# Patient Record
Sex: FEMALE | Race: WHITE | NOT HISPANIC OR LATINO | Employment: OTHER | URBAN - METROPOLITAN AREA
[De-identification: names, ages, dates, MRNs, and addresses within clinical notes are randomized per-mention and may not be internally consistent; named-entity substitution may affect disease eponyms.]

---

## 2017-01-23 ENCOUNTER — OFFICE VISIT (OUTPATIENT)
Dept: FAMILY MEDICINE | Facility: CLINIC | Age: 67
End: 2017-01-23
Payer: MEDICARE

## 2017-01-23 VITALS
WEIGHT: 193 LBS | HEART RATE: 80 BPM | DIASTOLIC BLOOD PRESSURE: 94 MMHG | BODY MASS INDEX: 28.58 KG/M2 | TEMPERATURE: 97.8 F | HEIGHT: 69 IN | SYSTOLIC BLOOD PRESSURE: 136 MMHG | OXYGEN SATURATION: 98 % | RESPIRATION RATE: 16 BRPM

## 2017-01-23 DIAGNOSIS — M62.838 MUSCLE SPASM: ICD-10-CM

## 2017-01-23 DIAGNOSIS — F43.22 ADJUSTMENT DISORDER WITH ANXIETY: ICD-10-CM

## 2017-01-23 DIAGNOSIS — F32.5 MAJOR DEPRESSIVE DISORDER, SINGLE EPISODE IN FULL REMISSION (H): Primary | Chronic | ICD-10-CM

## 2017-01-23 DIAGNOSIS — I10 ESSENTIAL HYPERTENSION, BENIGN: Chronic | ICD-10-CM

## 2017-01-23 DIAGNOSIS — Z79.899 CONTROLLED SUBSTANCE AGREEMENT SIGNED: ICD-10-CM

## 2017-01-23 PROCEDURE — 99214 OFFICE O/P EST MOD 30 MIN: CPT | Performed by: INTERNAL MEDICINE

## 2017-01-23 RX ORDER — DULOXETIN HYDROCHLORIDE 60 MG/1
60 CAPSULE, DELAYED RELEASE ORAL DAILY
Qty: 90 CAPSULE | Refills: 1 | Status: SHIPPED | OUTPATIENT
Start: 2017-01-23 | End: 2017-06-06

## 2017-01-23 RX ORDER — CLONAZEPAM 0.5 MG/1
.25-.5 TABLET ORAL
Qty: 45 TABLET | Refills: 0 | Status: SHIPPED | OUTPATIENT
Start: 2017-01-23 | End: 2017-07-24

## 2017-01-23 RX ORDER — LISINOPRIL 2.5 MG/1
2.5 TABLET ORAL DAILY
Qty: 90 TABLET | Refills: 3 | Status: SHIPPED | OUTPATIENT
Start: 2017-01-23 | End: 2017-07-24

## 2017-01-23 ASSESSMENT — ANXIETY QUESTIONNAIRES
GAD7 TOTAL SCORE: 7
IF YOU CHECKED OFF ANY PROBLEMS ON THIS QUESTIONNAIRE, HOW DIFFICULT HAVE THESE PROBLEMS MADE IT FOR YOU TO DO YOUR WORK, TAKE CARE OF THINGS AT HOME, OR GET ALONG WITH OTHER PEOPLE: SOMEWHAT DIFFICULT
3. WORRYING TOO MUCH ABOUT DIFFERENT THINGS: SEVERAL DAYS
6. BECOMING EASILY ANNOYED OR IRRITABLE: SEVERAL DAYS
7. FEELING AFRAID AS IF SOMETHING AWFUL MIGHT HAPPEN: SEVERAL DAYS
2. NOT BEING ABLE TO STOP OR CONTROL WORRYING: MORE THAN HALF THE DAYS
5. BEING SO RESTLESS THAT IT IS HARD TO SIT STILL: NOT AT ALL
1. FEELING NERVOUS, ANXIOUS, OR ON EDGE: SEVERAL DAYS

## 2017-01-23 ASSESSMENT — PATIENT HEALTH QUESTIONNAIRE - PHQ9: 5. POOR APPETITE OR OVEREATING: SEVERAL DAYS

## 2017-01-23 NOTE — Clinical Note
My Depression Action Plan  Name: Janie De Leon   Date of Birth 1950  Date: 1/23/2017    My doctor: Meche Fierro   My clinic: [address] [id number] [phone]          GREEN    ZONE   Good Control    What it looks like:     Things are going generally well. You have normal up s and down s. You may even feel depressed from time to time, but bad moods usually last less than a day.   What you need to do:  1. Continue to care for yourself (see self care plan)  2. Check your depression survival kit and update it as needed  3. Follow your physician s recommendations including any medication.  4. Do not stop taking medication unless you consult with your physician first.           YELLOW         ZONE Getting Worse    What it looks like:     Depression is starting to interfere with your life.     It may be hard to get out of bed; you may be starting to isolate yourself from others.    Symptoms of depression are starting to last most all day and this has happened for several days.     You may have suicidal thoughts but they are not constant.   What you need to do:     1. Call your care team, your response to treatment will improve if you keep your care team informed of your progress. Yellow periods are signs an adjustment may need to be made.     2. Continue your self-care, even if you have to fake it!    3. Talk to someone in your support network    4. Open up your depression survival kit           RED    ZONE Medical Alert - Get Help    What it looks like:     Depression is seriously interfering with your life.     You may experience these or other symptoms: You can t get out of bed most days, can t work or engage in other necessary activities, you have trouble taking care of basic hygiene, or basic responsibilities, thoughts of suicide or death that will not go away, self-injurious behavior.     What you need to do:  1. Call your care team and request a  same-day appointment. If they are not available (weekends or after hours) call your local crisis line, emergency room or 911.      Electronically signed by: Meche Fierro, January 23, 2017    Depression Self Care Plan / Survival Kit    Self-Care for Depression  Here s the deal. Your body and mind are really not as separate as most people think.  What you do and think affects how you feel and how you feel influences what you do and think. This means if you do things that people who feel good do, it will help you feel better.  Sometimes this is all it takes.  There is also a place for medication and therapy depending on how severe your depression is, so be sure to consult with your medical provider and/ or Behavioral Health Consultant if your symptoms are worsening or not improving.     In order to better manage my stress, I will:    Exercise  Get some form of exercise, every day. This will help reduce pain and release endorphins, the  feel good  chemicals in your brain. This is almost as good as taking antidepressants!  This is not the same as joining a gym and then never going! (they count on that by the way ) It can be as simple as just going for a walk or doing some gardening, anything that will get you moving.      Hygiene   Maintain good hygiene (Get out of bed in the morning, Make your bed, Brush your teeth, Take a shower, and Get dressed like you were going to work, even if you are unemployed).  If your clothes don't fit try to get ones that do.    Diet  I will strive to eat foods that are good for me, drink plenty of water, and avoid excessive sugar, caffeine, alcohol, and other mood-altering substances.  Some foods that are helpful in depression are: complex carbohydrates, B vitamins, flaxseed, fish or fish oil, fresh fruits and vegetables.    Psychotherapy  I agree to participate in Individual Therapy (if recommended).    Medication  If prescribed medications, I agree to take them.  Missing doses can  result in serious side effects.  I understand that drinking alcohol, or other illicit drug use, may cause potential side effects.  I will not stop my medication abruptly without first discussing it with my provider.    Staying Connected With Others  I will stay in touch with my friends, family members, and my primary care provider/team.    Use your imagination  Be creative.  We all have a creative side; it doesn t matter if it s oil painting, sand castles, or mud pies! This will also kick up the endorphins.    Witness Beauty  (AKA stop and smell the roses) Take a look outside, even in mid-winter. Notice colors, textures. Watch the squirrels and birds.     Service to others  Be of service to others.  There is always someone else in need.  By helping others we can  get out of ourselves  and remember the really important things.  This also provides opportunities for practicing all the other parts of the program.    Humor  Laugh and be silly!  Adjust your TV habits for less news and crime-drama and more comedy.    Control your stress  Try breathing deep, massage therapy, biofeedback, and meditation. Find time to relax each day.     My support system    Clinic Contact:  Phone number:    Contact 1:  Phone number:    Contact 2:  Phone number:    Buddhist/:  Phone number:    Therapist:  Phone number:    Local crisis center:    Phone number:    Other community support:  Phone number:

## 2017-01-23 NOTE — MR AVS SNAPSHOT
"              After Visit Summary   1/23/2017    Janie De Leon    MRN: [id number]           Patient Information     Date Of Birth          1950        Visit Information        Provider Department      1/23/2017 10:30 AM Meche Fierro,  [facility]        Today's Diagnoses     Major depressive disorder, single episode in full remission    -  1     Adjustment disorder with anxiety         Essential hypertension, benign - goal < 140/90           Care Instructions    Continue meds  Clonopin script given to you today  Let me know if your \"jolts\" get worse  See me this summer for physical or sooner if questions/concerns        Follow-ups after your visit        Who to contact     If you have questions or need follow up information about today's clinic visit or your schedule please contact [facility] directly at [phone].  Normal or non-critical lab and imaging results will be communicated to you by MyChart, letter or phone within 4 business days after the clinic has received the results. If you do not hear from us within 7 days, please contact the clinic through [employer] or phone. If you have a critical or abnormal lab result, we will notify you by phone as soon as possible.  Submit refill requests through [employer] or call your pharmacy and they will forward the refill request to us. Please allow 3 business days for your refill to be completed.          Additional Information About Your Visit        MyChart Information     [employer] gives you secure access to your electronic health record. If you see a primary care provider, you can also send messages to your care team and make appointments. If you have questions, please call your primary care clinic.  If you do not have a primary care provider, please call [phone] and they will assist you.        Care EveryWhere ID     This is your Care EveryWhere ID. This could be used by other organizations to access your [facility] " "records  [id number]        Your Vitals Were     Pulse Temperature Respirations Height BMI (Body Mass Index) Pulse Oximetry    80 97.8  F (36.6  C) (Oral) 16 5' 8.75\" (1.746 m) 28.72 kg/m2 98%    Breastfeeding?                   No            Blood Pressure from Last 3 Encounters:   01/23/17 136/94   07/22/16 115/68   06/07/16 132/82    Weight from Last 3 Encounters:   01/23/17 193 lb (87.544 kg)   07/22/16 190 lb (86.183 kg)   06/07/16 187 lb 8 oz (85.049 kg)              We Performed the Following     DEPRESSION ACTION PLAN (DAP) Order [[id number]]          Today's Medication Changes          These changes are accurate as of: 1/23/17 11:32 AM.  If you have any questions, ask your nurse or doctor.               Stop taking these medicines if you haven't already. Please contact your care team if you have questions.     cyclobenzaprine 5 MG tablet   Commonly known as:  FLEXERIL   Stopped by:  Meche Fierro DO                Where to get your medicines      These medications were sent to [facility] [id number] [place] [address] [address] AT [facility] [address], [facility] [address]     Phone:  [phone]    - DULoxetine 60 MG EC capsule  - lisinopril 2.5 MG tablet      Some of these will need a paper prescription and others can be bought over the counter.  Ask your nurse if you have questions.     Bring a paper prescription for each of these medications    - clonazePAM 0.5 MG tablet             Primary Care Provider Office Phone # Fax #    Meche Fierro [phone]       [facility] [address]  [facility] [address]        Thank you!     Thank you for choosing [facility]  for your care. Our goal is always to provide you with excellent care. Hearing back from our patients is one way we can continue to improve our services. Please take a few minutes to complete the written survey that you may receive in the mail after " your visit with us. Thank you!             Your Updated Medication List - Protect others around you: Learn how to safely use, store and throw away your medicines at www.disposemymeds.org.          This list is accurate as of: 1/23/17 11:32 AM.  Always use your most recent med list.                   Brand Name Dispense Instructions for use    aspirin 81 MG tablet      Take 81 mg by mouth daily       clonazePAM 0.5 MG tablet    klonoPIN    45 tablet    Take 0.5-1 tablets (0.25-0.5 mg) by mouth nightly as needed       DULoxetine 60 MG EC capsule    CYMBALTA    90 capsule    Take 1 capsule (60 mg) by mouth daily       GLUCOSAMINE CHONDR COMPLEX PO          levothyroxine 50 MCG tablet    SYNTHROID/LEVOTHROID    90 tablet    Take 1 tablet (50 mcg) by mouth daily       lisinopril 2.5 MG tablet    PRINIVIL/Zestril    90 tablet    Take 1 tablet (2.5 mg) by mouth daily       lovastatin 40 MG tablet    MEVACOR    90 tablet    Take 1 tablet (40 mg) by mouth At Bedtime       psyllium 58.6 % Powd    METAMUCIL     Take by mouth as needed for constipation       VITAMIN D3 PO      Take 1,000 Units by mouth daily

## 2017-01-23 NOTE — PROGRESS NOTES
"  SUBJECTIVE:                                                    Janie De Leon is a 66 year old female who presents to clinic today for the following health issues:    Chief Complaint   Patient presents with     Medication Follow-up     refills       Janie reports she is feeling much better since her recent dose increase of Cymbalta to 60 mg. She reports that she has been sleeping better and is beginning to do more activities she enjoys like quilting.   Janie is no longer getting night sweats.   Pt reports taking clonazepam 1-2x per month or max 1-2x per week as needed with good effect.   Janie was happy that she was able to visit a neurologist -- neurologist felt Janie was very stressed but no cognitive d/o.   Pt expresses feeling 'jerks/spams/jolts' when she lays down at night since beginning the increased dose of Cymbalta.  Janie discussed her , Quinton, and his cognitive impairment and some recent behaviors and symptoms he has had which is making it difficult to see his losses.    Problem list and histories reviewed & adjusted, as indicated.    ROS:  Detailed as above    This document serves as a record of the services and decisions personally performed and made by Meche Fierro DO. It was created on his/her behalf by Gosia Kessler, a trained medical scribe. The creation of this document is based the provider's statements to the medical scribe.  Samaria Kessler 11:02 AM, January 23, 2017    OBJECTIVE:                                                    /94 mmHg  Pulse 80  Temp(Src) 97.8  F (36.6  C) (Oral)  Resp 16  Ht 1.746 m (5' 8.75\")  Wt 87.544 kg (193 lb)  BMI 28.72 kg/m2  SpO2 98%  Breastfeeding? No  Body mass index is 28.72 kg/(m^2).  Brighter mood, euthymic, made many jokes throughout visit   HRRR without mrg     ASSESSMENT/PLAN:                                                      1. Major depressive disorder, single episode in full remission  Pt reports depression being " "much better controlled since last visit with increased dose from 30 mg to 60 mg.   She reports sleeping more and being more interested in hobbies and is happy with this change  PHQ is better but as per below, still has some anxiety  - DULoxetine (CYMBALTA) 60 MG EC capsule; Take 1 capsule (60 mg) by mouth daily  Dispense: 90 capsule; Refill: 1    PHQ-9 SCORE 1/21/2016 7/22/2016 1/23/2017   Total Score 1 4 2       2. Adjustment disorder with anxiety  Struggling with dementia dx of her   She was concerned about memory loss too so had neuro eval with reassuring results against a cognitive d/o - I don't have the records available to me at this time - did suggest stress so Cymbalta has helped  She takes Clonazepam sparingly  No concerns on  database - only filled Rx once in the past year  - clonazePAM (KLONOPIN) 0.5 MG tablet; Take 0.5-1 tablets (0.25-0.5 mg) by mouth nightly as needed  Dispense: 45 tablet; Refill: 0  QUEENIE-7 SCORE 1/7/2016 1/21/2016 1/23/2017   Total Score 1 0 7       3. Essential hypertension, benign - goal < 140/90  Original BP: 151/89, Recheck: 139/94  Pt reports Lisinopril compliance  Pt was very excited at visit and reports drinking a lots of coffee and running to make appointment and has good BP at home  - lisinopril (PRINIVIL/ZESTRIL) 2.5 MG tablet; Take 1 tablet (2.5 mg) by mouth daily  Dispense: 90 tablet; Refill: 3    4. Muscle spasm  Pt reports experiencing intermittent 'jolts' which are not outright concerning   Reassess changes in quality or quantity of symptoms at next encounter  May possibly be r/t increased dose Cymbalta but she has tolerated well at 60 mg dose for years prior     5. Controlled substance agreement signed  Clonazepam    Patient Instructions   Continue meds  Clonopin script given to you today  Let me know if your \"jolts\" get worse  See me this summer for physical or sooner if questions/concerns      The information in this document, created by the medical scribe " for me, accurately reflects the services I personally performed and the decisions made by me. I have reviewed and approved this document for accuracy prior to leaving the patient care area.  Meche Fierro DO  11:02 AM, 01/23/2017    Meche Fierro,   [facility]

## 2017-01-23 NOTE — NURSING NOTE
"Chief Complaint   Patient presents with     Medication Follow-up     refills       Initial /89 mmHg  Pulse 91  Temp(Src) 97.8  F (36.6  C) (Oral)  Resp 16  Ht 5' 8.75\" (1.746 m)  Wt 193 lb (87.544 kg)  BMI 28.72 kg/m2  SpO2 98%  Breastfeeding? No Estimated body mass index is 28.72 kg/(m^2) as calculated from the following:    Height as of this encounter: 5' 8.75\" (1.746 m).    Weight as of this encounter: 193 lb (87.544 kg).  BP completed using cuff size: sean Gomes CMA January 23, 2017 10:34 AM      "

## 2017-01-23 NOTE — Clinical Note
[facility]    01/23/2017    Patient: Janie De Leon  YOB: 1950  Medical Record Number: [id number]    Controlled Substance Agreement  I understand that my care provider has prescribed controlled substances (narcotics, tranquilizers, and/or stimulants) to help manage my condition(s).  I am taking this medicine to help me function or work.  I know that this is strong medicine.  It could have serious side effects and even cause a dependency on the drug.  If I stop these medicines suddenly, I could have severe withdrawal symptoms.    The risks, benefits, and side effects of these medication(s) were explained to me.  I agree that:  1. I will take part in other treatments as advised by my provider.  This may be psychiatry or counseling, physical therapy, behavioral therapy, group treatment, or a referral to a pain clinic.  I will reduce or stop my medicine when my provider tells me to do so.   2. I will take my medicines as prescribed.  I will not change the dose or schedule unless my provider tells me to.  There will be no refills if I  run out early.   I may be contacted at any time without warning and asked to complete a drug test or pill count.   3. I will keep all my appointments at the clinic.  If I miss appointments or fail to follow instructions, my provider may stop my medicine.  4. I will not ask other providers to prescribe controlled substances. And I will not accept controlled substances from other people. If I need another prescribed controlled substance for a new reason, I will notify my provider within one business day.  5. If I enroll in the [place] Medical Marijuana program, I will tell my provider.  I will also sign an agreement to share my medical records with my provider.  6. I will use one pharmacy to fill all of my controlled substance prescriptions.  If my prescription is mailed to my pharmacy, it may take 5 to 7 days for my medicine to be ready.  7. I understand that my  provider, clinic care team, and pharmacy can track controlled substance prescriptions from other providers through a central database (prescription monitoring program).  8. I will bring in my list of medications (or my medicine bottles) each time I come to the clinic.  Page 1 of 2      [facility]    01/23/2017    Patient: Janie De Leon  YOB: 1950  Medical Record Number: [id number]    9. Refills of controlled substances will be made only during office hours.  It is up to me to make sure that I do not run out of my medicines on weekends or holidays.    10. I am responsible for my prescriptions.  If the medicine is lost or stolen, it will not be replaced.   I also agree not to share these medicines with anyone.  11. I agree to not use ANY illegal or recreational drugs.  This includes marijuana, cocaine, bath salts or other drugs.  I agree not to use alcohol unless my provider says I may.  I agree to give urine samples whenever asked.  If I fail to give a urine sample, the provider may stop my medicine.     12. I will tell my nurse or provider right away if I become pregnant or have a new medical problem treated outside of [facility].  13. I understand that this medicine can affect my thinking and judgment.  It may be unsafe for me to drive, use machinery and do dangerous tasks.  I will not do any of these things until I know how the medicine affects me.  If my dose changes, I will wait to see how it affects me.  I will contact my provider if I have concerns about medicine side effects.  I understand that if I do not follow any of the conditions above, my prescriptions or treatment may be stopped.    I agree that my provider, clinic care team, and pharmacy may work with any city, state or federal law enforcement agency that investigates the misuse, sale, or other diversion of my controlled medicine. I will allow my provider to discuss my care with or share a copy of this agreement with any  other treating provider, pharmacy or emergency room where I receive care.  I agree to give up (waive) any right of privacy or confidentiality with respect to these authorizations.   I have read this agreement and have asked questions about anything I did not understand.   ___________________________________    ___________________________  Patient Signature                                                             Date and Time  ___________________________________     ____________________________  Witness                                                                              Date and Time  ___________________________________  Meche Isabel Fierro, DO  Page 2 of 2

## 2017-01-23 NOTE — PATIENT INSTRUCTIONS
"Continue meds  Clonopin script given to you today  Let me know if your \"jolts\" get worse  See me this summer for physical or sooner if questions/concerns  "

## 2017-01-24 ASSESSMENT — PATIENT HEALTH QUESTIONNAIRE - PHQ9: SUM OF ALL RESPONSES TO PHQ QUESTIONS 1-9: 2

## 2017-01-24 ASSESSMENT — ANXIETY QUESTIONNAIRES: GAD7 TOTAL SCORE: 7

## 2017-02-02 PROBLEM — Z79.899 CONTROLLED SUBSTANCE AGREEMENT SIGNED: Status: ACTIVE | Noted: 2017-02-02

## 2017-02-02 PROBLEM — Z79.899 CONTROLLED SUBSTANCE AGREEMENT SIGNED: Chronic | Status: ACTIVE | Noted: 2017-02-02

## 2017-02-20 ENCOUNTER — MYC MEDICAL ADVICE (OUTPATIENT)
Dept: FAMILY MEDICINE | Facility: CLINIC | Age: 67
End: 2017-02-20

## 2017-03-01 ENCOUNTER — OFFICE VISIT (OUTPATIENT)
Dept: FAMILY MEDICINE | Facility: CLINIC | Age: 67
End: 2017-03-01
Payer: MEDICARE

## 2017-03-01 VITALS
HEIGHT: 69 IN | TEMPERATURE: 97.8 F | HEART RATE: 89 BPM | BODY MASS INDEX: 28.5 KG/M2 | WEIGHT: 192.4 LBS | DIASTOLIC BLOOD PRESSURE: 74 MMHG | SYSTOLIC BLOOD PRESSURE: 118 MMHG | OXYGEN SATURATION: 97 %

## 2017-03-01 DIAGNOSIS — I10 ESSENTIAL HYPERTENSION, BENIGN: Chronic | ICD-10-CM

## 2017-03-01 DIAGNOSIS — M67.40 GANGLION CYST: Primary | ICD-10-CM

## 2017-03-01 PROCEDURE — 99212 OFFICE O/P EST SF 10 MIN: CPT | Performed by: INTERNAL MEDICINE

## 2017-03-01 RX ORDER — POLYETHYLENE GLYCOL 3350 17 G/17G
1 POWDER, FOR SOLUTION ORAL DAILY
COMMUNITY
End: 2017-08-16

## 2017-03-01 NOTE — MR AVS SNAPSHOT
After Visit Summary   3/1/2017    Janie De Leon    MRN: [id number]           Patient Information     Date Of Birth          1950        Visit Information        Provider Department      3/1/2017 9:30 AM Meche Fierro DO [facility]        Today's Diagnoses     Ganglion cyst    -  1    Essential hypertension, benign - goal < 140/90           Follow-ups after your visit        Your next 10 appointments already scheduled     Jul 24, 2017 10:00 AM CDT   Office Visit with Meche Fierro DO   [facility]    [address] [facility] [id number] [phone]           Bring a current list of meds and any records pertaining to this visit.  For Physicals, please bring immunization records and any forms needing to be filled out.  Please arrive 10 minutes early to complete paperwork.              Who to contact     If you have questions or need follow up information about today's clinic visit or your schedule please contact [facility] directly at [phone].  Normal or non-critical lab and imaging results will be communicated to you by Kamegohart, letter or phone within 4 business days after the clinic has received the results. If you do not hear from us within 7 days, please contact the clinic through [employer] or phone. If you have a critical or abnormal lab result, we will notify you by phone as soon as possible.  Submit refill requests through [employer] or call your pharmacy and they will forward the refill request to us. Please allow 3 business days for your refill to be completed.          Additional Information About Your Visit        Kamegohart Information     [employer] gives you secure access to your electronic health record. If you see a primary care provider, you can also send messages to your care team and make appointments. If you have questions, please call your primary care clinic.  If you do not have a primary care provider, please  "call [phone] and they will assist you.        Care EveryWhere ID     This is your Care EveryWhere ID. This could be used by other organizations to access your [facility] medical records  [id number]        Your Vitals Were     Pulse Temperature Height Pulse Oximetry BMI (Body Mass Index)       89 97.8  F (36.6  C) (Oral) 5' 8.75\" (1.746 m) 97% 28.62 kg/m2        Blood Pressure from Last 3 Encounters:   03/01/17 118/74   01/23/17 (!) 136/94   07/22/16 115/68    Weight from Last 3 Encounters:   03/01/17 192 lb 6.4 oz (87.3 kg)   01/23/17 193 lb (87.5 kg)   07/22/16 190 lb (86.2 kg)              Today, you had the following     No orders found for display       Primary Care Provider Office Phone # Fax #    Meche Hall Fierro,  [phone]       [facility] [address] [facility] [address]  [facility] [address]        Thank you!     Thank you for choosing [facility]  for your care. Our goal is always to provide you with excellent care. Hearing back from our patients is one way we can continue to improve our services. Please take a few minutes to complete the written survey that you may receive in the mail after your visit with us. Thank you!             Your Updated Medication List - Protect others around you: Learn how to safely use, store and throw away your medicines at www.disposemymeds.org.          This list is accurate as of: 3/1/17 11:59 PM.  Always use your most recent med list.                   Brand Name Dispense Instructions for use    aspirin 81 MG tablet      Take 81 mg by mouth daily       clonazePAM 0.5 MG tablet    klonoPIN    45 tablet    Take 0.5-1 tablets (0.25-0.5 mg) by mouth nightly as needed       DULoxetine 60 MG EC capsule    CYMBALTA    90 capsule    Take 1 capsule (60 mg) by mouth daily       GLUCOSAMINE CHONDR COMPLEX PO          levothyroxine 50 MCG tablet    SYNTHROID/LEVOTHROID    90 tablet    Take 1 tablet (50 mcg) by mouth daily       lisinopril 2.5 MG " tablet    PRINIVIL/Zestril    90 tablet    Take 1 tablet (2.5 mg) by mouth daily       lovastatin 40 MG tablet    MEVACOR    90 tablet    Take 1 tablet (40 mg) by mouth At Bedtime       MIRALAX powder   Generic drug:  polyethylene glycol      Take 1 capful by mouth daily       psyllium 58.6 % Powd    METAMUCIL     Take by mouth as needed for constipation       VITAMIN D3 PO      Take 1,000 Units by mouth daily

## 2017-03-01 NOTE — PROGRESS NOTES
"  SUBJECTIVE:                                                    Janie De Leon is a 66 year old female who presents to clinic today for the following health issues:    Lump on right hand, middle finger x 1 month    Janie reports that around a month ago a soft lump developed on her R middle finger. Lump on medial side of 3rd finger between PIP and DIP joint. Denies pain to touch or difficulty with ROM. No inciting injury - just seemed to be there. Has not changed since it first appeared. Otherwise feels well.    Problem list and histories reviewed & adjusted, as indicated.    ROS:  Detailed as above    This document serves as a record of the services and decisions personally performed and made by Meche Fierro DO. It was created on his/her behalf by Gosia Kessler, a trained medical scribe. The creation of this document is based the provider's statements to the medical scribe.  Samaria Kessler 9:36 AM, March 1, 2017    OBJECTIVE:                                                    /74 (BP Location: Right arm, Patient Position: Chair, Cuff Size: Adult Large)  Pulse 89  Temp 97.8  F (36.6  C) (Oral)  Ht 1.746 m (5' 8.75\")  Wt 87.3 kg (192 lb 6.4 oz)  SpO2 97%  BMI 28.62 kg/m2  Body mass index is 28.62 kg/(m^2).  Bright and pleasant  Soft, mobile, smooth, non-tender lump on medial side of 3rd finger between PIP and DIP joints on R hand     ASSESSMENT/PLAN:                                                      1. Ganglion cyst  Will monitor - reassured    2. Essential hypertension, benign - goal < 140/90  At goal.    The information in this document, created by the medical scribe for me, accurately reflects the services I personally performed and the decisions made by me. I have reviewed and approved this document for accuracy prior to leaving the patient care area.  Meche Fierro DO  9:36 AM, 03/01/17    No AVS required. F/u as planned.    Meche Fierro DO  [facility]  "

## 2017-03-01 NOTE — NURSING NOTE
"Chief Complaint   Patient presents with     Mass     finger       Initial /74 (BP Location: Right arm, Patient Position: Chair, Cuff Size: Adult Large)  Pulse 89  Temp 97.8  F (36.6  C) (Oral)  Ht 5' 8.75\" (1.746 m)  Wt 192 lb 6.4 oz (87.3 kg)  SpO2 97%  BMI 28.62 kg/m2 Estimated body mass index is 28.62 kg/(m^2) as calculated from the following:    Height as of this encounter: 5' 8.75\" (1.746 m).    Weight as of this encounter: 192 lb 6.4 oz (87.3 kg).  Medication Reconciliation: complete   Rosetta Watkins MA  "

## 2017-06-06 DIAGNOSIS — F32.5 MAJOR DEPRESSIVE DISORDER, SINGLE EPISODE IN FULL REMISSION (H): Chronic | ICD-10-CM

## 2017-06-06 RX ORDER — DULOXETIN HYDROCHLORIDE 60 MG/1
CAPSULE, DELAYED RELEASE ORAL
Qty: 15 CAPSULE | Refills: 0 | Status: SHIPPED | OUTPATIENT
Start: 2017-06-06 | End: 2017-07-24

## 2017-06-06 NOTE — TELEPHONE ENCOUNTER
Reason for Call:  Medication or medication refill:    Do you use a [facility]?  Name of the pharmacy and phone number for the current request:  [facility] in [place]  [phone]    Name of the medication requested: DULoxetine (CYMBALTA) 60 MG EC capsule       Other request: Patient is out of town due to house remodeling. Left full Rx at home. Just needs 15 days     Can we leave a detailed message on this number? YES    Phone number patient can be reached at: Cell number on file:    Telephone Information:   Mobile [phone]       Best Time: anytime     Call taken on 6/6/2017 at 10:32 AM by [place]

## 2017-06-06 NOTE — TELEPHONE ENCOUNTER
Cymbalta    Last Written Prescription Date: 01/23/17  Last Fill Quantity: 90, # refills: 1  Last Office Visit with [facility], [facility] or  Health prescribing provider: 03/01/17   Next 5 appointments (look out 90 days)     Jul 24, 2017 10:00 AM CDT   Office Visit with Meche Fierro DO   [facility]    [address] [facility] [id number] [phone]                   BP Readings from Last 3 Encounters:   03/01/17 118/74   01/23/17 (!) 136/94   07/22/16 115/68     Pulse: (for Fetzima)  Creatinine   Date Value Ref [facility] Status   07/15/2016 0.94 0.52 - 1.04 mg/dL Final   ]    Last PHQ-9 score on record=   PHQ-9 SCORE 1/23/2017   Total Score 2     Sierra Johnson MA

## 2017-06-06 NOTE — TELEPHONE ENCOUNTER
Prescription approved per [facility] Refill Protocol.    Called Patient to inform her that 15 tabs have been e-prescribed.    Yamile Salmon RN

## 2017-07-21 NOTE — PROGRESS NOTES
"  SUBJECTIVE:   Janie De Leon is a 67 year old female who presents for Preventive Visit.  Are you in the first 12 months of your Medicare Part B coverage?  No    Healthy Habits:    Do you get at least three servings of calcium containing foods daily (dairy, green leafy vegetables, etc.)? yes    Amount of exercise or daily activities, outside of work: walks dog 3 times per day - wanting to do more activity though again to loose weight     Problems taking medications regularly No    Medication side effects: No    Have you had an eye exam in the past two years? Yes     Do you see a dentist twice per year? Yes     Do you have sleep apnea, excessive snoring or daytime drowsiness? No - night owl though    COGNITIVE SCREEN  1) Repeat 3 items (Banana, Sunrise, Chair)    2) Clock draw: NORMAL  3) 3 item recall: Recalls 3 objects  Results: 3 items recalled: COGNITIVE IMPAIRMENT LESS LIKELY    Mini-CogTM Copyright S Elzbieta. Licensed by the author for use in [facility]; reprinted with permission (bianca@[facility]). All rights reserved.        Lena is here for her annual exam.  Wants to start jogging again. R knee s/p cortisone injection in the past which helped but still swells at times.   Says she was having concentrated urine x1 week and was dehydrated and then developed pain radiating from RLQ up to right flank. Feels deeper than muscle to her and more \"internal\"; sometimes hurts when first stands up. Not severe pain. Not brown urine - just seemed concentrated and now is clearing again. No fever/chills. No dysuria. Says that she never has typical \"bladder infection\" symptoms - just has flank pain and \"it goes up to my kidneys\". Bactrim works really well. Not sexually active with  as he is impotent.   with dementia and is stable.  Lena still sees a counselor which is helpful.  She will look at [employer] for a support group.  Stress at home with remodeling and 's dementia.  Sparing " use of Clonazepam. Still taking Cymbalta and finds its helpful.  Cyst on her finger popped and resolved.  No known breast changes but does have fibrocystic breast disease.  No family history updates - not close to to her brother.    Reviewed and updated as needed this visit by Provider        Social History   Substance Use Topics     Smoking status: Former Smoker     Quit date: 4/15/1975     Smokeless tobacco: Never Used     Alcohol use 0.0 oz/week     0 Standard drinks or equivalent per week      Comment: 5-6 drinks per week        The patient does not drink >3 drinks per day nor >7 drinks per week.    Today's PHQ-2 Score:   PHQ-2 ( 1999 Pfizer) 7/24/2017 3/1/2017   Q1: Little interest or pleasure in doing things 0 0   Q2: Feeling down, depressed or hopeless 0 0   PHQ-2 Score 0 0         Do you feel safe in your environment - Yes    Do you have a Health Care Directive?: No: Advance care planning reviewed with patient; information given to patient to review.      Current providers sharing in care for this patient include: Patient Care Team:  Meche Fierro DO as PCP - General (Internal Medicine)      Hearing impairment: No    Ability to successfully perform activities of daily living: Yes, no assistance needed     Fall risk:  Fallen 2 or more times in the past year?: No  Any fall with injury in the past year?: No      Home safety:  lack of grab bars in the bathroom    The following health maintenance items are reviewed in Epic and correct as of today:  Health Maintenance   Topic Date Due     PHQ-9 Q6 MONTHS  07/23/2017     MAMMO SCREEN Q2 YR (SYSTEM ASSIGNED)  08/21/2017     INFLUENZA VACCINE (SYSTEM ASSIGNED)  09/01/2017     FALL RISK ASSESSMENT  01/23/2018     QUEENIE QUESTIONNAIRE 1 YEAR  01/23/2018     DEPRESSION ACTION PLAN Q1 YR  01/23/2018     TETANUS IMMUNIZATION (SYSTEM ASSIGNED)  10/22/2019     ADVANCE DIRECTIVE PLANNING Q5 YRS  09/30/2020     COLONOSCOPY Q5 YR  03/22/2021     LIPID SCREEN Q5 YR FEMALE  "(SYSTEM ASSIGNED)  07/15/2021     DEXA SCAN SCREENING (SYSTEM ASSIGNED)  Completed     PNEUMOCOCCAL  Completed     HEPATITIS C SCREENING  Completed       ROS:  Comprehensive ROS negative unless as stated above in HPI.      OBJECTIVE:   /80 (BP Location: Right arm, Patient Position: Chair, Cuff Size: Adult Large)  Pulse 82  Temp 97.2  F (36.2  C) (Oral)  Ht 5' 8.75\" (1.746 m)  Wt 200 lb 8 oz (90.9 kg)  SpO2 96%  BMI 29.82 kg/m2 Estimated body mass index is 29.82 kg/(m^2) as calculated from the following:    Height as of this encounter: 5' 8.75\" (1.746 m).    Weight as of this encounter: 200 lb 8 oz (90.9 kg).  EXAM:   GENERAL APPEARANCE: healthy, alert and no distress  EYES: Eyes grossly normal to inspection, PERRL and conjunctivae and sclerae normal  HENT: ear canals and TM's normal, nose and mouth without ulcers or lesions, oropharynx clear and oral mucous membranes moist  NECK: no adenopathy, no asymmetry, masses, or scars and thyroid normal to palpation  RESP: lungs clear to auscultation - no rales, rhonchi or wheezes  BREAST: normal without masses, tenderness or nipple discharge and no palpable axillary masses or adenopathy; some fibrocystic chronic changes stable per patient bilaterally  CV: regular rate with occasional ectopy, normal S1 S2, no S3 or S4, no murmur, click or rub, no peripheral edema   ABDOMEN: soft, nontender, no hepatosplenomegaly, no masses and bowel sounds normal  MS: no musculoskeletal defects are noted and gait is age appropriate without ataxia; no CVA tenderness  SKIN: no suspicious lesions or rashes; chronic birth davy left breast; SKs back  NEURO: Normal strength and tone,mentation intact and speech normal  PSYCH: mentation appears normal and affect normal/bright    ASSESSMENT / PLAN:   1. Encounter for preventative adult health care exam with abnormal findings  Discussed Honoring [email or web address] class and gave her information about this  Needs labs and mammogram today but otherwise " "up to date on preventative health care  H/o Hysterectomy (JASBIR with BSO for benign fibroids)  - CBC with platelets; Future    2. Major depressive disorder, single episode in full remission  Continue therapy and to start running again  Continues with counseling  - DULoxetine (CYMBALTA) 60 MG EC capsule; TAKE 1 CAPSULE(60 MG) BY MOUTH DAILY  Dispense: 90 capsule; Refill: 3  PHQ-9 SCORE 7/22/2016 1/23/2017 7/24/2017   Total Score 4 2 3       3. Adjustment disorder with anxiety  Uses sparingly - Rx filled about 2x/year  - clonazePAM (KLONOPIN) 0.5 MG tablet; Take 0.5-1 tablets (0.25-0.5 mg) by mouth nightly as needed  Dispense: 45 tablet; Refill: 0    4. Subclinical hypothyroidism  - levothyroxine (SYNTHROID/LEVOTHROID) 50 MCG tablet; Take 1 tablet (50 mcg) by mouth daily  Dispense: 90 tablet; Refill: 3  - TSH with free T4 reflex; Future    5. Hyperlipidemia, unspecified hyperlipidemia type  - lovastatin (MEVACOR) 40 MG tablet; Take 1 tablet (40 mg) by mouth At Bedtime  Dispense: 90 tablet; Refill: 3  - Lipid panel reflex to direct LDL; Future    6. Essential hypertension, benign - goal < 140/90  At goal  - lisinopril (PRINIVIL/ZESTRIL) 2.5 MG tablet; Take 1 tablet (2.5 mg) by mouth daily  Dispense: 90 tablet; Refill: 3  - Comprehensive metabolic panel; Future    7. Visit for screening mammogram  - MA SCREENING DIGITAL BILAT - Future  (s+30); Future    8. Right flank pain  She wonders if it is r/t UTI though doesn't have classic UTI sx  UA today and go from there  ADDENDUM: UA negative for infection; does have \"trace\" blood. I discussed this with her on the phone and she really feels that it isn't muscular as she can push on the muscles without pain. Sometimes standing up does cause the discomfort. Isn't severe. Offered CT to look for kidney stone or other pathology but she wants to wait it out which is appropriate and see how it goes and we'll go from there. She will update me on MyChart or schedule an apt for changing " "symptoms.  - *UA reflex to Microscopic and Culture ([facility] (except [place] and [place])    9. Elevated fasting glucose  - Hemoglobin A1c; Future    End of Life Planning:  Patient currently has an advanced directive: No.  I have verified the patient's ablity to prepare an advanced directive/make health care decisions.  Literature was provided to assist patient in preparing an advanced directive.    COUNSELING:  Reviewed preventive health counseling, as reflected in patient instructions       Regular exercise       Healthy diet/nutrition  Estimated body mass index is 29.82 kg/(m^2) as calculated from the following:    Height as of this encounter: 5' 8.75\" (1.746 m).    Weight as of this encounter: 200 lb 8 oz (90.9 kg).   reports that she quit smoking about 42 years ago. She has never used smokeless tobacco.      Appropriate preventive services were discussed with this patient, including applicable screening as appropriate for cardiovascular disease, diabetes, osteopenia/osteoporosis, and glaucoma.  As appropriate for age/gender, discussed screening for colorectal cancer, prostate cancer, breast cancer, and cervical cancer. Checklist reviewing preventive services available has been given to the patient.    Reviewed patients plan of care and provided an AVS. The Intermediate Care Plan ( asthma action plan, low back pain action plan, and migraine action plan) for Janie meets the Care Plan requirement. This Care Plan has been established and reviewed with the Patient.    Patient Instructions   Lab urine today and maybe mammogram [address]  Schedule future fasting labs  I gave you the Clonazepam script and electronically sent in the others  Schedule an Honoring Cho[employer] class for advanced directives  If your right flank pain continues, let me know        Meche Fierro, DO  [facility]  "

## 2017-07-21 NOTE — PATIENT INSTRUCTIONS
Lab urine today and maybe mammogram [address]  Schedule future fasting labs  I gave you the Clonazepam script and electronically sent in the others  Schedule an Honoring Choices class for advanced directives  If your right flank pain continues, let me know    Preventive Health Recommendations  Female Ages 65 +    Yearly exam:     See your health care provider every year in order to  o Review health changes.   o Discuss preventive care.    o Review your medicines if your doctor has prescribed any.      You no longer need a yearly Pap test unless you've had an abnormal Pap test in the past 10 years. If you have vaginal symptoms, such as bleeding or discharge, be sure to talk with your provider about a Pap test.      Every 1 to 2 years, have a mammogram.  If you are over 69, talk with your health care provider about whether or not you want to continue having screening mammograms.      Every 10 years, have a colonoscopy. Or, have a yearly FIT test (stool test). These exams will check for colon cancer.       Have a cholesterol test every 5 years, or more often if your doctor advises it.       Have a diabetes test (fasting glucose) every three years. If you are at risk for diabetes, you should have this test more often.       At age 65, have a bone density scan (DEXA) to check for osteoporosis (brittle bone disease).    Shots:    Get a flu shot each year.    Get a tetanus shot every 10 years.    Talk to your doctor about your pneumonia vaccines. There are now two you should receive - Pneumovax (PPSV 23) and Prevnar (PCV 13).    Talk to your doctor about the shingles vaccine.    Talk to your doctor about the hepatitis B vaccine.    Nutrition:     Eat at least 5 servings of fruits and vegetables each day.      Eat whole-grain bread, whole-wheat pasta and brown rice instead of white grains and rice.      Talk to your provider about Calcium and Vitamin D.     Lifestyle    Exercise at least 150 minutes a week (30 minutes a  day, 5 days a week). This will help you control your weight and prevent disease.      Limit alcohol to one drink per day.      No smoking.       Wear sunscreen to prevent skin cancer.       See your dentist twice a year for an exam and cleaning.      See your eye doctor every 1 to 2 years to screen for conditions such as glaucoma, macular degeneration and cataracts.

## 2017-07-24 ENCOUNTER — OFFICE VISIT (OUTPATIENT)
Dept: FAMILY MEDICINE | Facility: CLINIC | Age: 67
End: 2017-07-24
Payer: MEDICARE

## 2017-07-24 ENCOUNTER — HOSPITAL ENCOUNTER (OUTPATIENT)
Dept: MAMMOGRAPHY | Facility: CLINIC | Age: 67
Discharge: HOME | End: 2017-07-24
Attending: INTERNAL MEDICINE | Admitting: INTERNAL MEDICINE
Payer: MEDICARE

## 2017-07-24 VITALS
OXYGEN SATURATION: 96 % | TEMPERATURE: 97.2 F | DIASTOLIC BLOOD PRESSURE: 80 MMHG | BODY MASS INDEX: 29.7 KG/M2 | WEIGHT: 200.5 LBS | HEIGHT: 69 IN | HEART RATE: 82 BPM | SYSTOLIC BLOOD PRESSURE: 124 MMHG

## 2017-07-24 DIAGNOSIS — Z12.31 VISIT FOR SCREENING MAMMOGRAM: ICD-10-CM

## 2017-07-24 DIAGNOSIS — F32.5 MAJOR DEPRESSIVE DISORDER, SINGLE EPISODE IN FULL REMISSION (H): Chronic | ICD-10-CM

## 2017-07-24 DIAGNOSIS — I10 ESSENTIAL HYPERTENSION, BENIGN: Chronic | ICD-10-CM

## 2017-07-24 DIAGNOSIS — F43.22 ADJUSTMENT DISORDER WITH ANXIETY: ICD-10-CM

## 2017-07-24 DIAGNOSIS — Z00.01 ENCOUNTER FOR PREVENTATIVE ADULT HEALTH CARE EXAM WITH ABNORMAL FINDINGS: Primary | ICD-10-CM

## 2017-07-24 DIAGNOSIS — R10.9 RIGHT FLANK PAIN: ICD-10-CM

## 2017-07-24 DIAGNOSIS — E03.8 SUBCLINICAL HYPOTHYROIDISM: ICD-10-CM

## 2017-07-24 DIAGNOSIS — E78.5 HYPERLIPIDEMIA, UNSPECIFIED HYPERLIPIDEMIA TYPE: Chronic | ICD-10-CM

## 2017-07-24 DIAGNOSIS — R73.01 ELEVATED FASTING GLUCOSE: ICD-10-CM

## 2017-07-24 LAB
ALBUMIN UR-MCNC: NEGATIVE MG/DL
APPEARANCE UR: CLEAR
BACTERIA #/AREA URNS HPF: ABNORMAL /HPF
BILIRUB UR QL STRIP: NEGATIVE
COLOR UR AUTO: YELLOW
GLUCOSE UR STRIP-MCNC: NEGATIVE MG/DL
HGB UR QL STRIP: ABNORMAL
KETONES UR STRIP-MCNC: NEGATIVE MG/DL
LEUKOCYTE ESTERASE UR QL STRIP: ABNORMAL
NITRATE UR QL: NEGATIVE
PH UR STRIP: 5.5 PH (ref 5–7)
RBC #/AREA URNS AUTO: ABNORMAL /HPF (ref 0–2)
SP GR UR STRIP: <=1.005 (ref 1–1.03)
URN SPEC COLLECT METH UR: ABNORMAL
UROBILINOGEN UR STRIP-ACNC: 0.2 EU/DL (ref 0.2–1)
WBC #/AREA URNS AUTO: ABNORMAL /HPF (ref 0–2)

## 2017-07-24 PROCEDURE — 77063 BREAST TOMOSYNTHESIS BI: CPT

## 2017-07-24 PROCEDURE — 81001 URINALYSIS AUTO W/SCOPE: CPT | Performed by: INTERNAL MEDICINE

## 2017-07-24 PROCEDURE — G0439 PPPS, SUBSEQ VISIT: HCPCS | Performed by: INTERNAL MEDICINE

## 2017-07-24 RX ORDER — DULOXETIN HYDROCHLORIDE 60 MG/1
CAPSULE, DELAYED RELEASE ORAL
Qty: 90 CAPSULE | Refills: 3 | Status: SHIPPED | OUTPATIENT
Start: 2017-07-24 | End: 2018-07-30

## 2017-07-24 RX ORDER — LISINOPRIL 2.5 MG/1
2.5 TABLET ORAL DAILY
Qty: 90 TABLET | Refills: 3 | Status: SHIPPED | OUTPATIENT
Start: 2017-07-24 | End: 2018-07-30

## 2017-07-24 RX ORDER — LOVASTATIN 40 MG
40 TABLET ORAL AT BEDTIME
Qty: 90 TABLET | Refills: 3 | Status: SHIPPED | OUTPATIENT
Start: 2017-07-24 | End: 2018-07-30

## 2017-07-24 RX ORDER — LEVOTHYROXINE SODIUM 50 UG/1
50 TABLET ORAL DAILY
Qty: 90 TABLET | Refills: 3 | Status: SHIPPED | OUTPATIENT
Start: 2017-07-24 | End: 2017-07-28

## 2017-07-24 RX ORDER — CLONAZEPAM 0.5 MG/1
.25-.5 TABLET ORAL
Qty: 45 TABLET | Refills: 0 | Status: SHIPPED | OUTPATIENT
Start: 2017-07-24 | End: 2018-01-30

## 2017-07-24 NOTE — MR AVS SNAPSHOT
After Visit Summary   7/24/2017    Janie De Leon    MRN: [id number]           Patient Information     Date Of Birth          1950        Visit Information        Provider Department      7/24/2017 10:00 AM Meche Fierro,  [facility]        Today's Diagnoses     Encounter for preventative adult health care exam with abnormal findings    -  1    Major depressive disorder, single episode in full remission        Adjustment disorder with anxiety        Subclinical hypothyroidism        Hyperlipidemia, unspecified hyperlipidemia type        Essential hypertension, benign - goal < 140/90        Visit for screening mammogram        Right flank pain        Elevated fasting glucose          Care Instructions    Lab urine today and maybe mammogram [address]  Schedule future fasting labs  I gave you the Clonazepam script and electronically sent in the others  Schedule an Honoring Choices class for advanced directives  If your right flank pain continues, let me know    Preventive Health Recommendations  Female Ages 65 +    Yearly exam:     See your health care provider every year in order to  o Review health changes.   o Discuss preventive care.    o Review your medicines if your doctor has prescribed any.      You no longer need a yearly Pap test unless you've had an abnormal Pap test in the past 10 years. If you have vaginal symptoms, such as bleeding or discharge, be sure to talk with your provider about a Pap test.      Every 1 to 2 years, have a mammogram.  If you are over 69, talk with your health care provider about whether or not you want to continue having screening mammograms.      Every 10 years, have a colonoscopy. Or, have a yearly FIT test (stool test). These exams will check for colon cancer.       Have a cholesterol test every 5 years, or more often if your doctor advises it.       Have a diabetes test (fasting glucose) every three years. If you are at risk for diabetes, you  should have this test more often.       At age 65, have a bone density scan (DEXA) to check for osteoporosis (brittle bone disease).    Shots:    Get a flu shot each year.    Get a tetanus shot every 10 years.    Talk to your doctor about your pneumonia vaccines. There are now two you should receive - Pneumovax (PPSV 23) and Prevnar (PCV 13).    Talk to your doctor about the shingles vaccine.    Talk to your doctor about the hepatitis B vaccine.    Nutrition:     Eat at least 5 servings of fruits and vegetables each day.      Eat whole-grain bread, whole-wheat pasta and brown rice instead of white grains and rice.      Talk to your provider about Calcium and Vitamin D.     Lifestyle    Exercise at least 150 minutes a week (30 minutes a day, 5 days a week). This will help you control your weight and prevent disease.      Limit alcohol to one drink per day.      No smoking.       Wear sunscreen to prevent skin cancer.       See your dentist twice a year for an exam and cleaning.      See your eye doctor every 1 to 2 years to screen for conditions such as glaucoma, macular degeneration and cataracts.          Follow-ups after your visit        Future tests that were ordered for you today     Open Future Orders        Priority Expected Expires Ordered    CBC with platelets Routine 7/25/2017 1/24/2018 7/24/2017    Comprehensive metabolic panel Routine 7/25/2017 1/24/2018 7/24/2017    Lipid panel reflex to direct LDL Routine 7/25/2017 1/24/2018 7/24/2017    Hemoglobin A1c Routine 7/25/2017 1/24/2018 7/24/2017    TSH with free T4 reflex Routine 7/25/2017 1/24/2018 7/24/2017    MA SCREENING DIGITAL BILAT - Future  (s+30) Routine  7/21/2018 7/24/2017            Who to contact     If you have questions or need follow up information about today's clinic visit or your schedule please contact [facility] directly at [phone].  Normal or non-critical lab and imaging results will be communicated to you by [employer]  "letter or phone within 4 business days after the clinic has received the results. If you do not hear from us within 7 days, please contact the clinic through [employer] or phone. If you have a critical or abnormal lab result, we will notify you by phone as soon as possible.  Submit refill requests through [employer] or call your pharmacy and they will forward the refill request to us. Please allow 3 business days for your refill to be completed.          Additional Information About Your Visit        [employer] Information     [employer] gives you secure access to your electronic health record. If you see a primary care provider, you can also send messages to your care team and make appointments. If you have questions, please call your primary care clinic.  If you do not have a primary care provider, please call [phone] and they will assist you.        Care EveryWhere ID     This is your Care EveryWhere ID. This could be used by other organizations to access your [facility] medical records  [id number]        Your Vitals Were     Pulse Temperature Height Pulse Oximetry BMI (Body Mass Index)       82 97.2  F (36.2  C) (Oral) 5' 8.75\" (1.746 m) 96% 29.82 kg/m2        Blood Pressure from Last 3 Encounters:   07/24/17 124/80   03/01/17 118/74   01/23/17 (!) 136/94    Weight from Last 3 Encounters:   07/24/17 200 lb 8 oz (90.9 kg)   03/01/17 192 lb 6.4 oz (87.3 kg)   01/23/17 193 lb (87.5 kg)              We Performed the Following     *UA reflex to Microscopic and Culture ([place] and [facility] (except [place] and [place])          Today's Medication Changes          These changes are accurate as of: 7/24/17 10:46 AM.  If you have any questions, ask your nurse or doctor.               These medicines have changed or have updated prescriptions.        Dose/Directions    DULoxetine 60 MG EC capsule   Commonly known as:  CYMBALTA   This may have changed:  See the new instructions.   Used for:  Major depressive disorder, " single episode in full remission (H)   Changed by:  Meche Fierro DO        TAKE 1 CAPSULE(60 MG) BY MOUTH DAILY   Quantity:  90 capsule   Refills:  3            Where to get your medicines      These medications were sent to [facility] [facility] [address] AT [facility] [address], [facility] [address]     Phone:  [phone]     DULoxetine 60 MG EC capsule    levothyroxine 50 MCG tablet    lisinopril 2.5 MG tablet    lovastatin 40 MG tablet         Some of these will need a paper prescription and others can be bought over the counter.  Ask your nurse if you have questions.     Bring a paper prescription for each of these medications     clonazePAM 0.5 MG tablet                Primary Care Provider Office Phone # Fax #    Meche Fierro [phone]       [facility] [address]  [facility] [address]        Equal Access to Services     [employer] DOUGLAS AH: Hadii aad ku hadasho Soomaali, waaxda luqadaha, qaybta kaalmada adeegyada, waxay idiin hayaan adeeg khararoly larudi . So [facility] [phone].    ATENCIÓN: Si habla español, tiene a benton disposición servicios gratuitos de asistencia lingüística. [facility] [phone].    We comply with applicable federal civil rights laws and [place] laws. We do not discriminate on the basis of race, color, national origin, age, disability sex, sexual orientation or gender identity.            Thank you!     Thank you for choosing [facility]  for your care. Our goal is always to provide you with excellent care. Hearing back from our patients is one way we can continue to improve our services. Please take a few minutes to complete the written survey that you may receive in the mail after your visit with us. Thank you!             Your Updated Medication List - Protect others around you: Learn how to safely use, store and throw away your medicines at www.disposemymeds.org.           This list is accurate as of: 7/24/17 10:46 AM.  Always use your most recent med list.                   Brand Name Dispense Instructions for use Diagnosis    aspirin 81 MG tablet      Take 81 mg by mouth daily        clonazePAM 0.5 MG tablet    klonoPIN    45 tablet    Take 0.5-1 tablets (0.25-0.5 mg) by mouth nightly as needed    Adjustment disorder with anxiety       DULoxetine 60 MG EC capsule    CYMBALTA    90 capsule    TAKE 1 CAPSULE(60 MG) BY MOUTH DAILY    Major depressive disorder, single episode in full remission (H)       levothyroxine 50 MCG tablet    SYNTHROID/LEVOTHROID    90 tablet    Take 1 tablet (50 mcg) by mouth daily    Subclinical hypothyroidism       lisinopril 2.5 MG tablet    PRINIVIL/Zestril    90 tablet    Take 1 tablet (2.5 mg) by mouth daily    Essential hypertension, benign       lovastatin 40 MG tablet    MEVACOR    90 tablet    Take 1 tablet (40 mg) by mouth At Bedtime    Hyperlipidemia, unspecified hyperlipidemia type       MIRALAX powder   Generic drug:  polyethylene glycol      Take 1 capful by mouth daily        psyllium 58.6 % Powd    METAMUCIL     Take by mouth as needed for constipation        VITAMIN D3 PO      Take 1,000 Units by mouth daily

## 2017-07-24 NOTE — NURSING NOTE
"Chief Complaint   Patient presents with     Wellness Visit       Initial /80 (BP Location: Right arm, Patient Position: Chair, Cuff Size: Adult Large)  Pulse 82  Temp 97.2  F (36.2  C) (Oral)  Ht 5' 8.75\" (1.746 m)  Wt 200 lb 8 oz (90.9 kg)  SpO2 96%  BMI 29.82 kg/m2 Estimated body mass index is 29.82 kg/(m^2) as calculated from the following:    Height as of this encounter: 5' 8.75\" (1.746 m).    Weight as of this encounter: 200 lb 8 oz (90.9 kg).  Medication Reconciliation: complete   Rosetta Watkins MA  "

## 2017-07-25 ASSESSMENT — PATIENT HEALTH QUESTIONNAIRE - PHQ9: SUM OF ALL RESPONSES TO PHQ QUESTIONS 1-9: 3

## 2017-07-26 DIAGNOSIS — R73.01 ELEVATED FASTING GLUCOSE: ICD-10-CM

## 2017-07-26 DIAGNOSIS — E78.5 HYPERLIPIDEMIA, UNSPECIFIED HYPERLIPIDEMIA TYPE: Chronic | ICD-10-CM

## 2017-07-26 DIAGNOSIS — I10 ESSENTIAL HYPERTENSION, BENIGN: Chronic | ICD-10-CM

## 2017-07-26 DIAGNOSIS — Z00.01 ENCOUNTER FOR PREVENTATIVE ADULT HEALTH CARE EXAM WITH ABNORMAL FINDINGS: ICD-10-CM

## 2017-07-26 DIAGNOSIS — E03.8 SUBCLINICAL HYPOTHYROIDISM: ICD-10-CM

## 2017-07-26 LAB
ALBUMIN SERPL-MCNC: 3.6 G/DL (ref 3.4–5)
ALP SERPL-CCNC: 99 U/L (ref 40–150)
ALT SERPL W P-5'-P-CCNC: 17 U/L (ref 0–50)
ANION GAP SERPL CALCULATED.3IONS-SCNC: 5 MMOL/L (ref 3–14)
AST SERPL W P-5'-P-CCNC: 14 U/L (ref 0–45)
BILIRUB SERPL-MCNC: 0.3 MG/DL (ref 0.2–1.3)
BUN SERPL-MCNC: 17 MG/DL (ref 7–30)
CALCIUM SERPL-MCNC: 8.8 MG/DL (ref 8.5–10.1)
CHLORIDE SERPL-SCNC: 108 MMOL/L (ref 94–109)
CHOLEST SERPL-MCNC: 192 MG/DL
CO2 SERPL-SCNC: 28 MMOL/L (ref 20–32)
CREAT SERPL-MCNC: 0.98 MG/DL (ref 0.52–1.04)
ERYTHROCYTE [DISTWIDTH] IN BLOOD BY AUTOMATED COUNT: 12.7 % (ref 10–15)
GFR SERPL CREATININE-BSD FRML MDRD: 57 ML/MIN/1.7M2
GLUCOSE SERPL-MCNC: 97 MG/DL (ref 70–99)
HBA1C MFR BLD: 5.9 % (ref 4.3–6)
HCT VFR BLD AUTO: 45.2 % (ref 35–47)
HDLC SERPL-MCNC: 52 MG/DL
HGB BLD-MCNC: 14.6 G/DL (ref 11.7–15.7)
LDLC SERPL CALC-MCNC: 108 MG/DL
MCH RBC QN AUTO: 29.6 PG (ref 26.5–33)
MCHC RBC AUTO-ENTMCNC: 32.3 G/DL (ref 31.5–36.5)
MCV RBC AUTO: 92 FL (ref 78–100)
NONHDLC SERPL-MCNC: 140 MG/DL
PLATELET # BLD AUTO: 240 10E9/L (ref 150–450)
POTASSIUM SERPL-SCNC: 4.4 MMOL/L (ref 3.4–5.3)
PROT SERPL-MCNC: 6.6 G/DL (ref 6.8–8.8)
RBC # BLD AUTO: 4.93 10E12/L (ref 3.8–5.2)
SODIUM SERPL-SCNC: 141 MMOL/L (ref 133–144)
T4 FREE SERPL-MCNC: 0.96 NG/DL (ref 0.76–1.46)
TRIGL SERPL-MCNC: 161 MG/DL
TSH SERPL DL<=0.005 MIU/L-ACNC: 5.6 MU/L (ref 0.4–4)
WBC # BLD AUTO: 5 10E9/L (ref 4–11)

## 2017-07-26 PROCEDURE — 85027 COMPLETE CBC AUTOMATED: CPT | Performed by: INTERNAL MEDICINE

## 2017-07-26 PROCEDURE — 83036 HEMOGLOBIN GLYCOSYLATED A1C: CPT | Performed by: INTERNAL MEDICINE

## 2017-07-26 PROCEDURE — 84443 ASSAY THYROID STIM HORMONE: CPT | Performed by: INTERNAL MEDICINE

## 2017-07-26 PROCEDURE — 84439 ASSAY OF FREE THYROXINE: CPT | Performed by: INTERNAL MEDICINE

## 2017-07-26 PROCEDURE — 80053 COMPREHEN METABOLIC PANEL: CPT | Performed by: INTERNAL MEDICINE

## 2017-07-26 PROCEDURE — 36415 COLL VENOUS BLD VENIPUNCTURE: CPT | Performed by: INTERNAL MEDICINE

## 2017-07-26 PROCEDURE — 80061 LIPID PANEL: CPT | Performed by: INTERNAL MEDICINE

## 2017-07-28 ENCOUNTER — MYC MEDICAL ADVICE (OUTPATIENT)
Dept: FAMILY MEDICINE | Facility: CLINIC | Age: 67
End: 2017-07-28

## 2017-07-28 DIAGNOSIS — E03.8 SUBCLINICAL HYPOTHYROIDISM: ICD-10-CM

## 2017-07-28 RX ORDER — LEVOTHYROXINE SODIUM 75 UG/1
75 TABLET ORAL DAILY
Qty: 90 TABLET | Refills: 0 | Status: SHIPPED | OUTPATIENT
Start: 2017-07-28 | End: 2018-04-12

## 2017-07-28 NOTE — TELEPHONE ENCOUNTER
DOD,    Please review pended med.    Mychart from pcp to pt below indicates switch to 75 mg with recheck in 2-3 months, sent pt mychart notifying this would be sent today and to get labs before rx runs out. Pended lab and 90 day rx.    Janie,   It was so nice seeing you this week!   Your labs are stable other than still slightly low thyroid function (as evidenced by a high TSH).   If I increase the Levothyroxine, this will help improve your cholesterol even more and may help with some weight loss. Would this be ok with you? Please let me know via MyChart. If I increase you from 50 mcg daily to 75 mcg daily, I'd suggest repeat lab only appointment in about 2-3 months.   Thanks!

## 2017-08-01 ENCOUNTER — MYC MEDICAL ADVICE (OUTPATIENT)
Dept: FAMILY MEDICINE | Facility: CLINIC | Age: 67
End: 2017-08-01

## 2017-08-01 DIAGNOSIS — R31.29 MICROSCOPIC HEMATURIA: ICD-10-CM

## 2017-08-01 DIAGNOSIS — R10.9 RIGHT FLANK PAIN: Primary | ICD-10-CM

## 2017-08-04 ENCOUNTER — HOSPITAL ENCOUNTER (OUTPATIENT)
Dept: CT IMAGING | Facility: CLINIC | Age: 67
Discharge: HOME | End: 2017-08-04
Attending: INTERNAL MEDICINE | Admitting: INTERNAL MEDICINE
Payer: MEDICARE

## 2017-08-04 DIAGNOSIS — R10.9 RIGHT FLANK PAIN: ICD-10-CM

## 2017-08-04 DIAGNOSIS — R31.29 MICROSCOPIC HEMATURIA: ICD-10-CM

## 2017-08-04 PROCEDURE — 25000128 H RX IP 250 OP 636: Performed by: INTERNAL MEDICINE

## 2017-08-04 PROCEDURE — 74178 CT ABD&PLV WO CNTR FLWD CNTR: CPT

## 2017-08-04 PROCEDURE — 25000125 ZZHC RX 250: Performed by: INTERNAL MEDICINE

## 2017-08-04 RX ORDER — IOPAMIDOL 755 MG/ML
100 INJECTION, SOLUTION INTRAVASCULAR ONCE
Status: COMPLETED | OUTPATIENT
Start: 2017-08-04 | End: 2017-08-04

## 2017-08-04 RX ADMIN — IOPAMIDOL 100 ML: 755 INJECTION, SOLUTION INTRAVENOUS at 11:21

## 2017-08-04 RX ADMIN — SODIUM CHLORIDE 60 ML: 9 INJECTION, SOLUTION INTRAVENOUS at 11:22

## 2017-08-14 ENCOUNTER — MYC MEDICAL ADVICE (OUTPATIENT)
Dept: FAMILY MEDICINE | Facility: CLINIC | Age: 67
End: 2017-08-14

## 2017-08-14 NOTE — TELEPHONE ENCOUNTER
Routing  [employer] message to PCP for review and advise.     Thank you,  Hermelinda DING RN,BSN

## 2017-08-15 DIAGNOSIS — E78.5 HYPERLIPIDEMIA, UNSPECIFIED HYPERLIPIDEMIA TYPE: Chronic | ICD-10-CM

## 2017-08-16 ENCOUNTER — APPOINTMENT (OUTPATIENT)
Dept: GENERAL RADIOLOGY | Facility: CLINIC | Age: 67
End: 2017-08-16
Attending: EMERGENCY MEDICINE
Payer: MEDICARE

## 2017-08-16 ENCOUNTER — HOSPITAL ENCOUNTER (EMERGENCY)
Facility: CLINIC | Age: 67
Discharge: HOME | End: 2017-08-17
Attending: EMERGENCY MEDICINE | Admitting: EMERGENCY MEDICINE
Payer: MEDICARE

## 2017-08-16 DIAGNOSIS — K59.00 CONSTIPATION, UNSPECIFIED CONSTIPATION TYPE: ICD-10-CM

## 2017-08-16 DIAGNOSIS — R10.9 FLANK PAIN: ICD-10-CM

## 2017-08-16 LAB
ALBUMIN SERPL-MCNC: 3.8 G/DL (ref 3.4–5)
ALBUMIN UR-MCNC: NEGATIVE MG/DL
ALP SERPL-CCNC: 106 U/L (ref 40–150)
ALT SERPL W P-5'-P-CCNC: 20 U/L (ref 0–50)
ANION GAP SERPL CALCULATED.3IONS-SCNC: 8 MMOL/L (ref 3–14)
APPEARANCE UR: CLEAR
AST SERPL W P-5'-P-CCNC: 17 U/L (ref 0–45)
BASOPHILS # BLD AUTO: 0 10E9/L (ref 0–0.2)
BASOPHILS NFR BLD AUTO: 0.6 %
BILIRUB SERPL-MCNC: 0.5 MG/DL (ref 0.2–1.3)
BILIRUB UR QL STRIP: NEGATIVE
BUN SERPL-MCNC: 13 MG/DL (ref 7–30)
CALCIUM SERPL-MCNC: 8.8 MG/DL (ref 8.5–10.1)
CHLORIDE SERPL-SCNC: 106 MMOL/L (ref 94–109)
CO2 SERPL-SCNC: 27 MMOL/L (ref 20–32)
COLOR UR AUTO: YELLOW
CREAT SERPL-MCNC: 0.92 MG/DL (ref 0.52–1.04)
DIFFERENTIAL METHOD BLD: ABNORMAL
EOSINOPHIL # BLD AUTO: 0.2 10E9/L (ref 0–0.7)
EOSINOPHIL NFR BLD AUTO: 2.6 %
ERYTHROCYTE [DISTWIDTH] IN BLOOD BY AUTOMATED COUNT: 12.5 % (ref 10–15)
GFR SERPL CREATININE-BSD FRML MDRD: 60 ML/MIN/1.7M2
GLUCOSE SERPL-MCNC: 149 MG/DL (ref 70–99)
GLUCOSE UR STRIP-MCNC: NEGATIVE MG/DL
HCT VFR BLD AUTO: 47.1 % (ref 35–47)
HGB BLD-MCNC: 15.7 G/DL (ref 11.7–15.7)
HGB UR QL STRIP: NEGATIVE
IMM GRANULOCYTES # BLD: 0 10E9/L (ref 0–0.4)
IMM GRANULOCYTES NFR BLD: 0.2 %
KETONES UR STRIP-MCNC: NEGATIVE MG/DL
LEUKOCYTE ESTERASE UR QL STRIP: ABNORMAL
LIPASE SERPL-CCNC: 160 U/L (ref 73–393)
LYMPHOCYTES # BLD AUTO: 1.8 10E9/L (ref 0.8–5.3)
LYMPHOCYTES NFR BLD AUTO: 29.2 %
MCH RBC QN AUTO: 30.3 PG (ref 26.5–33)
MCHC RBC AUTO-ENTMCNC: 33.3 G/DL (ref 31.5–36.5)
MCV RBC AUTO: 91 FL (ref 78–100)
MONOCYTES # BLD AUTO: 0.5 10E9/L (ref 0–1.3)
MONOCYTES NFR BLD AUTO: 8.3 %
NEUTROPHILS # BLD AUTO: 3.7 10E9/L (ref 1.6–8.3)
NEUTROPHILS NFR BLD AUTO: 59.1 %
NITRATE UR QL: NEGATIVE
NRBC # BLD AUTO: 0 10*3/UL
NRBC BLD AUTO-RTO: 0 /100
PH UR STRIP: 5 PH (ref 5–7)
PLATELET # BLD AUTO: 263 10E9/L (ref 150–450)
POTASSIUM SERPL-SCNC: 4.1 MMOL/L (ref 3.4–5.3)
PROT SERPL-MCNC: 7 G/DL (ref 6.8–8.8)
RBC # BLD AUTO: 5.19 10E12/L (ref 3.8–5.2)
RBC #/AREA URNS AUTO: NORMAL /HPF
SODIUM SERPL-SCNC: 141 MMOL/L (ref 133–144)
SOURCE: ABNORMAL
SP GR UR STRIP: 1.02 (ref 1–1.03)
UROBILINOGEN UR STRIP-ACNC: 0.2 EU/DL (ref 0.2–1)
WBC # BLD AUTO: 6.2 10E9/L (ref 4–11)
WBC #/AREA URNS AUTO: NORMAL /HPF

## 2017-08-16 PROCEDURE — 81001 URINALYSIS AUTO W/SCOPE: CPT | Performed by: EMERGENCY MEDICINE

## 2017-08-16 PROCEDURE — 25000132 ZZH RX MED GY IP 250 OP 250 PS 637: Mod: GY | Performed by: EMERGENCY MEDICINE

## 2017-08-16 PROCEDURE — 85025 COMPLETE CBC W/AUTO DIFF WBC: CPT | Performed by: EMERGENCY MEDICINE

## 2017-08-16 PROCEDURE — A9270 NON-COVERED ITEM OR SERVICE: HCPCS | Mod: GY | Performed by: EMERGENCY MEDICINE

## 2017-08-16 PROCEDURE — 74020 XR ABDOMEN 2 VW: CPT

## 2017-08-16 PROCEDURE — 99284 EMERGENCY DEPT VISIT MOD MDM: CPT

## 2017-08-16 PROCEDURE — 83690 ASSAY OF LIPASE: CPT | Performed by: EMERGENCY MEDICINE

## 2017-08-16 PROCEDURE — 80053 COMPREHEN METABOLIC PANEL: CPT | Performed by: EMERGENCY MEDICINE

## 2017-08-16 RX ORDER — LOVASTATIN 40 MG
TABLET ORAL
Refills: 0
Start: 2017-08-16

## 2017-08-16 RX ORDER — POLYETHYLENE GLYCOL 3350 17 G/17G
1 POWDER, FOR SOLUTION ORAL 2 TIMES DAILY
Qty: 340 G | Refills: 0 | Status: SHIPPED | OUTPATIENT
Start: 2017-08-16 | End: 2017-08-26

## 2017-08-16 RX ADMIN — DOCUSATE SODIUM 286 ML: 10 LIQUID ORAL at 20:43

## 2017-08-16 ASSESSMENT — ENCOUNTER SYMPTOMS
BACK PAIN: 1
FLANK PAIN: 1
VOMITING: 0
CONSTIPATION: 1
FEVER: 0
NAUSEA: 0

## 2017-08-16 NOTE — ED AVS SNAPSHOT
Emergency Department    [address] [facility] [address]    Phone:  [phone]    Fax:  [phone]                                       Janie De Leon   MRN: [id number]    Department:   Emergency Department   Date of Visit:  8/16/2017           Patient Information     Date Of Birth          1950        Your diagnoses for this visit were:     Flank pain     Constipation, unspecified constipation type        You were seen by Burke Wade MD and Jose Manuel Rabago MD.      Follow-up Information     Follow up with Meche Fierro DO In 1 week.    Specialty:  Internal Medicine    Contact information:    [address] [id number] [phone]          Follow up with  Emergency Department.    Specialty:  EMERGENCY MEDICINE    Why:  As needed, If symptoms worsen    Contact information:    [address] [facility] [address] [phone]        Discharge Instructions       Discharge Instructions  Abdominal Pain    Abdominal pain (belly pain) can be caused by many things. Your evaluation today does not show the exact cause for your pain. Your provider today has decided that it is unlikely your pain is due to a life threatening problem, or a problem requiring surgery or hospital admission. Sometimes those problems cannot be found right away, so it is very important that you follow up as directed.  Sometimes only the changes which occur over time allow the cause of your pain to be found.    Generally, every Emergency Department visit should have a follow-up clinic visit with either a primary or a specialty clinic/provider. Please follow-up as instructed by your emergency provider today. With abdominal pain, we often recommend very close follow-up, such as the following day.    ADULTS:  Return to the Emergency Department right away if:      You get an oral temperature above 102oF or as directed by your provider.    You have blood in your stools. This  may be bright red or appear as black, tarry stools.      You keep vomiting (throwing up) or cannot drink liquids.    You see blood when you vomit.     You cannot have a bowel movement or you cannot pass gas.    Your stomach gets bloated or bigger.    Your skin or the whites of your eyes look yellow.    You faint.    You have bloody, frequent or painful urination (peeing).    You have new symptoms or anything that worries you.    CHILDREN:  Return to the Emergency Department right away if your child has any of the above-listed symptoms or the following:      Pushes your hand away or screams/cries when his/her belly is touched.    You notice your child is very fussy or weak.    Your child is very tired and is too tired to eat or drink.    Your child is dehydrated.  Signs of dehydration can be:  o Significant change in the amount of wet diapers/urine.  o Your infant or child starts to have dry mouth and lips, or no saliva (spit) or tears.    PREGNANT WOMEN:  Return to the Emergency Department right away if you have any of the above-listed symptoms or the following:      You have bleeding, leaking fluid or passing tissue from the vagina.    You have worse pain or cramping, or pain in your shoulder or back.    You have vomiting that will not stop.    You have a temperature of 100oF or more.    Your baby is not moving as much as usual.    You faint.    You get a bad headache with or without eye problems and abdominal pain.    You have a seizure.    You have unusual discharge from your vagina and abdominal pain.    Abdominal pain is pretty common during pregnancy.  Your pain may or may not be related to your pregnancy. You should follow-up closely with your OB provider so they can evaluate you and your baby.  Until you follow-up with your regular provider, do the following:       Avoid sex and do not put anything in your vagina.    Drink clear fluids.    Only take medications approved by your provider.    MORE  "INFORMATION:    Appendicitis:  A possible cause of abdominal pain in any person who still has their appendix is acute appendicitis. Appendicitis is often hard to diagnose.  Testing does not always rule out early appendicitis or other causes of abdominal pain. Close follow-up with your provider and re-evaluations may be needed to figure out the reason for your abdominal pain.    Follow-up:  It is very important that you make an appointment with your clinic and go to the appointment.  If you do not follow-up with your primary provider, it may result in missing an important development which could result in permanent injury or disability and/or lasting pain.  If there is any problem keeping your appointment, call your provider or return to the Emergency Department.    Medications:  Take your medications as directed by your provider today.  Before using over-the-counter medications, ask your provider and make sure to take the medications as directed.  If you have any questions about medications, ask your provider.    Diet:  Resume your normal diet as much as possible, but do not eat fried, fatty or spicy foods while you have pain.  Do not drink alcohol or have caffeine.  Do not smoke tobacco.    Probiotics: If you have been given an antibiotic, you may want to also take a probiotic pill or eat yogurt with live cultures. Probiotics have \"good bacteria\" to help your intestines stay healthy. Studies have shown that probiotics help prevent diarrhea (loose stools) and other intestine problems (including C. diff infection) when you take antibiotics. You can buy these without a prescription in the pharmacy section of the store.     If you were given a prescription for medicine here today, be sure to read all of the information (including the package insert) that comes with your prescription.  This will include important information about the medicine, its side effects, and any warnings that you need to know about.  The " pharmacist who fills the prescription can provide more information and answer questions you may have about the medicine.  If you have questions or concerns that the pharmacist cannot address, please call or return to the Emergency Department.       Remember that you can always come back to the Emergency Department if you are not able to see your regular provider in the amount of time listed above, if you get any new symptoms, or if there is anything that worries you.  Constipation (Adult)  Constipation means that you have bowel movements that are less frequent than usual. Stools often become very hard and difficult to pass.  Constipation is very common. At some point in life it affects almost everyone. Since everyone's bowel habits are different, what is constipation to one person may not be to another. Your healthcare provider may do tests to diagnose constipation. It depends on what he or she finds when evaluating you.    Symptoms of constipation include:    Abdominal pain    Bloating    Vomiting    Painful bowel movements    Itching, swelling, bleeding, or pain around the anus  Causes  Constipation can have many causes. These include:    Diet low in fiber    Too much dairy    Not drinking enough liquids    Lack of exercise or physical activity. This is especially true for older adults.    Changes in lifestyle or daily routine, including pregnancy, aging, work, and travel    Frequent use or misuse of laxatives    Ignoring the urge to have a bowel movement or delaying it until later    Medicines, such as certain prescription pain medicines, iron supplements, antacids, certain antidepressants, and calcium supplements    Diseases like irritable bowel syndrome, bowel obstructions, stroke, diabetes, thyroid disease, Parkinson disease, hemorrhoids, and colon cancer  Complications  Potential complications of constipation can include:    Hemorrhoids    Rectal bleeding from hemorrhoids or anal fissures (skin  tears)    Hernias    Dependency on laxatives    Chronic constipation    Fecal impaction    Bowel obstruction or perforation  Home care  All treatment should be done after talking with your healthcare provider. This is especially true if you have another medical problems, are taking prescription medicines, or are an older adult. Treatment most often involves lifestyle changes. You may also need medicines. Your healthcare provider will tell you which will work best for you. Follow the advice below to help avoid this problem in the future.  Lifestyle changes  These lifestyle changes can help prevent constipation:    Diet. Eat a high-fiber diet, with fresh fruit and vegetables, and reduce dairy intake, meats, and processed foods    Fluids. It's important to get enough fluids each day. Drink plenty of water when you eat more fiber. If you are on diet that limits the amount of fluid you can have, talk about this with your healthcare provider.    Regular exercise. Check with your healthcare provider first.  Medications  Take any medicines as directed. Some laxatives are safe to use only every now and then. Others can be taken on a regular basis. Talk with your doctor or pharmacist if you have questions.  Prescription pain medicines can cause constipation. If you are taking this kind of medicine, ask your healthcare provider if you should also take a stool softener.  Medicines you may take to treat constipation include:    Fiber supplements    Stool softeners    Laxatives    Enemas    Rectal suppositories  Follow-up care  Follow up with your healthcare provider if symptoms don't get better in the next few days. You may need to have more tests or see a specialist.  Call 911  Call 911 if any of these occur:    Trouble breathing    Stiff, rigid abdomen that is severely painful to touch    Confusion    Fainting or loss of consciousness    Rapid heart rate    Chest pain  When to seek medical advice  Call your healthcare provider  right away if any of these occur:    Fever over 100.4 F (38 C)    Failure to resume normal bowel movements    Pain in your abdomen or back gets worse    Nausea or vomiting    Swelling in your abdomen    Blood in the stool    Black, tarry stool    Involuntary weight loss    Weakness  Date Last Reviewed: 12/30/2015 2000-2017 [employer]. [address] [place] [address]. All rights reserved. This information is not intended as a substitute for professional medical care. Always follow your healthcare professional's instructions.          24 Hour Appointment Hotline       To make an appointment at any [facility], call [phone] ([phone]). If you don't have a family doctor or clinic, we will help you find one. [place] clinics are conveniently located to serve the needs of you and your family.             Review of your medicines      START taking        Dose / Directions Last dose taken    magnesium citrate solution   Dose:  148 mL   Quantity:  296 mL        Take 148 mLs by mouth daily for 2 doses   Refills:  0          CONTINUE these medicines which may have CHANGED, or have new prescriptions. If we are uncertain of the size of tablets/capsules you have at home, strength may be listed as something that might have changed.        Dose / Directions Last dose taken    polyethylene glycol powder   Commonly known as:  MIRALAX   Dose:  1 capful   What changed:  when to take this   Quantity:  340 g        Take 17 g (1 capful) by mouth 2 times daily for 10 days   Refills:  0          Our records show that you are taking the medicines listed below. If these are incorrect, please call your family doctor or clinic.        Dose / Directions Last dose taken    aspirin 81 MG tablet   Dose:  81 mg        Take 81 mg by mouth daily   Refills:  0        clonazePAM 0.5 MG tablet   Commonly known as:  klonoPIN   Dose:  0.25-0.5 mg   Quantity:  45 tablet        Take 0.5-1 tablets (0.25-0.5 mg) by  mouth nightly as needed   Refills:  0        DULoxetine 60 MG EC capsule   Commonly known as:  CYMBALTA   Quantity:  90 capsule        TAKE 1 CAPSULE(60 MG) BY MOUTH DAILY   Refills:  3        levothyroxine 75 MCG tablet   Commonly known as:  SYNTHROID/LEVOTHROID   Dose:  75 mcg   Quantity:  90 tablet        Take 1 tablet (75 mcg) by mouth daily   Refills:  0        lisinopril 2.5 MG tablet   Commonly known as:  PRINIVIL/Zestril   Dose:  2.5 mg   Quantity:  90 tablet        Take 1 tablet (2.5 mg) by mouth daily   Refills:  3        lovastatin 40 MG tablet   Commonly known as:  MEVACOR   Dose:  40 mg   Quantity:  90 tablet        Take 1 tablet (40 mg) by mouth At Bedtime   Refills:  3        psyllium 58.6 % Powd   Commonly known as:  METAMUCIL        Take by mouth as needed for constipation   Refills:  0        VITAMIN D3 PO   Dose:  1000 Units        Take 1,000 Units by mouth daily   Refills:  0                Prescriptions were sent or printed at these locations (2 Prescriptions)                   Other Prescriptions                Printed at Department/Unit printer (2 of 2)         polyethylene glycol (MIRALAX) powder               magnesium citrate solution                Procedures and tests performed during your visit     *UA reflex to Microscopic (ED Lab POCT Only 3-11)    CBC with platelets differential    Comprehensive metabolic panel    Lipase    Urine Microscopic    XR Abdomen 2 Views      Orders Needing Specimen Collection     None      Pending Results     No orders found for last 3 day(s).            Pending Culture Results     No orders found for last 3 day(s).            Pending Results Instructions     If you had any lab results that were not finalized at the time of your Discharge, you can call the ED Lab Result RN at [phone]. You will be contacted by this team for any positive Lab results or changes in treatment. The nurses are available 7 days a week from 10A to 6:30P.  You can leave a message  24 hours per day and they will return your call.        Test Results From Your Hospital Stay        8/16/2017  8:25 PM      Component Results     Component Value Ref Range & Units Status    WBC 6.2 4.0 - 11.0 10e9/L Final    RBC Count 5.19 3.8 - 5.2 10e12/L Final    Hemoglobin 15.7 11.7 - 15.7 g/dL Final    Hematocrit 47.1 (H) 35.0 - 47.0 % Final    MCV 91 78 - 100 fl Final    MCH 30.3 26.5 - 33.0 pg Final    MCHC 33.3 31.5 - 36.5 g/dL Final    RDW 12.5 10.0 - 15.0 % Final    Platelet Count 263 150 - 450 10e9/L Final    Diff Method Automated Method  Final    % Neutrophils 59.1 % Final    % Lymphocytes 29.2 % Final    % Monocytes 8.3 % Final    % Eosinophils 2.6 % Final    % Basophils 0.6 % Final    % Immature Granulocytes 0.2 % Final    Nucleated RBCs 0 0 /100 Final    Absolute Neutrophil 3.7 1.6 - 8.3 10e9/L Final    Absolute Lymphocytes 1.8 0.8 - 5.3 10e9/L Final    Absolute Monocytes 0.5 0.0 - 1.3 10e9/L Final    Absolute Eosinophils 0.2 0.0 - 0.7 10e9/L Final    Absolute Basophils 0.0 0.0 - 0.2 10e9/L Final    Abs Immature Granulocytes 0.0 0 - 0.4 10e9/L Final    Absolute Nucleated RBC 0.0  Final         8/16/2017  8:46 PM      Component Results     Component Value Ref Range & Units Status    Sodium 141 133 - 144 mmol/L Final    Potassium 4.1 3.4 - 5.3 mmol/L Final    Chloride 106 94 - 109 mmol/L Final    Carbon Dioxide 27 20 - 32 mmol/L Final    Anion Gap 8 3 - 14 mmol/L Final    Glucose 149 (H) 70 - 99 mg/dL Final    Urea Nitrogen 13 7 - 30 mg/dL Final    Creatinine 0.92 0.52 - 1.04 mg/dL Final    GFR Estimate 60 (L) >60 mL/min/1.7m2 Final    Non  GFR Calc    GFR Estimate If Black 73 >60 mL/min/1.7m2 Final    African American GFR Calc    Calcium 8.8 8.5 - 10.1 mg/dL Final    Bilirubin Total 0.5 0.2 - 1.3 mg/dL Final    Albumin 3.8 3.4 - 5.0 g/dL Final    Protein Total 7.0 6.8 - 8.8 g/dL Final    Alkaline Phosphatase 106 40 - 150 U/L Final    ALT 20 0 - 50 U/L Final    AST 17 0 - 45 U/L Final          8/16/2017  8:43 PM      Component Results     Component Value Ref Range & Units Status    Lipase 160 73 - 393 U/L Final         8/16/2017 10:48 PM      Narrative     ABDOMEN TWO VIEWS   8/16/2017 8:18 PM     HISTORY: Abdominal pain.    COMPARISON: 8/4/2017 - CT abdomen and pelvis.    FINDINGS: Gas is present within nondilated small and large bowel. A  moderate amount of stool is present throughout the colon. No  visualized bowel wall thickening, pneumatosis or free intraperitoneal  gas.        Impression     IMPRESSION:   1. No evidence of bowel obstruction.  2. A moderate amount of stool is present within the colon.    FIDELIA GALLAGHER MD         8/16/2017 10:24 PM      Component Results     Component Value Ref Range & Units Status    Color Urine Yellow  Final    Appearance Urine Clear  Final    Glucose Urine Negative NEG^Negative mg/dL Final    Bilirubin Urine Negative NEG^Negative Final    Ketones Urine Negative NEG^Negative mg/dL Final    Specific Gravity Urine 1.025 1.003 - 1.035 Final    Blood Urine Negative NEG^Negative Final    pH Urine 5.0 5.0 - 7.0 pH Final    Protein Albumin Urine Negative NEG^Negative mg/dL Final    Urobilinogen Urine 0.2 0.2 - 1.0 EU/dL Final    Nitrite Urine Negative NEG^Negative Final    Leukocyte Esterase Urine Trace (A) NEG^Negative Final    Source Midstream Urine  Final         8/16/2017 10:24 PM      Component Results     Component Value Ref Range & Units Status    WBC Urine O - 2 OTO2^O - 2 /HPF Final    RBC Urine O - 2 OTO2^O - 2 /HPF Final                Clinical Quality Measure: Blood Pressure Screening     Your blood pressure was checked while you were in the emergency department today. The last reading we obtained was  BP: 150/88 . Please read the guidelines below about what these numbers mean and what you should do about them.  If your systolic blood pressure (the top number) is less than 120 and your diastolic blood pressure (the bottom number) is less than 80, then  your blood pressure is normal. There is nothing more that you need to do about it.  If your systolic blood pressure (the top number) is 120-139 or your diastolic blood pressure (the bottom number) is 80-89, your blood pressure may be higher than it should be. You should have your blood pressure rechecked within a year by a primary care provider.  If your systolic blood pressure (the top number) is 140 or greater or your diastolic blood pressure (the bottom number) is 90 or greater, you may have high blood pressure. High blood pressure is treatable, but if left untreated over time it can put you at risk for heart attack, stroke, or kidney failure. You should have your blood pressure rechecked by a primary care provider within the next 4 weeks.  If your provider in the emergency department today gave you specific instructions to follow-up with your doctor or provider even sooner than that, you should follow that instruction and not wait for up to 4 weeks for your follow-up visit.        Thank you for choosing [facility]       Thank you for choosing [facility] for your care. Our goal is always to provide you with excellent care. Hearing back from our patients is one way we can continue to improve our services. Please take a few minutes to complete the written survey that you may receive in the mail after you visit with us. Thank you!        [employer] Information     [employer] gives you secure access to your electronic health record. If you see a primary care provider, you can also send messages to your care team and make appointments. If you have questions, please call your primary care clinic.  If you do not have a primary care provider, please call [phone] and they will assist you.        Care EveryWhere ID     This is your Care EveryWhere ID. This could be used by other organizations to access your [facility] medical records  [id number]        Equal Access to Services     [employer] D[employer] AH: Kira Davison  lópez jiménez, chucky brandonmamisael epperson, gunnar riley ah. So [facility] [phone].    ATENCIÓN: Si habla español, tiene a benton disposición servicios gratuitos de asistencia lingüística. Llame al [phone].    We comply with applicable federal civil rights laws and [place] laws. We do not discriminate on the basis of race, color, national origin, age, disability sex, sexual orientation or gender identity.            After Visit Summary       This is your record. Keep this with you and show to your community pharmacist(s) and doctor(s) at your next visit.

## 2017-08-16 NOTE — ED AVS SNAPSHOT
Emergency Department    [address] [place] [address]    Phone:  [phone]    Fax:  [phone]                                       Janie De Leon   MRN: [id number]    Department:   Emergency Department   Date of Visit:  8/16/2017           After Visit Summary Signature Page     I have received my discharge instructions, and my questions have been answered. I have discussed any challenges I see with this plan with the nurse or doctor.    ..........................................................................................................................................  Patient/Patient Representative Signature      ..........................................................................................................................................  Patient Representative Print Name and Relationship to Patient    ..................................................               ................................................  Date                                            Time    ..........................................................................................................................................  Reviewed by Signature/Title    ...................................................              ..............................................  Date                                                            Time

## 2017-08-17 VITALS
BODY MASS INDEX: 29.18 KG/M2 | HEART RATE: 68 BPM | OXYGEN SATURATION: 98 % | WEIGHT: 197 LBS | TEMPERATURE: 97.8 F | HEIGHT: 69 IN | DIASTOLIC BLOOD PRESSURE: 84 MMHG | SYSTOLIC BLOOD PRESSURE: 144 MMHG | RESPIRATION RATE: 18 BRPM

## 2017-08-17 NOTE — DISCHARGE INSTRUCTIONS
Discharge Instructions  Abdominal Pain    Abdominal pain (belly pain) can be caused by many things. Your evaluation today does not show the exact cause for your pain. Your provider today has decided that it is unlikely your pain is due to a life threatening problem, or a problem requiring surgery or hospital admission. Sometimes those problems cannot be found right away, so it is very important that you follow up as directed.  Sometimes only the changes which occur over time allow the cause of your pain to be found.    Generally, every Emergency Department visit should have a follow-up clinic visit with either a primary or a specialty clinic/provider. Please follow-up as instructed by your emergency provider today. With abdominal pain, we often recommend very close follow-up, such as the following day.    ADULTS:  Return to the Emergency Department right away if:      You get an oral temperature above 102oF or as directed by your provider.    You have blood in your stools. This may be bright red or appear as black, tarry stools.      You keep vomiting (throwing up) or cannot drink liquids.    You see blood when you vomit.     You cannot have a bowel movement or you cannot pass gas.    Your stomach gets bloated or bigger.    Your skin or the whites of your eyes look yellow.    You faint.    You have bloody, frequent or painful urination (peeing).    You have new symptoms or anything that worries you.    CHILDREN:  Return to the Emergency Department right away if your child has any of the above-listed symptoms or the following:      Pushes your hand away or screams/cries when his/her belly is touched.    You notice your child is very fussy or weak.    Your child is very tired and is too tired to eat or drink.    Your child is dehydrated.  Signs of dehydration can be:  o Significant change in the amount of wet diapers/urine.  o Your infant or child starts to have dry mouth and lips, or no saliva (spit) or  tears.    PREGNANT WOMEN:  Return to the Emergency Department right away if you have any of the above-listed symptoms or the following:      You have bleeding, leaking fluid or passing tissue from the vagina.    You have worse pain or cramping, or pain in your shoulder or back.    You have vomiting that will not stop.    You have a temperature of 100oF or more.    Your baby is not moving as much as usual.    You faint.    You get a bad headache with or without eye problems and abdominal pain.    You have a seizure.    You have unusual discharge from your vagina and abdominal pain.    Abdominal pain is pretty common during pregnancy.  Your pain may or may not be related to your pregnancy. You should follow-up closely with your OB provider so they can evaluate you and your baby.  Until you follow-up with your regular provider, do the following:       Avoid sex and do not put anything in your vagina.    Drink clear fluids.    Only take medications approved by your provider.    MORE INFORMATION:    Appendicitis:  A possible cause of abdominal pain in any person who still has their appendix is acute appendicitis. Appendicitis is often hard to diagnose.  Testing does not always rule out early appendicitis or other causes of abdominal pain. Close follow-up with your provider and re-evaluations may be needed to figure out the reason for your abdominal pain.    Follow-up:  It is very important that you make an appointment with your clinic and go to the appointment.  If you do not follow-up with your primary provider, it may result in missing an important development which could result in permanent injury or disability and/or lasting pain.  If there is any problem keeping your appointment, call your provider or return to the Emergency Department.    Medications:  Take your medications as directed by your provider today.  Before using over-the-counter medications, ask your provider and make sure to take the medications as  "directed.  If you have any questions about medications, ask your provider.    Diet:  Resume your normal diet as much as possible, but do not eat fried, fatty or spicy foods while you have pain.  Do not drink alcohol or have caffeine.  Do not smoke tobacco.    Probiotics: If you have been given an antibiotic, you may want to also take a probiotic pill or eat yogurt with live cultures. Probiotics have \"good bacteria\" to help your intestines stay healthy. Studies have shown that probiotics help prevent diarrhea (loose stools) and other intestine problems (including C. diff infection) when you take antibiotics. You can buy these without a prescription in the pharmacy section of the store.     If you were given a prescription for medicine here today, be sure to read all of the information (including the package insert) that comes with your prescription.  This will include important information about the medicine, its side effects, and any warnings that you need to know about.  The pharmacist who fills the prescription can provide more information and answer questions you may have about the medicine.  If you have questions or concerns that the pharmacist cannot address, please call or return to the Emergency Department.       Remember that you can always come back to the Emergency Department if you are not able to see your regular provider in the amount of time listed above, if you get any new symptoms, or if there is anything that worries you.  Constipation (Adult)  Constipation means that you have bowel movements that are less frequent than usual. Stools often become very hard and difficult to pass.  Constipation is very common. At some point in life it affects almost everyone. Since everyone's bowel habits are different, what is constipation to one person may not be to another. Your healthcare provider may do tests to diagnose constipation. It depends on what he or she finds when evaluating you.    Symptoms of " constipation include:    Abdominal pain    Bloating    Vomiting    Painful bowel movements    Itching, swelling, bleeding, or pain around the anus  Causes  Constipation can have many causes. These include:    Diet low in fiber    Too much dairy    Not drinking enough liquids    Lack of exercise or physical activity. This is especially true for older adults.    Changes in lifestyle or daily routine, including pregnancy, aging, work, and travel    Frequent use or misuse of laxatives    Ignoring the urge to have a bowel movement or delaying it until later    Medicines, such as certain prescription pain medicines, iron supplements, antacids, certain antidepressants, and calcium supplements    Diseases like irritable bowel syndrome, bowel obstructions, stroke, diabetes, thyroid disease, Parkinson disease, hemorrhoids, and colon cancer  Complications  Potential complications of constipation can include:    Hemorrhoids    Rectal bleeding from hemorrhoids or anal fissures (skin tears)    Hernias    Dependency on laxatives    Chronic constipation    Fecal impaction    Bowel obstruction or perforation  Home care  All treatment should be done after talking with your healthcare provider. This is especially true if you have another medical problems, are taking prescription medicines, or are an older adult. Treatment most often involves lifestyle changes. You may also need medicines. Your healthcare provider will tell you which will work best for you. Follow the advice below to help avoid this problem in the future.  Lifestyle changes  These lifestyle changes can help prevent constipation:    Diet. Eat a high-fiber diet, with fresh fruit and vegetables, and reduce dairy intake, meats, and processed foods    Fluids. It's important to get enough fluids each day. Drink plenty of water when you eat more fiber. If you are on diet that limits the amount of fluid you can have, talk about this with your healthcare provider.    Regular  exercise. Check with your healthcare provider first.  Medications  Take any medicines as directed. Some laxatives are safe to use only every now and then. Others can be taken on a regular basis. Talk with your doctor or pharmacist if you have questions.  Prescription pain medicines can cause constipation. If you are taking this kind of medicine, ask your healthcare provider if you should also take a stool softener.  Medicines you may take to treat constipation include:    Fiber supplements    Stool softeners    Laxatives    Enemas    Rectal suppositories  Follow-up care  Follow up with your healthcare provider if symptoms don't get better in the next few days. You may need to have more tests or see a specialist.  Call 911  Call 911 if any of these occur:    Trouble breathing    Stiff, rigid abdomen that is severely painful to touch    Confusion    Fainting or loss of consciousness    Rapid heart rate    Chest pain  When to seek medical advice  Call your healthcare provider right away if any of these occur:    Fever over 100.4 F (38 C)    Failure to resume normal bowel movements    Pain in your abdomen or back gets worse    Nausea or vomiting    Swelling in your abdomen    Blood in the stool    Black, tarry stool    Involuntary weight loss    Weakness  Date Last Reviewed: 12/30/2015 2000-2017 [employer]. [address] [place] [address], [place] [address]. All rights reserved. This information is not intended as a substitute for professional medical care. Always follow your healthcare professional's instructions.

## 2017-08-17 NOTE — ED PROVIDER NOTES
History     Chief Complaint:  Flank Pain    HPI   Janie De Leon is a 67 year old female who presents to the emergency department today accompanied by her  for the evaluation of flank pain. The patient reports that for the last two to three weeks she has had right flank and back pain. She states that she was seen by her primary care provider at that time, who performed a urinalysis that revealed blood in her urine. A CT was then obtained as per below. The patient notes that she has chronic constipation and cannot remember her last bowel movement. She states that she takes Dulcolax in the morning but due to her increased pain today she drank a full glass of prune juice with Miralax and took four capsules of Dulcolax and used an enema and had a very minimal bowel movement. The patient denies fever, nausea, vomiting, change in gas passage, leg pain, or rash. She notes that she drinks 4 to 5 beers per week but does not smoke. She notes a history of abdominal surgery limited to a hysterectomy with bilateral salpingo-oophorectomy.    CT ABDOMEN AND PELVIS HEMATURIA WITH AND WITHOUT IV CONTRAST - 8/4/2017  No definite acute abnormality.  HA HERNANDEZ MD  Reading per radiology    Allergies:  Ciprofloxacin  Simvastatin     Medications:    Synthroid  Cymbalta  Clonazepam  Mevacor  Lisinopril  Miralax  Metamucil  Aspirin    Past Medical History:    Anemia   Atypical ductal hyperplasia of both breasts   High cholesterol   History of hematuria   History of hormone replacement therapy   HTN, goal below 140/90   Hx of bone density study   Hypothyroidism   Insomnia   Lipid screening   Major depressive disorder, single episode in full remission   Osteopenia   Prediabetes   Rotator cuff injury, left, sequela   TBI (traumatic brain injury)   Tubular adenoma of colon  Past Surgical History:    Ankle surgery  Breast biopsy, bilateral  C section, low transverse  Capsule/pill cam endoscopy  Colonoscopy  JASBIR with BSO  Tubal  "ligation    Family History:    Mother: Colon cancer, diabetes, macular degeneration, glaucoma, cerebrovascular disease, heart failure  Brother: Lung cancer  Father: Frailty, pneumonia, fractures, failure to thrive  Daughter: Colon polyps    Social History:  The patient was accompanied to the ED by her .  Smoking Status: Former Smoker  Smokeless Tobacco: Never Used  Alcohol Use: Positive  Marital Status:       Review of Systems   Constitutional: Negative for fever.   Gastrointestinal: Positive for constipation. Negative for nausea and vomiting.        Negative for change in gas passage.   Genitourinary: Positive for flank pain (right).   Musculoskeletal: Positive for back pain (right).        Negative for leg pain.   Skin: Negative for rash.   All other systems reviewed and are negative.    Physical Exam     Vitals:  Patient Vitals for the past 24 hrs:   BP Temp Temp src Heart Rate Resp SpO2 Height Weight   08/16/17 1940 150/88 97.8  F (36.6  C) Oral 68 16 97 % 1.753 m (5' 9\") 89.4 kg (197 lb)     Physical Exam  General: Alert, interactive in mild distress  Head:  Scalp is atraumatic  Eyes:  The pupils are equal, round, and reactive to light    EOM's intact    No scleral icterus  ENT:      Nose:  The external nose is normal  Ears:  External ears are normal  Mouth/Throat: The oropharynx is normal    Mucus membranes are moist      Neck:  Normal range of motion.      There is no rigidity.    Trachea is in the midline         CV:  Regular rate and rhythm    No murmur   Resp:  Breath sounds are clear bilaterally    Non-labored, no retractions or accessory muscle use      GI:  Abdomen is soft, no distension, no reproducible abdominal tenderness. + bowel sounds.      MS:  Right CVA tenderness. Normal strength in all 4 extremities, right CVA tenderness  Skin:  Warm and dry, No rash or lesions noted.  Neuro: Strength 5/5 x4.    Psych:  Awake. Alert.  Normal affect.      Appropriate interactions.      Emergency " Department Course     Imaging:  Radiology findings were communicated with the patient who voiced understanding of the findings.    XR Abdomen 2 Views  1. No evidence of bowel obstruction.  2. A moderate amount of stool is present within the colon.  Reading per radiology    Laboratory:  Laboratory findings were communicated with the patient who voiced understanding of the findings.    UA reflex to Microscopic: Leukocyte Esterase Trace (A)  Urine Microscopic: WBC/HPF 0-2, RBC/HPF 0-2  CBC: WBC 6.2, HGB 15.7,   CMP: Glucose 149 (H), GFR Estimate 60 (L), o/w WNL (Creatinine 0.92)  Lipase: 160    Interventions:  2043 Pink lady enema 286 mLs Rectal  Soap Suds Enema    Emergency Department Course:  Nursing notes and vitals reviewed.  I performed an exam of the patient as documented above.   The patient was sent for a XR Abdomen 2 Views while in the emergency department, results above.   IV was inserted and blood was drawn for laboratory testing, results above.  I discussed the treatment plan with the patient. They expressed understanding of this plan and consented to discharge. They will be discharged home with instructions for care and follow up. In addition, the patient will return to the emergency department if their symptoms persist, worsen, if new symptoms arise or if there is any concern.  All questions were answered.  I personally reviewed the imaging and laboratory results with the patient and answered all related questions prior to discharge.    Impression & Plan      Medical Decision Making:  Janie was seen and evaluated. The above work up was undertaken, returning as largely unremarkable. She does have a large amount of stool noted on her abdominal radiograph. Her abdominal examination is quite benign and I doubt appendicitis, diverticulitis, or more concerning illness. She has had a CT scan for similar symptoms two weeks ago which was unremarkable. Laboratory work up was unremarkable and I believe her  symptoms are likely being caused by constipation. I recommended increase of her Miralax to twice daily dosing and I have prescribed two days of magnesium citrate. She will follow up with her primary care provider and return here if new symptoms including fever, nausea, vomiting, or increasing pain develop.    Diagnosis:    ICD-10-CM    1. Flank pain R10.9 *UA reflex to Microscopic (ED Lab POCT Only 3-11)     Urine Microscopic     Urine Microscopic   2. Constipation, unspecified constipation type K59.00        Disposition:  The patient is discharged to home as noted above.    Discharge Medications:  New Prescriptions    MAGNESIUM CITRATE SOLUTION    Take 148 mLs by mouth daily for 2 doses     Scribe Disclosure:  I, Shawn Beyer, am serving as a scribe at 9:58 PM on 8/16/2017 to document services personally performed by Burke Wade MD based on my observations and the provider's statements to me.   EMERGENCY DEPARTMENT       Burke Wade MD  08/16/17 [id number]

## 2017-08-25 ENCOUNTER — OFFICE VISIT (OUTPATIENT)
Dept: FAMILY MEDICINE | Facility: CLINIC | Age: 67
End: 2017-08-25
Payer: MEDICARE

## 2017-08-25 VITALS
TEMPERATURE: 98 F | DIASTOLIC BLOOD PRESSURE: 78 MMHG | HEIGHT: 69 IN | SYSTOLIC BLOOD PRESSURE: 126 MMHG | WEIGHT: 196 LBS | OXYGEN SATURATION: 96 % | HEART RATE: 76 BPM | BODY MASS INDEX: 29.03 KG/M2

## 2017-08-25 DIAGNOSIS — K59.09 CHRONIC CONSTIPATION: Primary | ICD-10-CM

## 2017-08-25 PROCEDURE — 99213 OFFICE O/P EST LOW 20 MIN: CPT | Performed by: INTERNAL MEDICINE

## 2017-08-25 NOTE — MR AVS SNAPSHOT
"              After Visit Summary   8/25/2017    Janie De Leon    MRN: [id number]           Patient Information     Date Of Birth          1950        Visit Information        Provider Department      8/25/2017 2:30 PM Meche Fierro, DO [facility]        Today's Diagnoses     Chronic constipation    -  1       Follow-ups after your visit        Who to contact     If you have questions or need follow up information about today's clinic visit or your schedule please contact [facility] directly at [phone].  Normal or non-critical lab and imaging results will be communicated to you by Dynexhart, letter or phone within 4 business days after the clinic has received the results. If you do not hear from us within 7 days, please contact the clinic through IMNEXTt or phone. If you have a critical or abnormal lab result, we will notify you by phone as soon as possible.  Submit refill requests through MumsWay or call your pharmacy and they will forward the refill request to us. Please allow 3 business days for your refill to be completed.          Additional Information About Your Visit        MyChart Information     [employer] gives you secure access to your electronic health record. If you see a primary care provider, you can also send messages to your care team and make appointments. If you have questions, please call your primary care clinic.  If you do not have a primary care provider, please call [phone] and they will assist you.        Care EveryWhere ID     This is your Care EveryWhere ID. This could be used by other organizations to access your [facility] medical records  [id number]        Your Vitals Were     Pulse Temperature Height Pulse Oximetry Breastfeeding? BMI (Body Mass Index)    76 98  F (36.7  C) (Oral) 5' 9\" (1.753 m) 96% No 28.94 kg/m2       Blood Pressure from Last 3 Encounters:   08/25/17 126/78   08/17/17 144/84   07/24/17 124/80    Weight from Last 3 Encounters: "   08/25/17 196 lb (88.9 kg)   08/16/17 197 lb (89.4 kg)   07/24/17 200 lb 8 oz (90.9 kg)              Today, you had the following     No orders found for display       Primary Care Provider Office Phone # Fax #    Meche Fierro [phone] [address] [facility] [address]  [facility] [address]        Equal Access to Services     RACHEL DOUGLAS : Hadii aad ku hadasho Soomaali, waaxda luqadaha, qaybta kaalmada adeegyada, waxay idiin hayaan adeeg kharash la'denise . So [facility] [phone].    ATENCIÓN: Si sukhdeep aguirre, tiene a benton disposición servicios gratuitos de asistencia lingüística. Llame al [phone].    We comply with applicable federal civil rights laws and [place] laws. We do not discriminate on the basis of race, color, national origin, age, disability sex, sexual orientation or gender identity.            Thank you!     Thank you for choosing [facility]  for your care. Our goal is always to provide you with excellent care. Hearing back from our patients is one way we can continue to improve our services. Please take a few minutes to complete the written survey that you may receive in the mail after your visit with us. Thank you!             Your Updated Medication List - Protect others around you: Learn how to safely use, store and throw away your medicines at www.disposemymeds.org.          This list is accurate as of: 8/25/17 11:59 PM.  Always use your most recent med list.                   Brand Name Dispense Instructions for use Diagnosis    aspirin 81 MG tablet      Take 81 mg by mouth daily        clonazePAM 0.5 MG tablet    klonoPIN    45 tablet    Take 0.5-1 tablets (0.25-0.5 mg) by mouth nightly as needed    Adjustment disorder with anxiety       DULoxetine 60 MG EC capsule    CYMBALTA    90 capsule    TAKE 1 CAPSULE(60 MG) BY MOUTH DAILY    Major depressive disorder, single episode in full remission (H)       levothyroxine 75 MCG tablet    SYNTHROID/LEVOTHROID    90  tablet    Take 1 tablet (75 mcg) by mouth daily    Subclinical hypothyroidism       lisinopril 2.5 MG tablet    PRINIVIL/Zestril    90 tablet    Take 1 tablet (2.5 mg) by mouth daily    Essential hypertension, benign       lovastatin 40 MG tablet    MEVACOR    90 tablet    Take 1 tablet (40 mg) by mouth At Bedtime    Hyperlipidemia, unspecified hyperlipidemia type       polyethylene glycol powder    MIRALAX    340 g    Take 17 g (1 capful) by mouth 2 times daily for 10 days        psyllium 58.6 % Powd    METAMUCIL     Take by mouth as needed for constipation        VITAMIN D3 PO      Take 1,000 Units by mouth daily

## 2017-08-25 NOTE — PROGRESS NOTES
"  SUBJECTIVE:   Janie De Leon is a 67 year old female who presents to clinic today for the following health issues:      ED/UC Followup:    Facility:  [facility]  Date of visit: 8/16/17  Reason for visit: flank pain and constipation  Current Status: constipation improved, now a bit of diarrhea after using miralax       Miralax with prune juice this AM helped and is \"thankful\" to have some diarrhea as she notes has long-term h/o constipation.  Colonoscopy done spring 2016 and CT unremarkable recently in eval of right flank and right lower quadrant pain.  Xray in ER showed significant stool burden.  She says the pain was horrible that took her into ER.  They did some enemas there.  She is feeling better but still some mild RLQ pain.  Struggling more with her chronic constipation the past several months b/c of house remodel and not being able to cook in her kitchen or exercise as much as well as caregiver to  with dementia.  However, kitchen remodel is almost done and so will eat less fast food and is planning to get back into her jogging.  Her long-term plan is going to be Benefiber daily, Miralax once or twice per day, more activity and better diet along with hydration.    Problem list and histories reviewed & adjusted, as indicated.    ROS:  Detailed as above     OBJECTIVE:     /78 (BP Location: Right arm, Patient Position: Sitting, Cuff Size: Adult Regular)  Pulse 76  Temp 98  F (36.7  C) (Oral)  Ht 5' 9\" (1.753 m)  Wt 196 lb (88.9 kg)  SpO2 96%  Breastfeeding? No  BMI 28.94 kg/m2  Body mass index is 28.94 kg/(m^2).  Alert, pleasant, NAD, healthy appearing, talkative    ASSESSMENT/PLAN:     Chronic constipation  Discussed long term plan to stay on top of her constipation as noted above  Her long-term plan is going to be Benefiber daily, Miralax once or twice per day, more activity and better diet along with hydration.    No AVS required.    MDM: >15 minutes spent with patient, over 50% time counseling, " coordinating care and explaining about nature of the patient's conditions.    Meche Fierro, [facility]

## 2017-08-26 PROBLEM — R10.9 RIGHT FLANK PAIN: Status: RESOLVED | Noted: 2017-07-24 | Resolved: 2017-08-26

## 2017-08-26 PROBLEM — K59.09 CHRONIC CONSTIPATION: Status: ACTIVE | Noted: 2017-08-26

## 2018-01-30 ENCOUNTER — OFFICE VISIT (OUTPATIENT)
Dept: FAMILY MEDICINE | Facility: CLINIC | Age: 68
End: 2018-01-30
Payer: MEDICARE

## 2018-01-30 VITALS
OXYGEN SATURATION: 98 % | TEMPERATURE: 98.1 F | HEART RATE: 86 BPM | WEIGHT: 197 LBS | DIASTOLIC BLOOD PRESSURE: 80 MMHG | SYSTOLIC BLOOD PRESSURE: 123 MMHG | HEIGHT: 69 IN | BODY MASS INDEX: 29.18 KG/M2

## 2018-01-30 DIAGNOSIS — F43.22 ADJUSTMENT DISORDER WITH ANXIETY: ICD-10-CM

## 2018-01-30 PROCEDURE — 99213 OFFICE O/P EST LOW 20 MIN: CPT | Performed by: INTERNAL MEDICINE

## 2018-01-30 RX ORDER — CLONAZEPAM 0.5 MG/1
.25-.5 TABLET ORAL
Qty: 45 TABLET | Refills: 0 | Status: SHIPPED | OUTPATIENT
Start: 2018-01-30 | End: 2018-07-30

## 2018-01-30 ASSESSMENT — ANXIETY QUESTIONNAIRES
3. WORRYING TOO MUCH ABOUT DIFFERENT THINGS: NEARLY EVERY DAY
1. FEELING NERVOUS, ANXIOUS, OR ON EDGE: NEARLY EVERY DAY
7. FEELING AFRAID AS IF SOMETHING AWFUL MIGHT HAPPEN: SEVERAL DAYS
GAD7 TOTAL SCORE: 14
6. BECOMING EASILY ANNOYED OR IRRITABLE: MORE THAN HALF THE DAYS
2. NOT BEING ABLE TO STOP OR CONTROL WORRYING: NEARLY EVERY DAY
IF YOU CHECKED OFF ANY PROBLEMS ON THIS QUESTIONNAIRE, HOW DIFFICULT HAVE THESE PROBLEMS MADE IT FOR YOU TO DO YOUR WORK, TAKE CARE OF THINGS AT HOME, OR GET ALONG WITH OTHER PEOPLE: SOMEWHAT DIFFICULT
5. BEING SO RESTLESS THAT IT IS HARD TO SIT STILL: NOT AT ALL

## 2018-01-30 ASSESSMENT — PATIENT HEALTH QUESTIONNAIRE - PHQ9: 5. POOR APPETITE OR OVEREATING: MORE THAN HALF THE DAYS

## 2018-01-30 NOTE — NURSING NOTE
"Chief Complaint   Patient presents with     RECHECK       Initial /80 (BP Location: Right arm, Cuff Size: Adult Large)  Pulse 86  Temp 98.1  F (36.7  C) (Oral)  Ht 5' 9\" (1.753 m)  Wt 197 lb (89.4 kg)  SpO2 98%  BMI 29.09 kg/m2 Estimated body mass index is 29.09 kg/(m^2) as calculated from the following:    Height as of this encounter: 5' 9\" (1.753 m).    Weight as of this encounter: 197 lb (89.4 kg).  Medication Reconciliation: complete       Taylor Arguello CMA      "

## 2018-01-30 NOTE — LETTER
My Depression Action Plan  Name: Janie De Leon   Date of Birth 1950  Date: 1/30/2018    My doctor: Meche Fierro   My clinic: [address] [address] [phone]          GREEN    ZONE   Good Control    What it looks like:     Things are going generally well. You have normal up s and down s. You may even feel depressed from time to time, but bad moods usually last less than a day.   What you need to do:  1. Continue to care for yourself (see self care plan)  2. Check your depression survival kit and update it as needed  3. Follow your physician s recommendations including any medication.  4. Do not stop taking medication unless you consult with your physician first.           YELLOW         ZONE Getting Worse    What it looks like:     Depression is starting to interfere with your life.     It may be hard to get out of bed; you may be starting to isolate yourself from others.    Symptoms of depression are starting to last most all day and this has happened for several days.     You may have suicidal thoughts but they are not constant.   What you need to do:     1. Call your care team, your response to treatment will improve if you keep your care team informed of your progress. Yellow periods are signs an adjustment may need to be made.     2. Continue your self-care, even if you have to fake it!    3. Talk to someone in your support network    4. Open up your depression survival kit           RED    ZONE Medical Alert - Get Help    What it looks like:     Depression is seriously interfering with your life.     You may experience these or other symptoms: You can t get out of bed most days, can t work or engage in other necessary activities, you have trouble taking care of basic hygiene, or basic responsibilities, thoughts of suicide or death that will not go away, self-injurious behavior.     What you need to do:  1. Call your care team and request a  same-day appointment. If they are not available (weekends or after hours) call your local crisis line, emergency room or 911.      Electronically signed by: Meche Fierro, January 30, 2018    Depression Self Care Plan / Survival Kit    Self-Care for Depression  Here s the deal. Your body and mind are really not as separate as most people think.  What you do and think affects how you feel and how you feel influences what you do and think. This means if you do things that people who feel good do, it will help you feel better.  Sometimes this is all it takes.  There is also a place for medication and therapy depending on how severe your depression is, so be sure to consult with your medical provider and/ or Behavioral Health Consultant if your symptoms are worsening or not improving.     In order to better manage my stress, I will:    Exercise  Get some form of exercise, every day. This will help reduce pain and release endorphins, the  feel good  chemicals in your brain. This is almost as good as taking antidepressants!  This is not the same as joining a gym and then never going! (they count on that by the way ) It can be as simple as just going for a walk or doing some gardening, anything that will get you moving.      Hygiene   Maintain good hygiene (Get out of bed in the morning, Make your bed, Brush your teeth, Take a shower, and Get dressed like you were going to work, even if you are unemployed).  If your clothes don't fit try to get ones that do.    Diet  I will strive to eat foods that are good for me, drink plenty of water, and avoid excessive sugar, caffeine, alcohol, and other mood-altering substances.  Some foods that are helpful in depression are: complex carbohydrates, B vitamins, flaxseed, fish or fish oil, fresh fruits and vegetables.    Psychotherapy  I agree to participate in Individual Therapy (if recommended).    Medication  If prescribed medications, I agree to take them.  Missing doses can  result in serious side effects.  I understand that drinking alcohol, or other illicit drug use, may cause potential side effects.  I will not stop my medication abruptly without first discussing it with my provider.    Staying Connected With Others  I will stay in touch with my friends, family members, and my primary care provider/team.    Use your imagination  Be creative.  We all have a creative side; it doesn t matter if it s oil painting, sand castles, or mud pies! This will also kick up the endorphins.    Witness Beauty  (AKA stop and smell the roses) Take a look outside, even in mid-winter. Notice colors, textures. Watch the squirrels and birds.     Service to others  Be of service to others.  There is always someone else in need.  By helping others we can  get out of ourselves  and remember the really important things.  This also provides opportunities for practicing all the other parts of the program.    Humor  Laugh and be silly!  Adjust your TV habits for less news and crime-drama and more comedy.    Control your stress  Try breathing deep, massage therapy, biofeedback, and meditation. Find time to relax each day.     My support system    Clinic Contact:  Phone number:    Contact 1:  Phone number:    Contact 2:  Phone number:    Alevism/:  Phone number:    Therapist:  Phone number:    Local crisis center:    Phone number:    Other community support:  Phone number:

## 2018-01-30 NOTE — MR AVS SNAPSHOT
After Visit Summary   1/30/2018    Janie De Leon    MRN: [id number]           Patient Information     Date Of Birth          1950        Visit Information        Provider Department      1/30/2018 3:30 PM Meche Fierro,  [facility]        Today's Diagnoses     Adjustment disorder with anxiety          Care Instructions    Melatonin 3-6 mg with dinner  Klonopin script as well given to you   I think getting a new puppy may help  Keep seeing psychologist    [facility]  [phone] (phone)  [address] [place] [address], [facility] [address]  [place] [address]    Follow up in about 3 months or sooner as needed          Follow-ups after your visit        Who to contact     If you have questions or need follow up information about today's clinic visit or your schedule please contact [facility] directly at [phone].  Normal or non-critical lab and imaging results will be communicated to you by MyChart, letter or phone within 4 business days after the clinic has received the results. If you do not hear from us within 7 days, please contact the clinic through [employer]Dish[employer] or phone. If you have a critical or abnormal lab result, we will notify you by phone as soon as possible.  Submit refill requests through [employer] or call your pharmacy and they will forward the refill request to us. Please allow 3 business days for your refill to be completed.          Additional Information About Your Visit        MyChart Information     [employer] gives you secure access to your electronic health record. If you see a primary care provider, you can also send messages to your care team and make appointments. If you have questions, please call your primary care clinic.  If you do not have a primary care provider, please call [phone] and they will assist you.        Care EveryWhere ID     This is your Care EveryWhere ID. This could be used by other organizations to access your [facility]  "medical records  [id number]        Your Vitals Were     Pulse Temperature Height Pulse Oximetry BMI (Body Mass Index)       86 98.1  F (36.7  C) (Oral) 5' 9\" (1.753 m) 98% 29.09 kg/m2        Blood Pressure from Last 3 Encounters:   01/30/18 123/80   08/25/17 126/78   08/17/17 144/84    Weight from Last 3 Encounters:   01/30/18 197 lb (89.4 kg)   08/25/17 196 lb (88.9 kg)   08/16/17 197 lb (89.4 kg)              We Performed the Following     DEPRESSION ACTION PLAN (DAP)          Where to get your medicines      Some of these will need a paper prescription and others can be bought over the counter.  Ask your nurse if you have questions.     Bring a paper prescription for each of these medications     clonazePAM 0.5 MG tablet          Primary Care Provider Office Phone # Fax #    Meche Hall Fierro,  [phone] [address] [address]        Equal Access to Services     [facility]: Hadii bakari ku hadasho Soomaali, waaxda luqadaha, qaybta kaalmada adeegyada, gunnar riley . So [facility] [phone].    ATENCIÓN: Si habla español, tiene a benton disposición servicios gratuitos de asistencia lingüística. Llame al [phone].    We comply with applicable federal civil rights laws and [place] laws. We do not discriminate on the basis of race, color, national origin, age, disability, sex, sexual orientation, or gender identity.            Thank you!     Thank you for choosing [facility]  for your care. Our goal is always to provide you with excellent care. Hearing back from our patients is one way we can continue to improve our services. Please take a few minutes to complete the written survey that you may receive in the mail after your visit with us. Thank you!             Your Updated Medication List - Protect others around you: Learn how to safely use, store and throw away your medicines at www.disposemymeds.org.          This list is accurate " as of 1/30/18  4:18 PM.  Always use your most recent med list.                   Brand Name Dispense Instructions for use Diagnosis    aspirin 81 MG tablet      Take 81 mg by mouth daily        clonazePAM 0.5 MG tablet    klonoPIN    45 tablet    Take 0.5-1 tablets (0.25-0.5 mg) by mouth nightly as needed    Adjustment disorder with anxiety       DULoxetine 60 MG EC capsule    CYMBALTA    90 capsule    TAKE 1 CAPSULE(60 MG) BY MOUTH DAILY    Major depressive disorder, single episode in full remission (H)       levothyroxine 75 MCG tablet    SYNTHROID/LEVOTHROID    90 tablet    Take 1 tablet (75 mcg) by mouth daily    Subclinical hypothyroidism       lisinopril 2.5 MG tablet    PRINIVIL/Zestril    90 tablet    Take 1 tablet (2.5 mg) by mouth daily    Essential hypertension, benign       lovastatin 40 MG tablet    MEVACOR    90 tablet    Take 1 tablet (40 mg) by mouth At Bedtime    Hyperlipidemia, unspecified hyperlipidemia type       psyllium 58.6 % Powd    METAMUCIL     Take by mouth as needed for constipation        VITAMIN D3 PO      Take 1,000 Units by mouth daily

## 2018-01-30 NOTE — PATIENT INSTRUCTIONS
Melatonin 3-6 mg with dinner  Klonopin script as well given to you   I think getting a new puppy may help  Keep seeing psychologist    [facility]  [phone] (phone)  [address] [facility]  [place] [address]    Follow up in about 3 months or sooner as needed

## 2018-01-30 NOTE — PROGRESS NOTES
SUBJECTIVE:   Janie De Leon is a 67 year old female who presents to clinic today for the following health issues:    Janie is having more anxiety and stress/tension; denies depression flaring up.  Says she realized that she may be most affected by the fact their family dog  in Sept so misses her companionship a lot. They plan to get a new dog in nicer spring weather.  Going to psychologist every 2 rather than 4 weeks now which is good.   with early dementia and some repeated questions and also he gets easily frustrated which then in turn affects Janie.  Wants a support group for  through [employer] [employer].   Politics bother her too but she speaks up to local politicians.   Klonopin PRN sparingly but feels she could use a refill now (usually qty 45 lasts a year but she has used it more frequently recently).  Says sleep hygiene and melatonin trial and didn't help much.   Still taking her Cymbalta.    Hyperlipidemia Follow-Up      Rate your low fat/cholesterol diet?: fair    Taking statin?  Yes, no muscle aches from statin    Other lipid medications/supplements?:  none    Hypertension Follow-up      Outpatient blood pressures are not being checked.    Low Salt Diet: no added salt    Depression/Anxiety Followup    Status since last visit: Worsened -Anxiety    See PHQ-9 for current symptoms.  Other associated symptoms: None    Complicating factors:   Significant life event:  No   Current substance abuse:  None  Anxiety or Panic symptoms:  Yes    PHQ-9 2017   Total Score 2 3 4   Q9: Suicide Ideation Not at all Not at all Not at all     Hypothyroidism Follow-up      Since last visit, patient describes the following symptoms: Weight stable, no hair loss, no skin changes, no constipation, no loose stools    PHQ-9 SCORE 2017   Total Score 2 3 4     QUEENIE-7 SCORE 2016   Total Score 0 7 14         Problem list and histories  "reviewed & adjusted, as indicated.    ROS:  Detailed as above     OBJECTIVE:     /80 (BP Location: Right arm, Cuff Size: Adult Large)  Pulse 86  Temp 98.1  F (36.7  C) (Oral)  Ht 5' 9\" (1.753 m)  Wt 197 lb (89.4 kg)  SpO2 98%  BMI 29.09 kg/m2  Body mass index is 29.09 kg/(m^2).  Alert, pleasant, NAD, talkative    ASSESSMENT/PLAN:     Adjustment disorder with anxiety  Ok for refill Clonazepam as she uses sparingly  Continue other healthy habits as we discussed and follow patient instructions below  She also has a component of seasonal affects as well  Trial of melatonin earlier in the night may also help her  - clonazePAM (KLONOPIN) 0.5 MG tablet; Take 0.5-1 tablets (0.25-0.5 mg) by mouth nightly as needed  Dispense: 45 tablet; Refill: 0    Patient Instructions   Melatonin 3-6 mg with dinner  Klonopin script as well given to you   I think getting a new puppy may help  Keep seeing psychologist    [facility]  [phone] (phone)  [address] [place] [address]    Follow up in about 3 months or sooner as needed    MDM: >15 minutes spent with patient, over 50% time counseling, coordinating care and explaining about nature of the patient's conditions.    Meche Fierro, DO  [facility]    "

## 2018-01-31 ASSESSMENT — ANXIETY QUESTIONNAIRES: GAD7 TOTAL SCORE: 14

## 2018-01-31 ASSESSMENT — PATIENT HEALTH QUESTIONNAIRE - PHQ9: SUM OF ALL RESPONSES TO PHQ QUESTIONS 1-9: 4

## 2018-04-12 DIAGNOSIS — E03.8 SUBCLINICAL HYPOTHYROIDISM: ICD-10-CM

## 2018-04-12 NOTE — TELEPHONE ENCOUNTER
"Last Written Prescription Date:  7/28/17  Last Fill Quantity: 90 tablet,  # refills: 0   Last office visit: 1/30/2018 with prescribing provider:  Sri   Future Office Visit:      Requested Prescriptions   Pending Prescriptions Disp Refills     levothyroxine (SYNTHROID/LEVOTHROID) 75 MCG tablet [Pharmacy Med Name: LEVOTHYROXINE 0.075MG (75MCG) TABS] 90 tablet 0     Sig: TAKE 1 TABLET BY MOUTH DAILY    Thyroid Protocol Failed    4/12/2018  9:11 AM       Failed - Normal TSH on file in past 12 months    Recent Labs   Lab Test  07/26/17   0758   TSH  5.60*             Passed - Patient is 12 years or older       Passed - Recent (12 mo) or future (30 days) visit within the authorizing provider's specialty    Patient had office visit in the last 12 months or has a visit in the next 30 days with authorizing provider or within the authorizing provider's specialty.  See \"Patient Info\" tab in inbasket, or \"Choose Columns\" in Meds & Orders section of the refill encounter.           Passed - No active pregnancy on record    If patient is pregnant or has had a positive pregnancy test, please check TSH.         Passed - No positive pregnancy test in past 12 months    If patient is pregnant or has had a positive pregnancy test, please check TSH.            "

## 2018-04-13 NOTE — TELEPHONE ENCOUNTER
Patient needs thyroid labs rechecked.  Future orders signed.  Sent patient a hopTot message asking her to schedule lab appointment.  Madeline Chao RN

## 2018-04-16 NOTE — TELEPHONE ENCOUNTER
Patient has seen the message (My chart) regarding the need for lab appointment.  It isn't wise to refill her current Rx which was a dose change in July, 2017.  She had been advised to recheck labs in October.  Lisset Newsome RN

## 2018-04-18 ENCOUNTER — OFFICE VISIT (OUTPATIENT)
Dept: URGENT CARE | Facility: URGENT CARE | Age: 68
End: 2018-04-18
Payer: MEDICARE

## 2018-04-18 ENCOUNTER — TELEPHONE (OUTPATIENT)
Dept: FAMILY MEDICINE | Facility: CLINIC | Age: 68
End: 2018-04-18

## 2018-04-18 ENCOUNTER — HOSPITAL ENCOUNTER (EMERGENCY)
Facility: CLINIC | Age: 68
Discharge: HOME | End: 2018-04-18
Attending: EMERGENCY MEDICINE | Admitting: EMERGENCY MEDICINE
Payer: MEDICARE

## 2018-04-18 VITALS
TEMPERATURE: 97.8 F | SYSTOLIC BLOOD PRESSURE: 131 MMHG | WEIGHT: 195 LBS | HEART RATE: 83 BPM | BODY MASS INDEX: 28.8 KG/M2 | DIASTOLIC BLOOD PRESSURE: 79 MMHG

## 2018-04-18 VITALS
WEIGHT: 200 LBS | HEIGHT: 69 IN | BODY MASS INDEX: 29.62 KG/M2 | RESPIRATION RATE: 20 BRPM | OXYGEN SATURATION: 98 % | HEART RATE: 75 BPM | TEMPERATURE: 98.3 F | DIASTOLIC BLOOD PRESSURE: 81 MMHG | SYSTOLIC BLOOD PRESSURE: 144 MMHG
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
## 2018-04-18 DIAGNOSIS — E03.8 SUBCLINICAL HYPOTHYROIDISM: ICD-10-CM
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
## 2018-04-18 DIAGNOSIS — S61.215A LACERATION OF LEFT RING FINGER WITHOUT FOREIGN BODY WITHOUT DAMAGE TO NAIL, INITIAL ENCOUNTER: ICD-10-CM

## 2018-04-18 DIAGNOSIS — S61.259A DOG BITE OF FINGER, INITIAL ENCOUNTER: Primary | ICD-10-CM

## 2018-04-18 DIAGNOSIS — S61.259A DOG BITE OF FINGER, INITIAL ENCOUNTER: ICD-10-CM

## 2018-04-18 PROCEDURE — 99213 OFFICE O/P EST LOW 20 MIN: CPT | Mod: 25 | Performed by: FAMILY MEDICINE

## 2018-04-18 PROCEDURE — 84439 ASSAY OF FREE THYROXINE: CPT | Performed by: INTERNAL MEDICINE

## 2018-04-18 PROCEDURE — 84443 ASSAY THYROID STIM HORMONE: CPT | Performed by: INTERNAL MEDICINE

## 2018-04-18 PROCEDURE — 99282 EMERGENCY DEPT VISIT SF MDM: CPT

## 2018-04-18 PROCEDURE — 36415 COLL VENOUS BLD VENIPUNCTURE: CPT | Performed by: INTERNAL MEDICINE

## 2018-04-18 PROCEDURE — 12001 RPR S/N/AX/GEN/TRNK 2.5CM/<: CPT | Performed by: FAMILY MEDICINE

## 2018-04-18 RX ORDER — AMOXICILLIN AND CLAVULANATE POTASSIUM 500; 125 MG/1; MG/1
1 TABLET, FILM COATED ORAL 3 TIMES DAILY
Qty: 30 TABLET | Refills: 0 | Status: SHIPPED | OUTPATIENT
Start: 2018-04-18 | End: 2018-04-28

## 2018-04-18 ASSESSMENT — ENCOUNTER SYMPTOMS: WOUND: 1

## 2018-04-18 NOTE — ED PROVIDER NOTES
"  History     Chief Complaint:  Dog Bite (sent here by [facility] for rabies shot.  Her own puppy, to young to be vaccinated. puncture would on finger dressed and splinted at Urgent Care)      SOPHIA De Leon is a 68 year old female who presents with a dog bite. The patient was bitten by her puppy. The patient sustained a laceration to the tip of her right ring finger. The patient went to the [facility], where she got stitches for the laceration, was given an antibiotic, and told to come to the ED for rabies shots.      Allergies:  Ciprofloxacin  Oxycodone  Simvastatin     Medications:    Augmentin  Aspirin 81 MG  Clonazepam   Duloxetine  Levothyroxine  Lisinopril   Lovastatin     Past Medical History:    Anemia   Atypical ductal hyperplasia of both breasts   High cholesterol   History of hematuria  History of hormone replacement therapy   HTN  Hx of bone density study   Hypothyroidism   Insomnia   Lipid screening   Major depressive disorder, single episode in full remission (H)  Osteopenia   Prediabetes   Rotator cuff injury  TBI (traumatic brain injury) (H)   Tubular adenoma of colon     Past Surgical History:    Ankle Surgery  , Low Transverse  Hysterectomy  Tubal Ligation    Family History:    Cancer  Diabetes   Macular Degeneration   Glaucoma  Cerebrovascular Disease  Heart Failure    Social History:  Marital Status:   [2]  Smoking status: Former Smoker  Alcohol use: No    Review of Systems   Skin: Positive for wound.   All other systems reviewed and are negative.        Physical Exam   First Vitals:  BP: 144/81  Heart Rate: 80  Temp: 98.3  F (36.8  C)  Resp: 18  Height: 175.3 cm (5' 9\")  Weight: 90.7 kg (200 lb)  SpO2: 97 %      Physical Exam  MSK:  Normal movement through the elbow, wrist, and fingers without tendonous deficit.    SKIN:  Warm and dry with strong radial pulse and normal capillary refill.  There is a 2 cm laceration across the fat pad of the right ring finger with a " single suture in place at the center.  No gaping or bleeding noted.    NEURO:  5/5 strength through the fingers/wrist/elbow.  Normal sensation through the radial/ulnar/median nerve distributions.    PSYCH:  Normal affect      Emergency Department Course       Emergency Department Course:  Nursing notes and vitals reviewed.     1714 I performed an exam of the patient as documented above.     1720 I rechecked the patient and discussed the results of her workup thus far.     Findings and plan explained to the patient. Patient discharged home with instructions regarding supportive care, medications, and reasons to return. The importance of close follow-up was reviewed.     I personally reviewed the laboratory results with the patient and answered all related questions prior to discharge.         Impression & Plan      Medical Decision Making:  This healthy 68-year-old presents to us from urgent care for further evaluation of her right ring finger dog bite.  The patient has a new puppy which has been living with her for the last 2 weeks.  The puppy likes to nip and bite the patient notes she has multiple scratches and bites across her hands from this typical behavior.  However, when the puppy nipped at her finger today, she pulled back, resulting in a laceration.  She sought care at an urgent care because it had not stopped bleeding.  Otherwise, the patient feels that the puppy is acting his typical self.  In the 2 weeks that she has had the puppy, it has never been let outside without supervision.  There has been no exposure to other wildlife or any bats inside the house.  She has not noticed that the puppy has suffered any bites or other inappropriate lesions.    Because the puppy had not yet received his rabies vaccine, the urgent care provider urged the patient to come here for a rabies vaccination series.  The finger had been sutured as it had been gaping and bleeding.  This single suture, tied loosely, seems a very  reasonable course to help hold the wound together and the patient was prescribed Augmentin.  Regarding the rabies vaccination series, I spoke with the patient at length of how to proceed.  While this probably has not received a rabies vaccine, this seems to be 1 of the lowest risk exposures that I can imagine.  The puppy has been acting his typical self over the last 2 weeks.  There has been no risk that it has been bit by a rabid animal.  It is living within the patient's house and can be observed closely.  I explained that if the dog is rabid, that there should be significant decompensation over the next several days and that with this, she should present to her  and likely would need to come in for a rabies series.  However, I feel this is highly unlikely and that if the puppy is doing well over the next several days, there is no risk that the patient has contracted rabies.  The patient feels very comfortable not undergoing any injections that would not be necessary.  She will watch her dog closely.  She will return to us immediately for any signs of inappropriate behavior by her dog or other emergent concerns.      Diagnosis:    ICD-10-CM    1. Dog bite of finger, initial encounter S61.259A     W54.0XXA        Disposition:  Discharged to home.    Discharge Medications:  New Prescriptions    No medications on file     I, López Lamar, am serving as a scribe on 4/18/2018 at 5:14 PM to personally document services performed by Trierweiler, Chad A, MD based on my observations and the provider's statements to me.       López Lamar  4/18/2018    EMERGENCY DEPARTMENT       Trierweiler, Chad A, MD  04/19/18 1413

## 2018-04-18 NOTE — PROGRESS NOTES
SUBJECTIVE:     Chief Complaint   Patient presents with     Dog Bite     dog bite of left 3rd finger - incident happened today.     Janie De Leon is a 68 year old female who presents to the clinic with a laceration on the left third  finger  Palmar aspect distal phalanxsustained 1 hour(s) ago.  This is a non-work related and accidental injury.    Mechanism of injury: animal bite.  9 weeks puppy bit her and did not get rabies shot    Associated symptoms: Denies numbness, weakness, or loss of function  Last tetanus booster within 10 years: yes    EXAM:   The patient appears today in alert,no apparent distress distress  VITALS: /79  Pulse 83  Temp 97.8  F (36.6  C) (Oral)  Wt 195 lb (88.5 kg)  Breastfeeding? No  BMI 28.8 kg/m2    Size of laceration: 1 centimeters  Characteristics of the laceration: active bleeding noted in the pulp of the distal phalanx   Tendon function intact: yes  Sensation to light touch intact: yes  Pulses intact: not applicable  Picture included in patient's chart: no    Assessment:  Janie was seen today for dog bite.    Diagnoses and all orders for this visit:    Dog bite of finger, initial encounter  -     amoxicillin-clavulanate (AUGMENTIN) 500-125 MG per tablet; Take 1 tablet by mouth 3 times daily for 10 days    Laceration of left ring finger without foreign body without damage to nail, initial encounter      after suturing   Area was pressure bandaged   Also a splint was applied to the finger to prevent bleeding     PLAN:  PROCEDURE NOTE::  Wound was locally injected with 1 cc's of Lidocaine 1% plain  Wound cleaned with HIBICLENS  Laceration was closed using  4-0 ethilon suture only 1 suture was applied   After care instructions:  Signs of infection discussed today  Pt was advised to follow up in ER for rabies prophylaxis   Her puppy was too young to be vaccinated for rabies

## 2018-04-18 NOTE — ED AVS SNAPSHOT
Emergency Department    [address] [facility] [address]    Phone:  [phone]    Fax:  [phone]                                       Janie De Leon   MRN: [id number]    Department:   Emergency Department   Date of Visit:  4/18/2018           Patient Information     Date Of Birth          1950        Your diagnoses for this visit were:     Dog bite of finger, initial encounter        You were seen by Trierweiler, Chad A, MD.      Follow-up Information     Follow up with Meche Fierro DO In 1 week.    Specialty:  Internal Medicine    Contact information:    [address] [facility] [address]  [facility] [id number] [phone]          Follow up with  Emergency Department.    Specialty:  EMERGENCY MEDICINE    Why:  If symptoms worsen    Contact information:    [address] [facility] [id number] [phone]        Discharge Instructions         Dog Bite  A dog bite can cause a wound deep enough to break the skin. In such cases, the wound is cleaned and sometimes closed. If the wound is closed, it is usually not completely closed. This is so that fluid can drain if the wound becomes infected. Often, wounds will be left open to heal. In addition to wound care, a tetanus shot may be given, if needed.    Home care    Wash your hands well with soap and warm water before and after caring for the wound. This helps lower the risk of infection.    Care for the wound as directed. If a dressing was applied to the wound, be sure to change it as directed.    If the wound bleeds, place a clean, soft cloth on the wound. Then firmly apply pressure until the bleeding stops. This may take up to 5 minutes. Do not release the pressure and look at the wound during this time.    Most wounds heal within 10 days. But an infection can occur even with proper treatment. So be sure to check the wound daily for signs of infection (see below).    Antibiotics may be prescribed. These help prevent or treat  infection. If you re given antibiotics, take them as directed. Also be sure to complete the medicines.  Rabies prevention  Rabies is a virus that can be carried in certain animals. These can include domestic animals such as dogs and cats. Pets fully vaccinated against rabies (2 shots) are at very low risk of infection. But because human rabies is almost always fatal, any biting pet should be confined for 10 days as an extra precaution. In general, if there is a risk for rabies, the following steps may need to be taken:    If someone s pet dog has bitten you, it should be kept in a secure area for the next 10 days to watch for signs of illness. (If the pet owner won t allow this, contact your local animal control center.) If the dog becomes ill or dies during that time, contact your local animal control center at once so the animal may be tested for rabies. If the dog stays healthy for the next 10 days, there is no danger of rabies in the animal or you.    If a stray dog bit you, contact your local animal control center. They can give information on capture, quarantine, and animal rabies testing.    If you can t find the animal that bit you in the next 2 days, and if rabies exists in your area, you may need to receive the rabies vaccine series. Call your healthcare provider right away. Or, return to the emergency department promptly.    All animal bites should be reported to the local animal control center. If you were not given a form to fill out, you can report this yourself.  Follow-up care  Follow up with your healthcare provider, or as directed.  When to seek medical advice  Call your healthcare provider right away if any of these occur:    Signs of infection:    Spreading redness or warmth from the wound    Increased pain or swelling    Fever of 100.4 F (38 C) or higher, or as directed by your healthcare provider    Colored fluid or pus draining from the wound    Signs of rabies  infection:    Headache    Confusion    Strange behavior    Increased salivating and drooling    Seizure    Decreased ability to move any body part near the wound    Bleeding that can't be stopped after 5 minutes of firm pressure  Date Last Reviewed: 3/1/2017    4271-8771 [employer]. [address] [place] [address]. All rights reserved. This information is not intended as a substitute for professional medical care. Always follow your healthcare professional's instructions.          24 Hour Appointment Hotline       To make an appointment at any [facility], call [phone] ([phone]). If you don't have a family doctor or clinic, we will help you find one. [facility] clinics are conveniently located to serve the needs of you and your family.             Review of your medicines      Our records show that you are taking the medicines listed below. If these are incorrect, please call your family doctor or clinic.        Dose / Directions Last dose taken    amoxicillin-clavulanate 500-125 MG per tablet   Commonly known as:  AUGMENTIN   Dose:  1 tablet   Quantity:  30 tablet        Take 1 tablet by mouth 3 times daily for 10 days   Refills:  0        aspirin 81 MG tablet   Dose:  81 mg        Take 81 mg by mouth daily   Refills:  0        clonazePAM 0.5 MG tablet   Commonly known as:  klonoPIN   Dose:  0.25-0.5 mg   Quantity:  45 tablet        Take 0.5-1 tablets (0.25-0.5 mg) by mouth nightly as needed   Refills:  0        DULoxetine 60 MG EC capsule   Commonly known as:  CYMBALTA   Quantity:  90 capsule        TAKE 1 CAPSULE(60 MG) BY MOUTH DAILY   Refills:  3        levothyroxine 75 MCG tablet   Commonly known as:  SYNTHROID/LEVOTHROID   Dose:  75 mcg   Quantity:  90 tablet        Take 1 tablet (75 mcg) by mouth daily   Refills:  0        lisinopril 2.5 MG tablet   Commonly known as:  PRINIVIL/Zestril   Dose:  2.5 mg   Quantity:  90 tablet        Take 1 tablet (2.5 mg) by mouth daily    Refills:  3        lovastatin 40 MG tablet   Commonly known as:  MEVACOR   Dose:  40 mg   Quantity:  90 tablet        Take 1 tablet (40 mg) by mouth At Bedtime   Refills:  3        psyllium 58.6 % Powd   Commonly known as:  METAMUCIL        Take by mouth as needed for constipation   Refills:  0        VITAMIN D3 PO   Dose:  1000 Units        Take 1,000 Units by mouth daily   Refills:  0                Orders Needing Specimen Collection     None      Pending Results     Date and Time Order Name Status Description    4/18/2018 0801 TSH WITH FREE T4 REFLEX In process             Pending Culture Results     No orders found from 4/16/2018 to 4/19/2018.            Pending Results Instructions     If you had any lab results that were not finalized at the time of your Discharge, you can call the ED Lab Result RN at [phone]. You will be contacted by this team for any positive Lab results or changes in treatment. The nurses are available 7 days a week from 10A to 6:30P.  You can leave a message 24 hours per day and they will return your call.        Test Results From Your Hospital Stay               Clinical Quality Measure: Blood Pressure Screening     Your blood pressure was checked while you were in the emergency department today. The last reading we obtained was  BP: 144/81 . Please read the guidelines below about what these numbers mean and what you should do about them.  If your systolic blood pressure (the top number) is less than 120 and your diastolic blood pressure (the bottom number) is less than 80, then your blood pressure is normal. There is nothing more that you need to do about it.  If your systolic blood pressure (the top number) is 120-139 or your diastolic blood pressure (the bottom number) is 80-89, your blood pressure may be higher than it should be. You should have your blood pressure rechecked within a year by a primary care provider.  If your systolic blood pressure (the top number) is 140 or  greater or your diastolic blood pressure (the bottom number) is 90 or greater, you may have high blood pressure. High blood pressure is treatable, but if left untreated over time it can put you at risk for heart attack, stroke, or kidney failure. You should have your blood pressure rechecked by a primary care provider within the next 4 weeks.  If your provider in the emergency department today gave you specific instructions to follow-up with your doctor or provider even sooner than that, you should follow that instruction and not wait for up to 4 weeks for your follow-up visit.        Thank you for choosing [facility]       Thank you for choosing [facility] for your care. Our goal is always to provide you with excellent care. Hearing back from our patients is one way we can continue to improve our services. Please take a few minutes to complete the written survey that you may receive in the mail after you visit with us. Thank you!        [employer] Information     [employer] gives you secure access to your electronic health record. If you see a primary care provider, you can also send messages to your care team and make appointments. If you have questions, please call your primary care clinic.  If you do not have a primary care provider, please call [phone] and they will assist you.        Care EveryWhere ID     This is your Care EveryWhere ID. This could be used by other organizations to access your [facility] medical records  [id number]        Equal Access to Services     IRENA DOUGLAS : Kira Davison, wateodorada tino, qaybta kaalmamisael epperson, gunnar ray. So [facility] [phone].    ATENCIÓN: Si habla español, tiene a benton disposición servicios gratuitos de asistencia lingüística. Llame al [phone].    We comply with applicable federal civil rights laws and [place] laws. We do not discriminate on the basis of race, color, national origin, age, disability, sex, sexual  orientation, or gender identity.            After Visit Summary       This is your record. Keep this with you and show to your community pharmacist(s) and doctor(s) at your next visit.

## 2018-04-18 NOTE — DISCHARGE INSTRUCTIONS
Dog Bite  A dog bite can cause a wound deep enough to break the skin. In such cases, the wound is cleaned and sometimes closed. If the wound is closed, it is usually not completely closed. This is so that fluid can drain if the wound becomes infected. Often, wounds will be left open to heal. In addition to wound care, a tetanus shot may be given, if needed.    Home care    Wash your hands well with soap and warm water before and after caring for the wound. This helps lower the risk of infection.    Care for the wound as directed. If a dressing was applied to the wound, be sure to change it as directed.    If the wound bleeds, place a clean, soft cloth on the wound. Then firmly apply pressure until the bleeding stops. This may take up to 5 minutes. Do not release the pressure and look at the wound during this time.    Most wounds heal within 10 days. But an infection can occur even with proper treatment. So be sure to check the wound daily for signs of infection (see below).    Antibiotics may be prescribed. These help prevent or treat infection. If you re given antibiotics, take them as directed. Also be sure to complete the medicines.  Rabies prevention  Rabies is a virus that can be carried in certain animals. These can include domestic animals such as dogs and cats. Pets fully vaccinated against rabies (2 shots) are at very low risk of infection. But because human rabies is almost always fatal, any biting pet should be confined for 10 days as an extra precaution. In general, if there is a risk for rabies, the following steps may need to be taken:    If someone s pet dog has bitten you, it should be kept in a secure area for the next 10 days to watch for signs of illness. (If the pet owner won t allow this, contact your local animal control center.) If the dog becomes ill or dies during that time, contact your local animal control center at once so the animal may be tested for rabies. If the dog stays healthy  for the next 10 days, there is no danger of rabies in the animal or you.    If a stray dog bit you, contact your local animal control center. They can give information on capture, quarantine, and animal rabies testing.    If you can t find the animal that bit you in the next 2 days, and if rabies exists in your area, you may need to receive the rabies vaccine series. Call your healthcare provider right away. Or, return to the emergency department promptly.    All animal bites should be reported to the local animal control center. If you were not given a form to fill out, you can report this yourself.  Follow-up care  Follow up with your healthcare provider, or as directed.  When to seek medical advice  Call your healthcare provider right away if any of these occur:    Signs of infection:    Spreading redness or warmth from the wound    Increased pain or swelling    Fever of 100.4 F (38 C) or higher, or as directed by your healthcare provider    Colored fluid or pus draining from the wound    Signs of rabies infection:    Headache    Confusion    Strange behavior    Increased salivating and drooling    Seizure    Decreased ability to move any body part near the wound    Bleeding that can't be stopped after 5 minutes of firm pressure  Date Last Reviewed: 3/1/2017    6306-8535 [employer]. [address] [place] [address], [place] [address]. All rights reserved. This information is not intended as a substitute for professional medical care. Always follow your healthcare professional's instructions.

## 2018-04-18 NOTE — ED AVS SNAPSHOT
Emergency Department    [address] [place] [address]    Phone:  [phone]    Fax:  [phone]                                       Janie De Leon   MRN: [id number]    Department:   Emergency Department   Date of Visit:  4/18/2018           After Visit Summary Signature Page     I have received my discharge instructions, and my questions have been answered. I have discussed any challenges I see with this plan with the nurse or doctor.    ..........................................................................................................................................  Patient/Patient Representative Signature      ..........................................................................................................................................  Patient Representative Print Name and Relationship to Patient    ..................................................               ................................................  Date                                            Time    ..........................................................................................................................................  Reviewed by Signature/Title    ...................................................              ..............................................  Date                                                            Time

## 2018-04-18 NOTE — MR AVS SNAPSHOT
After Visit Summary   4/18/2018    Janie De Leon    MRN: [id number]           Patient Information     Date Of Birth          1950        Visit Information        Provider Department      4/18/2018 3:25 PM Leena Boswell MD [facility][1]        Today's Diagnoses     Dog bite of finger, initial encounter    -  1    Laceration of left ring finger without foreign body without damage to nail, initial encounter           Follow-ups after your visit        Your next 10 appointments already scheduled     Apr 25, 2018 11:30 AM CDT   Nurse Only with CS ANABEL NURSE   [facility]    [address]  [place] [address] [phone]              Who to contact     If you have questions or need follow up information about today's clinic visit or your schedule please contact [facility] directly at [phone].  Normal or non-critical lab and imaging results will be communicated to you by [employer], letter or phone within 4 business days after the clinic has received the results. If you do not hear from us within 7 days, please contact the clinic through [employer] or phone. If you have a critical or abnormal lab result, we will notify you by phone as soon as possible.  Submit refill requests through [employer] or call your pharmacy and they will forward the refill request to us. Please allow 3 business days for your refill to be completed.          Additional Information About Your Visit        MyChart Information     [employer] gives you secure access to your electronic health record. If you see a primary care provider, you can also send messages to your care team and make appointments. If you have questions, please call your primary care clinic.  If you do not have a primary care provider, please call [phone] and they will assist you.        Care EveryWhere ID     This is your Care EveryWhere ID. This could be used by other  organizations to access your [facility] medical records  [id number]        Your Vitals Were     Pulse Temperature Breastfeeding? BMI (Body Mass Index)          83 97.8  F (36.6  C) (Oral) No 28.8 kg/m2         Blood Pressure from Last 3 Encounters:   04/18/18 144/81   04/18/18 131/79   01/30/18 123/80    Weight from Last 3 Encounters:   04/18/18 200 lb (90.7 kg)   04/18/18 195 lb (88.5 kg)   01/30/18 197 lb (89.4 kg)              We Performed the Following     REPAIR SUPERFICIAL, WOUND BODY < =2.5CM          Today's Medication Changes          These changes are accurate as of 4/18/18 11:59 PM.  If you have any questions, ask your nurse or doctor.               Start taking these medicines.        Dose/Directions    amoxicillin-clavulanate 500-125 MG per tablet   Commonly known as:  AUGMENTIN   Used for:  Dog bite of finger, initial encounter   Started by:  Leena Boswell MD        Dose:  1 tablet   Take 1 tablet by mouth 3 times daily for 10 days   Quantity:  30 tablet   Refills:  0            Where to get your medicines      These medications were sent to [facility] [address] [place] [address] [address] [facility] [address], [facility] [address]     Phone:  [phone]     amoxicillin-clavulanate 500-125 MG per tablet                Primary Care Provider Office Phone # Fax #    Meche Hall Sri,  [phone] [address] [address]        Equal Access to Services     [facility] Hospital[facility] AH: Hadii bakari chung hadasho Soomaali, waaxda luqadaha, qaybta kaalmada adeegyamisael, gunnar idisteve ray. So [facility] [phone].    ATENCIÓN: Si habla español, tiene a benton disposición servicios gratuitos de asistencia lingüística. Llame al [phone].    We comply with applicable federal civil rights laws and [place] laws. We do not discriminate on the basis of race, color, national origin, age, disability, sex, sexual orientation, or  gender identity.            Thank you!     Thank you for choosing [facility]  for your care. Our goal is always to provide you with excellent care. Hearing back from our patients is one way we can continue to improve our services. Please take a few minutes to complete the written survey that you may receive in the mail after your visit with us. Thank you!             Your Updated Medication List - Protect others around you: Learn how to safely use, store and throw away your medicines at www.disposemymeds.org.          This list is accurate as of 4/18/18 11:59 PM.  Always use your most recent med list.                   Brand Name Dispense Instructions for use Diagnosis    amoxicillin-clavulanate 500-125 MG per tablet    AUGMENTIN    30 tablet    Take 1 tablet by mouth 3 times daily for 10 days    Dog bite of finger, initial encounter       aspirin 81 MG tablet      Take 81 mg by mouth daily        clonazePAM 0.5 MG tablet    klonoPIN    45 tablet    Take 0.5-1 tablets (0.25-0.5 mg) by mouth nightly as needed    Adjustment disorder with anxiety       DULoxetine 60 MG EC capsule    CYMBALTA    90 capsule    TAKE 1 CAPSULE(60 MG) BY MOUTH DAILY    Major depressive disorder, single episode in full remission (H)       levothyroxine 75 MCG tablet    SYNTHROID/LEVOTHROID    90 tablet    TAKE 1 TABLET BY MOUTH DAILY    Subclinical hypothyroidism       lisinopril 2.5 MG tablet    PRINIVIL/Zestril    90 tablet    Take 1 tablet (2.5 mg) by mouth daily    Essential hypertension, benign       lovastatin 40 MG tablet    MEVACOR    90 tablet    Take 1 tablet (40 mg) by mouth At Bedtime    Hyperlipidemia, unspecified hyperlipidemia type       psyllium 58.6 % Powd    METAMUCIL     Take by mouth as needed for constipation        VITAMIN D3 PO      Take 1,000 Units by mouth daily

## 2018-04-19 LAB
T4 FREE SERPL-MCNC: 0.86 NG/DL (ref 0.76–1.46)
TSH SERPL DL<=0.005 MIU/L-ACNC: 9.41 MU/L (ref 0.4–4)

## 2018-04-19 RX ORDER — LEVOTHYROXINE SODIUM 75 UG/1
TABLET ORAL
Qty: 90 TABLET | Refills: 1 | Status: SHIPPED | OUTPATIENT
Start: 2018-04-19 | End: 2018-07-30

## 2018-04-19 NOTE — TELEPHONE ENCOUNTER
Called and spoke with Janie. She took remaining 50 mcg for awhile by taking 1.5 tablets and then switched to the 75 mcg. Then about 3 weeks ago she was running low and didn't call us and started taking every other day which is why thyroid labs are off. She felt well on 75 mcg dose. Will continue and check labs later this summer with her annual. Rx sent in. As an aside, she is enjoying new puppy and was in ER yesterday with small bite wound and on Augmentin and doing well.

## 2018-04-25 ENCOUNTER — ALLIED HEALTH/NURSE VISIT (OUTPATIENT)
Dept: NURSING | Facility: CLINIC | Age: 68
End: 2018-04-25
Payer: MEDICARE

## 2018-04-25 DIAGNOSIS — Z48.02 ENCOUNTER FOR REMOVAL OF SUTURES: Primary | ICD-10-CM

## 2018-04-25 PROCEDURE — 99207 ZZC NO CHARGE NURSE ONLY: CPT

## 2018-04-25 NOTE — NURSING NOTE
Janie presents to the clinic today for  removal of sutures and suture.  The patient has had the sutures and suture in place for 7 days.    There has been no history of infection or drainage.    O: 1 sutures and suture are seen located on the Tip of right ring finger.  The wound is healing well with no signs of infection.    Tetanus status is up to date.    A: Suture removal.    P: Suture was easily removed today.  Routine wound care discussed.  The patient will follow up as needed.    Altagracia CHAMBERS RN

## 2018-04-25 NOTE — MR AVS SNAPSHOT
After Visit Summary   4/25/2018    Janie De Leon    MRN: [id number]           Patient Information     Date Of Birth          1950        Visit Information        Provider Department      4/25/2018 11:30 AM MARLON ELLER NURSE [place] [facility]a        Today's Diagnoses     Encounter for removal of sutures    -  1       Follow-ups after your visit        Who to contact     If you have questions or need follow up information about today's clinic visit or your schedule please contact [facility] directly at [phone].  Normal or non-critical lab and imaging results will be communicated to you by Talismahart, letter or phone within 4 business days after the clinic has received the results. If you do not hear from us within 7 days, please contact the clinic through PayScalet or phone. If you have a critical or abnormal lab result, we will notify you by phone as soon as possible.  Submit refill requests through [employer] or call your pharmacy and they will forward the refill request to us. Please allow 3 business days for your refill to be completed.          Additional Information About Your Visit        MyChart Information     [employer] gives you secure access to your electronic health record. If you see a primary care provider, you can also send messages to your care team and make appointments. If you have questions, please call your primary care clinic.  If you do not have a primary care provider, please call [phone] and they will assist you.        Care EveryWhere ID     This is your Care EveryWhere ID. This could be used by other organizations to access your [facility] medical records  [id number]         Blood Pressure from Last 3 Encounters:   04/18/18 144/81   04/18/18 131/79   01/30/18 123/80    Weight from Last 3 Encounters:   04/18/18 200 lb (90.7 kg)   04/18/18 195 lb (88.5 kg)   01/30/18 197 lb (89.4 kg)              Today, you had the following     No orders found for display        Primary Care Provider Office Phone # Fax #    Meche Fierro [phone] [address] [facility] [address]  [facility] [address]        Equal Access to Services     IRENA ODUGLAS : Hadii bakari ku hadbenitao Soomaali, waaxda luqadaha, qaybta kaalmada adeegyada, gunnar dietrichn prettysolis schumacher osvaldo ray. So [facility] [phone].    ATENCIÓN: Si habla español, tiene a benton disposición servicios gratuitos de asistencia lingüística. Llame al [phone].    We comply with applicable federal civil rights laws and [place] laws. We do not discriminate on the basis of race, color, national origin, age, disability, sex, sexual orientation, or gender identity.            Thank you!     Thank you for choosing [facility]  for your care. Our goal is always to provide you with excellent care. Hearing back from our patients is one way we can continue to improve our services. Please take a few minutes to complete the written survey that you may receive in the mail after your visit with us. Thank you!             Your Updated Medication List - Protect others around you: Learn how to safely use, store and throw away your medicines at www.disposemymeds.org.          This list is accurate as of 4/25/18 11:44 AM.  Always use your most recent med list.                   Brand Name Dispense Instructions for use Diagnosis    amoxicillin-clavulanate 500-125 MG per tablet    AUGMENTIN    30 tablet    Take 1 tablet by mouth 3 times daily for 10 days    Dog bite of finger, initial encounter       aspirin 81 MG tablet      Take 81 mg by mouth daily        clonazePAM 0.5 MG tablet    klonoPIN    45 tablet    Take 0.5-1 tablets (0.25-0.5 mg) by mouth nightly as needed    Adjustment disorder with anxiety       DULoxetine 60 MG EC capsule    CYMBALTA    90 capsule    TAKE 1 CAPSULE(60 MG) BY MOUTH DAILY    Major depressive disorder, single episode in full remission (H)       levothyroxine 75 MCG tablet    SYNTHROID/LEVOTHROID    90  tablet    TAKE 1 TABLET BY MOUTH DAILY    Subclinical hypothyroidism       lisinopril 2.5 MG tablet    PRINIVIL/Zestril    90 tablet    Take 1 tablet (2.5 mg) by mouth daily    Essential hypertension, benign       lovastatin 40 MG tablet    MEVACOR    90 tablet    Take 1 tablet (40 mg) by mouth At Bedtime    Hyperlipidemia, unspecified hyperlipidemia type       psyllium 58.6 % Powd    METAMUCIL     Take by mouth as needed for constipation        VITAMIN D3 PO      Take 1,000 Units by mouth daily

## 2018-07-30 ENCOUNTER — OFFICE VISIT (OUTPATIENT)
Dept: FAMILY MEDICINE | Facility: CLINIC | Age: 68
End: 2018-07-30
Payer: MEDICARE

## 2018-07-30 ENCOUNTER — HOSPITAL ENCOUNTER (OUTPATIENT)
Dept: MAMMOGRAPHY | Facility: CLINIC | Age: 68
Discharge: HOME | End: 2018-07-30
Attending: INTERNAL MEDICINE | Admitting: INTERNAL MEDICINE
Payer: MEDICARE

## 2018-07-30 VITALS
HEART RATE: 76 BPM | DIASTOLIC BLOOD PRESSURE: 77 MMHG | BODY MASS INDEX: 28.96 KG/M2 | HEIGHT: 69 IN | WEIGHT: 195.5 LBS | SYSTOLIC BLOOD PRESSURE: 123 MMHG | OXYGEN SATURATION: 96 % | TEMPERATURE: 97.8 F

## 2018-07-30 DIAGNOSIS — E03.9 HYPOTHYROIDISM, UNSPECIFIED TYPE: Chronic | ICD-10-CM

## 2018-07-30 DIAGNOSIS — R73.03 PREDIABETES: Chronic | ICD-10-CM

## 2018-07-30 DIAGNOSIS — F43.22 ADJUSTMENT DISORDER WITH ANXIETY: ICD-10-CM

## 2018-07-30 DIAGNOSIS — I10 ESSENTIAL HYPERTENSION, BENIGN: Chronic | ICD-10-CM

## 2018-07-30 DIAGNOSIS — Z12.31 VISIT FOR SCREENING MAMMOGRAM: ICD-10-CM

## 2018-07-30 DIAGNOSIS — Z00.00 MEDICARE ANNUAL WELLNESS VISIT, SUBSEQUENT: Primary | ICD-10-CM

## 2018-07-30 DIAGNOSIS — F32.5 MAJOR DEPRESSIVE DISORDER, SINGLE EPISODE IN FULL REMISSION (H): Chronic | ICD-10-CM

## 2018-07-30 DIAGNOSIS — M85.80 OSTEOPENIA, UNSPECIFIED LOCATION: ICD-10-CM

## 2018-07-30 DIAGNOSIS — E78.5 HYPERLIPIDEMIA, UNSPECIFIED HYPERLIPIDEMIA TYPE: Chronic | ICD-10-CM

## 2018-07-30 LAB
ANION GAP SERPL CALCULATED.3IONS-SCNC: 5 MMOL/L (ref 3–14)
BUN SERPL-MCNC: 17 MG/DL (ref 7–30)
CALCIUM SERPL-MCNC: 9 MG/DL (ref 8.5–10.1)
CHLORIDE SERPL-SCNC: 106 MMOL/L (ref 94–109)
CHOLEST SERPL-MCNC: 235 MG/DL
CO2 SERPL-SCNC: 30 MMOL/L (ref 20–32)
CREAT SERPL-MCNC: 0.92 MG/DL (ref 0.52–1.04)
DEPRECATED CALCIDIOL+CALCIFEROL SERPL-MC: 45 UG/L (ref 20–75)
GFR SERPL CREATININE-BSD FRML MDRD: 61 ML/MIN/1.7M2
GLUCOSE SERPL-MCNC: 104 MG/DL (ref 70–99)
HBA1C MFR BLD: 6.1 % (ref 0–5.6)
HDLC SERPL-MCNC: 50 MG/DL
HGB BLD-MCNC: 15.4 G/DL (ref 11.7–15.7)
LDLC SERPL CALC-MCNC: 122 MG/DL
NONHDLC SERPL-MCNC: 185 MG/DL
POTASSIUM SERPL-SCNC: 4.6 MMOL/L (ref 3.4–5.3)
SODIUM SERPL-SCNC: 141 MMOL/L (ref 133–144)
TRIGL SERPL-MCNC: 313 MG/DL
TSH SERPL DL<=0.005 MIU/L-ACNC: 2.97 MU/L (ref 0.4–4)

## 2018-07-30 PROCEDURE — 77067 SCR MAMMO BI INCL CAD: CPT

## 2018-07-30 PROCEDURE — 83036 HEMOGLOBIN GLYCOSYLATED A1C: CPT | Performed by: INTERNAL MEDICINE

## 2018-07-30 PROCEDURE — 36415 COLL VENOUS BLD VENIPUNCTURE: CPT | Performed by: INTERNAL MEDICINE

## 2018-07-30 PROCEDURE — G0439 PPPS, SUBSEQ VISIT: HCPCS | Performed by: INTERNAL MEDICINE

## 2018-07-30 PROCEDURE — 84443 ASSAY THYROID STIM HORMONE: CPT | Performed by: INTERNAL MEDICINE

## 2018-07-30 PROCEDURE — 80061 LIPID PANEL: CPT | Performed by: INTERNAL MEDICINE

## 2018-07-30 PROCEDURE — 82306 VITAMIN D 25 HYDROXY: CPT | Performed by: INTERNAL MEDICINE

## 2018-07-30 PROCEDURE — 85018 HEMOGLOBIN: CPT | Performed by: INTERNAL MEDICINE

## 2018-07-30 PROCEDURE — 80048 BASIC METABOLIC PNL TOTAL CA: CPT | Performed by: INTERNAL MEDICINE

## 2018-07-30 RX ORDER — LISINOPRIL 2.5 MG/1
2.5 TABLET ORAL DAILY
Qty: 90 TABLET | Refills: 3 | Status: SHIPPED | OUTPATIENT
Start: 2018-07-30 | End: 2019-09-16

## 2018-07-30 RX ORDER — LOVASTATIN 40 MG
40 TABLET ORAL AT BEDTIME
Qty: 90 TABLET | Refills: 3 | Status: SHIPPED | OUTPATIENT
Start: 2018-07-30 | End: 2019-09-16

## 2018-07-30 RX ORDER — LEVOTHYROXINE SODIUM 75 UG/1
75 TABLET ORAL DAILY
Qty: 90 TABLET | Refills: 3 | Status: SHIPPED | OUTPATIENT
Start: 2018-07-30 | End: 2019-09-17

## 2018-07-30 RX ORDER — DULOXETIN HYDROCHLORIDE 60 MG/1
CAPSULE, DELAYED RELEASE ORAL
Qty: 90 CAPSULE | Refills: 3 | Status: SHIPPED | OUTPATIENT
Start: 2018-07-30 | End: 2019-07-24

## 2018-07-30 RX ORDER — CLONAZEPAM 0.5 MG/1
.25-.5 TABLET ORAL
Qty: 45 TABLET | Refills: 0 | Status: SHIPPED | OUTPATIENT
Start: 2018-07-30 | End: 2019-07-29

## 2018-07-30 NOTE — PATIENT INSTRUCTIONS
Labs today  Keep up the great work  Clonazepam script given to you today  Follow up annually or as needed    Preventive Health Recommendations  Female Ages 65 +    Yearly exam:     See your health care provider every year in order to  o Review health changes.   o Discuss preventive care.    o Review your medicines if your doctor has prescribed any.      You no longer need a yearly Pap test unless you've had an abnormal Pap test in the past 10 years. If you have vaginal symptoms, such as bleeding or discharge, be sure to talk with your provider about a Pap test.      Every 1 to 2 years, have a mammogram.  If you are over 69, talk with your health care provider about whether or not you want to continue having screening mammograms.      Every 10 years, have a colonoscopy. Or, have a yearly FIT test (stool test). These exams will check for colon cancer.       Have a cholesterol test every 5 years, or more often if your doctor advises it.       Have a diabetes test (fasting glucose) every three years. If you are at risk for diabetes, you should have this test more often.       At age 65, have a bone density scan (DEXA) to check for osteoporosis (brittle bone disease).    Shots:    Get a flu shot each year.    Get a tetanus shot every 10 years.    Talk to your doctor about your pneumonia vaccines. There are now two you should receive - Pneumovax (PPSV 23) and Prevnar (PCV 13).    Talk to your pharmacist about the shingles vaccine.    Talk to your doctor about the hepatitis B vaccine.    Nutrition:     Eat at least 5 servings of fruits and vegetables each day.      Eat whole-grain bread, whole-wheat pasta and brown rice instead of white grains and rice.      Get adequate Calcium and Vitamin D.     Lifestyle    Exercise at least 150 minutes a week (30 minutes a day, 5 days a week). This will help you control your weight and prevent disease.      Limit alcohol to one drink per day.      No smoking.       Wear sunscreen to  prevent skin cancer.       See your dentist twice a year for an exam and cleaning.      See your eye doctor every 1 to 2 years to screen for conditions such as glaucoma, macular degeneration and cataracts.

## 2018-07-30 NOTE — MR AVS SNAPSHOT
After Visit Summary   7/30/2018    Janie De Leon    MRN: [id number]           Patient Information     Date Of Birth          1950        Visit Information        Provider Department      7/30/2018 10:00 AM Meche Fierro,  [facility]        Today's Diagnoses     Medicare annual wellness visit, subsequent    -  1    Prediabetes        Essential hypertension, benign - goal < 140/90        Hyperlipidemia, unspecified hyperlipidemia type        Hypothyroidism, unspecified type        Osteopenia, unspecified location        Major depressive disorder, single episode in full remission (H)        Adjustment disorder with anxiety          Care Instructions    Labs today  Keep up the great work  Clonazepam script given to you today  Follow up annually or as needed    Preventive Health Recommendations  Female Ages 65 +    Yearly exam:     See your health care provider every year in order to  o Review health changes.   o Discuss preventive care.    o Review your medicines if your doctor has prescribed any.      You no longer need a yearly Pap test unless you've had an abnormal Pap test in the past 10 years. If you have vaginal symptoms, such as bleeding or discharge, be sure to talk with your provider about a Pap test.      Every 1 to 2 years, have a mammogram.  If you are over 69, talk with your health care provider about whether or not you want to continue having screening mammograms.      Every 10 years, have a colonoscopy. Or, have a yearly FIT test (stool test). These exams will check for colon cancer.       Have a cholesterol test every 5 years, or more often if your doctor advises it.       Have a diabetes test (fasting glucose) every three years. If you are at risk for diabetes, you should have this test more often.       At age 65, have a bone density scan (DEXA) to check for osteoporosis (brittle bone disease).    Shots:    Get a flu shot each year.    Get a tetanus shot every 10  years.    Talk to your doctor about your pneumonia vaccines. There are now two you should receive - Pneumovax (PPSV 23) and Prevnar (PCV 13).    Talk to your pharmacist about the shingles vaccine.    Talk to your doctor about the hepatitis B vaccine.    Nutrition:     Eat at least 5 servings of fruits and vegetables each day.      Eat whole-grain bread, whole-wheat pasta and brown rice instead of white grains and rice.      Get adequate Calcium and Vitamin D.     Lifestyle    Exercise at least 150 minutes a week (30 minutes a day, 5 days a week). This will help you control your weight and prevent disease.      Limit alcohol to one drink per day.      No smoking.       Wear sunscreen to prevent skin cancer.       See your dentist twice a year for an exam and cleaning.      See your eye doctor every 1 to 2 years to screen for conditions such as glaucoma, macular degeneration and cataracts.          Follow-ups after your visit        Who to contact     If you have questions or need follow up information about today's clinic visit or your schedule please contact [facility] directly at [phone].  Normal or non-critical lab and imaging results will be communicated to you by Peeriushart, letter or phone within 4 business days after the clinic has received the results. If you do not hear from us within 7 days, please contact the clinic through Tiltapt or phone. If you have a critical or abnormal lab result, we will notify you by phone as soon as possible.  Submit refill requests through [employer] or call your pharmacy and they will forward the refill request to us. Please allow 3 business days for your refill to be completed.          Additional Information About Your Visit        Boutir Information     [employer] gives you secure access to your electronic health record. If you see a primary care provider, you can also send messages to your care team and make appointments. If you have questions, please call your  "primary care clinic.  If you do not have a primary care provider, please call [phone] and they will assist you.        Care EveryWhere ID     This is your Care EveryWhere ID. This could be used by other organizations to access your [facility] medical records  [id number]        Your Vitals Were     Pulse Temperature Height Pulse Oximetry BMI (Body Mass Index)       76 97.8  F (36.6  C) (Oral) 5' 9\" (1.753 m) 96% 28.87 kg/m2        Blood Pressure from Last 3 Encounters:   07/30/18 123/77   04/18/18 144/81   04/18/18 131/79    Weight from Last 3 Encounters:   07/30/18 195 lb 8 oz (88.7 kg)   04/18/18 200 lb (90.7 kg)   04/18/18 195 lb (88.5 kg)              We Performed the Following     Basic metabolic panel     DEPRESSION ACTION PLAN (DAP)     Hemoglobin A1c     Hemoglobin     Lipid panel reflex to direct LDL Fasting     TSH with free T4 reflex     Vitamin D Deficiency          Where to get your medicines      These medications were sent to [facility] [address] [address] AT [facility] [address], [facility] [address]     Phone:  [phone]     DULoxetine 60 MG EC capsule    lisinopril 2.5 MG tablet    lovastatin 40 MG tablet         Some of these will need a paper prescription and others can be bought over the counter.  Ask your nurse if you have questions.     Bring a paper prescription for each of these medications     clonazePAM 0.5 MG tablet          Primary Care Provider Office Phone # Fax #    Meche Isabel Fierro [phone] [address] [facility] [address]  [facility] [address]        Equal Access to Services     IRENA DOUGLAS AH: Hadii bakari Davison, wateodorada luqadaha, qaybta kaalmada zeenat, gunnar ray. So [facility] [phone].    ATENCIÓN: Si habla español, tiene a benton disposición servicios gratuitos de asistencia lingüística. Llame al [phone].    We comply with applicable federal civil rights " laws and [place] laws. We do not discriminate on the basis of race, color, national origin, age, disability, sex, sexual orientation, or gender identity.            Thank you!     Thank you for choosing [facility]  for your care. Our goal is always to provide you with excellent care. Hearing back from our patients is one way we can continue to improve our services. Please take a few minutes to complete the written survey that you may receive in the mail after your visit with us. Thank you!             Your Updated Medication List - Protect others around you: Learn how to safely use, store and throw away your medicines at www.disposemymeds.org.          This list is accurate as of 7/30/18 10:46 AM.  Always use your most recent med list.                   Brand Name Dispense Instructions for use Diagnosis    aspirin 81 MG tablet      Take 81 mg by mouth daily        clonazePAM 0.5 MG tablet    klonoPIN    45 tablet    Take 0.5-1 tablets (0.25-0.5 mg) by mouth nightly as needed    Adjustment disorder with anxiety       DULoxetine 60 MG EC capsule    CYMBALTA    90 capsule    TAKE 1 CAPSULE(60 MG) BY MOUTH DAILY    Major depressive disorder, single episode in full remission (H)       levothyroxine 75 MCG tablet    SYNTHROID/LEVOTHROID    90 tablet    TAKE 1 TABLET BY MOUTH DAILY    Subclinical hypothyroidism       lisinopril 2.5 MG tablet    PRINIVIL/Zestril    90 tablet    Take 1 tablet (2.5 mg) by mouth daily    Essential hypertension, benign       lovastatin 40 MG tablet    MEVACOR    90 tablet    Take 1 tablet (40 mg) by mouth At Bedtime    Hyperlipidemia, unspecified hyperlipidemia type       psyllium 58.6 % Powd    METAMUCIL     Take by mouth as needed for constipation        VITAMIN D3 PO      Take 1,000 Units by mouth daily

## 2018-07-30 NOTE — LETTER
My Depression Action Plan  Name: Janie De Leon   Date of Birth 1950  Date: 7/30/2018    My doctor: Meche Fierro   My clinic: [address] [address] [phone]          GREEN    ZONE   Good Control    What it looks like:     Things are going generally well. You have normal up s and down s. You may even feel depressed from time to time, but bad moods usually last less than a day.   What you need to do:  1. Continue to care for yourself (see self care plan)  2. Check your depression survival kit and update it as needed  3. Follow your physician s recommendations including any medication.  4. Do not stop taking medication unless you consult with your physician first.           YELLOW         ZONE Getting Worse    What it looks like:     Depression is starting to interfere with your life.     It may be hard to get out of bed; you may be starting to isolate yourself from others.    Symptoms of depression are starting to last most all day and this has happened for several days.     You may have suicidal thoughts but they are not constant.   What you need to do:     1. Call your care team, your response to treatment will improve if you keep your care team informed of your progress. Yellow periods are signs an adjustment may need to be made.     2. Continue your self-care, even if you have to fake it!    3. Talk to someone in your support network    4. Open up your depression survival kit           RED    ZONE Medical Alert - Get Help    What it looks like:     Depression is seriously interfering with your life.     You may experience these or other symptoms: You can t get out of bed most days, can t work or engage in other necessary activities, you have trouble taking care of basic hygiene, or basic responsibilities, thoughts of suicide or death that will not go away, self-injurious behavior.     What you need to do:  1. Call your care team and request a  same-day appointment. If they are not available (weekends or after hours) call your local crisis line, emergency room or 911.            Depression Self Care Plan / Survival Kit    Self-Care for Depression  Here s the deal. Your body and mind are really not as separate as most people think.  What you do and think affects how you feel and how you feel influences what you do and think. This means if you do things that people who feel good do, it will help you feel better.  Sometimes this is all it takes.  There is also a place for medication and therapy depending on how severe your depression is, so be sure to consult with your medical provider and/ or Behavioral Health Consultant if your symptoms are worsening or not improving.     In order to better manage my stress, I will:    Exercise  Get some form of exercise, every day. This will help reduce pain and release endorphins, the  feel good  chemicals in your brain. This is almost as good as taking antidepressants!  This is not the same as joining a gym and then never going! (they count on that by the way ) It can be as simple as just going for a walk or doing some gardening, anything that will get you moving.      Hygiene   Maintain good hygiene (Get out of bed in the morning, Make your bed, Brush your teeth, Take a shower, and Get dressed like you were going to work, even if you are unemployed).  If your clothes don't fit try to get ones that do.    Diet  I will strive to eat foods that are good for me, drink plenty of water, and avoid excessive sugar, caffeine, alcohol, and other mood-altering substances.  Some foods that are helpful in depression are: complex carbohydrates, B vitamins, flaxseed, fish or fish oil, fresh fruits and vegetables.    Psychotherapy  I agree to participate in Individual Therapy (if recommended).    Medication  If prescribed medications, I agree to take them.  Missing doses can result in serious side effects.  I understand that drinking  alcohol, or other illicit drug use, may cause potential side effects.  I will not stop my medication abruptly without first discussing it with my provider.    Staying Connected With Others  I will stay in touch with my friends, family members, and my primary care provider/team.    Use your imagination  Be creative.  We all have a creative side; it doesn t matter if it s oil painting, sand castles, or mud pies! This will also kick up the endorphins.    Witness Beauty  (AKA stop and smell the roses) Take a look outside, even in mid-winter. Notice colors, textures. Watch the squirrels and birds.     Service to others  Be of service to others.  There is always someone else in need.  By helping others we can  get out of ourselves  and remember the really important things.  This also provides opportunities for practicing all the other parts of the program.    Humor  Laugh and be silly!  Adjust your TV habits for less news and crime-drama and more comedy.    Control your stress  Try breathing deep, massage therapy, biofeedback, and meditation. Find time to relax each day.     My support system    Clinic Contact:  Phone number:    Contact 1:  Phone number:    Contact 2:  Phone number:    Zoroastrian/:  Phone number:    Therapist:  Phone number:    Local crisis center:    Phone number:    Other community support:  Phone number:

## 2018-07-30 NOTE — LETTER
[facility]    07/30/18    Patient: Janie De Leon  YOB: 1950  Medical Record Number: [id number]                                                                  Controlled Substance Agreement  I understand that my care provider has prescribed controlled substances (narcotics, tranquilizers, and/or stimulants) to help manage my condition(s).  I am taking this medicine to help me function or work.  I know that this is strong medicine.  It could have serious side effects and even cause a dependency on the drug.  If I stop these medicines suddenly, I could have severe withdrawal symptoms.    The risks, benefits, and side effects of these medication(s) were explained to me.  I agree that:  1. I will take part in other treatments as advised by my provider.  This may be psychiatry or counseling, physical therapy, behavioral therapy, group treatment, or a referral to a pain clinic.  I will reduce or stop my medicine when my provider tells me to do so.   2. I will take my medicines as prescribed.  I will not change the dose or schedule unless my provider tells me to.  There will be no refills if I  run out early.   I may be contacted at any time without warning and asked to complete a drug test or pill count.   3. I will keep all my appointments at the clinic.  If I miss appointments or fail to follow instructions, my provider may stop my medicine.  4. I will not ask other providers to prescribe controlled substances. And I will not accept controlled substances from other people. If I need another prescribed controlled substance for a new reason, I will notify my provider within one business day.  5. If I enroll in the [place] Medical Marijuana program, I will tell my provider.  I will also sign an agreement to share my medical records with my provider.  6. I will use one pharmacy to fill all of my controlled substance prescriptions.  If my prescription is mailed to my pharmacy, it may take 5 to 7 days  for my medicine to be ready.  7. I understand that my provider, clinic care team, and pharmacy can track controlled substance prescriptions from other providers through a central database (prescription monitoring program).  8. I will bring in my list of medications (or my medicine bottles) each time I come to the clinic.  [id number] REV-  07/2018                                                                                                                                   Page 1 of 2      [facility]    07/30/18    Patient: Janie De Leon  YOB: 1950  Medical Record Number: [id number]    9. Refills of controlled substances will be made only during office hours.  It is up to me to make sure that I do not run out of my medicines on weekends or holidays.    10. I am responsible for my prescriptions.  If the medicine/prescription is lost or stolen, it will not be replaced.   I also agree not to share these medicines with anyone.  11. I agree to not use ANY illegal or recreational drugs.  This includes marijuana, cocaine, bath salts or other drugs.  I agree not to use alcohol unless my provider says I may.  I agree to give urine samples whenever asked.  If I fail to give a urine sample, the provider may stop my medicine.     12. I will tell my nurse or provider right away if I become pregnant or have a new medical problem treated outside of [facility].  13. I understand that this medicine can affect my thinking and judgment.  It may be unsafe for me to drive, use machinery and do dangerous tasks.  I will not do any of these things until I know how the medicine affects me.  If my dose changes, I will wait to see how it affects me.  I will contact my provider if I have concerns about medicine side effects.  I understand that if I do not follow any of the conditions above, my prescriptions or treatment may be stopped.    I agree that my provider, clinic care team, and pharmacy may work with any city,  state or federal law enforcement agency that investigates the misuse, sale, or other diversion of my controlled medicine. I will allow my provider to discuss my care with or share a copy of this agreement with any other treating provider, pharmacy or emergency room where I receive care.  I agree to give up (waive) any right of privacy or confidentiality with respect to these authorizations.   I have read this agreement and have asked questions about anything I did not understand.   ___________________________________    ___________________________  Patient Signature                                                           Date and Time  ___________________________________     ____________________________  Witness                                                                            Date and Time  ___________________________________  Meche Fierro DO  [id number] REV-  07/2018                                                                                                                                                   Page 2 of 2

## 2018-07-30 NOTE — PROGRESS NOTES
SUBJECTIVE:   Janie De Leon is a 68 year old female who presents for Preventive Visit.  Are you in the first 12 months of your Medicare Part B coverage?  No    Healthy Habits:    Do you get at least three servings of calcium containing foods daily (dairy, green leafy vegetables, etc.)? yes    Amount of exercise or daily activities, outside of work: 7 day(s) per week    Problems taking medications regularly No    Medication side effects: No    Have you had an eye exam in the past two years? Is due    Do you see a dentist twice per year? Yes     Do you have sleep apnea, excessive snoring or daytime drowsiness? No       Ability to successfully perform activities of daily living: Yes, no assistance needed    Home safety:  lack of grab bars in the bathroom     Hearing impairment: No    Fall risk:  Fallen 2 or more times in the past year?: No  Any fall with injury in the past year?: No    COGNITIVE SCREEN   1) Repeat 3 items (Leader, Season, Table)     2) Clock draw: Normal  3) 3 item recall: Recalls 3 objects  Results: 3 items recalled: COGNITIVE IMPAIRMENT LESS LIKELY    Mini-CogTM Copyright S [employer]. Licensed by the author for use in [place] [employer]; reprinted with permission (bianca@[facility]). All rights reserved.      Kitchen remodel done and happy with new puppy. Mental health much better though periodically has down days. Healed well from little puppy accidental bite (was in ER); can't even see where it was. No specific concerns today. She did contact [employer] for resources on her  with dementia. Mammogram was done this AM and results pending. Chronic constipation stable. Jogging some; not bothered by her PVCs. Updated family history. Still has about 10 Clonazepam left as rarely takes it; 45 tabs per 6 months or so.    Fam Hx        Social History   Substance Use Topics     Smoking status: Former Smoker     Quit date: 4/15/1975     Smokeless tobacco: Never Used     Alcohol use 0.0 oz/week  "    0 Standard drinks or equivalent per week      Comment: 1-2 drinks per week        If you drink alcohol do you typically have >3 drinks per day or >7 drinks per week? No                        Today's PHQ-2 Score:   PHQ-2 ( 1999 [employer]) 7/30/2018 7/30/2018   Q1: Little interest or pleasure in doing things 0 0   Q2: Feeling down, depressed or hopeless 0 0   PHQ-2 Score 0 0       Do you feel safe in your environment - Yes    Do you have a Health Care Directive?: No: Advance care planning reviewed with patient; information given to patient to review.    Current providers sharing in care for this patient include:   Patient Care Team:  Meche Fierro DO as PCP - General (Internal Medicine)    The following health maintenance items are reviewed in Epic and correct as of today:  Health Maintenance   Topic Date Due     PHQ-9 Q6 MONTHS  07/30/2018     FALL RISK ASSESSMENT  08/25/2018     INFLUENZA VACCINE (1) 09/01/2018     QUEENIE QUESTIONNAIRE 1 YEAR  01/30/2019     DEPRESSION ACTION PLAN Q1 YR  01/30/2019     MAMMO SCREEN Q2 YR (SYSTEM ASSIGNED)  07/24/2019     TETANUS IMMUNIZATION (SYSTEM ASSIGNED)  10/22/2019     ADVANCE DIRECTIVE PLANNING Q5 YRS  09/30/2020     COLONOSCOPY Q5 YR  03/22/2021     LIPID SCREEN Q5 YR FEMALE (SYSTEM ASSIGNED)  07/26/2022     DEXA SCAN SCREENING (SYSTEM ASSIGNED)  Completed     PNEUMOCOCCAL  Completed     HEPATITIS C SCREENING  Completed       ROS:  Comprehensive ROS negative unless as stated above in HPI.     OBJECTIVE:   /77 (BP Location: Right arm, Cuff Size: Adult Large)  Pulse 76  Temp 97.8  F (36.6  C) (Oral)  Ht 5' 9\" (1.753 m)  Wt 195 lb 8 oz (88.7 kg)  SpO2 96%  BMI 28.87 kg/m2 Estimated body mass index is 28.87 kg/(m^2) as calculated from the following:    Height as of this encounter: 5' 9\" (1.753 m).    Weight as of this encounter: 195 lb 8 oz (88.7 kg).  EXAM:   GENERAL APPEARANCE: healthy, alert and no distress, robust  EYES: Eyes grossly normal to inspection, " PERRL and conjunctivae and sclerae normal  HENT: ear canals and TM's normal, nose and mouth without ulcers or lesions, oropharynx clear and oral mucous membranes moist  NECK: no adenopathy, no asymmetry, masses, or scars and thyroid normal to palpation  RESP: lungs clear to auscultation - no rales, rhonchi or wheezes  BREAST: normal without masses, tenderness or nipple discharge and no palpable axillary masses or adenopathy  CV: regular rate and rhythm, normal S1 S2, no S3 or S4, no murmur, click or rub, no peripheral edema and no carotid bruits  ABDOMEN: soft, nontender, no hepatosplenomegaly, no masses and bowel sounds normal  MS: no musculoskeletal defects are noted and gait is age appropriate without ataxia  SKIN: no suspicious lesions or rashes; birth davy left breast  NEURO: Normal strength and tone, mentation intact and speech normal  PSYCH: mentation appears normal and affect normal/bright    ASSESSMENT / PLAN:   1. Medicare annual wellness visit, subsequent  Up to date on preventative health care    2. Prediabetes  Was on Metformin in the past; now diet control and activity  - Hemoglobin A1c    3. Essential hypertension, benign - goal < 140/90  At goal  - Basic metabolic panel  - Hemoglobin  - lisinopril (PRINIVIL/ZESTRIL) 2.5 MG tablet; Take 1 tablet (2.5 mg) by mouth daily  Dispense: 90 tablet; Refill: 3    4. Hyperlipidemia, unspecified hyperlipidemia type  Due for labs  - Lipid panel reflex to direct LDL Fasting  - lovastatin (MEVACOR) 40 MG tablet; Take 1 tablet (40 mg) by mouth At Bedtime  Dispense: 90 tablet; Refill: 3    5. Hypothyroidism, unspecified type  Lab stable so 75 mcg Levothyroxine sent in for a year  - TSH with free T4 reflex    6. Osteopenia, unspecified location  DEXA completed in 2016 actually was quite good and she is very active on her feet  - Vitamin D Deficiency    7. Major depressive disorder, single episode in full remission (H)  Well controlled  PHQ-9 SCORE 7/24/2017 1/30/2018  "7/30/2018   Total Score 3 4 3   - DULoxetine (CYMBALTA) 60 MG EC capsule; TAKE 1 CAPSULE(60 MG) BY MOUTH DAILY  Dispense: 90 capsule; Refill: 3    8. Adjustment disorder with anxiety  Uses Rx sparingly. New CSA signed and she is compliant.  - clonazePAM (KLONOPIN) 0.5 MG tablet; Take 0.5-1 tablets (0.25-0.5 mg) by mouth nightly as needed  Dispense: 45 tablet; Refill: 0      End of Life Planning:  Patient currently has an advanced directive: No.  I have verified the patient's ablity to prepare an advanced directive/make health care decisions.  Literature was provided to assist patient in preparing an advanced directive.    COUNSELING:  Reviewed preventive health counseling, as reflected in patient instructions       Regular exercise       Healthy diet/nutrition    BP Readings from Last 1 Encounters:   07/30/18 123/77     Estimated body mass index is 28.87 kg/(m^2) as calculated from the following:    Height as of this encounter: 5' 9\" (1.753 m).    Weight as of this encounter: 195 lb 8 oz (88.7 kg).  Wt Readings from Last 4 Encounters:   07/30/18 195 lb 8 oz (88.7 kg)   04/18/18 200 lb (90.7 kg)   04/18/18 195 lb (88.5 kg)   01/30/18 197 lb (89.4 kg)      reports that she quit smoking about 43 years ago. She has never used smokeless tobacco.      Appropriate preventive services were discussed with this patient, including applicable screening as appropriate for cardiovascular disease, diabetes, osteopenia/osteoporosis, and glaucoma.  As appropriate for age/gender, discussed screening for colorectal cancer, prostate cancer, breast cancer, and cervical cancer. Checklist reviewing preventive services available has been given to the patient.    Reviewed patients plan of care and provided an AVS. The Intermediate Care Plan ( asthma action plan, low back pain action plan, and migraine action plan) for Janie meets the Care Plan requirement. This Care Plan has been established and reviewed with the Patient.    Patient " Instructions   Labs today  Keep up the great work  Clonazepam script given to you today  Follow up annually or as needed      Meche Fierro, DO  [facility]

## 2018-07-31 ASSESSMENT — PATIENT HEALTH QUESTIONNAIRE - PHQ9: SUM OF ALL RESPONSES TO PHQ QUESTIONS 1-9: 3

## 2018-08-24 DIAGNOSIS — F32.5 MAJOR DEPRESSIVE DISORDER, SINGLE EPISODE IN FULL REMISSION (H): Chronic | ICD-10-CM

## 2018-08-24 NOTE — TELEPHONE ENCOUNTER
"Pending Prescriptions:                       Disp   Refills    DULoxetine (CYMBALTA) 60 MG EC capsule [P*90 cap*0            Sig: TAKE 1 CAPSULE(60 MG) BY MOUTH DAILY      Last Written Prescription Date:  07/30/2018  Last Fill Quantity: 90,  # refills: 3   Last office visit: 7/30/2018 with prescribing provider:     Future Office Visit:    Requested Prescriptions   Pending Prescriptions Disp Refills     DULoxetine (CYMBALTA) 60 MG EC capsule [Pharmacy Med Name: DULOXETINE DR 60MG CAPSULES] 90 capsule 0     Sig: TAKE 1 CAPSULE(60 MG) BY MOUTH DAILY    Serotonin-Norepinephrine Reuptake Inhibitors  Passed    8/24/2018  3:22 AM       Passed - Blood pressure under 140/90 in past 12 months    BP Readings from Last 3 Encounters:   07/30/18 123/77   04/18/18 144/81   04/18/18 131/79                Passed - PHQ-9 score of less than 5 in past 6 months    Please review last PHQ-9 score.          Passed - Patient is age 18 or older       Passed - No active pregnancy on record       Passed - No positive pregnancy test in past 12 months       Passed - Recent (6 mo) or future (30 days) visit within the authorizing provider's specialty    Patient had office visit in the last 6 months or has a visit in the next 30 days with authorizing provider or within the authorizing provider's specialty.  See \"Patient Info\" tab in inbasket, or \"Choose Columns\" in Meds & Orders section of the refill encounter.              "

## 2018-08-27 RX ORDER — DULOXETIN HYDROCHLORIDE 60 MG/1
CAPSULE, DELAYED RELEASE ORAL
Qty: 90 CAPSULE | Refills: 0 | OUTPATIENT
Start: 2018-08-27

## 2018-08-27 NOTE — TELEPHONE ENCOUNTER
Rx refused. Duplicate request. Pharmacy notified.  Rx sent 7/30/18 for 1 year   Rossana WILSON RN

## 2018-09-28 DIAGNOSIS — I10 ESSENTIAL HYPERTENSION, BENIGN: Chronic | ICD-10-CM

## 2018-09-28 RX ORDER — LISINOPRIL 2.5 MG/1
TABLET ORAL
Qty: 90 TABLET | Refills: 0 | OUTPATIENT
Start: 2018-09-28

## 2018-09-28 NOTE — TELEPHONE ENCOUNTER
"Requested Prescriptions   Pending Prescriptions Disp Refills     lisinopril (PRINIVIL/ZESTRIL) 2.5 MG tablet [Pharmacy Med Name: LISINOPRIL 2.5MG TABLETS] 90 tablet 0    Last Written Prescription Date:  7/30/2018  Last Fill Quantity: 90,  # refills: 3   Last office visit: 7/30/2018 with prescribing provider:  Sri   Future Office Visit:     Sig: TAKE 1 TABLET(2.5 MG) BY MOUTH DAILY    ACE Inhibitors (Including Combos) Protocol Passed    9/28/2018  1:50 PM       Passed - Blood pressure under 140/90 in past 12 months    BP Readings from Last 3 Encounters:   07/30/18 123/77   04/18/18 144/81   04/18/18 131/79                Passed - Recent (12 mo) or future (30 days) visit within the authorizing provider's specialty    Patient had office visit in the last 12 months or has a visit in the next 30 days with authorizing provider or within the authorizing provider's specialty.  See \"Patient Info\" tab in inbasket, or \"Choose Columns\" in Meds & Orders section of the refill encounter.           Passed - Patient is age 18 or older       Passed - No active pregnancy on record       Passed - Normal serum creatinine on file in past 12 months    Recent Labs   Lab Test  07/30/18   1053   CR  0.92            Passed - Normal serum potassium on file in past 12 months    Recent Labs   Lab Test  07/30/18   1053   POTASSIUM  4.6            Passed - No positive pregnancy test in past 12 months        Patient has refills remaining at pharmacy.  Deanna BARAKAT RN  [employer] [facility]    "

## 2019-03-15 ENCOUNTER — OFFICE VISIT (OUTPATIENT)
Dept: FAMILY MEDICINE | Facility: CLINIC | Age: 69
End: 2019-03-15
Payer: MEDICARE

## 2019-03-15 DIAGNOSIS — F32.5 MAJOR DEPRESSIVE DISORDER, SINGLE EPISODE IN FULL REMISSION (H): ICD-10-CM

## 2019-03-15 DIAGNOSIS — M25.512 ACUTE PAIN OF LEFT SHOULDER: Primary | ICD-10-CM

## 2019-03-15 PROCEDURE — 99214 OFFICE O/P EST MOD 30 MIN: CPT | Performed by: INTERNAL MEDICINE

## 2019-03-15 ASSESSMENT — PATIENT HEALTH QUESTIONNAIRE - PHQ9: SUM OF ALL RESPONSES TO PHQ QUESTIONS 1-9: 1

## 2019-03-15 ASSESSMENT — MIFFLIN-ST. JEOR: SCORE: 1489.76

## 2019-03-15 NOTE — PATIENT INSTRUCTIONS
Proceed with Physical Therapy for your left shoulder pain.    May continue taking Naproxen as needed.    Call doctor if your shoulder persist/worsens, or if you develop new symptoms.

## 2019-03-15 NOTE — LETTER
My Depression Action Plan  Name: Janie De Leon   Date of Birth 1950  Date: 3/27/2019    My doctor: Kane La   My clinic: [place] [address] [address] [facility] [address]  [phone]          GREEN    ZONE   Good Control    What it looks like:     Things are going generally well. You have normal up s and down s. You may even feel depressed from time to time, but bad moods usually last less than a day.   What you need to do:  1. Continue to care for yourself (see self care plan)  2. Check your depression survival kit and update it as needed  3. Follow your physician s recommendations including any medication.  4. Do not stop taking medication unless you consult with your physician first.           YELLOW         ZONE Getting Worse    What it looks like:     Depression is starting to interfere with your life.     It may be hard to get out of bed; you may be starting to isolate yourself from others.    Symptoms of depression are starting to last most all day and this has happened for several days.     You may have suicidal thoughts but they are not constant.   What you need to do:     1. Call your care team, your response to treatment will improve if you keep your care team informed of your progress. Yellow periods are signs an adjustment may need to be made.     2. Continue your self-care, even if you have to fake it!    3. Talk to someone in your support network    4. Open up your depression survival kit           RED    ZONE Medical Alert - Get Help    What it looks like:     Depression is seriously interfering with your life.     You may experience these or other symptoms: You can t get out of bed most days, can t work or engage in other necessary activities, you have trouble taking care of basic hygiene, or basic responsibilities, thoughts of suicide or death that will not go away, self-injurious behavior.     What you need to do:  1. Call your care team and  request a same-day appointment. If they are not available (weekends or after hours) call your local crisis line, emergency room or 911.            Depression Self Care Plan / Survival Kit    Self-Care for Depression  Here s the deal. Your body and mind are really not as separate as most people think.  What you do and think affects how you feel and how you feel influences what you do and think. This means if you do things that people who feel good do, it will help you feel better.  Sometimes this is all it takes.  There is also a place for medication and therapy depending on how severe your depression is, so be sure to consult with your medical provider and/ or Behavioral Health Consultant if your symptoms are worsening or not improving.     In order to better manage my stress, I will:    Exercise  Get some form of exercise, every day. This will help reduce pain and release endorphins, the  feel good  chemicals in your brain. This is almost as good as taking antidepressants!  This is not the same as joining a gym and then never going! (they count on that by the way ) It can be as simple as just going for a walk or doing some gardening, anything that will get you moving.      Hygiene   Maintain good hygiene (Get out of bed in the morning, Make your bed, Brush your teeth, Take a shower, and Get dressed like you were going to work, even if you are unemployed).  If your clothes don't fit try to get ones that do.    Diet  I will strive to eat foods that are good for me, drink plenty of water, and avoid excessive sugar, caffeine, alcohol, and other mood-altering substances.  Some foods that are helpful in depression are: complex carbohydrates, B vitamins, flaxseed, fish or fish oil, fresh fruits and vegetables.    Psychotherapy  I agree to participate in Individual Therapy (if recommended).    Medication  If prescribed medications, I agree to take them.  Missing doses can result in serious side effects.  I understand that  drinking alcohol, or other illicit drug use, may cause potential side effects.  I will not stop my medication abruptly without first discussing it with my provider.    Staying Connected With Others  I will stay in touch with my friends, family members, and my primary care provider/team.    Use your imagination  Be creative.  We all have a creative side; it doesn t matter if it s oil painting, sand castles, or mud pies! This will also kick up the endorphins.    Witness Beauty  (AKA stop and smell the roses) Take a look outside, even in mid-winter. Notice colors, textures. Watch the squirrels and birds.     Service to others  Be of service to others.  There is always someone else in need.  By helping others we can  get out of ourselves  and remember the really important things.  This also provides opportunities for practicing all the other parts of the program.    Humor  Laugh and be silly!  Adjust your TV habits for less news and crime-drama and more comedy.    Control your stress  Try breathing deep, massage therapy, biofeedback, and meditation. Find time to relax each day.     My support system    Clinic Contact:  Phone number:    Contact 1:  Phone number:    Contact 2:  Phone number:    Church/:  Phone number:    Therapist:  Phone number:    Local crisis center:    Phone number:    Other community support:  Phone number:

## 2019-03-15 NOTE — PROGRESS NOTES
HPI    SUBJECTIVE:   Janie De Leon is a 69 year old female who presents to clinic today for the following health issues:      New Patient/Transfer of Care    Musculoskeletal problem/pain    Duration: x 2 weeks    Description  Location: left shoulder    Intensity:  4/10    Accompanying signs and symptoms: none    History  Previous similar problem: YES  Previous evaluation:  orthopedic evaluation    Precipitating or alleviating factors:  Trauma or overuse: no   Aggravating factors include: none    Therapies tried and outcome: heat and ice, OTC Naproxen -- moderate relief      Patient also has a history of depression which is currently under remission with Cymbalta.  Her PHQ9 score today is 1.      Past Medical History:   Diagnosis Date     Anemia     mild gastropathy on EGD 2008; neg pill cam     Atypical ductal hyperplasia of both breasts     Has had biopsies and MRI     High cholesterol      History of hematuria 2008    Cystoscopy for recurrent UTIs; unremarkable renal US. Also recurrent UTIs as child and pyelonephritis.      History of hormone replacement therapy     after JASBIR with BSO     HTN, goal below 140/90      Hx of bone density study 10/16/2006    Normal     Hypothyroidism      Insomnia     periodic - prn clonazepam helpful a couple times per month     Lipid screening 7/21/2015    Tchol 128, , HDL 53, LDL 53 outside records --> based on our labs off of atorvastatin 10yr ASCVD risk 9.4% in Oct 2015     Major depressive disorder, single episode in full remission (H) 2007     Osteopenia     Mild left hip July 2016     Prediabetes     Metformin in the past     Rotator cuff injury, left, sequela     MRI Jan 2015 and had PT; High-grade complete or near complete tear of the supraspinatus tendon and anterior fibers of the infraspinatus tendon. 1/3 of the infraspinatus tendon appears involved.      TBI (traumatic brain injury) (H)     As a child     Tubular adenoma of colon 2013 & 2016       Review of Systems  "  Gastrointestinal: Negative for abdominal pain, blood in stool, melena, nausea and vomiting.   Musculoskeletal: Positive for joint pain. Negative for back pain and neck pain.   Neurological: Negative for tingling, sensory change and focal weakness.   Psychiatric/Behavioral: Negative for depression, hallucinations and suicidal ideas. The patient is nervous/anxious (occasional). The patient does not have insomnia.        /83 (BP Location: Right arm, Patient Position: Sitting, Cuff Size: Adult Regular)   Pulse 81   Temp 96.3  F (35.7  C) (Oral)   Ht 1.753 m (5' 9\")   Wt 90.5 kg (199 lb 9.6 oz)   SpO2 98%   BMI 29.48 kg/m        Physical Exam   Constitutional: She is oriented to person, place, and time. No distress.   Neck: Normal range of motion. Neck supple. No thyromegaly present.   Musculoskeletal: She exhibits tenderness. She exhibits no edema.   Limited abduction of left arm at left shoulder joint due to pain   Neurological: She is alert and oriented to person, place, and time. No sensory deficit. She exhibits normal muscle tone.   Skin: No erythema.   Psychiatric: She has a normal mood and affect.   Vitals reviewed.        ICD-10-CM    1. Acute pain of left shoulder M25.512 [facility], AND CH[facility]OPRACTIC REFERRAL   2. Major depressive disorder, single episode in full remission (H) F32.5 Continue Cymbalta 60 mg daily       Patient Instructions   Proceed with Physical Therapy for your left shoulder pain.    May continue taking Naproxen as needed.    Call doctor if your shoulder persist/worsens, or if you develop new symptoms.      "

## 2019-03-18 ENCOUNTER — THERAPY VISIT (OUTPATIENT)
Dept: PHYSICAL THERAPY | Facility: CLINIC | Age: 69
End: 2019-03-18
Payer: MEDICARE

## 2019-03-18 DIAGNOSIS — M25.512 SHOULDER PAIN, LEFT: ICD-10-CM

## 2019-03-18 DIAGNOSIS — M25.512 ACUTE PAIN OF LEFT SHOULDER: ICD-10-CM

## 2019-03-18 PROCEDURE — 97110 THERAPEUTIC EXERCISES: CPT | Mod: GP | Performed by: PHYSICAL THERAPIST

## 2019-03-18 PROCEDURE — 97161 PT EVAL LOW COMPLEX 20 MIN: CPT | Mod: GP | Performed by: PHYSICAL THERAPIST

## 2019-03-18 NOTE — LETTER
DEPARTMENT OF HEALTH AND HUMAN SERVICES  CENTERS FOR MEDICARE & MEDICAID SERVICES    PLAN/UPDATED PLAN OF PROGRESS FOR OUTPATIENT REHABILITATION    PATIENTS NAME:  Janie De Leon   : 1950  PROVIDER NUMBER:    [id number]  HICN:  [id number]  PROVIDER NAME: [place] [facility]  MEDICAL RECORD NUMBER: [id number]   START OF CARE DATE:  SOC Date: 19   TYPE:  PT    PRIMARY/TREATMENT DIAGNOSIS: (Pertinent Medical Diagnosis)     Acute pain of left shoulder  Shoulder pain, left    VISITS FROM START OF CARE:  Rxs Used: 1     [place] [facility] Initial Evaluation    Subjective:  Recent left shoulder symptoms began ~ 3/4/3019 when patient reached quickly to catch a falling object with her left arm.  Symptoms worsened a few days later when patient reched out quickly to prevent a fall.  Patient complains of posterior left shoulder and proximal UE pain which is worse with reaching overhead and out to side and better with arm resting at side.  Patient also notes left UT pain and tightness.  She has a history of left shoulder pain 4-5 years ago with MRI findings complete tear of sliding scale and parital tear of IS.  Patient did well with PT at that time and was not having problems until recent reinjury.  The history is provided by the patient.   This is a recurrent condition     and is intermittent and reported as 2/10 and 8/10. General health as reported by patient is good.                General health as reported by patient is good.  Pertinent medical history includes:  Anemia, high blood pressure, depression and overweight.  Medical allergies: yes (Sumvastatin).    Current medications:  Anti-depressants, high blood pressure medication and pain medication.  Current occupation is Retired.      Objective:  Standing Alignment:    Cervical/Thoracic:  Forward head  Shoulder/UE:  Rounded shoulders and elevated scapula L  Shoulder Evaluation:  ROM:  AROM:  : in standing  patient achieves 130 deg of left UE elevation vs 145 on the right.  She reaches behind her back to T10 on the left vs T6 on the right.  Flexion:  Left:  145    Right:  165  Internal Rotation:  Left:  55    Right:  60  External Rotation:  Left:  80    Right:  90          PATIENTS NAME:  Janie De Leon   : 1950    Strength:    Flexion: Left:5/5    Pain: -    Right: 5/5      Pain:  -  Extension:  Left: 5/5     Pain:-    Right: 5/5     Pain:-  Abduction:  Left: 4/5   Pain:+    Right: 5/5      Pain:-  Internal Rotation:  Left:5/5      Pain:+    Right: 5/5      Pain:-  External Rotation:   Left:4-/5      Pain:+++   Right:5/5      Pain:-    Stability Testing:  normal  Special Tests:    Left shoulder negative for the following special tests:  Impingement  Right shoulder negative for the following special tests:Impingement  Palpation:    Left shoulder tenderness present at:  Infraspinatus  Mobility Tests:    Glenohumeral posterior left:  Hypomobile    Scapulothoracic left:  Hypermobile    Scapulohumeral rhythm right:  Hypermobile           Assessment/Plan:    Patient is a 69 year old female with left side shoulder complaints.    Patient has the following significant findings with corresponding treatment plan.                Diagnosis 1:  Left shoulder pain  Pain -  manual therapy, self management, education and home program  Decreased ROM/flexibility - manual therapy and therapeutic exercise  Decreased strength - therapeutic exercise and therapeutic activities  Decreased function - therapeutic activities  Impaired posture - neuro re-education  Therapy Evaluation Codes:   1) History comprised of:   Personal factors that impact the plan of care:      None.    Comorbidity factors that impact the plan of care are:      None.     Medications impacting care: None.  2) Examination of Body Systems comprised of:   Body structures and functions that impact the plan of care:      Shoulder.   Activity limitations that impact the  "plan of care are:      Dressing and Lifting.  3) Clinical presentation characteristics are:   Stable/Uncomplicated.  4) Decision-Making    Low complexity using standardized patient assessment instrument and/or measureable assessment of functional outcome.  Cumulative Therapy Evaluation is: Low complexity.  Previous and current functional limitations:  (See Goal Flow Sheet for this information)    Short term and Long term goals: (See Goal Flow Sheet for this information)   Communication ability:  Patient appears to be able to clearly communicate and understand verbal and written communication and follow directions correctly.  Treatment Explanation - The following has been discussed with the patient:   RX ordered/plan of care  PATIENTS NAME:  Janie De Leon   : 1950    Anticipated outcomes  Possible risks and side effects  This patient would benefit from PT intervention to resume normal activities.   Rehab potential is good.  Frequency:  1 X week, once daily  Duration:  for 8 weeks  Discharge Plan:  Achieve all LTG.  Independent in home treatment program.  Reach maximal therapeutic benefit.               Caregiver Signature/Credentials _____________________________ Date ________       Treating Provider: Josephine Hua PT [facility]   I have reviewed and certified the need for these services and plan of treatment while under my care.        PHYSICIAN'S SIGNATURE:   _________________________________________  Date___________   Kane Ojeda MD    Certification period:  Beginning of Cert date period: 19 to  End of Cert period date: 19     Functional Level Progress Report: Please see attached \"Goal Flow sheet for Functional level.\"    ____X____ Continue Services or       ________ DC Services                Service dates: From  SOC Date: 19 date to present                         "

## 2019-03-18 NOTE — PROGRESS NOTES
[place] [facility] Initial Evaluation  Subjective:  Recent left shoulder symptoms began ~ 3/4/3019 when patient reached quickly to catch a falling object with her left arm.  Symptoms worsened a few days later when patient reched out quickly to prevent a fall.  Patient complains of posterior left shoulder and proximal UE pain which is worse with reaching overhead and out to side and better with arm resting at side.  Patient also notes left UT pain and tightness.  She has a history of left shoulder pain 4-5 years ago with MRI findings complete tear of sliding scale and parital tear of IS.  Patient did well with PT at that time and was not having problems until recent reinjury.      The history is provided by the patient.         This is a recurrent condition          and is intermittent and reported as 2/10 and 8/10.                General health as reported by patient is good.                                              Objective:  Standing Alignment:    Cervical/Thoracic:  Forward head  Shoulder/UE:  Rounded shoulders and elevated scapula L                                       Shoulder Evaluation:  ROM:  AROM:  : in standing patient achieves 130 deg of left UE elevation vs 145 on the right.  She reaches behind her back to T10 on the left vs T6 on the right.  Flexion:  Left:  145    Right:  165        Internal Rotation:  Left:  55    Right:  60  External Rotation:  Left:  80    Right:  90                      Strength:    Flexion: Left:5/5    Pain: -    Right: 5/5      Pain:  -  Extension:  Left: 5/5     Pain:-    Right: 5/5     Pain:-  Abduction:  Left: 4/5   Pain:+    Right: 5/5      Pain:-    Internal Rotation:  Left:5/5      Pain:+    Right: 5/5      Pain:-  External Rotation:   Left:4-/5      Pain:+++   Right:5/5      Pain:-            Stability Testing:  normal      Special Tests:      Left shoulder negative for the following special tests:  Impingement    Right shoulder negative for the  following special tests:Impingement  Palpation:    Left shoulder tenderness present at:  Infraspinatus    Mobility Tests:      Glenohumeral posterior left:  Hypomobile        Scapulothoracic left:  Hypermobile      Scapulohumeral rhythm right:  Hypermobile                                     General     ROS    Assessment/Plan:    Patient is a 69 year old female with left side shoulder complaints.    Patient has the following significant findings with corresponding treatment plan.                Diagnosis 1:  Left shoulder pain  Pain -  manual therapy, self management, education and home program  Decreased ROM/flexibility - manual therapy and therapeutic exercise  Decreased strength - therapeutic exercise and therapeutic activities  Decreased function - therapeutic activities  Impaired posture - neuro re-education    Therapy Evaluation Codes:   1) History comprised of:   Personal factors that impact the plan of care:      None.    Comorbidity factors that impact the plan of care are:      None.     Medications impacting care: None.  2) Examination of Body Systems comprised of:   Body structures and functions that impact the plan of care:      Shoulder.   Activity limitations that impact the plan of care are:      Dressing and Lifting.  3) Clinical presentation characteristics are:   Stable/Uncomplicated.  4) Decision-Making    Low complexity using standardized patient assessment instrument and/or measureable assessment of functional outcome.  Cumulative Therapy Evaluation is: Low complexity.    Previous and current functional limitations:  (See Goal Flow Sheet for this information)    Short term and Long term goals: (See Goal Flow Sheet for this information)     Communication ability:  Patient appears to be able to clearly communicate and understand verbal and written communication and follow directions correctly.  Treatment Explanation - The following has been discussed with the patient:   RX ordered/plan of  care  Anticipated outcomes  Possible risks and side effects  This patient would benefit from PT intervention to resume normal activities.   Rehab potential is good.    Frequency:  1 X week, once daily  Duration:  for 8 weeks  Discharge Plan:  Achieve all LTG.  Independent in home treatment program.  Reach maximal therapeutic benefit.    Please refer to the daily flowsheet for treatment today, total treatment time and time spent performing 1:1 timed codes.

## 2019-03-19 NOTE — PROGRESS NOTES
[place] [facility] Initial Evaluation  Subjective:                                     General health as reported by patient is good.  Pertinent medical history includes:  Anemia, high blood pressure, depression and overweight.  Medical allergies: yes (Sumvastatin).    Current medications:  Anti-depressants, high blood pressure medication and pain medication.  Current occupation is Retired.                                    Objective:  System    Physical Exam    General     ROS    Assessment/Plan:

## 2019-03-25 ENCOUNTER — MYC MEDICAL ADVICE (OUTPATIENT)
Dept: FAMILY MEDICINE | Facility: CLINIC | Age: 69
End: 2019-03-25

## 2019-03-25 ENCOUNTER — THERAPY VISIT (OUTPATIENT)
Dept: PHYSICAL THERAPY | Facility: CLINIC | Age: 69
End: 2019-03-25
Payer: MEDICARE

## 2019-03-25 DIAGNOSIS — M25.512 ACUTE PAIN OF LEFT SHOULDER: Primary | ICD-10-CM

## 2019-03-25 DIAGNOSIS — M25.512 SHOULDER PAIN, LEFT: ICD-10-CM

## 2019-03-25 PROCEDURE — 97110 THERAPEUTIC EXERCISES: CPT | Mod: GP | Performed by: PHYSICAL THERAPIST

## 2019-03-25 PROCEDURE — G0283 ELEC STIM OTHER THAN WOUND: HCPCS | Mod: GP | Performed by: PHYSICAL THERAPIST

## 2019-03-25 PROCEDURE — 97140 MANUAL THERAPY 1/> REGIONS: CPT | Mod: GP | Performed by: PHYSICAL THERAPIST

## 2019-03-25 NOTE — TELEPHONE ENCOUNTER
Dr La,     Please see Space[employer] message and provide advise.    Thank you,  Hermelinda DING RN,BSN

## 2019-03-27 VITALS
BODY MASS INDEX: 29.56 KG/M2 | HEART RATE: 81 BPM | HEIGHT: 69 IN | DIASTOLIC BLOOD PRESSURE: 83 MMHG | TEMPERATURE: 96.3 F | WEIGHT: 199.6 LBS | SYSTOLIC BLOOD PRESSURE: 139 MMHG | OXYGEN SATURATION: 98 %

## 2019-03-27 ASSESSMENT — ENCOUNTER SYMPTOMS
NERVOUS/ANXIOUS: 1
TINGLING: 0
NAUSEA: 0
INSOMNIA: 0
FOCAL WEAKNESS: 0
BACK PAIN: 0
HALLUCINATIONS: 0
BLOOD IN STOOL: 0
VOMITING: 0
DEPRESSION: 0
ABDOMINAL PAIN: 0
NECK PAIN: 0
SENSORY CHANGE: 0

## 2019-04-01 ENCOUNTER — THERAPY VISIT (OUTPATIENT)
Dept: PHYSICAL THERAPY | Facility: CLINIC | Age: 69
End: 2019-04-01
Payer: MEDICARE

## 2019-04-01 DIAGNOSIS — M25.512 SHOULDER PAIN, LEFT: ICD-10-CM

## 2019-04-01 PROCEDURE — 97110 THERAPEUTIC EXERCISES: CPT | Mod: GP | Performed by: PHYSICAL THERAPIST

## 2019-04-01 PROCEDURE — G0283 ELEC STIM OTHER THAN WOUND: HCPCS | Mod: GP | Performed by: PHYSICAL THERAPIST

## 2019-04-01 PROCEDURE — 97140 MANUAL THERAPY 1/> REGIONS: CPT | Mod: GP | Performed by: PHYSICAL THERAPIST

## 2019-04-08 ENCOUNTER — THERAPY VISIT (OUTPATIENT)
Dept: PHYSICAL THERAPY | Facility: CLINIC | Age: 69
End: 2019-04-08
Payer: MEDICARE

## 2019-04-08 DIAGNOSIS — M25.512 SHOULDER PAIN, LEFT: ICD-10-CM

## 2019-04-08 PROCEDURE — G0283 ELEC STIM OTHER THAN WOUND: HCPCS | Mod: GP | Performed by: PHYSICAL THERAPIST

## 2019-04-08 PROCEDURE — 97110 THERAPEUTIC EXERCISES: CPT | Mod: GP | Performed by: PHYSICAL THERAPIST

## 2019-04-08 PROCEDURE — 97140 MANUAL THERAPY 1/> REGIONS: CPT | Mod: GP | Performed by: PHYSICAL THERAPIST

## 2019-04-15 ENCOUNTER — THERAPY VISIT (OUTPATIENT)
Dept: PHYSICAL THERAPY | Facility: CLINIC | Age: 69
End: 2019-04-15
Payer: MEDICARE

## 2019-04-15 DIAGNOSIS — M25.512 SHOULDER PAIN, LEFT: ICD-10-CM

## 2019-04-15 PROCEDURE — 97110 THERAPEUTIC EXERCISES: CPT | Mod: GP | Performed by: PHYSICAL THERAPIST

## 2019-04-15 PROCEDURE — 97112 NEUROMUSCULAR REEDUCATION: CPT | Mod: GP | Performed by: PHYSICAL THERAPIST

## 2019-04-15 PROCEDURE — 97140 MANUAL THERAPY 1/> REGIONS: CPT | Mod: GP | Performed by: PHYSICAL THERAPIST

## 2019-04-22 ENCOUNTER — THERAPY VISIT (OUTPATIENT)
Dept: PHYSICAL THERAPY | Facility: CLINIC | Age: 69
End: 2019-04-22
Payer: MEDICARE

## 2019-04-22 DIAGNOSIS — M25.512 SHOULDER PAIN, LEFT: ICD-10-CM

## 2019-04-22 DIAGNOSIS — E78.5 HYPERLIPIDEMIA, UNSPECIFIED HYPERLIPIDEMIA TYPE: Chronic | ICD-10-CM

## 2019-04-22 PROCEDURE — G0283 ELEC STIM OTHER THAN WOUND: HCPCS | Mod: GP | Performed by: PHYSICAL THERAPIST

## 2019-04-22 PROCEDURE — 97112 NEUROMUSCULAR REEDUCATION: CPT | Mod: GP | Performed by: PHYSICAL THERAPIST

## 2019-04-22 PROCEDURE — 97110 THERAPEUTIC EXERCISES: CPT | Mod: GP | Performed by: PHYSICAL THERAPIST

## 2019-04-22 NOTE — TELEPHONE ENCOUNTER
"lovastatin (MEVACOR) 40 MG tablet 90 tablet 3 7/30/2018       Last Written Prescription Date:  07/30/2018  Last Fill Quantity: 90,  # refills: 3   Last office visit: 3/15/2019 with prescribing provider:     Future Office Visit:  Unknown    Requested Prescriptions   Pending Prescriptions Disp Refills     lovastatin (MEVACOR) 40 MG tablet [Pharmacy Med Name: LOVASTATIN 40MG TABLETS] 90 tablet 0     Sig: TAKE 1 TABLET(40 MG) BY MOUTH AT BEDTIME       Statins Protocol Passed - 4/22/2019 12:43 PM        Passed - LDL on file in past 12 months     Recent Labs   Lab Test 07/30/18  1053   *             Passed - No abnormal creatine kinase in past 12 months     No lab results found.             Passed - Recent (12 mo) or future (30 days) visit within the authorizing provider's specialty     Patient had office visit in the last 12 months or has a visit in the next 30 days with authorizing provider or within the authorizing provider's specialty.  See \"Patient Info\" tab in inbasket, or \"Choose Columns\" in Meds & Orders section of the refill encounter.              Passed - Medication is active on med list        Passed - Patient is age 18 or older        Passed - No active pregnancy on record        Passed - No positive pregnancy test in past 12 months              "

## 2019-04-24 RX ORDER — LOVASTATIN 40 MG
TABLET ORAL
Refills: 0
Start: 2019-04-24

## 2019-04-29 ENCOUNTER — THERAPY VISIT (OUTPATIENT)
Dept: PHYSICAL THERAPY | Facility: CLINIC | Age: 69
End: 2019-04-29
Payer: MEDICARE

## 2019-04-29 DIAGNOSIS — M25.512 SHOULDER PAIN, LEFT: ICD-10-CM

## 2019-04-29 PROCEDURE — G0283 ELEC STIM OTHER THAN WOUND: HCPCS | Mod: GP | Performed by: PHYSICAL THERAPIST

## 2019-04-29 PROCEDURE — 97110 THERAPEUTIC EXERCISES: CPT | Mod: GP | Performed by: PHYSICAL THERAPIST

## 2019-04-29 PROCEDURE — 97140 MANUAL THERAPY 1/> REGIONS: CPT | Mod: GP | Performed by: PHYSICAL THERAPIST

## 2019-05-06 ENCOUNTER — THERAPY VISIT (OUTPATIENT)
Dept: PHYSICAL THERAPY | Facility: CLINIC | Age: 69
End: 2019-05-06
Payer: MEDICARE

## 2019-05-06 DIAGNOSIS — M25.512 SHOULDER PAIN, LEFT: ICD-10-CM

## 2019-05-06 PROCEDURE — 97110 THERAPEUTIC EXERCISES: CPT | Mod: GP | Performed by: PHYSICAL THERAPIST

## 2019-05-06 PROCEDURE — G0283 ELEC STIM OTHER THAN WOUND: HCPCS | Mod: GP | Performed by: PHYSICAL THERAPIST

## 2019-05-06 PROCEDURE — 97112 NEUROMUSCULAR REEDUCATION: CPT | Mod: GP | Performed by: PHYSICAL THERAPIST

## 2019-05-06 NOTE — PROGRESS NOTES
PROGRESS  REPORT    Progress reporting period is from 3/18/2019 to 5/6/2019. Janie has been seen in PT 8 times for treatment of left shoulder pain.  Her ROM has improved significantly especially reaching behind back; RC weakness persists.       SUBJECTIVE    Subjective: Patient reports significant improvement; fluctuating left shoulder pain persists but mobiltiy is improving    Current pain level is 2/10  .      Initial Pain level: (2-8/10).   Changes in function:  Yes (See Goal flowsheet attached for changes in current functional level)  Adverse reaction to treatment or activity: None    OBJECTIVE  Changes noted in objective findings:    Objective: Patient can reach behind her back to T8-9 without assist.  Very minimal deficit in overhead elevation left vs right without complaint.  Resisted SS testing 4+/5 without pain'; IS 4-/5 with pain.     ASSESSMENT/PLAN  Updated problem list and treatment plan: Diagnosis 1:  Left shoulder pain  Pain -  electric stimulation, manual therapy, self management, education and home program  Decreased ROM/flexibility - manual therapy and therapeutic exercise  Decreased strength - therapeutic exercise and therapeutic activities  Decreased function - therapeutic activities  STG/LTGs have been met or progress has been made towards goals:  Yes (See Goal flow sheet completed today.)  Assessment of Progress: The patient's condition is improving.  Self Management Plans:  Patient has been instructed in a home treatment program.  I have re-evaluated this patient and find that the nature, scope, duration and intensity of the therapy is appropriate for the medical condition of the patient.  Janie continues to require the following intervention to meet STG and LTG's:  PT    Recommendations:  This patient would benefit from continued therapy.     Frequency:  2 X a month, once daily  Duration:  1 month        Please refer to the daily flowsheet for treatment today, total treatment time and time  spent performing 1:1 timed codes.

## 2019-05-20 ENCOUNTER — THERAPY VISIT (OUTPATIENT)
Dept: PHYSICAL THERAPY | Facility: CLINIC | Age: 69
End: 2019-05-20
Payer: MEDICARE

## 2019-05-20 DIAGNOSIS — M25.512 SHOULDER PAIN, LEFT: ICD-10-CM

## 2019-05-20 PROCEDURE — 97140 MANUAL THERAPY 1/> REGIONS: CPT | Mod: GP | Performed by: PHYSICAL THERAPIST

## 2019-05-20 PROCEDURE — 97112 NEUROMUSCULAR REEDUCATION: CPT | Mod: GP | Performed by: PHYSICAL THERAPIST

## 2019-05-22 ENCOUNTER — OFFICE VISIT (OUTPATIENT)
Dept: FAMILY MEDICINE | Facility: CLINIC | Age: 69
End: 2019-05-22
Payer: MEDICARE

## 2019-05-22 VITALS
DIASTOLIC BLOOD PRESSURE: 88 MMHG | OXYGEN SATURATION: 99 % | BODY MASS INDEX: 28.44 KG/M2 | SYSTOLIC BLOOD PRESSURE: 137 MMHG | HEART RATE: 82 BPM | TEMPERATURE: 98.1 F | HEIGHT: 69 IN | WEIGHT: 192 LBS

## 2019-05-22 DIAGNOSIS — K92.1 BLOOD IN STOOL: Primary | ICD-10-CM

## 2019-05-22 DIAGNOSIS — R73.03 PREDIABETES: Chronic | ICD-10-CM

## 2019-05-22 DIAGNOSIS — Z12.39 BREAST CANCER SCREENING: ICD-10-CM

## 2019-05-22 LAB
BASOPHILS # BLD AUTO: 0 10E9/L (ref 0–0.2)
BASOPHILS NFR BLD AUTO: 0.7 %
DIFFERENTIAL METHOD BLD: NORMAL
EOSINOPHIL # BLD AUTO: 0.2 10E9/L (ref 0–0.7)
EOSINOPHIL NFR BLD AUTO: 2.8 %
ERYTHROCYTE [DISTWIDTH] IN BLOOD BY AUTOMATED COUNT: 13 % (ref 10–15)
HBA1C MFR BLD: 5.9 % (ref 0–5.6)
HCT VFR BLD AUTO: 45.6 % (ref 35–47)
HGB BLD-MCNC: 15 G/DL (ref 11.7–15.7)
LYMPHOCYTES # BLD AUTO: 1.5 10E9/L (ref 0.8–5.3)
LYMPHOCYTES NFR BLD AUTO: 25.6 %
MCH RBC QN AUTO: 30.2 PG (ref 26.5–33)
MCHC RBC AUTO-ENTMCNC: 32.9 G/DL (ref 31.5–36.5)
MCV RBC AUTO: 92 FL (ref 78–100)
MONOCYTES # BLD AUTO: 0.5 10E9/L (ref 0–1.3)
MONOCYTES NFR BLD AUTO: 8.3 %
NEUTROPHILS # BLD AUTO: 3.6 10E9/L (ref 1.6–8.3)
NEUTROPHILS NFR BLD AUTO: 62.6 %
PLATELET # BLD AUTO: 275 10E9/L (ref 150–450)
RBC # BLD AUTO: 4.96 10E12/L (ref 3.8–5.2)
WBC # BLD AUTO: 5.8 10E9/L (ref 4–11)

## 2019-05-22 PROCEDURE — 85025 COMPLETE CBC W/AUTO DIFF WBC: CPT | Performed by: INTERNAL MEDICINE

## 2019-05-22 PROCEDURE — 80053 COMPREHEN METABOLIC PANEL: CPT | Performed by: INTERNAL MEDICINE

## 2019-05-22 PROCEDURE — 36415 COLL VENOUS BLD VENIPUNCTURE: CPT | Performed by: INTERNAL MEDICINE

## 2019-05-22 PROCEDURE — 99214 OFFICE O/P EST MOD 30 MIN: CPT | Performed by: INTERNAL MEDICINE

## 2019-05-22 PROCEDURE — 83036 HEMOGLOBIN GLYCOSYLATED A1C: CPT | Performed by: INTERNAL MEDICINE

## 2019-05-22 ASSESSMENT — MIFFLIN-ST. JEOR: SCORE: 1455.29

## 2019-05-22 NOTE — PROGRESS NOTES
Subjective     Janie De Leon is a 69 year old female who presents to clinic today for the following health issues:      Gastrointestinal symptoms      Duration: last week one time    Description:  BLEEDING - bright red blood in the stool    Intensity:  mild    Accompanying signs and symptoms: constipation    History  Previous similar problem: no   Previous evaluation:  none    Aggravating factors: none    Alleviating factors: nothing    Other Therapies tried: none      Patient also has a history of glucose intolerance.  Denies polydipsia, polyphagia, polyuria.      Past Medical History:   Diagnosis Date     Anemia     mild gastropathy on EGD 2008; neg pill cam     Atypical ductal hyperplasia of both breasts     Has had biopsies and MRI     High cholesterol      History of hematuria 2008    Cystoscopy for recurrent UTIs; unremarkable renal US. Also recurrent UTIs as child and pyelonephritis.      History of hormone replacement therapy     after JASBIR with BSO     HTN, goal below 140/90      Hx of bone density study 10/16/2006    Normal     Hypothyroidism      Insomnia     periodic - prn clonazepam helpful a couple times per month     Lipid screening 7/21/2015    Tchol 128, , HDL 53, LDL 53 outside records --> based on our labs off of atorvastatin 10yr ASCVD risk 9.4% in Oct 2015     Major depressive disorder, single episode in full remission (H) 2007     Osteopenia     Mild left hip July 2016     Prediabetes     Metformin in the past     Rotator cuff injury, left, sequela     MRI Jan 2015 and had PT; High-grade complete or near complete tear of the supraspinatus tendon and anterior fibers of the infraspinatus tendon. 1/3 of the infraspinatus tendon appears involved.      TBI (traumatic brain injury) (H)     As a child     Tubular adenoma of colon 2013 & 2016       Review of Systems   Constitutional: Negative for fatigue and unexpected weight change.   HENT: Negative for nosebleeds.    Respiratory: Negative for  "shortness of breath.    Cardiovascular: Negative for chest pain and palpitations.   Gastrointestinal: Positive for blood in stool, constipation and nausea. Negative for abdominal pain, diarrhea and vomiting.   Genitourinary: Negative for difficulty urinating and hematuria.   Neurological: Negative for light-headedness.       /88 (BP Location: Right arm, Cuff Size: Adult Regular)   Pulse 82   Temp 98.1  F (36.7  C) (Oral)   Ht 1.753 m (5' 9\")   Wt 87.1 kg (192 lb)   SpO2 99%   BMI 28.35 kg/m        Physical Exam   Constitutional: She is oriented to person, place, and time. No distress.   Cardiovascular: Normal rate, regular rhythm and normal heart sounds.   Pulmonary/Chest: Effort normal. No respiratory distress.   Abdominal: Soft. She exhibits no distension. There is no tenderness.   Genitourinary:   Genitourinary Comments: No external/internal hemorrhoids or anal fissures on exam.  Stool on examining finger is brown.  No bright red blood/blood clots per examining finger.   Neurological: She is alert and oriented to person, place, and time. Coordination normal.   Nursing note and vitals reviewed.        ICD-10-CM    1. Blood in stool K92.1 Comprehensive metabolic panel     CBC with platelets differential     GASTROENTEROLOGY ADULT REF PROCEDURE ONLY   2. Prediabetes R73.03 Hemoglobin A1c   3. Breast cancer screening Z12.31 MA Screening Digital Bilateral       Patient Instructions   Proceed with your mammogram (suite 250) and your colonoscopy.      "

## 2019-05-23 LAB
ALBUMIN SERPL-MCNC: 3.8 G/DL (ref 3.4–5)
ALP SERPL-CCNC: 115 U/L (ref 40–150)
ALT SERPL W P-5'-P-CCNC: 18 U/L (ref 0–50)
ANION GAP SERPL CALCULATED.3IONS-SCNC: 2 MMOL/L (ref 3–14)
AST SERPL W P-5'-P-CCNC: 17 U/L (ref 0–45)
BILIRUB SERPL-MCNC: 0.3 MG/DL (ref 0.2–1.3)
BUN SERPL-MCNC: 16 MG/DL (ref 7–30)
CALCIUM SERPL-MCNC: 9 MG/DL (ref 8.5–10.1)
CHLORIDE SERPL-SCNC: 109 MMOL/L (ref 94–109)
CO2 SERPL-SCNC: 30 MMOL/L (ref 20–32)
CREAT SERPL-MCNC: 0.97 MG/DL (ref 0.52–1.04)
GFR SERPL CREATININE-BSD FRML MDRD: 59 ML/MIN/{1.73_M2}
GLUCOSE SERPL-MCNC: 89 MG/DL (ref 70–99)
POTASSIUM SERPL-SCNC: 4.1 MMOL/L (ref 3.4–5.3)
PROT SERPL-MCNC: 6.7 G/DL (ref 6.8–8.8)
SODIUM SERPL-SCNC: 141 MMOL/L (ref 133–144)

## 2019-05-27 ENCOUNTER — MYC MEDICAL ADVICE (OUTPATIENT)
Dept: FAMILY MEDICINE | Facility: CLINIC | Age: 69
End: 2019-05-27

## 2019-05-30 ENCOUNTER — TELEPHONE (OUTPATIENT)
Dept: FAMILY MEDICINE | Facility: CLINIC | Age: 69
End: 2019-05-30

## 2019-05-30 NOTE — TELEPHONE ENCOUNTER
PCP,    Pt states her mammogram is in July of this year. She also had a colonoscopy in 2016- did you want her to have another one?    Thank you,  Logan CHATTERJEE RN

## 2019-05-30 NOTE — TELEPHONE ENCOUNTER
Please check if colonoscopy order has been received as patient states that she has not been contacted for the procedure.

## 2019-05-30 NOTE — TELEPHONE ENCOUNTER
Reason for Call:  Other     Detailed comments: Pt called states that she was suppose to hear from Dr La Office regarding colonoscopy and has not heard anything. Pt states that she ws told she was overdue for Mammo and is not overdue. Please call to discuss.     Phone Number Patient can be reached at: Home number on file [phone] (home)    Best Time: Anytime     Can we leave a detailed message on this number? YES    Call taken on 5/30/2019 at 10:41 AM by Lynne Hicks

## 2019-06-03 ENCOUNTER — THERAPY VISIT (OUTPATIENT)
Dept: PHYSICAL THERAPY | Facility: CLINIC | Age: 69
End: 2019-06-03
Payer: MEDICARE

## 2019-06-03 DIAGNOSIS — M25.512 SHOULDER PAIN, LEFT: ICD-10-CM

## 2019-06-03 PROCEDURE — 97140 MANUAL THERAPY 1/> REGIONS: CPT | Mod: GP | Performed by: PHYSICAL THERAPIST

## 2019-06-03 PROCEDURE — 97112 NEUROMUSCULAR REEDUCATION: CPT | Mod: GP | Performed by: PHYSICAL THERAPIST

## 2019-06-03 PROCEDURE — 97110 THERAPEUTIC EXERCISES: CPT | Mod: GP | Performed by: PHYSICAL THERAPIST

## 2019-06-03 NOTE — PROGRESS NOTES
DISCHARGE REPORT    Progress reporting period is from 3/18/2019 to 6/3/2019.   Janie has been seen in PT 10 times for treatment of left shoulder pain.  Treatment has included manual therapy, estim, and scapular and rotator cuff strengthening.    SUBJECTIVE    Subjective: Patient reports significan improvement at left shoulder.  She notes minimal discomfort with reaching behind back, otherwise reaching, dressing and household lifting are all symptom free.     Current pain level is 0/10  .       Initial Pain level: (2-8/10).   Changes in function:  Yes (See Goal flowsheet attached for changes in current functional level)  Adverse reaction to treatment or activity: None    OBJECTIVE  Changes noted in objective findings:    Objective: Patient can reach behinc back to T5.  Supine left shoulder ROM:  flexion 155, ER 85, IR 66.  Left SS strength 5-/5, IS 4+/5.     ASSESSMENT/PLAN    STG/LTGs have been met or progress has been made towards goals:  Yes (See Goal flow sheet completed today.)  Assessment of Progress: The patient's condition is improving.  Self Management Plans:  Patient is independent in a home treatment program.    Janie continues to require the following intervention to meet STG and LTG's:  PT intervention is no longer required to meet STG/LTG.    Recommendations:  This patient is ready to be discharged from therapy and continue their home treatment program.    Please refer to the daily flowsheet for treatment today, total treatment time and time spent performing 1:1 timed codes.

## 2019-06-04 ASSESSMENT — ENCOUNTER SYMPTOMS
SHORTNESS OF BREATH: 0
FATIGUE: 0
CONSTIPATION: 1
HEMATURIA: 0
DIARRHEA: 0
VOMITING: 0
LIGHT-HEADEDNESS: 0
NAUSEA: 1
UNEXPECTED WEIGHT CHANGE: 0
BLOOD IN STOOL: 1
DIFFICULTY URINATING: 0
ABDOMINAL PAIN: 0
PALPITATIONS: 0

## 2019-06-20 ENCOUNTER — HOSPITAL ENCOUNTER (OUTPATIENT)
Facility: CLINIC | Age: 69
Discharge: HOME | End: 2019-06-20
Attending: COLON & RECTAL SURGERY | Admitting: COLON & RECTAL SURGERY
Payer: MEDICARE

## 2019-06-20 VITALS
RESPIRATION RATE: 14 BRPM | HEART RATE: 169 BPM | SYSTOLIC BLOOD PRESSURE: 144 MMHG | OXYGEN SATURATION: 96 % | DIASTOLIC BLOOD PRESSURE: 128 MMHG

## 2019-06-20 LAB — COLONOSCOPY: NORMAL

## 2019-06-20 PROCEDURE — 88305 TISSUE EXAM BY PATHOLOGIST: CPT | Performed by: COLON & RECTAL SURGERY

## 2019-06-20 PROCEDURE — 25000128 H RX IP 250 OP 636: Performed by: COLON & RECTAL SURGERY

## 2019-06-20 PROCEDURE — 88305 TISSUE EXAM BY PATHOLOGIST: CPT | Mod: 26 | Performed by: COLON & RECTAL SURGERY

## 2019-06-20 PROCEDURE — 99153 MOD SED SAME PHYS/QHP EA: CPT | Performed by: COLON & RECTAL SURGERY

## 2019-06-20 PROCEDURE — 45385 COLONOSCOPY W/LESION REMOVAL: CPT | Performed by: COLON & RECTAL SURGERY

## 2019-06-20 PROCEDURE — 45380 COLONOSCOPY AND BIOPSY: CPT | Performed by: COLON & RECTAL SURGERY

## 2019-06-20 PROCEDURE — G0500 MOD SEDAT ENDO SERVICE >5YRS: HCPCS | Performed by: COLON & RECTAL SURGERY

## 2019-06-20 RX ORDER — NALOXONE HYDROCHLORIDE 0.4 MG/ML
.1-.4 INJECTION, SOLUTION INTRAMUSCULAR; INTRAVENOUS; SUBCUTANEOUS
Status: DISCONTINUED | OUTPATIENT
Start: 2019-06-20 | End: 2019-06-20 | Stop reason: HOSPADM

## 2019-06-20 RX ORDER — LIDOCAINE 40 MG/G
CREAM TOPICAL
Status: DISCONTINUED | OUTPATIENT
Start: 2019-06-20 | End: 2019-06-20 | Stop reason: HOSPADM

## 2019-06-20 RX ORDER — FENTANYL CITRATE 50 UG/ML
INJECTION, SOLUTION INTRAMUSCULAR; INTRAVENOUS PRN
Status: DISCONTINUED | OUTPATIENT
Start: 2019-06-20 | End: 2019-06-20 | Stop reason: HOSPADM

## 2019-06-20 RX ORDER — ONDANSETRON 2 MG/ML
4 INJECTION INTRAMUSCULAR; INTRAVENOUS
Status: DISCONTINUED | OUTPATIENT
Start: 2019-06-20 | End: 2019-06-20 | Stop reason: HOSPADM

## 2019-06-20 RX ORDER — ONDANSETRON 2 MG/ML
4 INJECTION INTRAMUSCULAR; INTRAVENOUS EVERY 6 HOURS PRN
Status: DISCONTINUED | OUTPATIENT
Start: 2019-06-20 | End: 2019-06-20 | Stop reason: HOSPADM

## 2019-06-20 RX ORDER — ONDANSETRON 4 MG/1
4 TABLET, ORALLY DISINTEGRATING ORAL EVERY 6 HOURS PRN
Status: DISCONTINUED | OUTPATIENT
Start: 2019-06-20 | End: 2019-06-20 | Stop reason: HOSPADM

## 2019-06-20 RX ORDER — FLUMAZENIL 0.1 MG/ML
0.2 INJECTION, SOLUTION INTRAVENOUS
Status: DISCONTINUED | OUTPATIENT
Start: 2019-06-20 | End: 2019-06-20 | Stop reason: HOSPADM

## 2019-06-20 NOTE — H&P
Pre-Endoscopy History and Physical     Janie De Leon MRN# [id number]   YOB: 1950 Age: 69 year old     Date of Procedure: 6/20/2019  Primary care provider: Kane La  Type of Endoscopy: Colonoscopy  Reason for Procedure: Hematochezia  Type of Anesthesia Anticipated: Moderate Sedation    HPI:    Janie is a 69 year old female who will be undergoing the above procedure.      A history and physical has been performed. The patient's medications and allergies have been reviewed. The risks and benefits of the procedure and the sedation options and risks were discussed with the patient.  All questions were answered and informed consent was obtained.      She denies a personal or family history of anesthesia complications or bleeding disorders.     Allergies   Allergen Reactions     Ciprofloxacin GI Disturbance     Oxycodone Other (See Comments)     She prefers not to have Oxycodone or Oxycontin due to potential addictive properties     Simvastatin Other (See Comments)     Muscle aches           No current facility-administered medications on file prior to encounter.   Current Outpatient Medications on File Prior to Encounter:  acetaminophen-codeine (TYLENOL #3) 300-30 MG tablet Take 1 tablet by mouth nightly as needed for severe pain   aspirin 81 MG tablet Take 81 mg by mouth daily   Cholecalciferol (VITAMIN D3 PO) Take 1,000 Units by mouth daily   clonazePAM (KLONOPIN) 0.5 MG tablet Take 0.5-1 tablets (0.25-0.5 mg) by mouth nightly as needed   DULoxetine (CYMBALTA) 60 MG EC capsule TAKE 1 CAPSULE(60 MG) BY MOUTH DAILY   levothyroxine (SYNTHROID/LEVOTHROID) 75 MCG tablet Take 1 tablet (75 mcg) by mouth daily   lisinopril (PRINIVIL/ZESTRIL) 2.5 MG tablet Take 1 tablet (2.5 mg) by mouth daily   lovastatin (MEVACOR) 40 MG tablet Take 1 tablet (40 mg) by mouth At Bedtime   psyllium (METAMUCIL) 58.6 % POWD Take by mouth as needed for constipation       Patient Active Problem List   Diagnosis      Major depressive disorder, single episode in full remission (H)     Essential hypertension, benign - goal < 140/90     Prediabetes     Hyperlipidemia     Insomnia     Advanced directives, counseling/discussion     Adjustment disorder with anxiety     Rotator cuff injury, left, sequela     Hypothyroidism     Osteopenia     Controlled substance agreement signed     Chronic constipation        Past Medical History:   Diagnosis Date     Anemia     mild gastropathy on EGD 2008; neg pill cam     Atypical ductal hyperplasia of both breasts     Has had biopsies and MRI     High cholesterol      History of hematuria 2008    Cystoscopy for recurrent UTIs; unremarkable renal US. Also recurrent UTIs as child and pyelonephritis.      History of hormone replacement therapy     after JASBIR with BSO     HTN, goal below 140/90      Hx of bone density study 10/16/2006    Normal     Hypothyroidism      Insomnia     periodic - prn clonazepam helpful a couple times per month     Lipid screening 7/21/2015    Tchol 128, , HDL 53, LDL 53 outside records --> based on our labs off of atorvastatin 10yr ASCVD risk 9.4% in Oct 2015     Major depressive disorder, single episode in full remission (H) 2007     Osteopenia     Mild left hip July 2016     Prediabetes     Metformin in the past     Rotator cuff injury, left, sequela     MRI Jan 2015 and had PT; High-grade complete or near complete tear of the supraspinatus tendon and anterior fibers of the infraspinatus tendon. 1/3 of the infraspinatus tendon appears involved.      TBI (traumatic brain injury) (H)     As a child     Tubular adenoma of colon 2013 & 2016        Past Surgical History:   Procedure Laterality Date     ANKLE SURGERY  1976     BREAST BIOPSY, RT/LT      Many     C/SECTION, LOW TRANSVERSE  1968     CAPSULE/PILL CAM ENDOSCOPY  2/18/2014    EGD prior; capsule was negative      COLONOSCOPY      1999, 2003, 2008, 6/6/2013     HYSTERECTOMY, PAP NO LONGER INDICATED       JASBIR with  "BSO  2000    benign fibroids     TUBAL LIGATION         Social History     Tobacco Use     Smoking status: Former Smoker     Last attempt to quit: 4/15/1975     Years since quittin.2     Smokeless tobacco: Never Used   Substance Use Topics     Alcohol use: Yes     Alcohol/week: 0.0 oz     Comment: 1-2 drinks per week        Family History   Problem Relation Age of Onset     Colon Cancer Mother 70         age 81     Diabetes Mother         After age 75     Macular Degeneration Mother      Glaucoma Mother      Cerebrovascular Disease Mother      Heart Failure Mother      Hypertension Mother      Hyperlipidemia Mother      Other - See Comments Father 87        Frailty, pneumonia, fractures, failure to thrive     Osteoporosis Father         diagnosed in his 80s     Colon Polyps Daughter         Tubular adenoma     Deep Vein Thrombosis Brother      Hyperlipidemia Brother      Hypertension Brother      Hypertension Brother      Hyperlipidemia Brother      Depression Brother      Anxiety Disorder Brother      Mental Illness Brother         on Haldol before death from lung cancer     Lung Cancer Brother         long time heavy smoker     Breast Cancer Other         radical mastecomy in her 40s     Colon Cancer Other        REVIEW OF SYSTEMS:     5 point ROS negative except as noted above in HPI, including Gen., Resp., CV, GI &  system review.      PHYSICAL EXAM:   There were no vitals taken for this visit. Estimated body mass index is 28.35 kg/m  as calculated from the following:    Height as of 19: 1.753 m (5' 9\").    Weight as of 19: 87.1 kg (192 lb).   GENERAL APPEARANCE: healthy and alert  MENTAL STATUS: alert  RESP: lungs clear to auscultation - no rales, rhonchi or wheezes  CV: regular rates and rhythm and normal S1 S2, no S3 or S4      IMPRESSION   ASA Class 2 - Mild systemic disease        PLAN:     Plan for colonoscopy. We discussed the risks, benefits and alternatives and the patient " wished to proceed.    The above has been forwarded to the consulting provider.      Pepito Romero MD  [facility]  Phone: [phone]  June 20, 2019  \

## 2019-06-21 LAB — COPATH REPORT: NORMAL

## 2019-06-22 ENCOUNTER — TRANSFERRED RECORDS (OUTPATIENT)
Dept: HEALTH INFORMATION MANAGEMENT | Facility: CLINIC | Age: 69
End: 2019-06-22

## 2019-07-18 ENCOUNTER — OFFICE VISIT (OUTPATIENT)
Dept: FAMILY MEDICINE | Facility: CLINIC | Age: 69
End: 2019-07-18
Payer: MEDICARE

## 2019-07-18 VITALS
RESPIRATION RATE: 14 BRPM | WEIGHT: 190.4 LBS | DIASTOLIC BLOOD PRESSURE: 79 MMHG | OXYGEN SATURATION: 98 % | HEIGHT: 69 IN | SYSTOLIC BLOOD PRESSURE: 122 MMHG | HEART RATE: 72 BPM | BODY MASS INDEX: 28.2 KG/M2

## 2019-07-18 DIAGNOSIS — D22.9 MULTIPLE ATYPICAL SKIN MOLES: Primary | ICD-10-CM

## 2019-07-18 DIAGNOSIS — L82.1 SEBORRHEIC KERATOSES: ICD-10-CM

## 2019-07-18 PROCEDURE — 99213 OFFICE O/P EST LOW 20 MIN: CPT | Performed by: NURSE PRACTITIONER

## 2019-07-18 ASSESSMENT — MIFFLIN-ST. JEOR: SCORE: 1448.03

## 2019-07-18 ASSESSMENT — PAIN SCALES - GENERAL: PAINLEVEL: NO PAIN (0)

## 2019-07-18 NOTE — PROGRESS NOTES
Subjective     Janie De Leon is a 69 year old female who presents to clinic today for the following health issues:    HPI     Concerned about moles on back that have changed. Have been slightly itchy  These moles have been there for a very long time       Past Medical History:   Diagnosis Date     Anemia     mild gastropathy on EGD ; neg pill cam     Atypical ductal hyperplasia of both breasts     Has had biopsies and MRI     High cholesterol      History of hematuria     Cystoscopy for recurrent UTIs; unremarkable renal US. Also recurrent UTIs as child and pyelonephritis.      History of hormone replacement therapy     after JASBIR with BSO     HTN, goal below 140/90      Hx of bone density study 10/16/2006    Normal     Hypothyroidism      Insomnia     periodic - prn clonazepam helpful a couple times per month     Lipid screening 2015    Tchol 128, , HDL 53, LDL 53 outside records --> based on our labs off of atorvastatin 10yr ASCVD risk 9.4% in Oct 2015     Major depressive disorder, single episode in full remission (H)      Osteopenia     Mild left hip 2016     Prediabetes     Metformin in the past     Rotator cuff injury, left, sequela     MRI 2015 and had PT; High-grade complete or near complete tear of the supraspinatus tendon and anterior fibers of the infraspinatus tendon. 1/3 of the infraspinatus tendon appears involved.      TBI (traumatic brain injury) (H)     As a child     Tubular adenoma of colon  &      Family History   Problem Relation Age of Onset     Colon Cancer Mother 70         age 81     Diabetes Mother         After age 75     Macular Degeneration Mother      Glaucoma Mother      Cerebrovascular Disease Mother      Heart Failure Mother      Hypertension Mother      Hyperlipidemia Mother      Other - See Comments Father 87        Frailty, pneumonia, fractures, failure to thrive     Osteoporosis Father         diagnosed in his 80s     Colon Polyps Daughter          Tubular adenoma     Deep Vein Thrombosis Brother      Hyperlipidemia Brother      Hypertension Brother      Hypertension Brother      Hyperlipidemia Brother      Depression Brother      Anxiety Disorder Brother      Mental Illness Brother         on Haldol before death from lung cancer     Lung Cancer Brother         long time heavy smoker     Breast Cancer Other         radical mastecomy in her 40s     Colon Cancer Other      Past Surgical History:   Procedure Laterality Date     ANKLE SURGERY       BREAST BIOPSY, RT/LT      Many     C/SECTION, LOW TRANSVERSE  1968     CAPSULE/PILL CAM ENDOSCOPY  2014    EGD prior; capsule was negative      COLONOSCOPY      , , , 2013     COLONOSCOPY N/A 2019    Procedure: COLONOSCOPY, WITH POLYPECTOMY AND BIOPSY;  Surgeon: Pepito Romero MD;  Location: [facility]     HYSTERECTOMY, PAP NO LONGER INDICATED       JASBIR with BSO  2000    benign fibroids     TUBAL LIGATION       Social History     Tobacco Use     Smoking status: Former Smoker     Last attempt to quit: 4/15/1975     Years since quittin.2     Smokeless tobacco: Never Used   Substance Use Topics     Alcohol use: Yes     Alcohol/week: 0.0 oz     Comment: 1-2 drinks per week      Current Outpatient Medications   Medication Sig Dispense Refill     aspirin 81 MG tablet Take 81 mg by mouth daily       Cholecalciferol (VITAMIN D3 PO) Take 1,000 Units by mouth daily       clonazePAM (KLONOPIN) 0.5 MG tablet Take 0.5-1 tablets (0.25-0.5 mg) by mouth nightly as needed 45 tablet 0     DULoxetine (CYMBALTA) 60 MG EC capsule TAKE 1 CAPSULE(60 MG) BY MOUTH DAILY 90 capsule 3     levothyroxine (SYNTHROID/LEVOTHROID) 75 MCG tablet Take 1 tablet (75 mcg) by mouth daily 90 tablet 3     lisinopril (PRINIVIL/ZESTRIL) 2.5 MG tablet Take 1 tablet (2.5 mg) by mouth daily 90 tablet 3     lovastatin (MEVACOR) 40 MG tablet Take 1 tablet (40 mg) by mouth At Bedtime 90 tablet 3     psyllium (METAMUCIL)  "58.6 % POWD Take by mouth as needed for constipation       acetaminophen-codeine (TYLENOL #3) 300-30 MG tablet Take 1 tablet by mouth nightly as needed for severe pain (Patient not taking: Reported on 7/18/2019) 20 tablet 0     Allergies   Allergen Reactions     Ciprofloxacin GI Disturbance     Oxycodone Other (See Comments)     She prefers not to have Oxycodone or Oxycontin due to potential addictive properties     Simvastatin Other (See Comments)     Muscle aches        Reviewed and updated as needed this visit by clinical staff and provider     Review of Systems   Detailed as above         Objective    /79 (BP Location: Left arm, Patient Position: Sitting, Cuff Size: Adult Large)   Pulse 72   Resp 14   Ht 1.753 m (5' 9\")   Wt 86.4 kg (190 lb 6.4 oz)   SpO2 98%   BMI 28.12 kg/m    There is no height or weight on file to calculate BMI.  Physical Exam   Constitutional: She appears well-developed.   Pulmonary/Chest: Effort normal.   Neurological: She is alert.   Skin: Skin is warm and dry.   3 SK of left upper back   Multiple small moles of back    Psychiatric: She has a normal mood and affect. Judgment normal.        Assessment and Plan:       ICD-10-CM    1. Multiple atypical skin moles D22.9 DERMATOLOGY REFERRAL   2. Seborrheic keratoses L82.1 DERMATOLOGY REFERRAL     3 changing moles that appear to be SK. Will refer to derm for thorough eval       Carolyn Palacios APRN, CNP  [facility]       "

## 2019-07-22 ENCOUNTER — TRANSFERRED RECORDS (OUTPATIENT)
Dept: HEALTH INFORMATION MANAGEMENT | Facility: CLINIC | Age: 69
End: 2019-07-22

## 2019-07-22 DIAGNOSIS — F32.5 MAJOR DEPRESSIVE DISORDER, SINGLE EPISODE IN FULL REMISSION (H): Chronic | ICD-10-CM

## 2019-07-23 NOTE — TELEPHONE ENCOUNTER
"Last Written Prescription Date:  7/30/18  Last Fill Quantity: 90 capsule,  # refills: 3   Last office visit: 7/30/2018 with prescribing provider:  BHASKAR Fierro  (5/22/2019 - Abhinav)  Future Office Visit:   Next 5 appointments (look out 90 days)    Jul 30, 2019  8:30 AM CDT  Screening Mammogram with [facility]  [facility] ([facility]) [address] [place] [address], [address]  [place] [address] [phone]         [facility] Follow-up to PHQ 1/30/2018 7/30/2018 3/15/2019   PHQ-9 9. Suicide Ideation past 2 weeks Not at all Not at all Not at all     QUEENIE-7 SCORE 1/21/2016 1/23/2017 1/30/2018   Total Score 0 7 14       Requested Prescriptions   Pending Prescriptions Disp Refills     DULoxetine (CYMBALTA) 60 MG capsule 90 capsule 3     Sig: TAKE 1 CAPSULE(60 MG) BY MOUTH DAILY       Serotonin-Norepinephrine Reuptake Inhibitors  Passed - 7/22/2019  6:26 PM        Passed - Blood pressure under 140/90 in past 12 months     BP Readings from Last 3 Encounters:   07/18/19 122/79   06/20/19 (!) 144/128   05/22/19 137/88                 Passed - PHQ-9 score of less than 5 in past 6 months     Please review last PHQ-9 score.           Passed - Medication is active on med list        Passed - Patient is age 18 or older        Passed - No active pregnancy on record        Passed - No positive pregnancy test in past 12 months        Passed - Recent (6 mo) or future (30 days) visit within the authorizing provider's specialty     Patient had office visit in the last 6 months or has a visit in the next 30 days with authorizing provider or within the authorizing provider's specialty.  See \"Patient Info\" tab in inbasket, or \"Choose Columns\" in Meds & Orders section of the refill encounter.              "

## 2019-07-24 RX ORDER — DULOXETIN HYDROCHLORIDE 60 MG/1
CAPSULE, DELAYED RELEASE ORAL
Qty: 90 CAPSULE | Refills: 2 | Status: SHIPPED | OUTPATIENT
Start: 2019-07-24 | End: 2020-04-20

## 2019-07-24 NOTE — TELEPHONE ENCOUNTER
Prescription approved per [facility] Refill Protocol.    Hermelinda DING RN,BSN

## 2019-07-28 ASSESSMENT — ACTIVITIES OF DAILY LIVING (ADL): CURRENT_FUNCTION: NO ASSISTANCE NEEDED

## 2019-07-29 ENCOUNTER — OFFICE VISIT (OUTPATIENT)
Dept: FAMILY MEDICINE | Facility: CLINIC | Age: 69
End: 2019-07-29
Payer: MEDICARE

## 2019-07-29 VITALS
DIASTOLIC BLOOD PRESSURE: 76 MMHG | SYSTOLIC BLOOD PRESSURE: 137 MMHG | HEIGHT: 69 IN | TEMPERATURE: 98.1 F | BODY MASS INDEX: 28.24 KG/M2 | WEIGHT: 190.7 LBS | OXYGEN SATURATION: 97 % | HEART RATE: 71 BPM

## 2019-07-29 DIAGNOSIS — F43.22 ADJUSTMENT DISORDER WITH ANXIETY: ICD-10-CM

## 2019-07-29 DIAGNOSIS — Z00.00 MEDICARE ANNUAL WELLNESS VISIT, SUBSEQUENT: Primary | ICD-10-CM

## 2019-07-29 DIAGNOSIS — F32.5 MAJOR DEPRESSIVE DISORDER, SINGLE EPISODE IN FULL REMISSION (H): Chronic | ICD-10-CM

## 2019-07-29 DIAGNOSIS — E55.9 VITAMIN D DEFICIENCY: ICD-10-CM

## 2019-07-29 DIAGNOSIS — I10 ESSENTIAL HYPERTENSION: ICD-10-CM

## 2019-07-29 DIAGNOSIS — E78.5 HYPERLIPIDEMIA, UNSPECIFIED HYPERLIPIDEMIA TYPE: ICD-10-CM

## 2019-07-29 DIAGNOSIS — E03.9 HYPOTHYROIDISM, UNSPECIFIED TYPE: ICD-10-CM

## 2019-07-29 DIAGNOSIS — R73.03 PREDIABETES: ICD-10-CM

## 2019-07-29 DIAGNOSIS — N18.30 CKD (CHRONIC KIDNEY DISEASE) STAGE 3, GFR 30-59 ML/MIN (H): ICD-10-CM

## 2019-07-29 PROCEDURE — G0439 PPPS, SUBSEQ VISIT: HCPCS | Performed by: INTERNAL MEDICINE

## 2019-07-29 PROCEDURE — 99214 OFFICE O/P EST MOD 30 MIN: CPT | Mod: 25 | Performed by: INTERNAL MEDICINE

## 2019-07-29 RX ORDER — CLONAZEPAM 0.5 MG/1
.25-.5 TABLET ORAL
Qty: 45 TABLET | Refills: 0 | Status: SHIPPED | OUTPATIENT
Start: 2019-07-29 | End: 2020-08-27

## 2019-07-29 ASSESSMENT — ANXIETY QUESTIONNAIRES
1. FEELING NERVOUS, ANXIOUS, OR ON EDGE: NOT AT ALL
4. TROUBLE RELAXING: NOT AT ALL
2. NOT BEING ABLE TO STOP OR CONTROL WORRYING: SEVERAL DAYS
7. FEELING AFRAID AS IF SOMETHING AWFUL MIGHT HAPPEN: NOT AT ALL
IF YOU CHECKED OFF ANY PROBLEMS ON THIS QUESTIONNAIRE, HOW DIFFICULT HAVE THESE PROBLEMS MADE IT FOR YOU TO DO YOUR WORK, TAKE CARE OF THINGS AT HOME, OR GET ALONG WITH OTHER PEOPLE: NOT DIFFICULT AT ALL
6. BECOMING EASILY ANNOYED OR IRRITABLE: MORE THAN HALF THE DAYS
3. WORRYING TOO MUCH ABOUT DIFFERENT THINGS: SEVERAL DAYS
5. BEING SO RESTLESS THAT IT IS HARD TO SIT STILL: NOT AT ALL
GAD7 TOTAL SCORE: 4

## 2019-07-29 ASSESSMENT — ENCOUNTER SYMPTOMS
UNEXPECTED WEIGHT CHANGE: 0
SHORTNESS OF BREATH: 0
NUMBNESS: 0
BLOOD IN STOOL: 0
WEAKNESS: 0
CONSTIPATION: 1
NAUSEA: 0
LIGHT-HEADEDNESS: 1
COUGH: 0
HEADACHES: 0
BACK PAIN: 0
NECK PAIN: 0
MYALGIAS: 0
ARTHRALGIAS: 0
CHILLS: 0
SINUS PRESSURE: 0
DYSPHORIC MOOD: 0
FEVER: 0
FATIGUE: 0
ABDOMINAL PAIN: 0
EYE PAIN: 0
DIARRHEA: 1
DIFFICULTY URINATING: 0
DIZZINESS: 0
VOMITING: 0
TROUBLE SWALLOWING: 0
SINUS PAIN: 0
SORE THROAT: 0
NERVOUS/ANXIOUS: 0
PALPITATIONS: 0
SLEEP DISTURBANCE: 0

## 2019-07-29 ASSESSMENT — MIFFLIN-ST. JEOR: SCORE: 1449.39

## 2019-07-29 ASSESSMENT — ACTIVITIES OF DAILY LIVING (ADL): CURRENT_FUNCTION: NO ASSISTANCE NEEDED

## 2019-07-29 ASSESSMENT — PATIENT HEALTH QUESTIONNAIRE - PHQ9: SUM OF ALL RESPONSES TO PHQ QUESTIONS 1-9: 2

## 2019-07-29 NOTE — PROGRESS NOTES
"SUBJECTIVE:   Janie De Leon is a 69 year old female who presents for Preventive Visit.    Depression and anxiety are currently under remission with Cymbalta.  PHQ9 score today is 2.  Uses as needed clonazepam sparingly -- her prescription for 45 tablets from last year has lasted up to 1 year.   Continues levothyroxine 25 mcg daily for her hypothyroidism.  Denies fatigue or any other symptoms of hypo-/hyperthyroidism.    Hypertension is controlled on lisinopril.    Continues lovastatin for hyperlipidemia.  Denies myalgia.    She has glucose intolerance with a hemoglobin A1c of 5.9 on 5/22/2019.      Are you in the first 12 months of your Medicare coverage?  No    Healthy Habits:     In general, how would you rate your overall health?  Good    Frequency of exercise:  4-5 days/week    Duration of exercise:  45-60 minutes    Do you usually eat at least 4 servings of fruit and vegetables a day, include whole grains    & fiber and avoid regularly eating high fat or \"junk\" foods?  No    Taking medications regularly:  Yes    Medication side effects:  None    Ability to successfully perform activities of daily living:  No assistance needed    Home Safety:  Lack of grab bars in the bathroom    Hearing Impairment:  Difficulty following a conversation in a noisy restaurant or crowded room and feel that people are mumbling or not speaking clearly    In the past 6 months, have you been bothered by leaking of urine?  No    In general, how would you rate your overall mental or emotional health?  Fair      PHQ-2 Total Score: 1    Additional concerns today:  No    Do you feel safe in your environment? Yes    Do you have a Health Care Directive? Yes: Patient states has Advance Directive and will bring in a copy to clinic.      Fall risk, Patient states no falls in the last 12 months       Cognitive Screening   1) Repeat 3 items (Leader, Season, Table)    2) Clock draw: NORMAL  3) 3 item recall: Recalls 2 objects   Results: NORMAL clock, " 1-2 items recalled: COGNITIVE IMPAIRMENT LESS LIKELY    Mini-CogTM Copyright KELL [employer]uff. Licensed by the author for use in [facility]; reprinted with permission (bianca@.[facility]). All rights reserved.      Do you have sleep apnea, excessive snoring or daytime drowsiness?: no    Reviewed and updated as needed this visit by clinical staff  Tobacco  Allergies  Meds  Problems  Med Hx  Surg Hx  Soc Hx        Reviewed and updated as needed this visit by Provider  Allergies  Meds  Problems  Med Hx  Surg Hx        Social History     Tobacco Use     Smoking status: Former Smoker     Last attempt to quit: 4/15/1975     Years since quittin.3     Smokeless tobacco: Never Used   Substance Use Topics     Alcohol use: Yes     Alcohol/week: 0.0 oz     Comment: 1-2 drinks per week      If you drink alcohol do you typically have >3 drinks per day or >7 drinks per week? No    Alcohol Use 2019   Prescreen: >3 drinks/day or >7 drinks/week? -   Prescreen: >3 drinks/day or >7 drinks/week? No       Current providers sharing in care for this patient include:   Patient Care Team:  Kane La MD as PCP - General (Internal Medicine)  Kane La MD as Assigned PCP    The following health maintenance items are reviewed in Epic and correct as of today:  Health Maintenance   Topic Date Due     ZOSTER IMMUNIZATION (2 of 3) 2009     QUEENIE ASSESSMENT  2019     MEDICARE ANNUAL WELLNESS VISIT  2019     LIPID  2019     TSH W/FREE T4 REFLEX  2019     INFLUENZA VACCINE (1) 2019     PHQ-9  09/15/2019     DTAP/TDAP/TD IMMUNIZATION (3 - Td) 10/22/2019     FALL RISK ASSESSMENT  03/15/2020     A1C  2020     MAMMO SCREENING  2020     ADVANCE CARE PLANNING  2020     COLONOSCOPY  2024     DEXA  Completed     HEPATITIS C SCREENING  Completed     DEPRESSION ACTION PLAN  Completed     IPV IMMUNIZATION  Aged Out     MENINGITIS  "IMMUNIZATION  Aged Out       Review of Systems   Constitutional: Negative for chills, fatigue, fever and unexpected weight change.   HENT: Positive for congestion (chronic, allergi to her dog). Negative for ear pain, hearing loss, sinus pressure, sinus pain, sore throat and trouble swallowing.    Eyes: Negative for pain and visual disturbance.   Respiratory: Negative for cough and shortness of breath.    Cardiovascular: Negative for chest pain, palpitations and leg swelling.   Gastrointestinal: Positive for constipation (occasional) and diarrhea (occasional). Negative for abdominal pain, blood in stool, nausea and vomiting.   Genitourinary: Negative for difficulty urinating.   Musculoskeletal: Negative for arthralgias, back pain, myalgias and neck pain.   Skin: Negative for rash.   Neurological: Positive for light-headedness (when stand up quickly). Negative for dizziness, syncope, weakness, numbness and headaches.   Psychiatric/Behavioral: Negative for dysphoric mood and sleep disturbance. The patient is not nervous/anxious.        OBJECTIVE:   /76 (BP Location: Left arm, Cuff Size: Adult Large)   Pulse 71   Temp 98.1  F (36.7  C) (Oral)   Ht 1.753 m (5' 9\")   Wt 86.5 kg (190 lb 11.2 oz)   SpO2 97%   BMI 28.16 kg/m   Estimated body mass index is 28.16 kg/m  as calculated from the following:    Height as of this encounter: 1.753 m (5' 9\").    Weight as of this encounter: 86.5 kg (190 lb 11.2 oz).      Physical Exam   Constitutional: She is oriented to person, place, and time. No distress.   HENT:   Right Ear: External ear normal.   Left Ear: External ear normal.   Nose: Nose normal.   Mouth/Throat: Oropharynx is clear and moist.   Eyes: Pupils are equal, round, and reactive to light. Conjunctivae are normal.   Neck: Normal range of motion. Neck supple. No thyromegaly present.   Cardiovascular: Normal rate, regular rhythm and normal heart sounds.   Pulmonary/Chest: Effort normal and breath sounds normal. " No respiratory distress.   Abdominal: Soft. Bowel sounds are normal. There is no tenderness.   Musculoskeletal: Normal range of motion. She exhibits no edema or tenderness.   Lymphadenopathy:     She has no cervical adenopathy.   Neurological: She is alert and oriented to person, place, and time. She has normal reflexes. Coordination normal.   Skin: No rash noted.   Nursing note and vitals reviewed.      ASSESSMENT / PLAN:       ICD-10-CM    1. Medicare annual wellness visit, subsequent Z00.00 CBC with platelets differential   2. Major depressive disorder, single episode in full remission (H) F32.5    3. Essential hypertension I10 Basic metabolic panel   4. CKD (chronic kidney disease) stage 3, GFR 30-59 ml/min (H) N18.3 Basic metabolic panel   5. Hyperlipidemia, unspecified hyperlipidemia type E78.5 Lipid panel reflex to direct LDL Fasting   6. Prediabetes R73.03 Hemoglobin A1c   7. Hypothyroidism, unspecified type E03.9 TSH with free T4 reflex   8. Adjustment disorder with anxiety F43.22 clonazePAM (KLONOPIN) 0.5 MG tablet   9. Vitamin D deficiency E55.9 Vitamin D Deficiency       Patient Instructions     Schedule a laboratory-only visit for your blood tests (please come in fasting).    Monitor your blood pressure once a week.  Call doctor if:  -- your blood pressure for the top/upper number is greater than 140 or less than 90  -- your blood pressure for the bottom/lower number is greater than 90 or less than 60    Maintain low fat/calorie diet and regular exercise.    Complete PHQ questionnaire that will be forwarded to you in 6 months and route back trough My Chart.    Follow up yearly or as needed.      Patient Education   Personalized Prevention Plan  You are due for the preventive services outlined below.  Your care team is available to assist you in scheduling these services.  If you have already completed any of these items, please share that information with your care team to update in your medical  record.  Health Maintenance Due   Topic Date Due     Zoster (Shingles) Vaccine (2 of 3) 12/17/2009     Anxiety Assessment  01/30/2019     Annual Wellness Visit  07/30/2019     Cholesterol Lab  07/30/2019     Thyroid Function Lab  07/30/2019       Understanding USDA MyPlate  The USDA (U.S. Department of Agriculture) has guidelines to help you make healthy food choices. These are called MyPlate. MyPlate shows the food groups that make up healthy meals using the image of a place setting. Before you eat, think about the healthiest choices for what to put onto your plate or into your cup or bowl. To learn more about building a healthy plate, visit www.choosemyplate.gov.    The food groups    Fruits. Any fruit or 100% fruit juice counts as part of the Fruit Group. Fruits may be fresh, canned, frozen, or dried, and may be whole, cut-up, or pureed. Make half your plate fruits and vegetables.    Vegetables. Any vegetable or 100% vegetable juice counts as a member of the Vegetable Group. Vegetables may be fresh, frozen, canned, or dried. They can be served raw or cooked and may be whole, cut-up, or mashed. Make half your plate fruits and vegetables.    Grains. All foods made from grains are part of the Grains Group. These include wheat, rice, oats, cornmeal, and barley such as bread, pasta, oatmeal, cereal, tortillas, and grits. Grains should be no more than a quarter of your plate. At least half of your grains should be whole grains.    Protein. This group includes meat, poultry, seafood, beans and peas, eggs, processed soy products (like tofu), nuts (including nut butters), and seeds. Make protein choices no more than a quarter of your plate. Meat and poultry choices should be lean or low fat.    Dairy. All fluid milk products and foods made from milk that contain calcium, like yogurt and cheese, are part of the Dairy Group. (Foods that have little calcium, such as cream, butter, and cream cheese, are not part of the  group.) Most dairy choices should be low-fat or fat-free.    Oils. These are fats that are liquid at room temperature. They include canola, corn, olive, soybean, and sunflower oil. Foods that are mainly oil include mayonnaise, certain salad dressings, and soft margarines. You should have only 5 to 7 teaspoons of oils a day. You probably already get this much from the food you eat.  Date Last Reviewed: 8/1/2017 2000-2018 [employer]. [address] [place] [address]. All rights reserved. This information is not intended as a substitute for professional medical care. Always follow your healthcare professional's instructions.          Signs of Hearing Loss     Hearing much better with one ear can be a sign of hearing loss.     Hearing loss is a problem shared by many people. In fact, it is one of the most common health conditions, particularly as people age. Most people over age 65 have some hearing loss, and by age 80, almost everyone does. Because hearing loss usually occurs slowly over the years, you may not realize your hearing ability has gotten worse.  Have your hearing checked  Contact your healthcare provider if you:    Have to strain to hear normal conversation    Have to watch other people s faces very carefully to follow what they re saying    Need to ask people to repeat what they ve said    Often misunderstand what people are saying    Turn the volume of the television or radio up so high that others complain    Feel that people are mumbling when they re talking to you    Find that the effort to hear leaves you feeling tired and irritated    Notice, when using the phone, that you hear better with one ear than the other  Date Last Reviewed: 12/1/2016 2000-2018 [employer]. [address] [place] [id number]. All rights reserved. This information is not intended as a substitute for professional medical care. Always follow your healthcare professional's  "instructions.        Your Health Risk Assessment indicates you feel you are not in good emotional health.    Recreation   Recreation is not limited to sports and team events. It includes any activity that provides relaxation, interest, enjoyment, and exercise. Recreation provides an outlet for physical, mental, and social energy. It can give a sense of worth and achievement. It can help you stay healthy.    Mental Exercise and Social Involvement  Mental and emotional health is as important as physical health. Keep in touch with friends and family. Stay as active as possible. Continue to learn and challenge yourself.   Things you can do to stay mentally active are:    Learn something new, like a foreign language or musical instrument.     Play SCRABBLE or do crossword puzzles. If you cannot find people to play these games with you at home, you can play them with others on your computer through the Internet.     Join a games club--anything from card games to chess or checkers or lawn bowling.     Start a new hobby.     Go back to school.     Volunteer.     Read.   Keep up with world events.     End of Life Planning:  Patient currently has an advanced directive: No.  I have verified the patient's ablity to prepare an advanced directive/make health care decisions.  Literature was provided to assist patient in preparing an advanced directive.    COUNSELING:  Reviewed preventive health counseling, as reflected in patient instructions    Estimated body mass index is 28.16 kg/m  as calculated from the following:    Height as of this encounter: 1.753 m (5' 9\").    Weight as of this encounter: 86.5 kg (190 lb 11.2 oz).       reports that she quit smoking about 44 years ago. She has never used smokeless tobacco.      Appropriate preventive services were discussed with this patient, including applicable screening as appropriate for cardiovascular disease, diabetes, osteopenia/osteoporosis, and glaucoma.  As appropriate for " age/gender, discussed screening for colorectal cancer, prostate cancer, breast cancer, and cervical cancer. Checklist reviewing preventive services available has been given to the patient.    Reviewed patients plan of care and provided an AVS. The Basic Care Plan (routine screening as documented in Health Maintenance) for Janie meets the Care Plan requirement. This Care Plan has been established and reviewed with the Patient.    Counseling Resources:  ATP IV Guidelines  Pooled Cohorts Equation Calculator  Breast Cancer Risk Calculator  FRAX Risk Assessment  ICSI Preventive Guidelines  Dietary Guidelines for Americans, 2010  [employer] MyPlate  ASA Prophylaxis  Lung CA Screening    Kane La MD  [facility]    Identified Health Risks:    The patient was counseled and encouraged to consider modifying their diet and eating habits. She was provided with information on recommended healthy diet options.  The patient was provided with written information regarding signs of hearing loss.  The patient was provided with suggestions to help her develop a healthy emotional lifestyle.

## 2019-07-29 NOTE — PATIENT INSTRUCTIONS
Schedule a laboratory-only visit for your blood tests (please come in fasting).    Monitor your blood pressure once a week.  Call doctor if:  -- your blood pressure for the top/upper number is greater than 140 or less than 90  -- your blood pressure for the bottom/lower number is greater than 90 or less than 60    Maintain low fat/calorie diet and regular exercise.    Complete PHQ questionnaire that will be forwarded to you in 6 months and route back trough My Chart.    Follow up yearly or as needed.      Patient Education   Personalized Prevention Plan  You are due for the preventive services outlined below.  Your care team is available to assist you in scheduling these services.  If you have already completed any of these items, please share that information with your care team to update in your medical record.  Health Maintenance Due   Topic Date Due     Zoster (Shingles) Vaccine (2 of 3) 12/17/2009     Anxiety Assessment  01/30/2019     Annual Wellness Visit  07/30/2019     Cholesterol Lab  07/30/2019     Thyroid Function Lab  07/30/2019       Understanding [employer] MyPlate  The USDA (U.S. Department of Agriculture) has guidelines to help you make healthy food choices. These are called MyPlate. MyPlate shows the food groups that make up healthy meals using the image of a place setting. Before you eat, think about the healthiest choices for what to put onto your plate or into your cup or bowl. To learn more about building a healthy plate, visit www.choosemyplate.gov.    The food groups    Fruits. Any fruit or 100% fruit juice counts as part of the Fruit Group. Fruits may be fresh, canned, frozen, or dried, and may be whole, cut-up, or pureed. Make half your plate fruits and vegetables.    Vegetables. Any vegetable or 100% vegetable juice counts as a member of the Vegetable Group. Vegetables may be fresh, frozen, canned, or dried. They can be served raw or cooked and may be whole, cut-up, or mashed. Make half your  plate fruits and vegetables.    Grains. All foods made from grains are part of the Grains Group. These include wheat, rice, oats, cornmeal, and barley such as bread, pasta, oatmeal, cereal, tortillas, and grits. Grains should be no more than a quarter of your plate. At least half of your grains should be whole grains.    Protein. This group includes meat, poultry, seafood, beans and peas, eggs, processed soy products (like tofu), nuts (including nut butters), and seeds. Make protein choices no more than a quarter of your plate. Meat and poultry choices should be lean or low fat.    Dairy. All fluid milk products and foods made from milk that contain calcium, like yogurt and cheese, are part of the Dairy Group. (Foods that have little calcium, such as cream, butter, and cream cheese, are not part of the group.) Most dairy choices should be low-fat or fat-free.    Oils. These are fats that are liquid at room temperature. They include canola, corn, olive, soybean, and sunflower oil. Foods that are mainly oil include mayonnaise, certain salad dressings, and soft margarines. You should have only 5 to 7 teaspoons of oils a day. You probably already get this much from the food you eat.  Date Last Reviewed: 8/1/2017 2000-2018 [employer]. [address] [place] [address]. All rights reserved. This information is not intended as a substitute for professional medical care. Always follow your healthcare professional's instructions.          Signs of Hearing Loss     Hearing much better with one ear can be a sign of hearing loss.     Hearing loss is a problem shared by many people. In fact, it is one of the most common health conditions, particularly as people age. Most people over age 65 have some hearing loss, and by age 80, almost everyone does. Because hearing loss usually occurs slowly over the years, you may not realize your hearing ability has gotten worse.  Have your hearing checked  Contact  your healthcare provider if you:    Have to strain to hear normal conversation    Have to watch other people s faces very carefully to follow what they re saying    Need to ask people to repeat what they ve said    Often misunderstand what people are saying    Turn the volume of the television or radio up so high that others complain    Feel that people are mumbling when they re talking to you    Find that the effort to hear leaves you feeling tired and irritated    Notice, when using the phone, that you hear better with one ear than the other  Date Last Reviewed: 12/1/2016 2000-2018 [employer]. [address] [place] [id number]. All rights reserved. This information is not intended as a substitute for professional medical care. Always follow your healthcare professional's instructions.        Your Health Risk Assessment indicates you feel you are not in good emotional health.    Recreation   Recreation is not limited to sports and team events. It includes any activity that provides relaxation, interest, enjoyment, and exercise. Recreation provides an outlet for physical, mental, and social energy. It can give a sense of worth and achievement. It can help you stay healthy.    Mental Exercise and Social Involvement  Mental and emotional health is as important as physical health. Keep in touch with friends and family. Stay as active as possible. Continue to learn and challenge yourself.   Things you can do to stay mentally active are:    Learn something new, like a foreign language or musical instrument.     Play SCRABBLE or do crossword puzzles. If you cannot find people to play these games with you at home, you can play them with others on your computer through the Internet.     Join a games club--anything from card games to chess or checkers or lawn bowling.     Start a new hobby.     Go back to school.     Volunteer.     Read.   Keep up with world events.

## 2019-07-30 DIAGNOSIS — Z12.31 VISIT FOR SCREENING MAMMOGRAM: ICD-10-CM

## 2019-07-30 DIAGNOSIS — Z00.00 MEDICARE ANNUAL WELLNESS VISIT, SUBSEQUENT: ICD-10-CM

## 2019-07-30 DIAGNOSIS — I10 ESSENTIAL HYPERTENSION: ICD-10-CM

## 2019-07-30 DIAGNOSIS — N18.30 CKD (CHRONIC KIDNEY DISEASE) STAGE 3, GFR 30-59 ML/MIN (H): ICD-10-CM

## 2019-07-30 DIAGNOSIS — R73.03 PREDIABETES: ICD-10-CM

## 2019-07-30 DIAGNOSIS — E55.9 VITAMIN D DEFICIENCY: ICD-10-CM

## 2019-07-30 DIAGNOSIS — E03.9 HYPOTHYROIDISM, UNSPECIFIED TYPE: ICD-10-CM

## 2019-07-30 DIAGNOSIS — E78.5 HYPERLIPIDEMIA, UNSPECIFIED HYPERLIPIDEMIA TYPE: ICD-10-CM

## 2019-07-30 LAB
ANION GAP SERPL CALCULATED.3IONS-SCNC: 6 MMOL/L (ref 3–14)
BASOPHILS # BLD AUTO: 0 10E9/L (ref 0–0.2)
BASOPHILS NFR BLD AUTO: 0.6 %
BUN SERPL-MCNC: 12 MG/DL (ref 7–30)
CALCIUM SERPL-MCNC: 8.9 MG/DL (ref 8.5–10.1)
CHLORIDE SERPL-SCNC: 107 MMOL/L (ref 94–109)
CHOLEST SERPL-MCNC: 200 MG/DL
CO2 SERPL-SCNC: 28 MMOL/L (ref 20–32)
CREAT SERPL-MCNC: 0.94 MG/DL (ref 0.52–1.04)
DIFFERENTIAL METHOD BLD: ABNORMAL
EOSINOPHIL # BLD AUTO: 0.2 10E9/L (ref 0–0.7)
EOSINOPHIL NFR BLD AUTO: 3.2 %
ERYTHROCYTE [DISTWIDTH] IN BLOOD BY AUTOMATED COUNT: 12.9 % (ref 10–15)
GFR SERPL CREATININE-BSD FRML MDRD: 61 ML/MIN/{1.73_M2}
GLUCOSE SERPL-MCNC: 117 MG/DL (ref 70–99)
HBA1C MFR BLD: 5.8 % (ref 0–5.6)
HCT VFR BLD AUTO: 47.2 % (ref 35–47)
HDLC SERPL-MCNC: 49 MG/DL
HGB BLD-MCNC: 15.3 G/DL (ref 11.7–15.7)
LDLC SERPL CALC-MCNC: 105 MG/DL
LYMPHOCYTES # BLD AUTO: 1.4 10E9/L (ref 0.8–5.3)
LYMPHOCYTES NFR BLD AUTO: 28.1 %
MCH RBC QN AUTO: 30.1 PG (ref 26.5–33)
MCHC RBC AUTO-ENTMCNC: 32.4 G/DL (ref 31.5–36.5)
MCV RBC AUTO: 93 FL (ref 78–100)
MONOCYTES # BLD AUTO: 0.4 10E9/L (ref 0–1.3)
MONOCYTES NFR BLD AUTO: 8.4 %
NEUTROPHILS # BLD AUTO: 3 10E9/L (ref 1.6–8.3)
NEUTROPHILS NFR BLD AUTO: 59.7 %
NONHDLC SERPL-MCNC: 151 MG/DL
PLATELET # BLD AUTO: 290 10E9/L (ref 150–450)
POTASSIUM SERPL-SCNC: 3.9 MMOL/L (ref 3.4–5.3)
RBC # BLD AUTO: 5.08 10E12/L (ref 3.8–5.2)
SODIUM SERPL-SCNC: 141 MMOL/L (ref 133–144)
T4 FREE SERPL-MCNC: 0.97 NG/DL (ref 0.76–1.46)
TRIGL SERPL-MCNC: 230 MG/DL
TSH SERPL DL<=0.005 MIU/L-ACNC: 4.92 MU/L (ref 0.4–4)
WBC # BLD AUTO: 5 10E9/L (ref 4–11)

## 2019-07-30 PROCEDURE — 85025 COMPLETE CBC W/AUTO DIFF WBC: CPT | Performed by: INTERNAL MEDICINE

## 2019-07-30 PROCEDURE — 77067 SCR MAMMO BI INCL CAD: CPT | Mod: TC

## 2019-07-30 PROCEDURE — 83036 HEMOGLOBIN GLYCOSYLATED A1C: CPT | Performed by: INTERNAL MEDICINE

## 2019-07-30 PROCEDURE — 84443 ASSAY THYROID STIM HORMONE: CPT | Performed by: INTERNAL MEDICINE

## 2019-07-30 PROCEDURE — 77063 BREAST TOMOSYNTHESIS BI: CPT | Mod: TC

## 2019-07-30 PROCEDURE — 36415 COLL VENOUS BLD VENIPUNCTURE: CPT | Performed by: INTERNAL MEDICINE

## 2019-07-30 PROCEDURE — 82306 VITAMIN D 25 HYDROXY: CPT | Performed by: INTERNAL MEDICINE

## 2019-07-30 PROCEDURE — 80048 BASIC METABOLIC PNL TOTAL CA: CPT | Performed by: INTERNAL MEDICINE

## 2019-07-30 PROCEDURE — 80061 LIPID PANEL: CPT | Performed by: INTERNAL MEDICINE

## 2019-07-30 PROCEDURE — 84439 ASSAY OF FREE THYROXINE: CPT | Performed by: INTERNAL MEDICINE

## 2019-07-30 ASSESSMENT — ANXIETY QUESTIONNAIRES: GAD7 TOTAL SCORE: 4

## 2019-07-31 LAB — DEPRECATED CALCIDIOL+CALCIFEROL SERPL-MC: 39 UG/L (ref 20–75)

## 2019-09-13 DIAGNOSIS — I10 ESSENTIAL HYPERTENSION, BENIGN: Chronic | ICD-10-CM

## 2019-09-13 DIAGNOSIS — E03.9 HYPOTHYROIDISM, UNSPECIFIED TYPE: Chronic | ICD-10-CM

## 2019-09-13 DIAGNOSIS — E78.5 HYPERLIPIDEMIA, UNSPECIFIED HYPERLIPIDEMIA TYPE: Chronic | ICD-10-CM

## 2019-09-13 NOTE — TELEPHONE ENCOUNTER
"levothyroxine (SYNTHROID/LEVOTHROID) 75 MCG tablet  Last Written Prescription Date:  7/30/18  Last Fill Quantity: 90 tablet,  # refills: 3   Last office visit: 7/29/2019 with prescribing provider:  Abhinav   Future Office Visit:      lisinopril (PRINIVIL/ZESTRIL) 2.5 MG tablet  Last Written Prescription Date:  7/30/18  Last Fill Quantity: 90 tablet,  # refills: 3   Last office visit: 7/29/2019 with prescribing provider:  Abhinav   Future Office Visit:      lovastatin (MEVACOR) 40 MG tablet  Last Written Prescription Date:  7/30/18  Last Fill Quantity: 90 tablet,  # refills: 3   Last office visit: 7/29/2019 with prescribing provider:  Abhinav Eaton Office Visit:      Requested Prescriptions   Pending Prescriptions Disp Refills     levothyroxine (SYNTHROID/LEVOTHROID) 75 MCG tablet 90 tablet 3     Sig: Take 1 tablet (75 mcg) by mouth daily       Thyroid Protocol Failed - 9/13/2019  3:25 PM        Failed - Normal TSH on file in past 12 months     Recent Labs   Lab Test 07/30/19  0848   TSH 4.92*              Passed - Patient is 12 years or older        Passed - Recent (12 mo) or future (30 days) visit within the authorizing provider's specialty     Patient had office visit in the last 12 months or has a visit in the next 30 days with authorizing provider or within the authorizing provider's specialty.  See \"Patient Info\" tab in inbasket, or \"Choose Columns\" in Meds & Orders section of the refill encounter.              Passed - Medication is active on med list        Passed - No active pregnancy on record     If patient is pregnant or has had a positive pregnancy test, please check TSH.          Passed - No positive pregnancy test in past 12 months     If patient is pregnant or has had a positive pregnancy test, please check TSH.          lisinopril (PRINIVIL/ZESTRIL) 2.5 MG tablet 90 tablet 3     Sig: Take 1 tablet (2.5 mg) by mouth daily       ACE Inhibitors (Including Combos) Protocol Passed - 9/13/2019  3:25 PM    " "    Passed - Blood pressure under 140/90 in past 12 months     BP Readings from Last 3 Encounters:   07/29/19 137/76   07/18/19 122/79   06/20/19 (!) 144/128                 Passed - Recent (12 mo) or future (30 days) visit within the authorizing provider's specialty     Patient had office visit in the last 12 months or has a visit in the next 30 days with authorizing provider or within the authorizing provider's specialty.  See \"Patient Info\" tab in inbasket, or \"Choose Columns\" in Meds & Orders section of the refill encounter.              Passed - Medication is active on med list        Passed - Patient is age 18 or older        Passed - No active pregnancy on record        Passed - Normal serum creatinine on file in past 12 months     Recent Labs   Lab Test 07/30/19  0848   CR 0.94             Passed - Normal serum potassium on file in past 12 months     Recent Labs   Lab Test 07/30/19  0848   POTASSIUM 3.9             Passed - No positive pregnancy test within past 12 months        lovastatin (MEVACOR) 40 MG tablet 90 tablet 3     Sig: Take 1 tablet (40 mg) by mouth At Bedtime       Statins Protocol Passed - 9/13/2019  3:25 PM        Passed - LDL on file in past 12 months     Recent Labs   Lab Test 07/30/19  0848   *             Passed - No abnormal creatine kinase in past 12 months     No lab results found.             Passed - Recent (12 mo) or future (30 days) visit within the authorizing provider's specialty     Patient had office visit in the last 12 months or has a visit in the next 30 days with authorizing provider or within the authorizing provider's specialty.  See \"Patient Info\" tab in inbasket, or \"Choose Columns\" in Meds & Orders section of the refill encounter.              Passed - Medication is active on med list        Passed - Patient is age 18 or older        Passed - No active pregnancy on record        Passed - No positive pregnancy test in past 12 months          "

## 2019-09-16 RX ORDER — LISINOPRIL 2.5 MG/1
2.5 TABLET ORAL DAILY
Qty: 90 TABLET | Refills: 2 | Status: SHIPPED | OUTPATIENT
Start: 2019-09-16 | End: 2020-08-12

## 2019-09-16 RX ORDER — LOVASTATIN 40 MG
40 TABLET ORAL AT BEDTIME
Qty: 90 TABLET | Refills: 2 | Status: SHIPPED | OUTPATIENT
Start: 2019-09-16 | End: 2020-07-03

## 2019-09-16 NOTE — TELEPHONE ENCOUNTER
Lovastatin, Lisinopril - Prescription approved per [facility] Refill Protocol.    Levothyroxine - Routing refill request to provider for review/approval because:  Labs out of range:  TSH    Rossana WILSON RN

## 2019-09-17 RX ORDER — LEVOTHYROXINE SODIUM 75 UG/1
75 TABLET ORAL DAILY
Qty: 90 TABLET | Refills: 1 | Status: SHIPPED | OUTPATIENT
Start: 2019-09-17 | End: 2020-03-19

## 2019-10-03 ENCOUNTER — MYC MEDICAL ADVICE (OUTPATIENT)
Dept: FAMILY MEDICINE | Facility: CLINIC | Age: 69
End: 2019-10-03

## 2019-10-03 DIAGNOSIS — K21.9 GASTROESOPHAGEAL REFLUX DISEASE, ESOPHAGITIS PRESENCE NOT SPECIFIED: Primary | ICD-10-CM

## 2019-10-16 ENCOUNTER — MYC MEDICAL ADVICE (OUTPATIENT)
Dept: FAMILY MEDICINE | Facility: CLINIC | Age: 69
End: 2019-10-16

## 2019-10-16 DIAGNOSIS — K21.9 GASTROESOPHAGEAL REFLUX DISEASE, ESOPHAGITIS PRESENCE NOT SPECIFIED: Primary | ICD-10-CM

## 2019-10-16 NOTE — TELEPHONE ENCOUNTER
Dr La,    Please see [facility] [employer] (P) message     Pharmacy pended .        Thank you,  Hermelinda DNIG RN,BSN

## 2019-10-22 RX ORDER — PANTOPRAZOLE SODIUM 20 MG/1
40 TABLET, DELAYED RELEASE ORAL DAILY
Qty: 30 TABLET | Refills: 1 | Status: SHIPPED | OUTPATIENT
Start: 2019-10-22 | End: 2020-12-07

## 2019-11-07 ENCOUNTER — ALLIED HEALTH/NURSE VISIT (OUTPATIENT)
Dept: NURSING | Facility: CLINIC | Age: 69
End: 2019-11-07
Payer: MEDICARE

## 2019-11-07 DIAGNOSIS — Z23 NEED FOR VACCINATION: Primary | ICD-10-CM

## 2019-11-07 PROCEDURE — 99207 ZZC NO CHARGE LOS: CPT

## 2019-11-07 PROCEDURE — 90714 TD VACC NO PRESV 7 YRS+ IM: CPT

## 2019-11-07 PROCEDURE — 90471 IMMUNIZATION ADMIN: CPT

## 2019-11-07 NOTE — NURSING NOTE
Prior to immunization administration, verified patients identity using patient s name and date of birth. Please see Immunization Activity for additional information.     Screening Questionnaire for Adult Immunization    Are you sick today?   No   Do you have allergies to medications, food, a vaccine component or latex?   No   Have you ever had a serious reaction after receiving a vaccination?   No   Do you have a long-term health problem with heart disease, lung disease, asthma, kidney disease, metabolic disease (e.g. diabetes), anemia, or other blood disorder?   No   Do you have cancer, leukemia, HIV/AIDS, or any other immune system problem?   No   In the past 3 months, have you taken medications that affect  your immune system, such as prednisone, other steroids, or anticancer drugs; drugs for the treatment of rheumatoid arthritis, Crohn s disease, or psoriasis; or have you had radiation treatments?   No   Have you had a seizure, or a brain or other nervous system problem?   No   During the past year, have you received a transfusion of blood or blood     products, or been given immune (gamma) globulin or antiviral drug?   No   For women: Are you pregnant or is there a chance you could become        pregnant during the next month?   No   Have you received any vaccinations in the past 4 weeks?   No     Immunization questionnaire answers were all negative.        Per orders of Dr. La, injection of Td given by Sierra Araiza CMA. Patient instructed to remain in clinic for 15 minutes afterwards, and to report any adverse reaction to me immediately.       Screening performed by Sierra Araiza CMA on 11/7/2019 at 11:51 AM.

## 2019-12-31 ENCOUNTER — OFFICE VISIT (OUTPATIENT)
Dept: URGENT CARE | Facility: URGENT CARE | Age: 69
End: 2019-12-31
Payer: MEDICARE

## 2019-12-31 VITALS
DIASTOLIC BLOOD PRESSURE: 73 MMHG | OXYGEN SATURATION: 98 % | TEMPERATURE: 98 F | HEART RATE: 101 BPM | SYSTOLIC BLOOD PRESSURE: 117 MMHG

## 2019-12-31 DIAGNOSIS — J06.9 VIRAL URI WITH COUGH: Primary | ICD-10-CM

## 2019-12-31 PROCEDURE — 99213 OFFICE O/P EST LOW 20 MIN: CPT | Performed by: NURSE PRACTITIONER

## 2019-12-31 NOTE — PROGRESS NOTES
SUBJECTIVE:   Janie De Leon is a 69 year old female presenting with a chief complaint of   Chief Complaint   Patient presents with     Urgent Care     Cough     3x days yellow mucus    .    ST and fatigue as well    Onset of symptoms was 3 day(s) ago.  Course of illness is worsening.    Severity moderate  Current and Associated symptoms: fatigue  Treatment measures tried include benadryl.  Predisposing factors include None.    CONSTITUTIONAL: no fatigue, no unexpected change in weight  SKIN: no worrisome rashes, no worrisome moles, no worrisome lesions  CV: no chest pain, no palpitations, no new or worsening peripheral edema  GI: no nausea, no vomiting, no constipation, no diarrhea  : no frequency, no dysuria, no hematuria  NEURO: no weakness, no dizziness, no syncope, no headaches  HEME: no easy bruising, no bleeding problems      Past Medical History:   Diagnosis Date     Anemia     mild gastropathy on EGD 2008; neg pill cam     Atypical ductal hyperplasia of both breasts     Has had biopsies and MRI     High cholesterol      History of hematuria 2008    Cystoscopy for recurrent UTIs; unremarkable renal US. Also recurrent UTIs as child and pyelonephritis.      History of hormone replacement therapy     after JASBIR with BSO     HTN, goal below 140/90      Hx of bone density study 10/16/2006    Normal     Hypothyroidism      Insomnia     periodic - prn clonazepam helpful a couple times per month     Lipid screening 7/21/2015    Tchol 128, , HDL 53, LDL 53 outside records --> based on our labs off of atorvastatin 10yr ASCVD risk 9.4% in Oct 2015     Major depressive disorder, single episode in full remission (H) 2007     Osteopenia     Mild left hip July 2016     Prediabetes     Metformin in the past     Rotator cuff injury, left, sequela     MRI Jan 2015 and had PT; High-grade complete or near complete tear of the supraspinatus tendon and anterior fibers of the infraspinatus tendon. 1/3 of the infraspinatus  tendon appears involved.      TBI (traumatic brain injury) (H)     As a child     Tubular adenoma of colon 2013 & 2016     Current Outpatient Medications   Medication Sig Dispense Refill     Cholecalciferol (VITAMIN D3 PO) Take 1,000 Units by mouth daily       clonazePAM (KLONOPIN) 0.5 MG tablet Take 0.5-1 tablets (0.25-0.5 mg) by mouth nightly as needed for anxiety 45 tablet 0     DULoxetine (CYMBALTA) 60 MG capsule TAKE 1 CAPSULE(60 MG) BY MOUTH DAILY 90 capsule 2     levothyroxine (SYNTHROID/LEVOTHROID) 75 MCG tablet Take 1 tablet (75 mcg) by mouth daily 90 tablet 1     lisinopril (PRINIVIL/ZESTRIL) 2.5 MG tablet Take 1 tablet (2.5 mg) by mouth daily 90 tablet 2     lovastatin (MEVACOR) 40 MG tablet Take 1 tablet (40 mg) by mouth At Bedtime 90 tablet 2     pantoprazole (PROTONIX) 20 MG EC tablet Take 2 tablets (40 mg) by mouth daily 30 tablet 1     psyllium (METAMUCIL) 58.6 % POWD Take by mouth as needed for constipation       Social History     Tobacco Use     Smoking status: Former Smoker     Last attempt to quit: 4/15/1975     Years since quittin.7     Smokeless tobacco: Never Used   Substance Use Topics     Alcohol use: Yes     Alcohol/week: 0.0 standard drinks     Comment: 1-2 drinks per week        OBJECTIVE  /73   Pulse 101   Temp 98  F (36.7  C) (Oral)   SpO2 98%         GENERAL APPEARANCE: healthy appearing, alert     EYES: PERRL, EOMI, sclera non-icteric     HENT: oral exam benign, mucus membranes slightly intact but slightly dry without ulcers or lesions     NECK: no adenopathy or asymmetry, thyroid normal to palpation     RESP: lungs clear to auscultation - no rales, rhonchi or wheezes     CV: regular rates and rhythm, no murmurs, rubs, or gallop     ABDOMEN:  soft, nontender, no HSM or masses and bowel sounds normal     MS: extremities normal- no gross deformities noted; normal muscle tone.     SKIN: no suspicious lesions or rashes     NEURO: Normal strength and tone, mentation intact  and speech normal        ICD-10-CM    1. Viral URI with cough J06.9     B97.89        Tylenol, Ibuprofen, Fluids, Rest, OTC cough suppressant/expectorant and OTC decongestant/antihistamine    Followup:  Return in about 1 week (around 1/7/2020), or if symptoms worsen or fail to improve.    Patient Instructions   Upper respiratory infections are usually caused by viruses and, sometimes certain bacteria.  Antibiotics don't help the vast majority of people recover any quicker even when caused by a bacteria.  The body will fight this infection but it needs to be treated well in order to help heal itself.  Rest as needed.  It is ok to reduce food intake if appetite is poor but it is quite important to increase fluid intake.    For cough suppression take, dextromethorphan (Delysm) to suppress cough safely without significant risk of sedation. Often 2 puffs four times daily of an albuterol inhaler will help with bronchitis.  This is a prescription medicine.    For nasal congestion and sinus pressure, pseudoephedrine (Sudafed) or phenylephrine is often helpful but it can cause elevations in blood pressure and insomnia. Yoi should not take these if you have a history of high blood pressure. Short courses of a nasal decongestant spray (Afrin or Neosinephrine) can be appropriate but their use should be restricted to 3 days due to the high risk of nasal addiction.     Acetaminophen (Tylenol) may be taken up to 4000mg daily (3000mg if over 65) which would be 2 regular strength tablets (325mg) or two extra strength tablets(500mg) up to 4 times a day (3 times a day if over 65).   Check for acetaminophen in other OTC or prescription medications and be sure you add this in the maximum amount you take every day.    Too much acetaminophen can lead to serious liver damage. DO NOT TAKE if you have a history of liver disease.    Ibuprofen 200mg tablets may be taken up to 4 pills 4 times a day(three times a day if over 65)  to the maximum  of 3200mg,  (2400mg if >65)  daily as needed for pain.   Take with food.  Don't take with aspirin, Aleve or other antiinflammatory medication or with warfarin. DO NOT TAKE if you have a history of kidney disease.            ALEXYS Conde, [facility] Ur[facility] Provider

## 2019-12-31 NOTE — PATIENT INSTRUCTIONS
Upper respiratory infections are usually caused by viruses and, sometimes certain bacteria.  Antibiotics don't help the vast majority of people recover any quicker even when caused by a bacteria.  The body will fight this infection but it needs to be treated well in order to help heal itself.  Rest as needed.  It is ok to reduce food intake if appetite is poor but it is quite important to increase fluid intake.    For cough suppression take, dextromethorphan (Delysm) to suppress cough safely without significant risk of sedation. Often 2 puffs four times daily of an albuterol inhaler will help with bronchitis.  This is a prescription medicine.    For nasal congestion and sinus pressure, pseudoephedrine (Sudafed) or phenylephrine is often helpful but it can cause elevations in blood pressure and insomnia. Yoi should not take these if you have a history of high blood pressure. Short courses of a nasal decongestant spray (Afrin or Neosinephrine) can be appropriate but their use should be restricted to 3 days due to the high risk of nasal addiction.     Acetaminophen (Tylenol) may be taken up to 4000mg daily (3000mg if over 65) which would be 2 regular strength tablets (325mg) or two extra strength tablets(500mg) up to 4 times a day (3 times a day if over 65).   Check for acetaminophen in other OTC or prescription medications and be sure you add this in the maximum amount you take every day.    Too much acetaminophen can lead to serious liver damage. DO NOT TAKE if you have a history of liver disease.    Ibuprofen 200mg tablets may be taken up to 4 pills 4 times a day(three times a day if over 65)  to the maximum of 3200mg,  (2400mg if >65)  daily as needed for pain.   Take with food.  Don't take with aspirin, Aleve or other antiinflammatory medication or with warfarin. DO NOT TAKE if you have a history of kidney disease.

## 2020-03-02 ENCOUNTER — OFFICE VISIT (OUTPATIENT)
Dept: URGENT CARE | Facility: URGENT CARE | Age: 70
End: 2020-03-02
Payer: MEDICARE

## 2020-03-02 VITALS
WEIGHT: 190 LBS | DIASTOLIC BLOOD PRESSURE: 80 MMHG | HEART RATE: 78 BPM | OXYGEN SATURATION: 95 % | RESPIRATION RATE: 16 BRPM | BODY MASS INDEX: 28.06 KG/M2 | SYSTOLIC BLOOD PRESSURE: 138 MMHG

## 2020-03-02 DIAGNOSIS — S61.213A LACERATION OF LEFT MIDDLE FINGER WITHOUT FOREIGN BODY WITHOUT DAMAGE TO NAIL, INITIAL ENCOUNTER: Primary | ICD-10-CM

## 2020-03-02 PROCEDURE — 12001 RPR S/N/AX/GEN/TRNK 2.5CM/<: CPT | Performed by: FAMILY MEDICINE

## 2020-03-02 NOTE — PROGRESS NOTES
SUBJECTIVE: @[email or web address]@.ident who presents to the clinic with a laceration on the finger sustained hour(s) ago.    This is a accidental injury.    Mechanism of injury: knife.  Associated symptoms: Denies numbness, weakness, or loss of function  Last tetanus booster within 10 years: yes    Past Medical History:   Diagnosis Date     Anemia     mild gastropathy on EGD ; neg pill cam     Atypical ductal hyperplasia of both breasts     Has had biopsies and MRI     High cholesterol      History of hematuria     Cystoscopy for recurrent UTIs; unremarkable renal US. Also recurrent UTIs as child and pyelonephritis.      History of hormone replacement therapy     after JASBIR with BSO     HTN, goal below 140/90      Hx of bone density study 10/16/2006    Normal     Hypothyroidism      Insomnia     periodic - prn clonazepam helpful a couple times per month     Lipid screening 2015    Tchol 128, , HDL 53, LDL 53 outside records --> based on our labs off of atorvastatin 10yr ASCVD risk 9.4% in Oct 2015     Major depressive disorder, single episode in full remission (H)      Osteopenia     Mild left hip 2016     Prediabetes     Metformin in the past     Rotator cuff injury, left, sequela     MRI 2015 and had PT; High-grade complete or near complete tear of the supraspinatus tendon and anterior fibers of the infraspinatus tendon. 1/3 of the infraspinatus tendon appears involved.      TBI (traumatic brain injury) (H)     As a child     Tubular adenoma of colon  &      Allergies   Allergen Reactions     Ciprofloxacin GI Disturbance     Oxycodone Other (See Comments)     She prefers not to have Oxycodone or Oxycontin due to potential addictive properties     Simvastatin Other (See Comments)     Muscle aches      Social History     Tobacco Use     Smoking status: Former Smoker     Last attempt to quit: 4/15/1975     Years since quittin.9     Smokeless tobacco: Never Used   Substance Use Topics      Alcohol use: Yes     Alcohol/week: 0.0 standard drinks     Comment: 1-2 drinks per week        ROS:  Neuro: good distal sensation  Motor: normal rom and strenght  Hem: capillary refill < 2 sec    EXAM: The patient appears today in alert,no apparent distress distressVITALS:   /80   Pulse 78   Resp 16   Wt 86.2 kg (190 lb)   SpO2 95%   BMI 28.06 kg/m    Size of laceration: 2 centimeters  Characteristics of the laceration: clean and straight  Tendon function intact: yes  Sensation to light touch intact: yes  Pulses/Capillary refill intact: yes      ICD-10-CM    1. Laceration of left middle finger without foreign body without damage to nail, initial encounter S61.213A REPAIR SUPERFICIAL, WOUND BODY < =2.5CM       Procedure Note:Wound injected with 1 cc's of Lidocaine 1% plain  Good anesthesia was obtainedPrepped and draped in the usual sterile fashion  Wound cleaned with sterile water  Wound irrigatedLaceration was closed using 3 5-0 nylon interrupted sutures  After care instructions:Keep wound clean   Sutures out in 10 days  Signs of infection discussed today

## 2020-03-12 ENCOUNTER — ALLIED HEALTH/NURSE VISIT (OUTPATIENT)
Dept: NURSING | Facility: CLINIC | Age: 70
End: 2020-03-12
Payer: MEDICARE

## 2020-03-12 DIAGNOSIS — Z48.02 ENCOUNTER FOR REMOVAL OF SUTURES: Primary | ICD-10-CM

## 2020-03-12 PROCEDURE — 99207 ZZC NO CHARGE NURSE ONLY: CPT

## 2020-03-12 NOTE — PROGRESS NOTES
Janie Moises presents to the clinic for removal of sutures. The patient has had sutures in place for 10 days. There has been no patient reported signs or symptoms of infection or drainage. 3  sutures are seen and located on the left finger. Tetanus status is up to date. All sutures were easily removed today. Routine wound care discussed by the RN or provider. The patient will follow up as needed.    Logan CHATTERJEE RN

## 2020-03-18 DIAGNOSIS — E03.9 HYPOTHYROIDISM, UNSPECIFIED TYPE: Chronic | ICD-10-CM

## 2020-03-18 NOTE — TELEPHONE ENCOUNTER
Refill request:    Levothyroxine 75 mcg tabs. Qty 90.    Summary: Take 1 tablet (75 mcg) by mouth daily, Disp-90 tablet, R-1, E-Prescribe   Dose, Route, Frequency: 75 mcg, Oral, DAILY  Start: 9/17/2019  Ord/Sold: 9/17/2019

## 2020-03-19 NOTE — TELEPHONE ENCOUNTER
"  Routing refill request to provider for review/approval because:  Labs out of range:  TSH  Please authorize if appropriate.  Thanks,  Chio Guevara RN          synthroid      Last Written Prescription Date:  9/17/19  Last Fill Quantity: 90,   # refills: 1  Last Office Visit:   Future Office visit:           Requested Prescriptions   Pending Prescriptions Disp Refills     levothyroxine (SYNTHROID/LEVOTHROID) 75 MCG tablet 90 tablet 1     Sig: Take 1 tablet (75 mcg) by mouth daily       Thyroid Protocol Failed - 3/18/2020  5:28 PM        Failed - Normal TSH on file in past 12 months     Recent Labs   Lab Test 07/30/19  0848   TSH 4.92*              Passed - Patient is 12 years or older        Passed - Recent (12 mo) or future (30 days) visit within the authorizing provider's specialty     Patient has had an office visit with the authorizing provider or a provider within the authorizing providers department within the previous 12 mos or has a future within next 30 days. See \"Patient Info\" tab in inbasket, or \"Choose Columns\" in Meds & Orders section of the refill encounter.              Passed - Medication is active on med list        Passed - No active pregnancy on record     If patient is pregnant or has had a positive pregnancy test, please check TSH.          Passed - No positive pregnancy test in past 12 months     If patient is pregnant or has had a positive pregnancy test, please check TSH.               "

## 2020-03-20 RX ORDER — LEVOTHYROXINE SODIUM 75 UG/1
75 TABLET ORAL DAILY
Qty: 90 TABLET | Refills: 1 | Status: SHIPPED | OUTPATIENT
Start: 2020-03-20 | End: 2020-09-18

## 2020-06-29 NOTE — TELEPHONE ENCOUNTER
PCP - pt reports she has not been taking gastric reflux medication; is requesting a new medication.     Would you recommend OV/e-visit/phone visit to discuss?    Rossana WILSON RN     [id number] [facility]

## 2020-07-03 DIAGNOSIS — E78.5 HYPERLIPIDEMIA, UNSPECIFIED HYPERLIPIDEMIA TYPE: Chronic | ICD-10-CM

## 2020-07-03 RX ORDER — LOVASTATIN 40 MG
40 TABLET ORAL AT BEDTIME
Qty: 90 TABLET | Refills: 0 | Status: SHIPPED | OUTPATIENT
Start: 2020-07-03 | End: 2020-09-30

## 2020-07-17 DIAGNOSIS — F32.5 MAJOR DEPRESSIVE DISORDER, SINGLE EPISODE IN FULL REMISSION (H): Chronic | ICD-10-CM

## 2020-07-17 NOTE — TELEPHONE ENCOUNTER
Next 5 appointments (look out 90 days)    Aug 05, 2020 10:00 AM CDT  [facility] with Kane La [facility] [address] [facility] [address]  [phone]        RT Symone (R)

## 2020-07-20 RX ORDER — DULOXETIN HYDROCHLORIDE 60 MG/1
CAPSULE, DELAYED RELEASE ORAL
Qty: 90 CAPSULE | Refills: 0 | Status: SHIPPED | OUTPATIENT
Start: 2020-07-20 | End: 2020-10-20

## 2020-07-31 ENCOUNTER — ANCILLARY PROCEDURE (OUTPATIENT)
Dept: MAMMOGRAPHY | Facility: CLINIC | Age: 70
End: 2020-07-31
Attending: INTERNAL MEDICINE
Payer: MEDICARE

## 2020-07-31 DIAGNOSIS — Z12.31 VISIT FOR SCREENING MAMMOGRAM: ICD-10-CM

## 2020-07-31 PROCEDURE — 77063 BREAST TOMOSYNTHESIS BI: CPT | Mod: TC

## 2020-07-31 PROCEDURE — 77067 SCR MAMMO BI INCL CAD: CPT | Mod: TC

## 2020-08-11 DIAGNOSIS — I10 ESSENTIAL HYPERTENSION, BENIGN: Chronic | ICD-10-CM

## 2020-08-12 RX ORDER — LISINOPRIL 2.5 MG/1
2.5 TABLET ORAL DAILY
Qty: 30 TABLET | Refills: 0 | Status: SHIPPED | OUTPATIENT
Start: 2020-08-12 | End: 2020-09-11

## 2020-08-12 NOTE — TELEPHONE ENCOUNTER
Prescription approved per [facility] [place].  Lissy JOHNSON RN

## 2020-08-27 ENCOUNTER — TELEPHONE (OUTPATIENT)
Dept: FAMILY MEDICINE | Facility: CLINIC | Age: 70
End: 2020-08-27

## 2020-08-27 ENCOUNTER — OFFICE VISIT (OUTPATIENT)
Dept: FAMILY MEDICINE | Facility: CLINIC | Age: 70
End: 2020-08-27
Payer: MEDICARE

## 2020-08-27 VITALS
DIASTOLIC BLOOD PRESSURE: 75 MMHG | TEMPERATURE: 96.4 F | WEIGHT: 190 LBS | BODY MASS INDEX: 28.14 KG/M2 | SYSTOLIC BLOOD PRESSURE: 122 MMHG | OXYGEN SATURATION: 98 % | HEIGHT: 69 IN | HEART RATE: 76 BPM

## 2020-08-27 DIAGNOSIS — F43.22 ADJUSTMENT DISORDER WITH ANXIETY: ICD-10-CM

## 2020-08-27 DIAGNOSIS — Z00.00 MEDICARE ANNUAL WELLNESS VISIT, SUBSEQUENT: Primary | ICD-10-CM

## 2020-08-27 DIAGNOSIS — N18.30 CKD (CHRONIC KIDNEY DISEASE) STAGE 3, GFR 30-59 ML/MIN (H): ICD-10-CM

## 2020-08-27 DIAGNOSIS — F32.5 MAJOR DEPRESSIVE DISORDER, SINGLE EPISODE IN FULL REMISSION (H): ICD-10-CM

## 2020-08-27 DIAGNOSIS — I10 ESSENTIAL HYPERTENSION, BENIGN: ICD-10-CM

## 2020-08-27 DIAGNOSIS — E78.5 HYPERLIPIDEMIA, UNSPECIFIED HYPERLIPIDEMIA TYPE: ICD-10-CM

## 2020-08-27 DIAGNOSIS — E03.9 HYPOTHYROIDISM, UNSPECIFIED TYPE: ICD-10-CM

## 2020-08-27 DIAGNOSIS — R73.03 PREDIABETES: ICD-10-CM

## 2020-08-27 LAB
ALBUMIN SERPL-MCNC: 3.7 G/DL (ref 3.4–5)
ALP SERPL-CCNC: 115 U/L (ref 40–150)
ALT SERPL W P-5'-P-CCNC: 21 U/L (ref 0–50)
ANION GAP SERPL CALCULATED.3IONS-SCNC: 7 MMOL/L (ref 3–14)
AST SERPL W P-5'-P-CCNC: 16 U/L (ref 0–45)
BASOPHILS # BLD AUTO: 0 10E9/L (ref 0–0.2)
BASOPHILS NFR BLD AUTO: 0.5 %
BILIRUB SERPL-MCNC: 0.3 MG/DL (ref 0.2–1.3)
BUN SERPL-MCNC: 17 MG/DL (ref 7–30)
CALCIUM SERPL-MCNC: 8.9 MG/DL (ref 8.5–10.1)
CHLORIDE SERPL-SCNC: 105 MMOL/L (ref 94–109)
CHOLEST SERPL-MCNC: 237 MG/DL
CO2 SERPL-SCNC: 26 MMOL/L (ref 20–32)
CREAT SERPL-MCNC: 0.9 MG/DL (ref 0.52–1.04)
DIFFERENTIAL METHOD BLD: NORMAL
EOSINOPHIL # BLD AUTO: 0.2 10E9/L (ref 0–0.7)
EOSINOPHIL NFR BLD AUTO: 3.2 %
ERYTHROCYTE [DISTWIDTH] IN BLOOD BY AUTOMATED COUNT: 12.6 % (ref 10–15)
GFR SERPL CREATININE-BSD FRML MDRD: 64 ML/MIN/{1.73_M2}
GLUCOSE SERPL-MCNC: 117 MG/DL (ref 70–99)
HBA1C MFR BLD: 5.8 % (ref 0–5.6)
HCT VFR BLD AUTO: 46.5 % (ref 35–47)
HDLC SERPL-MCNC: 46 MG/DL
HGB BLD-MCNC: 15.5 G/DL (ref 11.7–15.7)
LDLC SERPL CALC-MCNC: 116 MG/DL
LYMPHOCYTES # BLD AUTO: 1.9 10E9/L (ref 0.8–5.3)
LYMPHOCYTES NFR BLD AUTO: 31.5 %
MCH RBC QN AUTO: 30.3 PG (ref 26.5–33)
MCHC RBC AUTO-ENTMCNC: 33.3 G/DL (ref 31.5–36.5)
MCV RBC AUTO: 91 FL (ref 78–100)
MONOCYTES # BLD AUTO: 0.4 10E9/L (ref 0–1.3)
MONOCYTES NFR BLD AUTO: 7.5 %
NEUTROPHILS # BLD AUTO: 3.4 10E9/L (ref 1.6–8.3)
NEUTROPHILS NFR BLD AUTO: 57.3 %
NONHDLC SERPL-MCNC: 191 MG/DL
PLATELET # BLD AUTO: 303 10E9/L (ref 150–450)
POTASSIUM SERPL-SCNC: 4.2 MMOL/L (ref 3.4–5.3)
PROT SERPL-MCNC: 6.8 G/DL (ref 6.8–8.8)
RBC # BLD AUTO: 5.11 10E12/L (ref 3.8–5.2)
SODIUM SERPL-SCNC: 138 MMOL/L (ref 133–144)
T4 FREE SERPL-MCNC: 0.99 NG/DL (ref 0.76–1.46)
TRIGL SERPL-MCNC: 373 MG/DL
TSH SERPL DL<=0.005 MIU/L-ACNC: 7.17 MU/L (ref 0.4–4)
WBC # BLD AUTO: 5.9 10E9/L (ref 4–11)

## 2020-08-27 PROCEDURE — 84443 ASSAY THYROID STIM HORMONE: CPT | Performed by: INTERNAL MEDICINE

## 2020-08-27 PROCEDURE — G0439 PPPS, SUBSEQ VISIT: HCPCS | Performed by: INTERNAL MEDICINE

## 2020-08-27 PROCEDURE — 83036 HEMOGLOBIN GLYCOSYLATED A1C: CPT | Performed by: INTERNAL MEDICINE

## 2020-08-27 PROCEDURE — 99214 OFFICE O/P EST MOD 30 MIN: CPT | Mod: 25 | Performed by: INTERNAL MEDICINE

## 2020-08-27 PROCEDURE — 85025 COMPLETE CBC W/AUTO DIFF WBC: CPT | Performed by: INTERNAL MEDICINE

## 2020-08-27 PROCEDURE — 84439 ASSAY OF FREE THYROXINE: CPT | Performed by: INTERNAL MEDICINE

## 2020-08-27 PROCEDURE — 80061 LIPID PANEL: CPT | Performed by: INTERNAL MEDICINE

## 2020-08-27 PROCEDURE — 36415 COLL VENOUS BLD VENIPUNCTURE: CPT | Performed by: INTERNAL MEDICINE

## 2020-08-27 PROCEDURE — 80053 COMPREHEN METABOLIC PANEL: CPT | Performed by: INTERNAL MEDICINE

## 2020-08-27 RX ORDER — CLONAZEPAM 0.5 MG/1
.25-.5 TABLET ORAL
Qty: 45 TABLET | Refills: 0 | Status: SHIPPED | OUTPATIENT
Start: 2020-08-27 | End: 2021-08-30

## 2020-08-27 ASSESSMENT — ENCOUNTER SYMPTOMS
DIARRHEA: 0
EYE PAIN: 0
DYSPHORIC MOOD: 0
UNEXPECTED WEIGHT CHANGE: 0
VOMITING: 0
CHILLS: 0
PALPITATIONS: 0
SORE THROAT: 0
HEADACHES: 0
MYALGIAS: 0
FEVER: 0
ABDOMINAL PAIN: 0
ARTHRALGIAS: 0
FATIGUE: 0
DIZZINESS: 0
NUMBNESS: 0
BACK PAIN: 0
LIGHT-HEADEDNESS: 0
DIFFICULTY URINATING: 0
SLEEP DISTURBANCE: 0
NECK PAIN: 0
BLOOD IN STOOL: 0
COUGH: 0
WEAKNESS: 0
NAUSEA: 0
SHORTNESS OF BREATH: 0
CONSTIPATION: 0
TROUBLE SWALLOWING: 0
NERVOUS/ANXIOUS: 0

## 2020-08-27 ASSESSMENT — ACTIVITIES OF DAILY LIVING (ADL): CURRENT_FUNCTION: NO ASSISTANCE NEEDED

## 2020-08-27 ASSESSMENT — PATIENT HEALTH QUESTIONNAIRE - PHQ9
SUM OF ALL RESPONSES TO PHQ QUESTIONS 1-9: 2
5. POOR APPETITE OR OVEREATING: SEVERAL DAYS

## 2020-08-27 ASSESSMENT — ANXIETY QUESTIONNAIRES
6. BECOMING EASILY ANNOYED OR IRRITABLE: MORE THAN HALF THE DAYS
7. FEELING AFRAID AS IF SOMETHING AWFUL MIGHT HAPPEN: NOT AT ALL
GAD7 TOTAL SCORE: 8
2. NOT BEING ABLE TO STOP OR CONTROL WORRYING: MORE THAN HALF THE DAYS
5. BEING SO RESTLESS THAT IT IS HARD TO SIT STILL: NOT AT ALL
3. WORRYING TOO MUCH ABOUT DIFFERENT THINGS: MORE THAN HALF THE DAYS
1. FEELING NERVOUS, ANXIOUS, OR ON EDGE: SEVERAL DAYS
IF YOU CHECKED OFF ANY PROBLEMS ON THIS QUESTIONNAIRE, HOW DIFFICULT HAVE THESE PROBLEMS MADE IT FOR YOU TO DO YOUR WORK, TAKE CARE OF THINGS AT HOME, OR GET ALONG WITH OTHER PEOPLE: SOMEWHAT DIFFICULT

## 2020-08-27 ASSESSMENT — MIFFLIN-ST. JEOR: SCORE: 1441.21

## 2020-08-27 NOTE — PROGRESS NOTES
"   SUBJECTIVE:   CC: Janie De Leon is an 70 year old woman who presents for preventive health visit.       Depression and anxiety are currently under remission with Cymbalta.  PHQ9 score today is 2.  Uses as needed clonazepam sparingly -- her prescription for 45 tablets from last year has lasted up to 1 year.     Continues levothyroxine 25 mcg daily for her hypothyroidism.  Denies fatigue or any other symptoms of hypo-/hyperthyroidism.     Hypertension is controlled on lisinopril.     Continues lovastatin for hyperlipidemia.  Denies myalgia.     She has glucose intolerance with a hemoglobin A1c of 5.8 on 7/30/2019.        Healthy Habits:    In general, how would you rate your overall health?  Very good    Frequency of exercise:  4-5 days/week (but less with heat / hot out )    Duration of exercise:  30-45 minutes    Do you usually eat at least 4 servings of fruit and vegetables a day, include whole grains    & fiber and avoid regularly eating high fat or \"junk\" foods?  Yes (not always )    Taking medications regularly:  Yes    Barriers to taking medications:  None (once in awhile forgets thyroid )    Medication side effects:  Other (sweaty from levothyroxine once in awhile )    Ability to successfully perform activities of daily living:  No assistance needed    Home Safety:  No safety concerns identified    Hearing Impairment:  Difficulty understanding soft or whispered speech    In the past 6 months, have you been bothered by leaking of urine?  No    In general, how would you rate your overall mental or emotional health?  Good      PHQ-2 Total Score:    Additional concerns today:  No      Today's PHQ-2 Score:   PHQ-2 ( 1999 [employer]) 8/27/2020   Q1: Little interest or pleasure in doing things 1   Q2: Feeling down, depressed or hopeless 0   PHQ-2 Score 1   Q1: Little interest or pleasure in doing things -   Q2: Feeling down, depressed or hopeless -   PHQ-2 Score -       Abuse: Current or Past (Physical, Sexual or " Emotional) - No  Do you feel safe in your environment? Yes      Social History     Tobacco Use     Smoking status: Former Smoker     Last attempt to quit: 4/15/1975     Years since quittin.4     Smokeless tobacco: Never Used   Substance Use Topics     Alcohol use: Yes     Alcohol/week: 0.0 standard drinks     Comment: 1-2 drinks per week      If you drink alcohol do you typically have >3 drinks per day or >7 drinks per week? No    Alcohol Use 2020   Prescreen: >3 drinks/day or >7 drinks/week? -   Prescreen: >3 drinks/day or >7 drinks/week? No       1) Repeat 3 items (Leader, Season, Table)    2) Clock draw: NORMAL  3) 3 item recall: Recalls 3 objects  Results: 3 items recalled: COGNITIVE IMPAIRMENT LESS LIKELY      Fall Risk Fallen 2 or more times in the past year?: No  Any fall with injury in the past year?: No    Mini-CogTM Copyright KELL Ruff. Licensed by the author for use in [facility]; reprinted with permission (bianca@[facility]). All rights reserved.    Reviewed orders with patient.  Reviewed health maintenance and updated orders accordingly - Yes      Pertinent mammograms are reviewed under the imaging tab.       Reviewed and updated as needed this visit by clinical staff  Tobacco  Allergies  Meds  Problems  Med Hx  Surg Hx  Soc Hx        Reviewed and updated as needed this visit by Provider  Allergies  Meds  Problems  Med Hx  Surg Hx        Past Medical History:   Diagnosis Date     Anemia     mild gastropathy on EGD ; neg pill cam     Atypical ductal hyperplasia of both breasts     Has had biopsies and MRI     High cholesterol      History of hematuria     Cystoscopy for recurrent UTIs; unremarkable renal US. Also recurrent UTIs as child and pyelonephritis.      History of hormone replacement therapy     after JASBIR with BSO     HTN, goal below 140/90      Hx of bone density study 10/16/2006    Normal     Hypothyroidism      Insomnia     periodic - prn clonazepam helpful  a couple times per month     Lipid screening 2015    Tchol 128, , HDL 53, LDL 53 outside records --> based on our labs off of atorvastatin 10yr ASCVD risk 9.4% in Oct 2015     Major depressive disorder, single episode in full remission (H)      Osteopenia     Mild left hip 2016     Prediabetes     Metformin in the past     Rotator cuff injury, left, sequela     MRI 2015 and had PT; High-grade complete or near complete tear of the supraspinatus tendon and anterior fibers of the infraspinatus tendon. 1/3 of the infraspinatus tendon appears involved.      TBI (traumatic brain injury) (H)     As a child     Tubular adenoma of colon  &        Past Surgical History:   Procedure Laterality Date     ANKLE SURGERY       BREAST BIOPSY, RT/LT      Many     C/SECTION, LOW TRANSVERSE       CAPSULE/PILL CAM ENDOSCOPY  2014    EGD prior; capsule was negative      COLONOSCOPY      , , , 2013     COLONOSCOPY N/A 2019    Procedure: COLONOSCOPY, WITH POLYPECTOMY AND BIOPSY;  Surgeon: Pepito Romero MD;  Location: [facility]     HYSTERECTOMY, PAP NO LONGER INDICATED       JASBIR with BSO  2000    benign fibroids     TUBAL LIGATION         Family History   Problem Relation Age of Onset     Colon Cancer Mother 70         age 81     Diabetes Mother         After age 75     Macular Degeneration Mother      Glaucoma Mother      Cerebrovascular Disease Mother      Heart Failure Mother      Hypertension Mother      Hyperlipidemia Mother      Other - See Comments Father 87        Frailty, pneumonia, fractures, failure to thrive     Osteoporosis Father         diagnosed in his 80s     Colon Polyps Daughter         Tubular adenoma     Deep Vein Thrombosis Brother      Hyperlipidemia Brother      Hypertension Brother      Hypertension Brother      Hyperlipidemia Brother      Depression Brother      Anxiety Disorder Brother      Mental Illness Brother         on Haldol before  "death from lung cancer     Lung Cancer Brother         long time heavy smoker     Breast Cancer Other         radical mastecomy in her 40s     Colon Cancer Other        Social History     Tobacco Use     Smoking status: Former Smoker     Last attempt to quit: 4/15/1975     Years since quittin.4     Smokeless tobacco: Never Used   Substance Use Topics     Alcohol use: Yes     Alcohol/week: 0.0 standard drinks     Comment: 1-2 drinks per week        Review of Systems   Constitutional: Negative for chills, fatigue, fever and unexpected weight change.   HENT: Negative for congestion, ear pain, hearing loss, sore throat and trouble swallowing.    Eyes: Negative for pain and visual disturbance.   Respiratory: Negative for cough and shortness of breath.    Cardiovascular: Negative for chest pain, palpitations and leg swelling.   Gastrointestinal: Negative for abdominal pain, blood in stool, constipation, diarrhea, nausea and vomiting.   Genitourinary: Negative for difficulty urinating.   Musculoskeletal: Negative for arthralgias, back pain, myalgias and neck pain.   Skin: Negative for rash.   Neurological: Negative for dizziness, syncope, weakness, light-headedness, numbness and headaches.   Psychiatric/Behavioral: Negative for dysphoric mood and sleep disturbance. The patient is not nervous/anxious.         OBJECTIVE:   /75 (BP Location: Right arm, Patient Position: Chair, Cuff Size: Adult Regular)   Pulse 76   Temp 96.4  F (35.8  C) (Temporal)   Ht 1.753 m (5' 9\")   Wt 86.2 kg (190 lb)   SpO2 98%   BMI 28.06 kg/m    Physical Exam  Vitals signs and nursing note reviewed.   Constitutional:       General: She is not in acute distress.  HENT:      Right Ear: External ear normal.      Left Ear: External ear normal.      Nose: Nose normal.   Eyes:      Conjunctiva/sclera: Conjunctivae normal.      Pupils: Pupils are equal, round, and reactive to light.   Neck:      Musculoskeletal: Normal range of motion and " neck supple.      Thyroid: No thyromegaly.   Cardiovascular:      Rate and Rhythm: Normal rate and regular rhythm.      Heart sounds: Normal heart sounds.   Pulmonary:      Effort: Pulmonary effort is normal. No respiratory distress.      Breath sounds: Normal breath sounds.   Abdominal:      General: Bowel sounds are normal.      Palpations: Abdomen is soft.      Tenderness: There is no abdominal tenderness.   Musculoskeletal: Normal range of motion.         General: No tenderness.   Lymphadenopathy:      Cervical: No cervical adenopathy.   Skin:     Findings: No rash.   Neurological:      Mental Status: She is alert and oriented to person, place, and time.      Coordination: Coordination normal.      Deep Tendon Reflexes: Reflexes are normal and symmetric.         ASSESSMENT/PLAN:       ICD-10-CM    1. Medicare annual wellness visit, subsequent  Z00.00    2. Major depressive disorder, single episode in full remission (H)  F32.5 TSH WITH FREE T4 REFLEX   3. CKD (chronic kidney disease) stage 3, GFR 30-59 ml/min (H)  N18.3 CBC with platelets differential     Comprehensive metabolic panel   4. Essential hypertension, benign - goal < 140/90  I10 Comprehensive metabolic panel   5. Hyperlipidemia, unspecified hyperlipidemia type  E78.5 Lipid panel reflex to direct LDL Fasting   6. Hypothyroidism, unspecified type  E03.9 TSH WITH FREE T4 REFLEX   7. Prediabetes  R73.03 HEMOGLOBIN A1C   8. Adjustment disorder with anxiety  F43.22 clonazePAM (KLONOPIN) 0.5 MG tablet         Patient Instructions   Monitor your blood pressure once a week.  Call doctor if:  -- your blood pressure for the top/upper number is greater than 140 or less than 90  -- your blood pressure for the bottom/lower number is greater than 90 or less than 60     Maintain low fat/calorie diet and regular exercise.     Complete PHQ questionnaire that will be forwarded to you in 6 months and route back trough My Chart.     Follow up yearly or as  "needed.        COUNSELING:  Reviewed preventive health counseling, as reflected in patient instructions    Estimated body mass index is 28.06 kg/m  as calculated from the following:    Height as of this encounter: 1.753 m (5' 9\").    Weight as of this encounter: 86.2 kg (190 lb).      She reports that she quit smoking about 45 years ago. She has never used smokeless tobacco.      Counseling Resources:  ATP IV Guidelines  Pooled Cohorts Equation Calculator  Breast Cancer Risk Calculator  BRCA-Related Cancer Risk Assessment: FHS-7 Tool  FRAX Risk Assessment  ICSI Preventive Guidelines  Dietary Guidelines for Americans, 2010  USDA's MyPlate  ASA Prophylaxis  Lung CA Screening    Kane La MD  [facility]  "

## 2020-08-27 NOTE — PATIENT INSTRUCTIONS
Monitor your blood pressure once a week.  Call doctor if:  -- your blood pressure for the top/upper number is greater than 140 or less than 90  -- your blood pressure for the bottom/lower number is greater than 90 or less than 60     Maintain low fat/calorie diet and regular exercise.     Complete PHQ questionnaire that will be forwarded to you in 6 months and route back trough My Chart.     Follow up yearly or as needed.

## 2020-08-27 NOTE — TELEPHONE ENCOUNTER
clonazePAM (KLONOPIN) 0.5 MG tablet  Dr La printed and signed this RX Hard copy so we could give it to the patient before she left the clinic     Patient has hardcopy of this RX

## 2020-08-27 NOTE — NURSING NOTE
I put in the wrong physical smart set.  Added memory and fall questions into progress notes after encounter closed.  Gia PERALES MA

## 2020-08-28 ASSESSMENT — ANXIETY QUESTIONNAIRES: GAD7 TOTAL SCORE: 8

## 2020-09-11 DIAGNOSIS — I10 ESSENTIAL HYPERTENSION, BENIGN: Chronic | ICD-10-CM

## 2020-09-14 RX ORDER — LISINOPRIL 2.5 MG/1
2.5 TABLET ORAL DAILY
Qty: 90 TABLET | Refills: 3 | Status: SHIPPED | OUTPATIENT
Start: 2020-09-14 | End: 2021-08-30

## 2020-09-18 DIAGNOSIS — E03.9 HYPOTHYROIDISM, UNSPECIFIED TYPE: Chronic | ICD-10-CM

## 2020-09-21 RX ORDER — LEVOTHYROXINE SODIUM 75 UG/1
75 TABLET ORAL DAILY
Qty: 30 TABLET | Refills: 0 | Status: SHIPPED | OUTPATIENT
Start: 2020-09-21 | End: 2020-10-26

## 2020-09-21 NOTE — TELEPHONE ENCOUNTER
Dr Schwartz,   [facility]:   Discussed with patient   She has not changed her dose  However, has been missing a few doses  Doesn't want to change dosing right now  Will continue on 75mcg daily and recheck next time she's in   Thanks,  Lissy JOHNSON RN

## 2020-09-21 NOTE — TELEPHONE ENCOUNTER
Please check with patient is this an increase dose of 75 mcg of levothyroxine?, TSH not in range, has occult hypothyroidism by labs.

## 2020-09-21 NOTE — TELEPHONE ENCOUNTER
Dr. Schwartz,     Refill request for Levothyroxine 75 mcg  Former patient of Dr. La:    TSH   Date Value Ref Range Status   08/27/2020 7.17 (H) 0.40 - 4.00 mU/L Final     Does not look like result addressed.     Last Rx 3/20/2020 for #90 with 1 refill,    Thanks,  Madeline Chao RN

## 2020-09-29 DIAGNOSIS — E78.5 HYPERLIPIDEMIA, UNSPECIFIED HYPERLIPIDEMIA TYPE: Chronic | ICD-10-CM

## 2020-09-30 RX ORDER — LOVASTATIN 40 MG
40 TABLET ORAL AT BEDTIME
Qty: 90 TABLET | Refills: 3 | Status: SHIPPED | OUTPATIENT
Start: 2020-09-30 | End: 2021-08-30

## 2020-09-30 NOTE — TELEPHONE ENCOUNTER
Refill request:    LOVASTATIN 40 MG TABLETS    Summary: Take 1 tablet (40 mg) by mouth At Bedtime - Fasting physical due before medication runs out, Disp-90 tablet,R-0, E-Prescribe  Please advise patient that a fasting physical is due with Dr La before medication runs out (before end of September)     Dose, Route, Frequency: 40 mg, Oral, AT BEDTIME  Start: 7/3/2020  Ord/Sold: 7/3/2020

## 2020-10-19 DIAGNOSIS — F32.5 MAJOR DEPRESSIVE DISORDER, SINGLE EPISODE IN FULL REMISSION (H): Chronic | ICD-10-CM

## 2020-10-20 RX ORDER — DULOXETIN HYDROCHLORIDE 60 MG/1
CAPSULE, DELAYED RELEASE ORAL
Qty: 90 CAPSULE | Refills: 0 | Status: SHIPPED | OUTPATIENT
Start: 2020-10-20 | End: 2021-01-17

## 2020-10-20 NOTE — TELEPHONE ENCOUNTER
Routing refill request to provider for review/approval because:  PCP no longer [facility] provider.    CARMELITA Avila, RN  [employer] [facility] [facility]

## 2020-10-20 NOTE — TELEPHONE ENCOUNTER
Refill request:    DULOXETINE DR 60 MG CAPSULES    Summary: TAKE 1 CAPSULE(60 MG) BY MOUTH DAILY, Disp-90 capsule,R-0, E-Prescribe   Start: 7/20/2020  Ord/Sold: 7/20/2020

## 2020-10-26 DIAGNOSIS — E03.9 HYPOTHYROIDISM, UNSPECIFIED TYPE: Chronic | ICD-10-CM

## 2020-10-26 NOTE — TELEPHONE ENCOUNTER
See 9/18/20 encounter - spoke to patient about lab values    SIG & Pharm note given    Mahnaz Fabian, RT (R)

## 2020-10-27 DIAGNOSIS — E03.9 HYPOTHYROIDISM, UNSPECIFIED TYPE: Primary | ICD-10-CM

## 2020-10-27 RX ORDER — LEVOTHYROXINE SODIUM 75 UG/1
75 TABLET ORAL DAILY
Qty: 90 TABLET | Refills: 1 | Status: SHIPPED | OUTPATIENT
Start: 2020-10-27 | End: 2020-12-07

## 2020-10-27 NOTE — TELEPHONE ENCOUNTER
Routing refill request to provider for review/approval because:  Labs out of range:  Review for thyroid labs  Pt DID have full physical 8/27, and since t4 in range, pended for review,  per labs below, see consistant history of TSH high, T4 in range.     TSH   Date Value Ref Range Status   08/27/2020 7.17 (H) 0.40 - 4.00 mU/L Final     T4 Free   Date Value Ref Range Status   08/27/2020 0.99 0.76 - 1.46 ng/dL Final     T4 Free   Date Value Ref Range Status   08/27/2020 0.99 0.76 - 1.46 ng/dL Final   07/30/2019 0.97 0.76 - 1.46 ng/dL Final   04/18/2018 0.86 0.76 - 1.46 ng/dL Final   07/26/2017 0.96 0.76 - 1.46 ng/dL Final   07/15/2016 1.00 0.76 - 1.46 ng/dL Final       TSH   Date Value Ref Range Status   08/27/2020 7.17 (H) 0.40 - 4.00 mU/L Final   07/30/2019 4.92 (H) 0.40 - 4.00 mU/L Final   07/30/2018 2.97 0.40 - 4.00 mU/L Final   04/18/2018 9.41 (H) 0.40 - 4.00 mU/L Final   07/26/2017 5.60 (H) 0.40 - 4.00 mU/L Final

## 2020-12-07 ENCOUNTER — OFFICE VISIT (OUTPATIENT)
Dept: FAMILY MEDICINE | Facility: CLINIC | Age: 70
End: 2020-12-07
Payer: MEDICARE

## 2020-12-07 VITALS
OXYGEN SATURATION: 98 % | DIASTOLIC BLOOD PRESSURE: 83 MMHG | BODY MASS INDEX: 28.44 KG/M2 | WEIGHT: 192 LBS | HEIGHT: 69 IN | SYSTOLIC BLOOD PRESSURE: 127 MMHG | HEART RATE: 83 BPM | TEMPERATURE: 97.3 F

## 2020-12-07 DIAGNOSIS — G47.00 INSOMNIA, UNSPECIFIED TYPE: Chronic | ICD-10-CM

## 2020-12-07 DIAGNOSIS — E78.5 HYPERLIPIDEMIA, UNSPECIFIED HYPERLIPIDEMIA TYPE: Chronic | ICD-10-CM

## 2020-12-07 DIAGNOSIS — F32.5 MAJOR DEPRESSIVE DISORDER, SINGLE EPISODE IN FULL REMISSION (H): Chronic | ICD-10-CM

## 2020-12-07 DIAGNOSIS — I10 ESSENTIAL HYPERTENSION, BENIGN: Chronic | ICD-10-CM

## 2020-12-07 DIAGNOSIS — E03.9 HYPOTHYROIDISM, UNSPECIFIED TYPE: Primary | Chronic | ICD-10-CM

## 2020-12-07 DIAGNOSIS — N28.9 RENAL INSUFFICIENCY: ICD-10-CM

## 2020-12-07 PROBLEM — F41.9 ANXIETY: Status: ACTIVE | Noted: 2020-12-07

## 2020-12-07 PROBLEM — F41.9 ANXIETY: Chronic | Status: ACTIVE | Noted: 2020-12-07

## 2020-12-07 PROBLEM — F41.9 ANXIETY: Chronic | Status: RESOLVED | Noted: 2020-12-07 | Resolved: 2020-12-07

## 2020-12-07 LAB
T4 FREE SERPL-MCNC: 0.88 NG/DL (ref 0.76–1.46)
TSH SERPL DL<=0.005 MIU/L-ACNC: 4.87 MU/L (ref 0.4–4)

## 2020-12-07 PROCEDURE — 99214 OFFICE O/P EST MOD 30 MIN: CPT | Performed by: INTERNAL MEDICINE

## 2020-12-07 PROCEDURE — 84443 ASSAY THYROID STIM HORMONE: CPT | Performed by: INTERNAL MEDICINE

## 2020-12-07 PROCEDURE — 84439 ASSAY OF FREE THYROXINE: CPT | Performed by: INTERNAL MEDICINE

## 2020-12-07 PROCEDURE — 36415 COLL VENOUS BLD VENIPUNCTURE: CPT | Performed by: INTERNAL MEDICINE

## 2020-12-07 RX ORDER — LEVOTHYROXINE SODIUM 100 UG/1
100 TABLET ORAL DAILY
Qty: 90 TABLET | Refills: 0 | Status: SHIPPED | OUTPATIENT
Start: 2020-12-07 | End: 2021-03-09

## 2020-12-07 ASSESSMENT — MIFFLIN-ST. JEOR: SCORE: 1450.29

## 2020-12-07 NOTE — PROGRESS NOTES
"Subjective     Janie De Leon is a 70 year old female who presents to clinic today for the following health issues:    HPI         Former PCP: chyna sebastian  Specialists: none  Problem list and medications reviewed and updated. See below for additional notes.  Social: nonsmoker, occasional etoh    Very pleasant 70 year old who is here to establish care.    Physical done few months ago. Labs reviewed.    Thyroid: abnormal labs. Admits was forgetting to take medication but has been compliant lately. Agreeable to recheck labs today.    She takes clonazepam sparingly for sleepless nights. Usually 45 tabs last a year however admits this year with covid and other stressors a bit more, never with alcohol. Cymbalta daily helps.    Simvastatin-pains in legs. Currently tolerating statin.    Review of Systems   Constitutional, HEENT, cardiovascular, pulmonary, gi and gu systems are negative, except as otherwise noted.      Objective    /83 (BP Location: Right arm, Patient Position: Sitting, Cuff Size: Adult Regular)   Pulse 83   Temp 97.3  F (36.3  C) (Tympanic)   Ht 1.753 m (5' 9\")   Wt 87.1 kg (192 lb)   SpO2 98%   BMI 28.35 kg/m    Body mass index is 28.35 kg/m .  Physical Exam     GENERAL APPEARANCE: AAOx3, no distress. Well developed.    PSYCH: appropriate mood and affect.               Assessment & Plan     Hypothyroidism, unspecified type  Recheck labs given abnormalities last time (most likely due to inconsistency with med compliance). Reviewed best to take on empty stomach.  - T4, free  - TSH    Hyperlipidemia, unspecified hyperlipidemia type  On Statin. Reviewed last labs and she admits was fasting. Can consider switching to crestor or lipitor in future.     Essential hypertension, benign - goal < 140/90  Controlled    Insomnia, unspecified type  Clonazepam sparingly. Reviewed not to mix with alcohol and best to avoid nightly use due to risk of dependence and tolerance. She is highly agreeable and med is " effective for her with her usage sparingly.    Major depressive disorder, single episode in full remission (H)  Controlled on cymbalta.     Renal insufficiency  Discussed. Seen on labs one year ago. Repeat labs in appropriate range for GFR. Recommend follow 1-2 times a year to ensure stable.           Return in about 8 months (around 8/7/2021) for Routine preventive, with me, in person, sooner if symptoms worsen or do not improve.    Jade Davis DO  [facility]

## 2021-01-16 DIAGNOSIS — F32.5 MAJOR DEPRESSIVE DISORDER, SINGLE EPISODE IN FULL REMISSION (H): Chronic | ICD-10-CM

## 2021-01-17 RX ORDER — DULOXETIN HYDROCHLORIDE 60 MG/1
CAPSULE, DELAYED RELEASE ORAL
Qty: 90 CAPSULE | Refills: 1 | Status: SHIPPED | OUTPATIENT
Start: 2021-01-17 | End: 2021-07-16

## 2021-01-22 DIAGNOSIS — E03.9 HYPOTHYROIDISM, UNSPECIFIED TYPE: Chronic | ICD-10-CM

## 2021-01-22 LAB
T4 FREE SERPL-MCNC: 0.97 NG/DL (ref 0.76–1.46)
TSH SERPL DL<=0.005 MIU/L-ACNC: 4.81 MU/L (ref 0.4–4)

## 2021-01-22 PROCEDURE — 36415 COLL VENOUS BLD VENIPUNCTURE: CPT | Performed by: INTERNAL MEDICINE

## 2021-01-22 PROCEDURE — 84439 ASSAY OF FREE THYROXINE: CPT | Performed by: INTERNAL MEDICINE

## 2021-01-22 PROCEDURE — 84443 ASSAY THYROID STIM HORMONE: CPT | Performed by: INTERNAL MEDICINE

## 2021-03-01 ENCOUNTER — IMMUNIZATION (OUTPATIENT)
Dept: NURSING | Facility: CLINIC | Age: 71
End: 2021-03-01
Payer: MEDICARE

## 2021-03-01 PROCEDURE — 0011A PR COVID VAC MODERNA 100 MCG/0.5 ML IM: CPT

## 2021-03-01 PROCEDURE — 91301 PR COVID VAC MODERNA 100 MCG/0.5 ML IM: CPT

## 2021-03-06 DIAGNOSIS — E03.9 HYPOTHYROIDISM, UNSPECIFIED TYPE: Chronic | ICD-10-CM

## 2021-03-06 NOTE — TELEPHONE ENCOUNTER
Levothyroxine 100 mcg tablets    Summary: Take 1 tablet (100 mcg) by mouth daily, Disp-90 tablet, R-0, E-Prescribe   Dose, Route, Frequency: 100 mcg, Oral, DAILY  Start: 12/7/2020  Ord/Sold: 12/7/2020

## 2021-03-09 RX ORDER — LEVOTHYROXINE SODIUM 100 UG/1
100 TABLET ORAL DAILY
Qty: 90 TABLET | Refills: 0 | Status: SHIPPED | OUTPATIENT
Start: 2021-03-09 | End: 2021-03-29

## 2021-03-09 NOTE — TELEPHONE ENCOUNTER
Routing refill request to provider for review/approval because:  Labs out of range:    TSH   Date Value Ref Range Status   01/22/2021 4.81 (H) 0.40 - 4.00 mU/L Final

## 2021-03-12 ENCOUNTER — OFFICE VISIT (OUTPATIENT)
Dept: URGENT CARE | Facility: URGENT CARE | Age: 71
End: 2021-03-12
Payer: MEDICARE

## 2021-03-12 VITALS
WEIGHT: 193 LBS | TEMPERATURE: 97.3 F | BODY MASS INDEX: 28.58 KG/M2 | SYSTOLIC BLOOD PRESSURE: 138 MMHG | HEART RATE: 70 BPM | OXYGEN SATURATION: 98 % | HEIGHT: 69 IN | DIASTOLIC BLOOD PRESSURE: 86 MMHG

## 2021-03-12 DIAGNOSIS — N39.0 URINARY TRACT INFECTION WITHOUT HEMATURIA, SITE UNSPECIFIED: ICD-10-CM

## 2021-03-12 DIAGNOSIS — R10.9 RIGHT FLANK PAIN: Primary | ICD-10-CM

## 2021-03-12 LAB
ALBUMIN UR-MCNC: NEGATIVE MG/DL
APPEARANCE UR: CLEAR
BACTERIA #/AREA URNS HPF: ABNORMAL /HPF
BILIRUB UR QL STRIP: NEGATIVE
COLOR UR AUTO: YELLOW
GLUCOSE UR STRIP-MCNC: NEGATIVE MG/DL
HGB UR QL STRIP: NEGATIVE
KETONES UR STRIP-MCNC: NEGATIVE MG/DL
LEUKOCYTE ESTERASE UR QL STRIP: NEGATIVE
NITRATE UR QL: POSITIVE
NON-SQ EPI CELLS #/AREA URNS LPF: ABNORMAL /LPF
PH UR STRIP: 5.5 PH (ref 5–7)
RBC #/AREA URNS AUTO: ABNORMAL /HPF
SOURCE: ABNORMAL
SP GR UR STRIP: 1.02 (ref 1–1.03)
UROBILINOGEN UR STRIP-ACNC: 0.2 EU/DL (ref 0.2–1)
WBC #/AREA URNS AUTO: ABNORMAL /HPF

## 2021-03-12 PROCEDURE — 99214 OFFICE O/P EST MOD 30 MIN: CPT | Performed by: FAMILY MEDICINE

## 2021-03-12 PROCEDURE — 81001 URINALYSIS AUTO W/SCOPE: CPT | Performed by: FAMILY MEDICINE

## 2021-03-12 PROCEDURE — 87088 URINE BACTERIA CULTURE: CPT | Performed by: FAMILY MEDICINE

## 2021-03-12 PROCEDURE — 87086 URINE CULTURE/COLONY COUNT: CPT | Performed by: FAMILY MEDICINE

## 2021-03-12 PROCEDURE — 87186 SC STD MICRODIL/AGAR DIL: CPT | Performed by: FAMILY MEDICINE

## 2021-03-12 RX ORDER — SULFAMETHOXAZOLE/TRIMETHOPRIM 800-160 MG
1 TABLET ORAL 2 TIMES DAILY
Qty: 14 TABLET | Refills: 0 | Status: SHIPPED | OUTPATIENT
Start: 2021-03-12 | End: 2021-03-19

## 2021-03-12 ASSESSMENT — MIFFLIN-ST. JEOR: SCORE: 1449.82

## 2021-03-12 NOTE — PROGRESS NOTES
Chief Complaint   Patient presents with     Urinary Problem     x2-3 days-right flank pain- fatigue-       Medical Decision Making:    ASSESMENT AND PLAN     Janie was seen today for urinary problem.    Diagnoses and all orders for this visit:    Right flank pain  -     *UA reflex to Microscopic and Culture ([facility] (except [place] and [place])  -     Urine Culture Aerobic Bacterial  -     Urine Microscopic    Urinary tract infection without hematuria, site unspecified  -     sulfamethoxazole-trimethoprim (BACTRIM DS) 800-160 MG tablet; Take 1 tablet by mouth 2 times daily for 7 days        Tylenol, Fluids and Rest  Start antibiotic as directed     Differential Diagnosis:  UTI: UTI, Dysuria, Pyelonephritis, Kidney Stone, Urethritis and Vaginitis      See orders in Epic  Pt verbalized and agreed with the plan and is aware of the worsening symptoms for which would need to follow up .  Pt was stable during time of discharge from the clinic     X-Ray was not done.    Time  spent on the date of the encounter doing chart review, interpretation of tests, patient visit and documentation     If not improving or if condition worsens, follow up with your Primary Care Provider    SUBJECTIVE     Janie De Leon is a 71 year old female presenting with a chief complaint of    Chief Complaint   Patient presents with     Urinary Problem     x2-3 days-right flank pain- fatigue-           Janie De Leon is a 71 year old female presenting with a chief complaint of right flank pain   Denies any fever or chills  She has h/o uti in the past . She is an established patient of [facility].  Onset of symptoms was 3 day(s) ago.  Course of illness is worsening.    Severity moderate  Current and Associated symptoms: right flank pain   Treatment measures tried include Tylenol/Ibuprofen and None tried.  Predisposing factors include None.    Past Medical History:   Diagnosis Date     Anemia     mild gastropathy on EGD 2008; neg  pill cam     Atypical ductal hyperplasia of both breasts     Has had biopsies and MRI     High cholesterol      History of hematuria 2008    Cystoscopy for recurrent UTIs; unremarkable renal US. Also recurrent UTIs as child and pyelonephritis.      History of hormone replacement therapy     after JASBIR with BSO     HTN, goal below 140/90      Hx of bone density study 10/16/2006    Normal     Hypothyroidism      Insomnia     periodic - prn clonazepam helpful a couple times per month     Lipid screening 7/21/2015    Tchol 128, , HDL 53, LDL 53 outside records --> based on our labs off of atorvastatin 10yr ASCVD risk 9.4% in Oct 2015     Major depressive disorder, single episode in full remission (H) 2007     Osteopenia     Mild left hip July 2016     Prediabetes     Metformin in the past     Rotator cuff injury, left, sequela     MRI Jan 2015 and had PT; High-grade complete or near complete tear of the supraspinatus tendon and anterior fibers of the infraspinatus tendon. 1/3 of the infraspinatus tendon appears involved.      TBI (traumatic brain injury) (H)     As a child     Tubular adenoma of colon 2013 & 2016     Current Outpatient Medications   Medication Sig Dispense Refill     Cholecalciferol (VITAMIN D3 PO) Take 1,000 Units by mouth daily       clonazePAM (KLONOPIN) 0.5 MG tablet Take 0.5-1 tablets (0.25-0.5 mg) by mouth nightly as needed for anxiety 45 tablet 0     DULoxetine (CYMBALTA) 60 MG capsule TAKE 1 CAPSULE(60 MG) BY MOUTH DAILY 90 capsule 1     levothyroxine (SYNTHROID/LEVOTHROID) 100 MCG tablet Take 1 tablet (100 mcg) by mouth daily 90 tablet 0     lisinopril (ZESTRIL) 2.5 MG tablet Take 1 tablet (2.5 mg) by mouth daily 90 tablet 3     lovastatin (MEVACOR) 40 MG tablet Take 1 tablet (40 mg) by mouth At Bedtime 90 tablet 3     psyllium (METAMUCIL) 58.6 % POWD Take by mouth as needed for constipation       sulfamethoxazole-trimethoprim (BACTRIM DS) 800-160 MG tablet Take 1 tablet by mouth 2 times  "daily for 7 days 14 tablet 0     Social History     Tobacco Use     Smoking status: Former Smoker     Quit date: 4/15/1975     Years since quittin.9     Smokeless tobacco: Never Used   Substance Use Topics     Alcohol use: Yes     Alcohol/week: 0.0 standard drinks     Comment: 1-2 drinks per week      Family History   Problem Relation Age of Onset     Colon Cancer Mother 70         age 81     Diabetes Mother         After age 75     Macular Degeneration Mother      Glaucoma Mother      Cerebrovascular Disease Mother      Heart Failure Mother      Hypertension Mother      Hyperlipidemia Mother      Other - See Comments Father 87        Frailty, pneumonia, fractures, failure to thrive     Osteoporosis Father         diagnosed in his 80s     Colon Polyps Daughter         Tubular adenoma     Deep Vein Thrombosis Brother      Hyperlipidemia Brother      Hypertension Brother      Hypertension Brother      Hyperlipidemia Brother      Depression Brother      Anxiety Disorder Brother      Mental Illness Brother         on Haldol before death from lung cancer     Lung Cancer Brother         long time heavy smoker     Breast Cancer Other         radical mastecomy in her 40s     Colon Cancer Other          ROS:    10 point ROS of systems including Constitutional, Eyes, Respiratory, Cardiovascular, Gastroenterology,Integumentary, Muscularskeletal, Psychiatric ,neurological were all negative except for pertinent positives noted in my HPI         OBJECTIVE:    /86   Pulse 70   Temp 97.3  F (36.3  C) (Tympanic)   Ht 1.753 m (5' 9\")   Wt 87.5 kg (193 lb)   LMP  (LMP Unknown)   SpO2 98%   Breastfeeding No   BMI 28.50 kg/m    GENERAL APPEARANCE: healthy, alert and no distress  EYES: EOMI,  PERRL, conjunctiva clear  RESP: lungs clear to auscultation - no rales, rhonchi or wheezes  CV: regular rates and rhythm, normal S1 S2, no murmur noted  ABDOMEN:  soft, nontender, no HSM or masses and bowel sounds " normal  Extremities: right flank pain   PSYCH: mentation appears normal  Physical Exam      (Note was completed, in part, with [employer] voice-recognition software. Documentation reviewed, but some grammatical, spelling, and word errors may remain.)  Leena Boswell MD

## 2021-03-14 LAB
BACTERIA SPEC CULT: ABNORMAL
Lab: ABNORMAL
SPECIMEN SOURCE: ABNORMAL

## 2021-03-29 ENCOUNTER — VIRTUAL VISIT (OUTPATIENT)
Dept: ENDOCRINOLOGY | Facility: CLINIC | Age: 71
End: 2021-03-29
Payer: MEDICARE

## 2021-03-29 ENCOUNTER — IMMUNIZATION (OUTPATIENT)
Dept: NURSING | Facility: CLINIC | Age: 71
End: 2021-03-29
Attending: INTERNAL MEDICINE
Payer: MEDICARE

## 2021-03-29 DIAGNOSIS — E03.9 HYPOTHYROIDISM, UNSPECIFIED TYPE: Primary | ICD-10-CM

## 2021-03-29 PROCEDURE — 99204 OFFICE O/P NEW MOD 45 MIN: CPT | Mod: 95 | Performed by: INTERNAL MEDICINE

## 2021-03-29 PROCEDURE — 0012A PR COVID VAC MODERNA 100 MCG/0.5 ML IM: CPT

## 2021-03-29 PROCEDURE — 91301 PR COVID VAC MODERNA 100 MCG/0.5 ML IM: CPT

## 2021-03-29 RX ORDER — LEVOTHYROXINE SODIUM 88 UG/1
88 TABLET ORAL DAILY
Qty: 90 TABLET | Refills: 1 | Status: SHIPPED | OUTPATIENT
Start: 2021-03-29 | End: 2021-06-03

## 2021-03-29 NOTE — LETTER
3/29/2021         RE: Janie De Leon  [address]  [facility] [address]        Dear Colleague,    Thank you for referring your patient, Janie De Leon, to the [facility]. Please see a copy of my visit note below.    Janie is a 71 year old who is being evaluated via a billable video visit.      How would you like to obtain your AVS? Verbally Reviewed  If the video visit is dropped, the invitation should be resent by: Cellphone  Will anyone else be joining your video visit? No      Video Start Time: 8:30 am      Name: Janie De Leon is a 71 year old woman, seen at the request of Dr. Jade Davis for evaluation of     Chief Complaint   Patient presents with     Thyroid Problem       HPI:  Recent issues:  Here for evaluation of thyroid problem   Reviewed medical history from patient and Epic chart record        Initial diagnosis of hypothyroidism  Had seen Dr. SUDHA Fierro, then Dr. SUDHA La/[facility]  Began treatment with levothyroxine medication  2020. Recalls having sweating spells while on thyroid medication,    Taking levothyroxine 0.1 mg dose, then decided to cut tablets in half for ~4 months  Noticed less sweating symptom  12/7/20. Subsequent medical evaluation with Dr. FRANSISCA Davis  Lab tests showed mildly elevated TSH level.  Dose increase back to full levothyroxine 0.1 mg tablet daily, dosing before breakfast meal    Previous [facility] thyroid tests include:     Lab Test 01/22/21  1005 12/07/20  1029 08/27/20  0943 07/30/19  0848 07/30/18  1053 04/18/18  0800   TSH 4.81* 4.87* 7.17* 4.92* 2.97 9.41*   T4 0.97 0.88 0.99 0.97  --  0.86       Additional health history:  Previous thyroid nodules: none  Neck radiation treatments: none  Fam Hx thyroid disease:  none    Recent [facility] labs include:  Lab Results   Component Value Date    TSH 4.81 (H) 01/22/2021    T4 0.97 01/22/2021     Current dose:  Levothyroxine 0.1 mg daily    Recent symptoms:   Some fatigue   Sweating  spells, worse at night or if drinking something hot   Constipation   Some reflux   No musculoskeletal pains   No menses since age 51 after total hysterectomy      Lives in [place], [place]  Sees Dr. Jade Davis/[facility] clinic for general medicine evaluations.    PMH/PSH:  Past Medical History:   Diagnosis Date     Anemia     mild gastropathy on EGD 2008; neg pill cam     Atypical ductal hyperplasia of both breasts     Has had biopsies and MRI     Fibroid uterus      High cholesterol      History of hematuria 2008    Cystoscopy for recurrent UTIs; unremarkable renal US. Also recurrent UTIs as child and pyelonephritis.      History of hormone replacement therapy     after JASBIR with BSO     HTN, goal below 140/90      Hx of bone density study 10/16/2006    Normal     Hypothyroidism      Insomnia     periodic - prn clonazepam helpful a couple times per month     Lipid screening 07/21/2015    Tchol 128, , HDL 53, LDL 53 outside records --> based on our labs off of atorvastatin 10yr ASCVD risk 9.4% in Oct 2015     Major depressive disorder, single episode in full remission (H) 2007     Osteopenia     Mild left hip July 2016     Prediabetes     Metformin in the past     Rotator cuff injury, left, sequela     MRI Jan 2015 and had PT; High-grade complete or near complete tear of the supraspinatus tendon and anterior fibers of the infraspinatus tendon. 1/3 of the infraspinatus tendon appears involved.      TBI (traumatic brain injury) (H)     As a child     Tubular adenoma of colon 2013 & 2016     Past Surgical History:   Procedure Laterality Date     ANKLE SURGERY  1976     BREAST BIOPSY, RT/LT      Many     C/SECTION, LOW TRANSVERSE  1968     CAPSULE/PILL CAM ENDOSCOPY  2/18/2014    EGD prior; capsule was negative      COLONOSCOPY      1999, 2003, 2008, 6/6/2013     COLONOSCOPY N/A 6/20/2019    Procedure: COLONOSCOPY, WITH POLYPECTOMY AND BIOPSY;  Surgeon: Pepito Romero MD;  Location:  GI      HYSTERECTOMY, PAP NO LONGER INDICATED       JASBIR with BSO  2000    benign fibroids     TUBAL LIGATION         Family Hx:  Family History   Problem Relation Age of Onset     Colon Cancer Mother 70         age 81     Diabetes Mother         After age 75     Macular Degeneration Mother      Glaucoma Mother      Cerebrovascular Disease Mother      Heart Failure Mother      Hypertension Mother      Hyperlipidemia Mother      Other - See Comments Father 87        Frailty, pneumonia, fractures, failure to thrive     Osteoporosis Father         diagnosed in his 80s     Colon Polyps Daughter         Tubular adenoma     Deep Vein Thrombosis Brother      Hyperlipidemia Brother      Hypertension Brother      Hypertension Brother      Hyperlipidemia Brother      Depression Brother      Anxiety Disorder Brother      Mental Illness Brother         on Haldol before death from lung cancer     Lung Cancer Brother         long time heavy smoker     Breast Cancer Other         radical mastecomy in her 40s     Colon Cancer Other          Social Hx:  Social History     Socioeconomic History     Marital status:      Spouse name: Not on file     Number of children: Not on file     Years of education: Not on file     Highest education level: Not on file   Occupational History     Not on file   Social Needs     Financial resource strain: Not on file     Food insecurity     Worry: Not on file     Inability: Not on file     Transportation needs     Medical: Not on file     Non-medical: Not on file   Tobacco Use     Smoking status: Former Smoker     Quit date: 4/15/1975     Years since quittin.9     Smokeless tobacco: Never Used   Substance and Sexual Activity     Alcohol use: Yes     Alcohol/week: 0.0 standard drinks     Comment: 1-2 drinks per week      Drug use: No     Sexual activity: Yes     Partners: Male   Lifestyle     Physical activity     Days per week: Not on file     Minutes per session: Not on file      Stress: Not on file   Relationships     Social connections     Talks on phone: Not on file     Gets together: Not on file     Attends Hoahaoism service: Not on file     Active member of club or organization: Not on file     Attends meetings of clubs or organizations: Not on file     Relationship status: Not on file     Intimate partner violence     Fear of current or ex partner: Not on file     Emotionally abused: Not on file     Physically abused: Not on file     Forced sexual activity: Not on file   Other Topics Concern     Parent/sibling w/ CABG, MI or angioplasty before 65F 55M? Not Asked   Social History Narrative     Not on file          MEDICATIONS:  has a current medication list which includes the following prescription(s): cholecalciferol, clonazepam, duloxetine, levothyroxine, lisinopril, lovastatin, and psyllium.    ROS:     ROS: 10 point ROS neg other than the symptoms noted above in the HPI.    GENERAL: mild fatigue, wt stable; denies fevers, chills, night sweats.   HEENT: no dysphagia, odonophagia, diplopia, neck pain  THYROID:  no apparent hyper or hypothyroid symptoms  CV: no chest pain, pressure, palpitations  LUNGS: no SOB, PIERSON, cough, wheezing   ABDOMEN: constipation, some reflux; no diarrhea, abdominal pain  EXTREMITIES: no rashes, ulcers, edema  NEUROLOGY: no headaches, denies changes in vision, tingling, extremitiy numbness   MSK: no muscle aches or pains, weakness  SKIN: no rashes or lesions  : no menses since hysterectomy age 51  PSYCH:  stable mood, no significant anxiety or depression  ENDOCRINE: sweating spells, as noted    Physical Exam (visual exam)  VS:  no vital signs taken for video visit  CONSTITUTIONAL: healthy, alert and NAD, well dressed, answering questions appropriately  ENT: no nose swelling or nasal discharge, mouth redness or gum changes.  EYES: eyes grossly normal to inspection, conjunctivae and sclerae normal, no exophthalmos or proptosis  THYROID:  no apparent nodules or  goiter  LUNGS: no audible wheeze, cough or visible cyanosis, no visible retractions or increased work of breathing  ABDOMEN: abdomen not evaluated  EXTREMITIES: no hand tremors, limited exam  NEUROLOGY: CN grossly intact, mentation intact and speech normal   SKIN:  no apparent skin lesions, rash, or edema with visualized skin appearance  PSYCH: mentation appears normal, affect normal/bright, judgement and insight intact,   normal speech and appearance well groomed      LABS:    All pertinent notes, labs, and images personally reviewed by me.     A/P:  Encounter Diagnosis   Name Primary?     Hypothyroidism, unspecified type Yes       Comments:  Reviewed health history and hypothyroidism issues.  Persistent mild TSH elevation with the dose increase of the levothyroxine unusual  Doubt association of sweating spells and hypothyroidism  Reviewed and interpreted tests that I previously ordered.   Ordered appropriate tests for the endocrinology disease management.    Management options discussed and implemented after shared medical decision making with the patient.    Plan:  Discussed general issues with the hypothyroidism diagnosis and management  Reviewed thyroid gland anatomy and hormone physiology  Discussed lab tests used to assess patient thyroid hormone levels  Reviewed treatment options including levothyroxine medication    Recommend:  Continue levothyroxine but try lower dose 0.088 mg daily  Reviewed ideal thyroid med dosing plan  New levothyroxine Rx sent to pharmacy  Check lab tests in 2-mo for comparison  Monitor for symptom changes  No thyroid U/S imaging needed at this time  Contact me if questions about thyroid plan.     Addressed patient questions today    Future labs ordered today:   Orders Placed This Encounter   Procedures     TSH     T4 free     Thyroid peroxidase antibody     Radiology/Consults ordered today: None    Total time spent in with the patient evaluation:  25 min  Additional time spent  reviewing pertinent lab tests and chart notes, and documentation:  5 min    Follow-up:  0/2021    NIMO Cutler MD, MS  Endocrinology  [facility]    CC: Jade Davis       Video-Visit Details    Type of service:  Video Visit    Video End Time:  8:55 am    Originating Location (pt. Location): home    Distant Location (provider location):  [facility]/home     Platform used for Video Visit: AmWell        Again, thank you for allowing me to participate in the care of your patient.        Sincerely,        Horace Cutler MD

## 2021-03-29 NOTE — PROGRESS NOTES
Janie is a 71 year old who is being evaluated via a billable video visit.      How would you like to obtain your AVS? Verbally Reviewed  If the video visit is dropped, the invitation should be resent by: Cellphone  Will anyone else be joining your video visit? No      Video Start Time: 8:30 am      Name: Janie De Leon is a 71 year old woman, seen at the request of Dr. Jade Davis for evaluation of     Chief Complaint   Patient presents with     Thyroid Problem       HPI:  Recent issues:  Here for evaluation of thyroid problem   Reviewed medical history from patient and Epic chart record        Initial diagnosis of hypothyroidism  Had seen Dr. SUDHA Fierro, then Dr. SUDHA La/[facility] clinic  Began treatment with levothyroxine medication  2020. Recalls having sweating spells while on thyroid medication,    Taking levothyroxine 0.1 mg dose, then decided to cut tablets in half for ~4 months  Noticed less sweating symptom  12/7/20. Subsequent medical evaluation with Dr. FRANSISCA Davis  Lab tests showed mildly elevated TSH level.  Dose increase back to full levothyroxine 0.1 mg tablet daily, dosing before breakfast meal    Previous [facility] thyroid tests include:     Lab Test 01/22/21  1005 12/07/20  1029 08/27/20  0943 07/30/19  0848 07/30/18  1053 04/18/18  0800   TSH 4.81* 4.87* 7.17* 4.92* 2.97 9.41*   T4 0.97 0.88 0.99 0.97  --  0.86       Additional health history:  Previous thyroid nodules: none  Neck radiation treatments: none  Fam Hx thyroid disease:  none    Recent [facility] labs include:  Lab Results   Component Value Date    TSH 4.81 (H) 01/22/2021    T4 0.97 01/22/2021     Current dose:  Levothyroxine 0.1 mg daily    Recent symptoms:   Some fatigue   Sweating spells, worse at night or if drinking something hot   Constipation   Some reflux   No musculoskeletal pains   No menses since age 51 after total hysterectomy      Lives in [place], [place]  Sees Dr. Jade Davis/[facility] clinic for general  medicine evaluations.    PMH/PSH:  Past Medical History:   Diagnosis Date     Anemia     mild gastropathy on EGD ; neg pill cam     Atypical ductal hyperplasia of both breasts     Has had biopsies and MRI     Fibroid uterus      High cholesterol      History of hematuria     Cystoscopy for recurrent UTIs; unremarkable renal US. Also recurrent UTIs as child and pyelonephritis.      History of hormone replacement therapy     after JASBIR with BSO     HTN, goal below 140/90      Hx of bone density study 10/16/2006    Normal     Hypothyroidism      Insomnia     periodic - prn clonazepam helpful a couple times per month     Lipid screening 2015    Tchol 128, , HDL 53, LDL 53 outside records --> based on our labs off of atorvastatin 10yr ASCVD risk 9.4% in Oct 2015     Major depressive disorder, single episode in full remission (H)      Osteopenia     Mild left hip 2016     Prediabetes     Metformin in the past     Rotator cuff injury, left, sequela     MRI 2015 and had PT; High-grade complete or near complete tear of the supraspinatus tendon and anterior fibers of the infraspinatus tendon. 1/3 of the infraspinatus tendon appears involved.      TBI (traumatic brain injury) (H)     As a child     Tubular adenoma of colon  &      Past Surgical History:   Procedure Laterality Date     ANKLE SURGERY       BREAST BIOPSY, RT/LT      Many     C/SECTION, LOW TRANSVERSE       CAPSULE/PILL CAM ENDOSCOPY  2014    EGD prior; capsule was negative      COLONOSCOPY      , , , 2013     COLONOSCOPY N/A 2019    Procedure: COLONOSCOPY, WITH POLYPECTOMY AND BIOPSY;  Surgeon: Pepito Romero MD;  Location:  GI     HYSTERECTOMY, PAP NO LONGER INDICATED       JASBIR with BSO  2000    benign fibroids     TUBAL LIGATION         Family Hx:  Family History   Problem Relation Age of Onset     Colon Cancer Mother 70         age 81     Diabetes Mother         After age  75     Macular Degeneration Mother      Glaucoma Mother      Cerebrovascular Disease Mother      Heart Failure Mother      Hypertension Mother      Hyperlipidemia Mother      Other - See Comments Father 87        Frailty, pneumonia, fractures, failure to thrive     Osteoporosis Father         diagnosed in his 80s     Colon Polyps Daughter         Tubular adenoma     Deep Vein Thrombosis Brother      Hyperlipidemia Brother      Hypertension Brother      Hypertension Brother      Hyperlipidemia Brother      Depression Brother      Anxiety Disorder Brother      Mental Illness Brother         on Haldol before death from lung cancer     Lung Cancer Brother         long time heavy smoker     Breast Cancer Other         radical mastecomy in her 40s     Colon Cancer Other          Social Hx:  Social History     Socioeconomic History     Marital status:      Spouse name: Not on file     Number of children: Not on file     Years of education: Not on file     Highest education level: Not on file   Occupational History     Not on file   Social Needs     Financial resource strain: Not on file     Food insecurity     Worry: Not on file     Inability: Not on file     Transportation needs     Medical: Not on file     Non-medical: Not on file   Tobacco Use     Smoking status: Former Smoker     Quit date: 4/15/1975     Years since quittin.9     Smokeless tobacco: Never Used   Substance and Sexual Activity     Alcohol use: Yes     Alcohol/week: 0.0 standard drinks     Comment: 1-2 drinks per week      Drug use: No     Sexual activity: Yes     Partners: Male   Lifestyle     Physical activity     Days per week: Not on file     Minutes per session: Not on file     Stress: Not on file   Relationships     Social connections     Talks on phone: Not on file     Gets together: Not on file     Attends Zoroastrianism service: Not on file     Active member of club or organization: Not on file     Attends meetings of clubs or  organizations: Not on file     Relationship status: Not on file     Intimate partner violence     Fear of current or ex partner: Not on file     Emotionally abused: Not on file     Physically abused: Not on file     Forced sexual activity: Not on file   Other Topics Concern     Parent/sibling w/ CABG, MI or angioplasty before 65F 55M? Not Asked   Social History Narrative     Not on file          MEDICATIONS:  has a current medication list which includes the following prescription(s): cholecalciferol, clonazepam, duloxetine, levothyroxine, lisinopril, lovastatin, and psyllium.    ROS:     ROS: 10 point ROS neg other than the symptoms noted above in the HPI.    GENERAL: mild fatigue, wt stable; denies fevers, chills, night sweats.   HEENT: no dysphagia, odonophagia, diplopia, neck pain  THYROID:  no apparent hyper or hypothyroid symptoms  CV: no chest pain, pressure, palpitations  LUNGS: no SOB, PIERSON, cough, wheezing   ABDOMEN: constipation, some reflux; no diarrhea, abdominal pain  EXTREMITIES: no rashes, ulcers, edema  NEUROLOGY: no headaches, denies changes in vision, tingling, extremitiy numbness   MSK: no muscle aches or pains, weakness  SKIN: no rashes or lesions  : no menses since hysterectomy age 51  PSYCH:  stable mood, no significant anxiety or depression  ENDOCRINE: sweating spells, as noted    Physical Exam (visual exam)  VS:  no vital signs taken for video visit  CONSTITUTIONAL: healthy, alert and NAD, well dressed, answering questions appropriately  ENT: no nose swelling or nasal discharge, mouth redness or gum changes.  EYES: eyes grossly normal to inspection, conjunctivae and sclerae normal, no exophthalmos or proptosis  THYROID:  no apparent nodules or goiter  LUNGS: no audible wheeze, cough or visible cyanosis, no visible retractions or increased work of breathing  ABDOMEN: abdomen not evaluated  EXTREMITIES: no hand tremors, limited exam  NEUROLOGY: CN grossly intact, mentation intact and speech  normal   SKIN:  no apparent skin lesions, rash, or edema with visualized skin appearance  PSYCH: mentation appears normal, affect normal/bright, judgement and insight intact,   normal speech and appearance well groomed      LABS:    All pertinent notes, labs, and images personally reviewed by me.     A/P:  Encounter Diagnosis   Name Primary?     Hypothyroidism, unspecified type Yes       Comments:  Reviewed health history and hypothyroidism issues.  Persistent mild TSH elevation with the dose increase of the levothyroxine unusual  Doubt association of sweating spells and hypothyroidism  Reviewed and interpreted tests that I previously ordered.   Ordered appropriate tests for the endocrinology disease management.    Management options discussed and implemented after shared medical decision making with the patient.    Plan:  Discussed general issues with the hypothyroidism diagnosis and management  Reviewed thyroid gland anatomy and hormone physiology  Discussed lab tests used to assess patient thyroid hormone levels  Reviewed treatment options including levothyroxine medication    Recommend:  Continue levothyroxine but try lower dose 0.088 mg daily  Reviewed ideal thyroid med dosing plan  New levothyroxine Rx sent to pharmacy  Check lab tests in 2-mo for comparison  Monitor for symptom changes  No thyroid U/S imaging needed at this time  Contact me if questions about thyroid plan.     Addressed patient questions today    Future labs ordered today:   Orders Placed This Encounter   Procedures     TSH     T4 free     Thyroid peroxidase antibody     Radiology/Consults ordered today: None    Total time spent in with the patient evaluation:  25 min  Additional time spent reviewing pertinent lab tests and chart notes, and documentation:  5 min    Follow-up:  0/2021    NIMO Cutler MD, MS  Endocrinology  [facility]    CC: Jade Davis       Video-Visit Details    Type of service:  Video Visit    Video End Time:  8:55  awais    Originating Location (pt. Location): home    Distant Location (provider location):  [facility]/home     Platform used for Video Visit: [place]

## 2021-05-28 DIAGNOSIS — E03.9 HYPOTHYROIDISM, UNSPECIFIED TYPE: ICD-10-CM

## 2021-05-28 LAB
T4 FREE SERPL-MCNC: 1.01 NG/DL (ref 0.76–1.46)
TSH SERPL DL<=0.005 MIU/L-ACNC: 3.66 MU/L (ref 0.4–4)

## 2021-05-28 PROCEDURE — 86376 MICROSOMAL ANTIBODY EACH: CPT | Performed by: INTERNAL MEDICINE

## 2021-05-28 PROCEDURE — 36415 COLL VENOUS BLD VENIPUNCTURE: CPT | Performed by: INTERNAL MEDICINE

## 2021-05-28 PROCEDURE — 84443 ASSAY THYROID STIM HORMONE: CPT | Performed by: INTERNAL MEDICINE

## 2021-05-28 PROCEDURE — 84439 ASSAY OF FREE THYROXINE: CPT | Performed by: INTERNAL MEDICINE

## 2021-06-02 LAB — THYROPEROXIDASE AB SERPL-ACNC: 182 IU/ML

## 2021-06-03 DIAGNOSIS — E03.9 HYPOTHYROIDISM, UNSPECIFIED TYPE: ICD-10-CM

## 2021-06-03 RX ORDER — LEVOTHYROXINE SODIUM 88 UG/1
88 TABLET ORAL DAILY
Qty: 90 TABLET | Refills: 3 | Status: SHIPPED | OUTPATIENT
Start: 2021-06-03 | End: 2022-05-23

## 2021-07-15 DIAGNOSIS — F32.5 MAJOR DEPRESSIVE DISORDER, SINGLE EPISODE IN FULL REMISSION (H): Chronic | ICD-10-CM

## 2021-07-16 RX ORDER — DULOXETIN HYDROCHLORIDE 60 MG/1
60 CAPSULE, DELAYED RELEASE ORAL DAILY
Qty: 90 CAPSULE | Refills: 0 | Status: SHIPPED | OUTPATIENT
Start: 2021-07-16 | End: 2021-08-30

## 2021-07-16 NOTE — TELEPHONE ENCOUNTER
Medication is being filled for 1 time refill only due to:  Patient needs to be seen because 6 months since seen by PCP.  Gisselle Malagon RN

## 2021-08-02 DIAGNOSIS — Z12.31 VISIT FOR SCREENING MAMMOGRAM: ICD-10-CM

## 2021-08-02 PROCEDURE — 77067 SCR MAMMO BI INCL CAD: CPT | Mod: TC | Performed by: RADIOLOGY

## 2021-08-02 PROCEDURE — 77063 BREAST TOMOSYNTHESIS BI: CPT | Mod: TC | Performed by: RADIOLOGY

## 2021-08-27 ASSESSMENT — ENCOUNTER SYMPTOMS
FREQUENCY: 0
JOINT SWELLING: 1
SORE THROAT: 0
NERVOUS/ANXIOUS: 0
COUGH: 0
WEAKNESS: 0
DIARRHEA: 0
DYSURIA: 0
ABDOMINAL PAIN: 0
HEMATOCHEZIA: 0
MYALGIAS: 0
PARESTHESIAS: 0
NAUSEA: 0
SHORTNESS OF BREATH: 0
BREAST MASS: 0
CONSTIPATION: 1
EYE PAIN: 0
CHILLS: 0
FEVER: 0
DIZZINESS: 0
HEADACHES: 0
HEMATURIA: 0
ARTHRALGIAS: 0
PALPITATIONS: 0
HEARTBURN: 0

## 2021-08-27 ASSESSMENT — ACTIVITIES OF DAILY LIVING (ADL): CURRENT_FUNCTION: NO ASSISTANCE NEEDED

## 2021-08-30 ENCOUNTER — OFFICE VISIT (OUTPATIENT)
Dept: FAMILY MEDICINE | Facility: CLINIC | Age: 71
End: 2021-08-30
Payer: MEDICARE

## 2021-08-30 VITALS
HEIGHT: 69 IN | HEART RATE: 76 BPM | WEIGHT: 195 LBS | OXYGEN SATURATION: 98 % | TEMPERATURE: 97.1 F | SYSTOLIC BLOOD PRESSURE: 133 MMHG | RESPIRATION RATE: 16 BRPM | BODY MASS INDEX: 28.88 KG/M2 | DIASTOLIC BLOOD PRESSURE: 85 MMHG

## 2021-08-30 DIAGNOSIS — Z00.00 ROUTINE HISTORY AND PHYSICAL EXAMINATION OF ADULT: Primary | ICD-10-CM

## 2021-08-30 DIAGNOSIS — F43.22 ADJUSTMENT DISORDER WITH ANXIETY: ICD-10-CM

## 2021-08-30 DIAGNOSIS — R73.03 PREDIABETES: ICD-10-CM

## 2021-08-30 DIAGNOSIS — G47.00 INSOMNIA, UNSPECIFIED TYPE: Chronic | ICD-10-CM

## 2021-08-30 DIAGNOSIS — E03.9 HYPOTHYROIDISM, UNSPECIFIED TYPE: Chronic | ICD-10-CM

## 2021-08-30 DIAGNOSIS — F32.5 MAJOR DEPRESSIVE DISORDER, SINGLE EPISODE IN FULL REMISSION (H): ICD-10-CM

## 2021-08-30 DIAGNOSIS — I10 ESSENTIAL HYPERTENSION, BENIGN: Chronic | ICD-10-CM

## 2021-08-30 DIAGNOSIS — E78.5 HYPERLIPIDEMIA, UNSPECIFIED HYPERLIPIDEMIA TYPE: Chronic | ICD-10-CM

## 2021-08-30 PROBLEM — N18.30 CKD (CHRONIC KIDNEY DISEASE) STAGE 3, GFR 30-59 ML/MIN (H): Chronic | Status: ACTIVE | Noted: 2019-07-29

## 2021-08-30 LAB
ALBUMIN SERPL-MCNC: 3.7 G/DL (ref 3.4–5)
ALP SERPL-CCNC: 115 U/L (ref 40–150)
ALT SERPL W P-5'-P-CCNC: 21 U/L (ref 0–50)
ANION GAP SERPL CALCULATED.3IONS-SCNC: 2 MMOL/L (ref 3–14)
AST SERPL W P-5'-P-CCNC: 16 U/L (ref 0–45)
BASOPHILS # BLD AUTO: 0 10E3/UL (ref 0–0.2)
BASOPHILS NFR BLD AUTO: 0 %
BILIRUB SERPL-MCNC: 0.4 MG/DL (ref 0.2–1.3)
BUN SERPL-MCNC: 19 MG/DL (ref 7–30)
CALCIUM SERPL-MCNC: 8.7 MG/DL (ref 8.5–10.1)
CHLORIDE BLD-SCNC: 109 MMOL/L (ref 94–109)
CHOLEST SERPL-MCNC: 219 MG/DL
CO2 SERPL-SCNC: 30 MMOL/L (ref 20–32)
CREAT SERPL-MCNC: 1.01 MG/DL (ref 0.52–1.04)
EOSINOPHIL # BLD AUTO: 0.1 10E3/UL (ref 0–0.7)
EOSINOPHIL NFR BLD AUTO: 2 %
ERYTHROCYTE [DISTWIDTH] IN BLOOD BY AUTOMATED COUNT: 12.8 % (ref 10–15)
FASTING STATUS PATIENT QL REPORTED: YES
GFR SERPL CREATININE-BSD FRML MDRD: 56 ML/MIN/1.73M2
GLUCOSE BLD-MCNC: 104 MG/DL (ref 70–99)
HBA1C MFR BLD: 6.1 % (ref 0–5.6)
HCT VFR BLD AUTO: 47.1 % (ref 35–47)
HDLC SERPL-MCNC: 43 MG/DL
HGB BLD-MCNC: 15.6 G/DL (ref 11.7–15.7)
LDLC SERPL CALC-MCNC: 118 MG/DL
LYMPHOCYTES # BLD AUTO: 2.2 10E3/UL (ref 0.8–5.3)
LYMPHOCYTES NFR BLD AUTO: 31 %
MCH RBC QN AUTO: 30.6 PG (ref 26.5–33)
MCHC RBC AUTO-ENTMCNC: 33.1 G/DL (ref 31.5–36.5)
MCV RBC AUTO: 93 FL (ref 78–100)
MONOCYTES # BLD AUTO: 0.5 10E3/UL (ref 0–1.3)
MONOCYTES NFR BLD AUTO: 7 %
NEUTROPHILS # BLD AUTO: 4.1 10E3/UL (ref 1.6–8.3)
NEUTROPHILS NFR BLD AUTO: 60 %
NONHDLC SERPL-MCNC: 176 MG/DL
PLATELET # BLD AUTO: 310 10E3/UL (ref 150–450)
POTASSIUM BLD-SCNC: 4.2 MMOL/L (ref 3.4–5.3)
PROT SERPL-MCNC: 7 G/DL (ref 6.8–8.8)
RBC # BLD AUTO: 5.09 10E6/UL (ref 3.8–5.2)
SODIUM SERPL-SCNC: 141 MMOL/L (ref 133–144)
TRIGL SERPL-MCNC: 288 MG/DL
WBC # BLD AUTO: 6.9 10E3/UL (ref 4–11)

## 2021-08-30 PROCEDURE — G0439 PPPS, SUBSEQ VISIT: HCPCS | Performed by: INTERNAL MEDICINE

## 2021-08-30 PROCEDURE — 36415 COLL VENOUS BLD VENIPUNCTURE: CPT | Performed by: INTERNAL MEDICINE

## 2021-08-30 PROCEDURE — 80053 COMPREHEN METABOLIC PANEL: CPT | Performed by: INTERNAL MEDICINE

## 2021-08-30 PROCEDURE — 80061 LIPID PANEL: CPT | Performed by: INTERNAL MEDICINE

## 2021-08-30 PROCEDURE — 83036 HEMOGLOBIN GLYCOSYLATED A1C: CPT | Performed by: INTERNAL MEDICINE

## 2021-08-30 PROCEDURE — 85025 COMPLETE CBC W/AUTO DIFF WBC: CPT | Performed by: INTERNAL MEDICINE

## 2021-08-30 RX ORDER — CLONAZEPAM 0.5 MG/1
.25-.5 TABLET ORAL
Qty: 45 TABLET | Refills: 0 | Status: SHIPPED | OUTPATIENT
Start: 2021-08-30 | End: 2022-03-18

## 2021-08-30 RX ORDER — DULOXETIN HYDROCHLORIDE 60 MG/1
60 CAPSULE, DELAYED RELEASE ORAL DAILY
Qty: 90 CAPSULE | Refills: 3 | Status: SHIPPED | OUTPATIENT
Start: 2021-08-30 | End: 2022-08-31

## 2021-08-30 RX ORDER — LISINOPRIL 2.5 MG/1
2.5 TABLET ORAL DAILY
Qty: 90 TABLET | Refills: 3 | Status: SHIPPED | OUTPATIENT
Start: 2021-08-30 | End: 2022-03-08

## 2021-08-30 RX ORDER — CLONAZEPAM 0.5 MG/1
.25-.5 TABLET ORAL
Qty: 45 TABLET | Refills: 0 | Status: CANCELLED | OUTPATIENT
Start: 2021-08-30

## 2021-08-30 RX ORDER — LOVASTATIN 40 MG
40 TABLET ORAL AT BEDTIME
Qty: 90 TABLET | Refills: 3 | Status: SHIPPED | OUTPATIENT
Start: 2021-08-30 | End: 2021-09-21

## 2021-08-30 ASSESSMENT — ANXIETY QUESTIONNAIRES
IF YOU CHECKED OFF ANY PROBLEMS ON THIS QUESTIONNAIRE, HOW DIFFICULT HAVE THESE PROBLEMS MADE IT FOR YOU TO DO YOUR WORK, TAKE CARE OF THINGS AT HOME, OR GET ALONG WITH OTHER PEOPLE: NOT DIFFICULT AT ALL
1. FEELING NERVOUS, ANXIOUS, OR ON EDGE: NOT AT ALL
6. BECOMING EASILY ANNOYED OR IRRITABLE: NOT AT ALL
GAD7 TOTAL SCORE: 2
5. BEING SO RESTLESS THAT IT IS HARD TO SIT STILL: SEVERAL DAYS
2. NOT BEING ABLE TO STOP OR CONTROL WORRYING: NOT AT ALL
3. WORRYING TOO MUCH ABOUT DIFFERENT THINGS: NOT AT ALL
7. FEELING AFRAID AS IF SOMETHING AWFUL MIGHT HAPPEN: NOT AT ALL

## 2021-08-30 ASSESSMENT — PATIENT HEALTH QUESTIONNAIRE - PHQ9
5. POOR APPETITE OR OVEREATING: SEVERAL DAYS
SUM OF ALL RESPONSES TO PHQ QUESTIONS 1-9: 3

## 2021-08-30 ASSESSMENT — ACTIVITIES OF DAILY LIVING (ADL): CURRENT_FUNCTION: NO ASSISTANCE NEEDED

## 2021-08-30 ASSESSMENT — MIFFLIN-ST. JEOR: SCORE: 1455.71

## 2021-08-30 NOTE — PROGRESS NOTES
"SUBJECTIVE:   Janie De Leon is a 71 year old female who presents for Preventive Visit.      Patient has been advised of split billing requirements and indicates understanding: Yes   Are you in the first 12 months of your Medicare coverage?  No    Healthy Habits:     In general, how would you rate your overall health?  Good    Frequency of exercise:  1 day/week    Duration of exercise:  Less than 15 minutes    Do you usually eat at least 4 servings of fruit and vegetables a day, include whole grains    & fiber and avoid regularly eating high fat or \"junk\" foods?  Yes    Taking medications regularly:  Yes    Barriers to taking medications:  None    Medication side effects:  Not applicable    Ability to successfully perform activities of daily living:  No assistance needed    Home Safety:  Throw rugs in the hallway and lack of grab bars in the bathroom    Hearing Impairment:  Difficulty following a conversation in a noisy restaurant or crowded room and need to ask people to speak up or repeat themselves    In the past 6 months, have you been bothered by leaking of urine?  No    In general, how would you rate your overall mental or emotional health?  Fair      PHQ-2 Total Score: 2    Additional concerns today:  No    Do you feel safe in your environment? Yes    Have you ever done Advance Care Planning? (For example, a Health Directive, POLST, or a discussion with a medical provider or your loved ones about your wishes): No, advance care planning information given to patient to review.  Patient plans to discuss their wishes with loved ones or provider.         Fall risk  Fallen 2 or more times in the past year?: No  Any fall with injury in the past year?: No    Cognitive Screening   1) Repeat 3 items (Leader, Season, Table)    2) Clock draw: NORMAL  3) 3 item recall: Recalls 3 objects  Results: 3 items recalled: COGNITIVE IMPAIRMENT LESS LIKELY    Mini-CogTM Copyright S [employer]. Licensed by the author for use in [place] [facility]; reprinted with permission (soob@.[facility]). All rights reserved.      Do you have sleep apnea, excessive snoring or daytime drowsiness?: no    Reviewed and updated as needed this visit by clinical staff  Tobacco  Allergies  Meds              Reviewed and updated as needed this visit by Provider                Social History     Tobacco Use     Smoking status: Former Smoker     Quit date: 4/15/1975     Years since quittin.4     Smokeless tobacco: Never Used   Substance Use Topics     Alcohol use: Yes     Comment: 3 drinks per week     If you drink alcohol do you typically have >3 drinks per day or >7 drinks per week? No      Current providers sharing in care for this patient include:   Patient Care Team:  Jade Davis DO as PCP - General (Internal Medicine)  Jade Davis DO as Assigned PCP  Horace Cutler MD as Assigned Endocrinology Provider    The following health maintenance items are reviewed in Epic and correct as of today:  Health Maintenance Due   Topic Date Due     A1C  2021     LIPID  2021     QUEENIE ASSESSMENT  2021     INFLUENZA VACCINE (1) 2021         Review of Systems  Constitutional, HEENT, cardiovascular, pulmonary, gi and gu systems are negative, except as otherwise noted.    OBJECTIVE:   /85 (BP Location: Left arm, Patient Position: Sitting, Cuff Size: Adult Large)   Pulse 76   Temp 97.1  F (36.2  C) (Temporal)   Resp 16   Ht 1.748 m (5' 8.8\")   Wt 88.5 kg (195 lb)   LMP  (LMP Unknown)   SpO2 98%   BMI 28.96 kg/m   Estimated body mass index is 28.96 kg/m  as calculated from the following:    Height as of this encounter: 1.748 m (5' 8.8\").    Weight as of this encounter: 88.5 kg (195 lb).  Physical Exam    GENERAL APPEARANCE: AAOx3, no distress. Well developed.    NECK: Supple without adenopathy, Trachea is midline. No masses palpated.    RESP: Lungs CTA bilaterally. No w/r/r. No distress     CV: RRR, S1/S2 present. No m/r/c.     " "ABDOMEN:  soft, nontender, no distention. No rebound or guarding.     EXT: No c/c/e in lower extremities b/l. No rashes or deformities noted.    MSK: ROM and strength intact in four extremities. No gross deformities noted.    SKIN: no suspicious lesions or rashes    NEURO: AAOx3, Motor function intact w/ 5/5 strength bilaterally to UE and LE.  Speech is fluent and comprehension intact. No gait abnormalities noted.    PSYCH: appropriate mood and affect.       ASSESSMENT / PLAN:   Janie was seen today for physical.    Diagnoses and all orders for this visit:    Routine history and physical examination of adult   Vaccines/Cscope/Mammo UTD.   Check labs    Major depressive disorder, single episode in full remission (H)  Adjustment disorder with anxiety  Insomnia, unspecified type  Controlled on current regimen with clonazepam sparingly  -     clonazePAM (KLONOPIN) 0.5 MG tablet; Take 0.5-1 tablets (0.25-0.5 mg) by mouth nightly as needed for anxiety    Essential hypertension, benign - goal < 140/90  controlled  -     lisinopril (ZESTRIL) 2.5 MG tablet; Take 1 tablet (2.5 mg) by mouth daily    Hyperlipidemia, unspecified hyperlipidemia type  -     Lipid panel reflex to direct LDL Fasting; Future  -     lovastatin (MEVACOR) 40 MG tablet; Take 1 tablet (40 mg) by mouth At Bedtime  -     Comprehensive metabolic panel; Future    Prediabetes  -     HEMOGLOBIN A1C; Future    Hypothyroidism, unspecified type   Following endo, recent labs UTD    Other orders  -     REVIEW OF HEALTH MAINTENANCE PROTOCOL ORDERS    COUNSELING:  Reviewed preventive health counseling, as reflected in patient instructions       Regular exercise       Healthy diet/nutrition    Estimated body mass index is 28.96 kg/m  as calculated from the following:    Height as of this encounter: 1.748 m (5' 8.8\").    Weight as of this encounter: 88.5 kg (195 lb).        She reports that she quit smoking about 46 years ago. She has never used smokeless " tobacco.    Reviewed patients plan of care and provided an AVS. The Basic Care Plan (routine screening as documented in Health Maintenance) for Janie meets the Care Plan requirement. This Care Plan has been established and reviewed with the Patient.    Counseling Resources:  ATP IV Guidelines  Pooled Cohorts Equation Calculator  Breast Cancer Risk Calculator  Breast Cancer: Medication to Reduce Risk  FRAX Risk Assessment  ICSI Preventive Guidelines  Dietary Guidelines for Americans, 2010  USDA's MyPlate  ASA Prophylaxis  Lung CA Screening    DO SHENA Amado [facility]    Identified Health Risks:

## 2021-08-31 ASSESSMENT — ANXIETY QUESTIONNAIRES: GAD7 TOTAL SCORE: 2

## 2021-09-25 ENCOUNTER — MYC MEDICAL ADVICE (OUTPATIENT)
Dept: FAMILY MEDICINE | Facility: CLINIC | Age: 71
End: 2021-09-25

## 2021-11-05 ENCOUNTER — LAB (OUTPATIENT)
Dept: LAB | Facility: CLINIC | Age: 71
End: 2021-11-05
Payer: MEDICARE

## 2021-11-05 DIAGNOSIS — E03.9 HYPOTHYROIDISM, UNSPECIFIED TYPE: ICD-10-CM

## 2021-11-05 LAB
T4 FREE SERPL-MCNC: 1.04 NG/DL (ref 0.76–1.46)
TSH SERPL DL<=0.005 MIU/L-ACNC: 3.28 MU/L (ref 0.4–4)

## 2021-11-05 PROCEDURE — 84439 ASSAY OF FREE THYROXINE: CPT

## 2021-11-05 PROCEDURE — 36415 COLL VENOUS BLD VENIPUNCTURE: CPT

## 2021-11-05 PROCEDURE — 84443 ASSAY THYROID STIM HORMONE: CPT

## 2021-11-15 ENCOUNTER — IMMUNIZATION (OUTPATIENT)
Dept: NURSING | Facility: CLINIC | Age: 71
End: 2021-11-15
Payer: MEDICARE

## 2021-11-15 PROCEDURE — 0064A COVID-19,PF,MODERNA (18+ YRS BOOSTER .25ML): CPT

## 2021-11-15 PROCEDURE — 91306 COVID-19,PF,MODERNA (18+ YRS BOOSTER .25ML): CPT

## 2021-11-21 ENCOUNTER — MYC MEDICAL ADVICE (OUTPATIENT)
Dept: FAMILY MEDICINE | Facility: CLINIC | Age: 71
End: 2021-11-21
Payer: MEDICARE

## 2022-01-05 ENCOUNTER — LAB (OUTPATIENT)
Dept: LAB | Facility: CLINIC | Age: 72
End: 2022-01-05
Payer: MEDICARE

## 2022-01-05 DIAGNOSIS — N18.30 CKD (CHRONIC KIDNEY DISEASE) STAGE 3, GFR 30-59 ML/MIN (H): Primary | ICD-10-CM

## 2022-01-05 PROCEDURE — 82043 UR ALBUMIN QUANTITATIVE: CPT

## 2022-01-11 LAB
CREAT UR-MCNC: 143 MG/DL
MICROALBUMIN UR-MCNC: 7 MG/L
MICROALBUMIN/CREAT UR: 4.9 MG/G CR (ref 0–25)

## 2022-03-06 ENCOUNTER — MYC MEDICAL ADVICE (OUTPATIENT)
Dept: FAMILY MEDICINE | Facility: CLINIC | Age: 72
End: 2022-03-06
Payer: MEDICARE

## 2022-03-07 ENCOUNTER — NURSE TRIAGE (OUTPATIENT)
Dept: FAMILY MEDICINE | Facility: CLINIC | Age: 72
End: 2022-03-07
Payer: MEDICARE

## 2022-03-07 ENCOUNTER — HOSPITAL ENCOUNTER (EMERGENCY)
Facility: CLINIC | Age: 72
Discharge: LEFT BEFORE DISCHARGE | End: 2022-03-07
Payer: MEDICARE

## 2022-03-07 VITALS
SYSTOLIC BLOOD PRESSURE: 203 MMHG | RESPIRATION RATE: 18 BRPM | HEART RATE: 78 BPM | TEMPERATURE: 98.1 F | WEIGHT: 210 LBS | DIASTOLIC BLOOD PRESSURE: 91 MMHG | BODY MASS INDEX: 31.1 KG/M2 | HEIGHT: 69 IN | OXYGEN SATURATION: 98 %

## 2022-03-07 NOTE — TELEPHONE ENCOUNTER
Pt sent this [employer] message:     Blood pressure meds  3/6/2022 2:43 PM Reply    To: [facility] TRIAGE      From: Janie De Leon      Created: 3/6/2022 2:43 PM        *-*-*This message has not been handled.*-*-*    I have been having severe headaches lately.  Today I checked my blood pressure and it was 170/101 and 170/96.     Do I need an appointment or can you prescribe a different medicine or a higher dose.  Do I need to come in for a check up?     Thank you  Janie De Leon        Spoke with patient     Pt states she thinks she forgot her BP medication then took Naproxen not realizing it's antagonistic with Lisinopril     Headaches occur every couple of days, does not normally get headaches     Last night had bad headache     Also has gained some weight after being able to get out     Stopped taking Naproxen     Other than yesterday has not been monitoring BP    Psych Rx'd something that raised BP and was elevated initially but she went to another psych for alternative med instead in past - at that time she was having headaches too     Asked pt to check BP while on the phone     BP now 213/126 - and pt has Headache now     Advised pt go immediately to ED     Pt agreeable to recommendation     Rossana WILSON, Triage RN  [facility]             Reason for Disposition    Systolic BP >= 160 OR Diastolic >= 100, and any cardiac or neurologic symptoms (e.g., chest pain, difficulty breathing, unsteady gait, blurred vision)    Additional Information    Negative: Sounds like a life-threatening emergency to the triager    Negative: Pregnant > 20 weeks or postpartum (< 6 weeks after delivery) and new hand or face swelling    Negative: Pregnant > 20 weeks and BP > 140/90    Protocols used: HIGH BLOOD PRESSURE-A-OH

## 2022-03-07 NOTE — ED TRIAGE NOTES
Pt has had intermittent headaches for 3 weeks. Worried because her BP was high today and nurse told her it was stroke level

## 2022-03-08 ENCOUNTER — VIRTUAL VISIT (OUTPATIENT)
Dept: FAMILY MEDICINE | Facility: CLINIC | Age: 72
End: 2022-03-08
Payer: MEDICARE

## 2022-03-08 DIAGNOSIS — I10 PRIMARY HYPERTENSION: Primary | ICD-10-CM

## 2022-03-08 DIAGNOSIS — R51.9 NONINTRACTABLE HEADACHE, UNSPECIFIED CHRONICITY PATTERN, UNSPECIFIED HEADACHE TYPE: ICD-10-CM

## 2022-03-08 PROCEDURE — 99213 OFFICE O/P EST LOW 20 MIN: CPT | Mod: 95 | Performed by: INTERNAL MEDICINE

## 2022-03-08 RX ORDER — OLMESARTAN MEDOXOMIL 20 MG/1
20 TABLET ORAL DAILY
Qty: 90 TABLET | Refills: 1 | Status: SHIPPED | OUTPATIENT
Start: 2022-03-08 | End: 2022-03-18

## 2022-03-08 NOTE — PROGRESS NOTES
Janie is a 72 year old who is being evaluated via a billable video visit.      How would you like to obtain your AVS? MyChart  If the video visit is dropped, the invitation should be resent by: Text to cell phone: [phone]  Will anyone else be joining your video visit? No    Video Start Time: 4:50 PM    Assessment & Plan     Primary hypertension  Nonintractable headache, unspecified chronicity pattern, unspecified headache type  Hypertensive urgency yesterday. Improved today but BP still above goal. No reported focal neuro or cardiovascular deficits, though limited eval through virtual platform. Discussed if headache worsening or new symptoms such as but not limited to focal weakness, blurry vision, severe headache, vomiting, chest pain, go back to ER. In the meantime, I recommend adjustment to antihypertensive regimen.  Discontinue lisinopril 2.5mg (she will take last dose tonight)  Start olmesartan 20mg (she will start tomorrow night)  Follow-up in office scheduled in two weeks (discussed monitor and bring written log to visit).  Will plan to repeat BMP (renal function/lytes) as discussed MOA of medication and risks/benefits.   She is highly agreeable to plan.  - olmesartan (BENICAR) 20 MG tablet  Dispense: 90 tablet; Refill: 1      Return in about 2 weeks (around 3/22/2022) for Follow up, with me, in person, sooner if symptoms worsen or do not improve.    Jade Davsi DO  [facility] NELL Lima is a 72 year old who presents for the following health issues     HPI     ED/UC Followup:    Facility:  [facility] Emergency Department  Date of visit: 03/07/22  Reason for visit: Headache  Current Status: 148/100, 160/96 blood pressure readings today, headaches are better       Janie presents virtually for HTN/HA.  Systolic 200s at home yesterday with headache, denies hx of headaches or migraines in the past. States was new for her.  Compliant with lisinopril  2.5mg  States called here, told to go to ER.   In ER BP systolic was 210, diastolic around 107.   States was very packed and waited two hours, decided to leave.   States BP today improving (see above readings) and headache improving.   Denies blurry vision, cp/sob, focal weakness, speech changes.   No new stressors or anxiety.    Review of Systems   Constitutional, HEENT, cardiovascular, pulmonary, gi and gu systems are negative, except as otherwise noted.      Objective           Vitals:  No vitals were obtained today due to virtual visit.    Physical Exam   GEN: No acute distress  RESP: No audible increased work of breathing. Patient speaking in full sentences without distress.  PSYCH: pleasant  Exam otherwise limited due to virtual platform          Video-Visit Details    Type of service:  Video Visit    Video End Time:5:03 PM    Originating Location (pt. Location): Home    Distant Location (provider location):  [facility]     Platform used for Video Visit: Koru

## 2022-03-18 ENCOUNTER — OFFICE VISIT (OUTPATIENT)
Dept: FAMILY MEDICINE | Facility: CLINIC | Age: 72
End: 2022-03-18
Payer: MEDICARE

## 2022-03-18 VITALS
TEMPERATURE: 97.1 F | BODY MASS INDEX: 28.29 KG/M2 | HEART RATE: 73 BPM | SYSTOLIC BLOOD PRESSURE: 157 MMHG | WEIGHT: 191 LBS | DIASTOLIC BLOOD PRESSURE: 95 MMHG | OXYGEN SATURATION: 98 % | RESPIRATION RATE: 16 BRPM | HEIGHT: 69 IN

## 2022-03-18 DIAGNOSIS — Z78.0 ASYMPTOMATIC POSTMENOPAUSAL STATUS: ICD-10-CM

## 2022-03-18 DIAGNOSIS — M85.80 OSTEOPENIA, UNSPECIFIED LOCATION: ICD-10-CM

## 2022-03-18 DIAGNOSIS — N18.31 STAGE 3A CHRONIC KIDNEY DISEASE (H): Chronic | ICD-10-CM

## 2022-03-18 DIAGNOSIS — L60.9 NAIL ABNORMALITY: ICD-10-CM

## 2022-03-18 DIAGNOSIS — G47.00 INSOMNIA, UNSPECIFIED TYPE: Chronic | ICD-10-CM

## 2022-03-18 DIAGNOSIS — R79.9 ABNORMAL FINDING OF BLOOD CHEMISTRY, UNSPECIFIED: ICD-10-CM

## 2022-03-18 DIAGNOSIS — I10 ESSENTIAL HYPERTENSION, BENIGN: Primary | Chronic | ICD-10-CM

## 2022-03-18 LAB
FOLATE SERPL-MCNC: 6.1 NG/ML
VIT B12 SERPL-MCNC: 1410 PG/ML (ref 193–986)

## 2022-03-18 PROCEDURE — 82607 VITAMIN B-12: CPT | Performed by: INTERNAL MEDICINE

## 2022-03-18 PROCEDURE — 82746 ASSAY OF FOLIC ACID SERUM: CPT | Performed by: INTERNAL MEDICINE

## 2022-03-18 PROCEDURE — 36415 COLL VENOUS BLD VENIPUNCTURE: CPT | Performed by: INTERNAL MEDICINE

## 2022-03-18 PROCEDURE — 80048 BASIC METABOLIC PNL TOTAL CA: CPT | Performed by: INTERNAL MEDICINE

## 2022-03-18 PROCEDURE — 83550 IRON BINDING TEST: CPT | Performed by: INTERNAL MEDICINE

## 2022-03-18 PROCEDURE — 82306 VITAMIN D 25 HYDROXY: CPT | Performed by: INTERNAL MEDICINE

## 2022-03-18 PROCEDURE — 99213 OFFICE O/P EST LOW 20 MIN: CPT | Performed by: INTERNAL MEDICINE

## 2022-03-18 RX ORDER — OLMESARTAN MEDOXOMIL 40 MG/1
40 TABLET ORAL DAILY
Qty: 90 TABLET | Refills: 3 | Status: SHIPPED | OUTPATIENT
Start: 2022-03-18 | End: 2022-04-01

## 2022-03-18 RX ORDER — CLONAZEPAM 0.5 MG/1
.25-.5 TABLET ORAL
Qty: 45 TABLET | Refills: 0 | Status: SHIPPED | OUTPATIENT
Start: 2022-03-18 | End: 2022-08-31

## 2022-03-18 ASSESSMENT — PAIN SCALES - GENERAL: PAINLEVEL: NO PAIN (0)

## 2022-03-18 NOTE — PROGRESS NOTES
"  Assessment & Plan     Essential hypertension, benign - goal < 140/90  Stage 3a chronic kidney disease (H)  BP better but still elevated and intermittent HA. Increase olmesartan from 20mg daily to 40mg daily. New rx sent. Check bmp/lytes today for stability and again next visit given adjustment. Continue monitoring and follow-up in two weeks  - Basic metabolic panel  (Ca, Cl, CO2, Creat, Gluc, K, Na, BUN)  - olmesartan (BENICAR) 40 MG tablet  Dispense: 90 tablet; Refill: 3    Osteopenia, unspecified location  Check for stability  Continue vitamin D  - DX Hip/Pelvis/Spine  - Vitamin D Deficiency    Insomnia, unspecified type  Uses sparingly, refill:  - clonazePAM (KLONOPIN) 0.5 MG tablet  Dispense: 45 tablet; Refill: 0    Nail abnormality  White ridges in nails. Check vitamin levels.  - Iron & Iron Binding Capacity  - Folate  - Vitamin B12  - Vitamin D Deficiency        Return in about 2 weeks (around 4/1/2022) for Follow up, with me, in person, sooner if symptoms worsen or do not improve.    Jade Davis DO  [facility] NELL    Marixa Lima is a 72 year old who presents for the following health issues     HPI     Follow up on blood pressure and headaches, headaches have gotten better:    Janie presents with her BP log for follow-up.  Overall flucuates, on average higher than goal though better.  States headaches better but not gone, intermittent.  No SE with new rx. Tolerating.  Denies sob/cp.    She would like refill on clonazepam  Uses sparingly    She takes vitamin D. Has white ridges in some of her fingernails, no pain or injury.    Review of Systems   Constitutional, HEENT, cardiovascular, pulmonary, gi and gu systems are negative, except as otherwise noted.      Objective    BP (!) 157/95 (BP Location: Right arm, Patient Position: Sitting, Cuff Size: Adult Regular)   Pulse 73   Temp 97.1  F (36.2  C) (Temporal)   Resp 16   Ht 1.753 m (5' 9\")   Wt 86.6 kg (191 lb)   LMP  (LMP " Unknown)   SpO2 98%   BMI 28.21 kg/m    Body mass index is 28.21 kg/m .  Physical Exam     GENERAL APPEARANCE: AAOx3, no distress. Well developed.    PSYCH: appropriate mood and affect.

## 2022-03-19 LAB
ANION GAP SERPL CALCULATED.3IONS-SCNC: 7 MMOL/L (ref 3–14)
BUN SERPL-MCNC: 16 MG/DL (ref 7–30)
CALCIUM SERPL-MCNC: 9.1 MG/DL (ref 8.5–10.1)
CHLORIDE BLD-SCNC: 105 MMOL/L (ref 94–109)
CO2 SERPL-SCNC: 29 MMOL/L (ref 20–32)
CREAT SERPL-MCNC: 1.02 MG/DL (ref 0.52–1.04)
GFR SERPL CREATININE-BSD FRML MDRD: 58 ML/MIN/1.73M2
GLUCOSE BLD-MCNC: 87 MG/DL (ref 70–99)
IRON SATN MFR SERPL: 28 % (ref 15–46)
IRON SERPL-MCNC: 94 UG/DL (ref 35–180)
POTASSIUM BLD-SCNC: 3.9 MMOL/L (ref 3.4–5.3)
SODIUM SERPL-SCNC: 141 MMOL/L (ref 133–144)
TIBC SERPL-MCNC: 335 UG/DL (ref 240–430)

## 2022-03-21 LAB — DEPRECATED CALCIDIOL+CALCIFEROL SERPL-MC: 49 UG/L (ref 20–75)

## 2022-03-23 ENCOUNTER — HOSPITAL ENCOUNTER (OUTPATIENT)
Dept: BONE DENSITY | Facility: CLINIC | Age: 72
Discharge: HOME | End: 2022-03-23
Attending: INTERNAL MEDICINE | Admitting: INTERNAL MEDICINE
Payer: MEDICARE

## 2022-03-23 DIAGNOSIS — Z78.0 ASYMPTOMATIC POSTMENOPAUSAL STATUS: ICD-10-CM

## 2022-03-23 DIAGNOSIS — M85.80 OSTEOPENIA, UNSPECIFIED LOCATION: ICD-10-CM

## 2022-03-23 PROCEDURE — 77080 DXA BONE DENSITY AXIAL: CPT

## 2022-04-01 ENCOUNTER — OFFICE VISIT (OUTPATIENT)
Dept: FAMILY MEDICINE | Facility: CLINIC | Age: 72
End: 2022-04-01
Payer: MEDICARE

## 2022-04-01 ENCOUNTER — TELEPHONE (OUTPATIENT)
Dept: FAMILY MEDICINE | Facility: CLINIC | Age: 72
End: 2022-04-01

## 2022-04-01 VITALS
HEIGHT: 69 IN | WEIGHT: 189 LBS | DIASTOLIC BLOOD PRESSURE: 98 MMHG | TEMPERATURE: 97.2 F | OXYGEN SATURATION: 98 % | BODY MASS INDEX: 27.99 KG/M2 | RESPIRATION RATE: 16 BRPM | HEART RATE: 91 BPM | SYSTOLIC BLOOD PRESSURE: 145 MMHG

## 2022-04-01 DIAGNOSIS — I10 ESSENTIAL HYPERTENSION, BENIGN: Primary | ICD-10-CM

## 2022-04-01 DIAGNOSIS — R73.03 PREDIABETES: ICD-10-CM

## 2022-04-01 DIAGNOSIS — I10 ESSENTIAL HYPERTENSION, BENIGN: Chronic | ICD-10-CM

## 2022-04-01 DIAGNOSIS — N18.31 STAGE 3A CHRONIC KIDNEY DISEASE (H): Chronic | ICD-10-CM

## 2022-04-01 DIAGNOSIS — Z12.11 COLON CANCER SCREENING: ICD-10-CM

## 2022-04-01 LAB
ANION GAP SERPL CALCULATED.3IONS-SCNC: 2 MMOL/L (ref 3–14)
BUN SERPL-MCNC: 17 MG/DL (ref 7–30)
CALCIUM SERPL-MCNC: 9.5 MG/DL (ref 8.5–10.1)
CHLORIDE BLD-SCNC: 106 MMOL/L (ref 94–109)
CO2 SERPL-SCNC: 32 MMOL/L (ref 20–32)
CREAT SERPL-MCNC: 1.2 MG/DL (ref 0.52–1.04)
GFR SERPL CREATININE-BSD FRML MDRD: 48 ML/MIN/1.73M2
GLUCOSE BLD-MCNC: 89 MG/DL (ref 70–99)
HBA1C MFR BLD: 5.8 % (ref 0–5.6)
POTASSIUM BLD-SCNC: 4.3 MMOL/L (ref 3.4–5.3)
SODIUM SERPL-SCNC: 140 MMOL/L (ref 133–144)

## 2022-04-01 PROCEDURE — 99213 OFFICE O/P EST LOW 20 MIN: CPT | Performed by: INTERNAL MEDICINE

## 2022-04-01 PROCEDURE — 80048 BASIC METABOLIC PNL TOTAL CA: CPT | Performed by: INTERNAL MEDICINE

## 2022-04-01 PROCEDURE — 83036 HEMOGLOBIN GLYCOSYLATED A1C: CPT | Performed by: INTERNAL MEDICINE

## 2022-04-01 PROCEDURE — 36415 COLL VENOUS BLD VENIPUNCTURE: CPT | Performed by: INTERNAL MEDICINE

## 2022-04-01 RX ORDER — AMLODIPINE BESYLATE 10 MG/1
10 TABLET ORAL DAILY
Qty: 90 TABLET | Refills: 3 | Status: SHIPPED | OUTPATIENT
Start: 2022-04-01 | End: 2022-07-08

## 2022-04-01 RX ORDER — OLMESARTAN MEDOXOMIL 40 MG/1
20 TABLET ORAL EVERY EVENING
Qty: 45 TABLET | Refills: 3 | COMMUNITY
Start: 2022-04-01 | End: 2022-11-04

## 2022-04-01 RX ORDER — AMLODIPINE BESYLATE 5 MG/1
5 TABLET ORAL DAILY
Qty: 90 TABLET | Refills: 1 | Status: SHIPPED | OUTPATIENT
Start: 2022-04-01 | End: 2022-04-01

## 2022-04-01 NOTE — TELEPHONE ENCOUNTER
Discussed with patient personally.   Likely mild BANG with olmesartan 40mg. Kidneys tolerates 20mg better so we discussed going back to that. She will simply take 1/2 tab of her 40mg tabs since she has plenty for now.  Instead of starting amlodipine 5mg daily as discussed during appt, we will start 10mg daily. New rx sent.   She is highly agreeable with plan and appreciative of call. No indication for nephro consultation at this time.  She has follow-up with me already planned in few weeks and we will recheck BMP then. See us sooner if new or concerning symptoms arise.

## 2022-04-01 NOTE — PROGRESS NOTES
"  Assessment & Plan     Essential hypertension, benign  Improving but still above goal, add amlodipine  Follow-up 3-4 weeks  Check BMP for stability given adjustments last visit  - amLODIPine (NORVASC) 5 MG tablet  Dispense: 90 tablet; Refill: 1  - Basic metabolic panel  (Ca, Cl, CO2, Creat, Gluc, K, Na, BUN)    Colon cancer screening  q3 year recall, due:  - Adult Gastro Ref - Procedure Only    Prediabetes  Due for a1c:  - Hemoglobin A1c        Return in about 3 weeks (around 4/22/2022) for Follow up, with me, in person, sooner if symptoms worsen or do not improve.    Jade Davis DO  [facility] NELL Lima is a 72 year old who presents for the following health issues     HPI     Follow up on blood pressure:    BP at home have been better but still above goal. Flucuates. Denies cp/sob/ha.   Compliant with Rx.  No SE.     Review of Systems   Constitutional, HEENT, cardiovascular, pulmonary, gi and gu systems are negative, except as otherwise noted.      Objective    BP (!) 145/98 (BP Location: Right arm, Patient Position: Sitting, Cuff Size: Adult Regular)   Pulse 91   Temp 97.2  F (36.2  C)   Resp 16   Ht 1.753 m (5' 9\")   Wt 85.7 kg (189 lb)   LMP  (LMP Unknown)   SpO2 98%   BMI 27.91 kg/m    Body mass index is 27.91 kg/m .  Physical Exam     GENERAL APPEARANCE: AAOx3, no distress. Well developed.    PSYCH: appropriate mood and affect.               "

## 2022-04-01 NOTE — TELEPHONE ENCOUNTER
Pt saw results on [employer], and is asking for a referral for nephrology and her result note.  Can be sent in [employer].  She had a missed called and htought it might be us.        Faina Avila, RN  [facility] RN Triage Team

## 2022-04-05 ENCOUNTER — TELEPHONE (OUTPATIENT)
Dept: GASTROENTEROLOGY | Facility: CLINIC | Age: 72
End: 2022-04-05
Payer: MEDICARE

## 2022-04-05 NOTE — TELEPHONE ENCOUNTER
Screening Questions  BlueKIND OF PREP RedLOCATION [review exclusion criteria] GreenSEDATION TYPE  1. Have you had a positive covid test in the last 90 days? N     2. Are you active on mychart? Y    3. What insurance is in the chart? Medicare     3.   Ordering/Referring Provider: Jade Davis D    4. BMI  27.9 [BMI OVER 40-EXTENDED PREP]  If greater than 40 review exclusion criteria [PAC APPT IF @ [facility]]        5.  Respiratory Screening :  [If yes to any of the following HOSPITAL setting only]     Do you use daily home oxygen? N    Do you have mod to severe Obstructive Sleep Apnea? N  [OKAY @ [facility] SH PH RI]   Do you have Pulmonary Hypertension? N     Do you have UNCONTROLLED asthma? N        6.   Have you had a heart or lung transplant? N      7.   Are you currently on dialysis? N [ If yes, G-PREP & HOSPITAL setting only]     8.   Do you have chronic kidney disease? Y [ If yes, G-PREP ]    9.   Have you had a stroke or Transient ischemic attack (TIA - aka  mini stroke ) within 6 months?  N (If yes, please review exclusion criteria)    10.   In the past 6 months, have you had any heart related issues including cardiomyopathy or heart attack? N           If yes, did it require cardiac stenting or other implantable device? N      11.   Do you have any implantable devices in your body (pacemaker, defib, LVAD)? N (If yes, please review exclusion criteria)    12.   Do you take nitroglycerin? N           If yes, how often? N  (if yes, HOSPITAL setting ONLY)    13.   Are you currently taking any blood thinners? N           [IF YES, INFORM PATIENT TO FOLLOW UP W/ ORDERING PROVIDER FOR BRIDGING INSTRUCTIONS]     14.   Do you have a diagnosis of diabetes? N   [ If yes, G-PREP ]    15.   [FEMALES] Are you currently pregnant?     If yes, how many weeks?     16.   Are you taking any prescription pain medications on a routine schedule?  N  [ If yes, EXTENDED PREP.] [If yes, MAC]    17.   Do you have any chemical  dependencies such as alcohol, street drugs, or methadone?  N [If yes, MAC]    18.   Do you have any history of post-traumatic stress syndrome, severe anxiety or history of psychosis?  N  [If yes, MAC]    19.   Do you have a significant intellectual disability?  N [If yes, MAC]    20.   Do you transfer independently?  Y    21.  On a regular basis do you go 3-5 days between bowel movements? Y   [ If yes, EXTENDED PREP.]    22.   Preferred LOCAL Pharmacy for Pre Prescription Y  [facility] #[id number] [place], [place] - [address] AT [facility] [address]      Scheduling Details      Caller : Janie  (Please ask for phone number if not scheduled by patient)    Type of Procedure Scheduled: Colon  Which Colonoscopy Prep was Sent?: Ext Prep  KHORUTS CF PATIENTS & GROEN'S PATIENTS NEEDS EXTENDED PREP  Surgeon: Manjinder  Date of Procedure: 6-16  Location:       Sedation Type: CS  Conscious Sedation- Needs  for 6 hours after the procedure  MAC/General-Needs  for 24 hours after procedure    Pre-op Required at [facility] [place] [place] and OR for MAC sedation: Y  (advise patient they will need a pre-op prior to procedure -)      Informed patient they will need an adult  Y  Cannot take any type of public or medical transportation alone    Pre-Pro[facility] or Externally: Y -  urgent care 6-13    Confirmed Nurse will call to complete assessment Y    Additional comments:

## 2022-04-25 ENCOUNTER — OFFICE VISIT (OUTPATIENT)
Dept: FAMILY MEDICINE | Facility: CLINIC | Age: 72
End: 2022-04-25
Payer: MEDICARE

## 2022-04-25 VITALS
TEMPERATURE: 97.4 F | DIASTOLIC BLOOD PRESSURE: 63 MMHG | BODY MASS INDEX: 27.99 KG/M2 | SYSTOLIC BLOOD PRESSURE: 99 MMHG | OXYGEN SATURATION: 98 % | RESPIRATION RATE: 16 BRPM | HEART RATE: 92 BPM | WEIGHT: 189 LBS | HEIGHT: 69 IN

## 2022-04-25 DIAGNOSIS — I10 ESSENTIAL HYPERTENSION, BENIGN: Primary | ICD-10-CM

## 2022-04-25 PROCEDURE — 36415 COLL VENOUS BLD VENIPUNCTURE: CPT | Performed by: INTERNAL MEDICINE

## 2022-04-25 PROCEDURE — 99213 OFFICE O/P EST LOW 20 MIN: CPT | Performed by: INTERNAL MEDICINE

## 2022-04-25 PROCEDURE — 80048 BASIC METABOLIC PNL TOTAL CA: CPT | Performed by: INTERNAL MEDICINE

## 2022-04-26 LAB
ANION GAP SERPL CALCULATED.3IONS-SCNC: 4 MMOL/L (ref 3–14)
BUN SERPL-MCNC: 20 MG/DL (ref 7–30)
CALCIUM SERPL-MCNC: 9 MG/DL (ref 8.5–10.1)
CHLORIDE BLD-SCNC: 108 MMOL/L (ref 94–109)
CO2 SERPL-SCNC: 28 MMOL/L (ref 20–32)
CREAT SERPL-MCNC: 0.92 MG/DL (ref 0.52–1.04)
GFR SERPL CREATININE-BSD FRML MDRD: 65 ML/MIN/1.73M2
GLUCOSE BLD-MCNC: 96 MG/DL (ref 70–99)
POTASSIUM BLD-SCNC: 5 MMOL/L (ref 3.4–5.3)
SODIUM SERPL-SCNC: 140 MMOL/L (ref 133–144)

## 2022-05-18 ENCOUNTER — IMMUNIZATION (OUTPATIENT)
Dept: NURSING | Facility: CLINIC | Age: 72
End: 2022-05-18
Payer: MEDICARE

## 2022-05-18 PROCEDURE — 0064A COVID-19,PF,MODERNA (18+ YRS BOOSTER .25ML): CPT

## 2022-05-18 PROCEDURE — 91306 COVID-19,PF,MODERNA (18+ YRS BOOSTER .25ML): CPT

## 2022-05-20 ENCOUNTER — TRANSFERRED RECORDS (OUTPATIENT)
Dept: HEALTH INFORMATION MANAGEMENT | Facility: CLINIC | Age: 72
End: 2022-05-20
Payer: MEDICARE

## 2022-05-23 DIAGNOSIS — E03.9 HYPOTHYROIDISM, UNSPECIFIED TYPE: ICD-10-CM

## 2022-05-23 RX ORDER — LEVOTHYROXINE SODIUM 88 UG/1
TABLET ORAL
Qty: 30 TABLET | Refills: 0 | Status: SHIPPED | OUTPATIENT
Start: 2022-05-23 | End: 2022-06-21

## 2022-05-23 NOTE — TELEPHONE ENCOUNTER
Last Written Prescription Date:  6/3/2021  Last Fill Quantity: 90,  # refills: 3   Last office visit: 3/29/2021 with prescribing provider:  Dr. Cutler    Future Office Visit:   Next 5 appointments (look out 90 days)    Jun 13, 2022 11:05 AM  Pre-procedure Covid with BL COVID TESTING HUB 1  [facility] [address] [address] [phone]

## 2022-05-27 ENCOUNTER — TRANSFERRED RECORDS (OUTPATIENT)
Dept: HEALTH INFORMATION MANAGEMENT | Facility: CLINIC | Age: 72
End: 2022-05-27
Payer: MEDICARE

## 2022-06-13 ENCOUNTER — LAB (OUTPATIENT)
Dept: URGENT CARE | Facility: URGENT CARE | Age: 72
End: 2022-06-13
Payer: MEDICARE

## 2022-06-13 DIAGNOSIS — Z20.822 COVID-19 RULED OUT: Primary | ICD-10-CM

## 2022-06-13 DIAGNOSIS — Z20.822 COVID-19 RULED OUT: ICD-10-CM

## 2022-06-13 PROCEDURE — U0003 INFECTIOUS AGENT DETECTION BY NUCLEIC ACID (DNA OR RNA); SEVERE ACUTE RESPIRATORY SYNDROME CORONAVIRUS 2 (SARS-COV-2) (CORONAVIRUS DISEASE [COVID-19]), AMPLIFIED PROBE TECHNIQUE, MAKING USE OF HIGH THROUGHPUT TECHNOLOGIES AS DESCRIBED BY CMS-2020-01-R: HCPCS

## 2022-06-13 PROCEDURE — U0005 INFEC AGEN DETEC AMPLI PROBE: HCPCS

## 2022-06-14 LAB — SARS-COV-2 RNA RESP QL NAA+PROBE: NEGATIVE

## 2022-06-16 ENCOUNTER — HOSPITAL ENCOUNTER (OUTPATIENT)
Facility: CLINIC | Age: 72
Discharge: HOME | End: 2022-06-16
Attending: INTERNAL MEDICINE | Admitting: INTERNAL MEDICINE
Payer: MEDICARE

## 2022-06-16 VITALS
HEIGHT: 69 IN | OXYGEN SATURATION: 94 % | HEART RATE: 66 BPM | SYSTOLIC BLOOD PRESSURE: 92 MMHG | RESPIRATION RATE: 16 BRPM | WEIGHT: 192 LBS | BODY MASS INDEX: 28.44 KG/M2 | DIASTOLIC BLOOD PRESSURE: 67 MMHG

## 2022-06-16 DIAGNOSIS — Z86.0100 HISTORY OF COLONIC POLYPS: Primary | ICD-10-CM

## 2022-06-16 LAB — COLONOSCOPY: NORMAL

## 2022-06-16 PROCEDURE — 45378 DIAGNOSTIC COLONOSCOPY: CPT | Performed by: INTERNAL MEDICINE

## 2022-06-16 PROCEDURE — G0500 MOD SEDAT ENDO SERVICE >5YRS: HCPCS | Performed by: INTERNAL MEDICINE

## 2022-06-16 PROCEDURE — G0105 COLORECTAL SCRN; HI RISK IND: HCPCS | Performed by: INTERNAL MEDICINE

## 2022-06-16 PROCEDURE — 250N000011 HC RX IP 250 OP 636: Performed by: INTERNAL MEDICINE

## 2022-06-16 RX ORDER — FENTANYL CITRATE 50 UG/ML
INJECTION, SOLUTION INTRAMUSCULAR; INTRAVENOUS PRN
Status: COMPLETED | OUTPATIENT
Start: 2022-06-16 | End: 2022-06-16

## 2022-06-16 RX ADMIN — MIDAZOLAM 2 MG: 1 INJECTION INTRAMUSCULAR; INTRAVENOUS at 11:13

## 2022-06-16 RX ADMIN — FENTANYL CITRATE 100 MCG: 50 INJECTION, SOLUTION INTRAMUSCULAR; INTRAVENOUS at 11:10

## 2022-06-16 RX ADMIN — MIDAZOLAM 2 MG: 1 INJECTION INTRAMUSCULAR; INTRAVENOUS at 11:10

## 2022-06-16 NOTE — H&P
[facility]  Pre-Endoscopy History and Physical     Janie De Leon MRN# [id number]   YOB: 1950 Age: 72 year old     Date of Procedure: 6/16/2022  Primary care provider: Jade Davis  Type of Endoscopy: colonoscopy  Reason for Procedure: Survillance  Type of Anesthesia Anticipated: Moderate Sedation    HPI:    Janie is a 72 year old female who will be undergoing the above procedure.      A history and physical has been performed. The patient's medications and allergies have been reviewed. The risks and benefits of the procedure and the sedation options and risks were discussed with the patient.  All questions were answered and informed consent was obtained.      She denies a personal or family history of anesthesia complications or bleeding disorders.     Allergies   Allergen Reactions     Ciprofloxacin GI Disturbance     Oxycodone Other (See Comments)     She prefers not to have Oxycodone or Oxycontin due to potential addictive properties     Simvastatin Other (See Comments)     Muscle aches         Prior to Admission Medications   Prescriptions Last Dose Informant Patient Reported? Taking?   CRANBERRY EXTRACT PO   Yes Yes   Sig: Take 1 tablet by mouth daily with food   Cholecalciferol (VITAMIN D3 PO) 6/16/2022 at Unknown time  Yes Yes   Sig: Take 1,000 Units by mouth daily   DULoxetine (CYMBALTA) 60 MG capsule 6/16/2022 at Unknown time  No Yes   Sig: Take 1 capsule (60 mg) by mouth daily   amLODIPine (NORVASC) 10 MG tablet 6/15/2022 at Unknown time  No Yes   Sig: Take 1 tablet (10 mg) by mouth daily   clonazePAM (KLONOPIN) 0.5 MG tablet Past Week at Unknown time  No Yes   Sig: Take 0.5-1 tablets (0.25-0.5 mg) by mouth nightly as needed for anxiety   levothyroxine (SYNTHROID/LEVOTHROID) 88 MCG tablet 6/16/2022 at Unknown time  No Yes   Sig: TAKE 1 TABLET(88 MCG) BY MOUTH DAILY   lovastatin (MEVACOR) 40 MG tablet 6/15/2022 at Unknown time  No Yes   Sig: TAKE 1 TABLET(40 MG) BY  MOUTH AT BEDTIME   olmesartan (BENICAR) 40 MG tablet 6/15/2022 at Unknown time  Yes Yes   Sig: Take 0.5 tablets (20 mg) by mouth daily   psyllium (METAMUCIL) 58.6 % POWD Past Week at Unknown time  Yes Yes   Sig: Take by mouth as needed for constipation      Facility-Administered Medications: None       Patient Active Problem List   Diagnosis     Major depressive disorder, single episode in full remission (H)     Essential hypertension, benign - goal < 140/90     Prediabetes     Hyperlipidemia     Insomnia     Advanced directives, counseling/discussion     Adjustment disorder with anxiety     Rotator cuff injury, left, sequela     Hypothyroidism     Osteopenia     Controlled substance agreement signed     Chronic constipation     CKD (chronic kidney disease) stage 3, GFR 30-59 ml/min (H)        Past Medical History:   Diagnosis Date     Anemia     mild gastropathy on EGD 2008; neg pill cam     Atypical ductal hyperplasia of both breasts     Has had biopsies and MRI     Fibroid uterus      High cholesterol      History of hematuria 2008    Cystoscopy for recurrent UTIs; unremarkable renal US. Also recurrent UTIs as child and pyelonephritis.      History of hormone replacement therapy     after JASBIR with BSO     HTN, goal below 140/90      Hx of bone density study 10/16/2006    Normal     Hypothyroidism      Insomnia     periodic - prn clonazepam helpful a couple times per month     Lipid screening 07/21/2015    Tchol 128, , HDL 53, LDL 53 outside records --> based on our labs off of atorvastatin 10yr ASCVD risk 9.4% in Oct 2015     Major depressive disorder, single episode in full remission (H) 2007     Osteopenia     Mild left hip July 2016     Prediabetes     Metformin in the past     Rotator cuff injury, left, sequela     MRI Jan 2015 and had PT; High-grade complete or near complete tear of the supraspinatus tendon and anterior fibers of the infraspinatus tendon. 1/3 of the infraspinatus tendon appears  involved.      TBI (traumatic brain injury) (H)     As a child     Tubular adenoma of colon  &         Past Surgical History:   Procedure Laterality Date     ANKLE SURGERY       BREAST BIOPSY, RT/LT      Many     C/SECTION, LOW TRANSVERSE  1968     CAPSULE/PILL CAM ENDOSCOPY  2014    EGD prior; capsule was negative      COLONOSCOPY      , , , 2013     COLONOSCOPY N/A 2019    Procedure: COLONOSCOPY, WITH POLYPECTOMY AND BIOPSY;  Surgeon: Pepito Romero MD;  Location: [facility]     HYSTERECTOMY, PAP NO LONGER INDICATED       JASBIR with BSO  2000    benign fibroids     TUBAL LIGATION         Social History     Tobacco Use     Smoking status: Former Smoker     Packs/day: 0.50     Years: 9.00     Pack years: 4.50     Types: Cigarettes     Start date: 1965     Quit date: 4/15/1975     Years since quittin.2     Smokeless tobacco: Never Used     Tobacco comment: social smoker - only smoked when with another smoker   Substance Use Topics     Alcohol use: Yes     Comment: moderate 1 or 2 beers or wine 2x /wk       Family History   Problem Relation Age of Onset     Colon Cancer Mother 70         age 81     Diabetes Mother         After age 75     Macular Degeneration Mother      Glaucoma Mother      Cerebrovascular Disease Mother      Heart Failure Mother      Hypertension Mother      Hyperlipidemia Mother      Other - See Comments Father 87        Frailty, pneumonia, fractures, failure to thrive     Osteoporosis Father         diagnosed in his 80s     Colon Polyps Daughter         Tubular adenoma     Deep Vein Thrombosis Brother      Hyperlipidemia Brother      Hypertension Brother      Hypertension Brother         both brothers     Hyperlipidemia Brother         both brothers     Depression Brother      Anxiety Disorder Brother      Mental Illness Brother         on Haldol before death from lung cancer     Lung Cancer Brother         long time heavy smoker     Breast  "Cancer Other         radical mastecomy in her 40s     Colon Cancer Other      Other Cancer Other         several aunts various kinds     Other Cancer Other         throat cancer     Other Cancer Paternal Grandmother         throat cancer       REVIEW OF SYSTEMS:     5 point ROS negative except as noted above in HPI, including Gen., Resp., CV, GI &  system review.      PHYSICAL EXAM:   BP (!) 152/90   Pulse 77   Resp 16   Ht 1.753 m (5' 9\")   Wt 87.1 kg (192 lb)   LMP  (LMP Unknown)   SpO2 100%   BMI 28.35 kg/m   Estimated body mass index is 28.35 kg/m  as calculated from the following:    Height as of this encounter: 1.753 m (5' 9\").    Weight as of this encounter: 87.1 kg (192 lb).   GENERAL APPEARANCE: healthy, alert and no distress  MENTAL STATUS: alert  AIRWAY EXAM: Mallampatti Class I (visualization of the soft palate, fauces, uvula, anterior and posterior pillars)  RESP: lungs clear to auscultation - no rales, rhonchi or wheezes  CV: regular rates and rhythm      DIAGNOSTICS:    Not indicated      IMPRESSION   ASA Class 2 - Mild systemic disease        PLAN:       Plan for colonoscopy. We discussed the risks, benefits and alternatives and the patient wished to proceed.    The above has been forwarded to the consulting provider.      Signed Electronically by: Rodrigo Dunbar MD,MD  June 16, 2022      [facility]  Ph: [phone] Fax: [phone]    " Advancement-Rotation Flap Text: The defect edges were debeveled with a #15 scalpel blade.  Given the location of the defect, shape of the defect and the proximity to free margins an advancement-rotation flap was deemed most appropriate.  Using a sterile surgical marker, an appropriate flap was drawn incorporating the defect and placing the expected incisions within the relaxed skin tension lines where possible. The area thus outlined was incised deep to adipose tissue with a #15 scalpel blade.  The skin margins were undermined to an appropriate distance in all directions utilizing iris scissors.

## 2022-06-21 ENCOUNTER — TELEPHONE (OUTPATIENT)
Dept: ENDOCRINOLOGY | Facility: CLINIC | Age: 72
End: 2022-06-21

## 2022-06-21 DIAGNOSIS — E03.9 HYPOTHYROIDISM, UNSPECIFIED TYPE: ICD-10-CM

## 2022-06-21 DIAGNOSIS — E03.9 HYPOTHYROIDISM, UNSPECIFIED TYPE: Primary | ICD-10-CM

## 2022-06-21 NOTE — TELEPHONE ENCOUNTER
LOV 3- TL virtual  No future OV scheduled    Rona x1 given    SIG/pharm note given    Endo team: please call patient to schedule    RT Symone (R)

## 2022-06-21 NOTE — TELEPHONE ENCOUNTER
LVM for PT to call [phone] to schedule f/u appt with Dr. Cutler, needed for any more refills.

## 2022-06-21 NOTE — TELEPHONE ENCOUNTER
[facility] Call Center    Phone Message    May a detailed message be left on voicemail: yes     Reason for Call: Order(s): Other:   Reason for requested: labs for upcoming virtual appt    Date needed: whenever possible, if needed    Provider name: Omayra PLATT wasn't sure if labs would be needed for upcoming appt 8/30/22. Please review.      Action Taken: Other: endo    Travel Screening: Not Applicable

## 2022-06-23 RX ORDER — LEVOTHYROXINE SODIUM 88 UG/1
88 TABLET ORAL DAILY
Qty: 30 TABLET | Refills: 3 | Status: SHIPPED | OUTPATIENT
Start: 2022-06-23 | End: 2022-08-30

## 2022-06-24 ENCOUNTER — TRANSFERRED RECORDS (OUTPATIENT)
Dept: FAMILY MEDICINE | Facility: CLINIC | Age: 72
End: 2022-06-24

## 2022-06-27 ENCOUNTER — LAB (OUTPATIENT)
Dept: LAB | Facility: CLINIC | Age: 72
End: 2022-06-27
Payer: MEDICARE

## 2022-06-27 DIAGNOSIS — E03.9 HYPOTHYROIDISM, UNSPECIFIED TYPE: ICD-10-CM

## 2022-06-27 PROCEDURE — 84443 ASSAY THYROID STIM HORMONE: CPT

## 2022-06-27 PROCEDURE — 36415 COLL VENOUS BLD VENIPUNCTURE: CPT

## 2022-06-27 PROCEDURE — 84439 ASSAY OF FREE THYROXINE: CPT

## 2022-06-30 LAB
T4 FREE SERPL-MCNC: 1.27 NG/DL (ref 0.76–1.46)
TSH SERPL DL<=0.005 MIU/L-ACNC: 0.8 MU/L (ref 0.4–4)

## 2022-07-06 ASSESSMENT — PATIENT HEALTH QUESTIONNAIRE - PHQ9
SUM OF ALL RESPONSES TO PHQ QUESTIONS 1-9: 3
SUM OF ALL RESPONSES TO PHQ QUESTIONS 1-9: 3

## 2022-07-08 ENCOUNTER — VIRTUAL VISIT (OUTPATIENT)
Dept: FAMILY MEDICINE | Facility: CLINIC | Age: 72
End: 2022-07-08
Payer: MEDICARE

## 2022-07-08 DIAGNOSIS — R42 DIZZINESS: Primary | ICD-10-CM

## 2022-07-08 DIAGNOSIS — I10 ESSENTIAL HYPERTENSION, BENIGN: ICD-10-CM

## 2022-07-08 DIAGNOSIS — I10 ESSENTIAL HYPERTENSION, BENIGN: Chronic | ICD-10-CM

## 2022-07-08 PROCEDURE — 99213 OFFICE O/P EST LOW 20 MIN: CPT | Mod: 95 | Performed by: INTERNAL MEDICINE

## 2022-07-08 RX ORDER — AMLODIPINE BESYLATE 10 MG/1
5 TABLET ORAL DAILY
Qty: 90 TABLET | Refills: 3 | COMMUNITY
Start: 2022-07-08 | End: 2022-08-31

## 2022-07-08 ASSESSMENT — PATIENT HEALTH QUESTIONNAIRE - PHQ9
SUM OF ALL RESPONSES TO PHQ QUESTIONS 1-9: 3
10. IF YOU CHECKED OFF ANY PROBLEMS, HOW DIFFICULT HAVE THESE PROBLEMS MADE IT FOR YOU TO DO YOUR WORK, TAKE CARE OF THINGS AT HOME, OR GET ALONG WITH OTHER PEOPLE: NOT DIFFICULT AT ALL

## 2022-07-08 NOTE — PROGRESS NOTES
"Janie is a 72 year old who is being evaluated via a billable telephone visit.      Assessment & Plan     Dizziness  Essential hypertension, benign  Positional. No alarm signs. Discussed trial 1/2 dose of amlodipine (so she will decrease from 10mg to 5mg). Monitor. Notify me if worsening or not improving.         Return in about 4 weeks (around 8/5/2022) for Follow up, with me, sooner if symptoms worsen or do not improve.    Jade Davis DO  [facility]    Subjective   Janie is a 72 year old, presenting for the following health issues:  No chief complaint on file.      HPI     Janie presents virtually via telephone for dizziness.   States intermittent, usually with rolling over or sitting to standing.   Was monitoring her BP when started on medications and states was \"normal\"  Admits does not have recent readings.   No other alarm signs such as cp/sob/ha/focal weakness/nausea/vomiting    Review of Systems   Constitutional, HEENT, cardiovascular, pulmonary, gi and gu systems are negative, except as otherwise noted.      Objective           Vitals:  No vitals were obtained today due to virtual visit.    Physical Exam   GEN: No acute distress  RESP: No audible increased work of breathing. Patient speaking in full sentences without distress.  PSYCH: pleasant  Exam otherwise limited due to virtual platform          Phone call duration: 10 minutes    .  ..    "

## 2022-08-05 ENCOUNTER — VIRTUAL VISIT (OUTPATIENT)
Dept: FAMILY MEDICINE | Facility: CLINIC | Age: 72
End: 2022-08-05
Payer: MEDICARE

## 2022-08-05 DIAGNOSIS — U07.1 INFECTION DUE TO 2019 NOVEL CORONAVIRUS: Primary | ICD-10-CM

## 2022-08-05 PROCEDURE — 99213 OFFICE O/P EST LOW 20 MIN: CPT | Mod: CS | Performed by: PHYSICIAN ASSISTANT

## 2022-08-05 NOTE — PROGRESS NOTES
"Janie is a 72 year old who is being evaluated via a billable video visit.      How would you like to obtain your AVS? MyChart  If the video visit is dropped, the invitation should be resent by: Text to cell phone: [phone]  Will anyone else be joining your video visit? No          Assessment & Plan     Infection due to 2019 novel coronavirus  Start paxlovid. HOLD lovastatin, clonazepam, amlodipine 8 days. Patient agrees with plan. Doing well with symptoms currently. I discussed with the patient risks and benefits of the new medication prescribed including potential side effects.  The patient had opportunity to ask questions and is comfortable with and interested in medications as prescribed.   - nirmatrelvir and ritonavir (PAXLOVID) therapy pack; Take 2 tablets by mouth 2 times daily for 5 days (Take 1 tablet of Nirmatelvir and 1 tablet of Ritonavir twice daily for 5 days)             BMI:   Estimated body mass index is 28.35 kg/m  as calculated from the following:    Height as of 6/16/22: 1.753 m (5' 9\").    Weight as of 6/16/22: 87.1 kg (192 lb).           Return in about 1 week (around 8/12/2022) for worsening symptoms or failure to improve..    Tamar Barahona PA-C  [facility]    Marixa Lima is a 72 year old, presenting for the following health issues:  Suspected Covid      History of Present Illness       Reason for visit:  COVID19 positive test  Symptom onset:  3-7 days ago  Symptoms include:  Sore throat, dry cough, body aches  Symptom intensity:  Moderate  Symptom progression:  Staying the same  Had these symptoms before:  No  What makes it worse:  LACK OF TREATMENT!!  What makes it better:  Warm milk with honey makes my sore throat betterShe consumes 2 sweetened beverage(s) daily.She exercises with enough effort to increase her heart rate 10 to 19 minutes per day.  She exercises with enough effort to increase her heart rate 3 or less days per week.   She is taking " medications regularly.         COVID-19 Symptom Review- At home test covid test positive on 8/4/22    How many days ago did these symptoms start? X5 days    Are any of the following symptoms significant for you?    New or worsening difficulty breathing? No    Worsening cough? Yes, it's a dry cough.     Fever or chills? Yes, the highest temperature was 101.6    Headache: No    Sore throat: YES    Chest pain: No    Diarrhea: No    Body aches? YES    What treatments has patient tried? Naproxen for pain   Does patient live in a nursing home, group home, or shelter? No  Does patient have a way to get food/medications during quarantined? Yes, I have a friend or family member who can help me.    Cough is worse at night. Nasal congestion. Doing ok without taking medications for symptoms. No shortness of breath.                    Review of Systems   Constitutional, HEENT, cardiovascular, pulmonary, gi and gu systems are negative, except as otherwise noted.      Objective           Vitals:  No vitals were obtained today due to virtual visit.    Physical Exam   GENERAL: Healthy, alert and no distress  EYES: Eyes grossly normal to inspection.  No discharge or erythema, or obvious scleral/conjunctival abnormalities.  RESP: No audible wheeze, cough, or visible cyanosis.  No visible retractions or increased work of breathing.    SKIN: Visible skin clear. No significant rash, abnormal pigmentation or lesions.  NEURO: Cranial nerves grossly intact.  Mentation and speech appropriate for age.  PSYCH: Mentation appears normal, affect normal/bright, judgement and insight intact, normal speech and appearance well-groomed.                Video-Visit Details    Video Start Time: 11:30 AM    Type of service:  Video Visit    Video End Time:11:42 AM    Originating Location (pt. Location): Home    Distant Location (provider location):  [facility]     Platform used for Video Visit: [employer]    .  ..

## 2022-08-17 DIAGNOSIS — E78.5 HYPERLIPIDEMIA, UNSPECIFIED HYPERLIPIDEMIA TYPE: Chronic | ICD-10-CM

## 2022-08-18 RX ORDER — LOVASTATIN 40 MG
40 TABLET ORAL AT BEDTIME
Qty: 90 TABLET | Refills: 2 | Status: SHIPPED | OUTPATIENT
Start: 2022-08-18 | End: 2022-09-27

## 2022-08-18 NOTE — TELEPHONE ENCOUNTER
Routing refill request to provider for review/approval because:  Drug interaction warning    Gisselle Suárez RN BSN MSN  [facility]

## 2022-08-26 ASSESSMENT — ENCOUNTER SYMPTOMS
CONSTIPATION: 0
SHORTNESS OF BREATH: 0
ARTHRALGIAS: 0
CHILLS: 0
FEVER: 0
JOINT SWELLING: 0
HEADACHES: 0
FREQUENCY: 0
HEMATOCHEZIA: 0
WEAKNESS: 0
NAUSEA: 0
COUGH: 0
SORE THROAT: 0
MYALGIAS: 0
DIZZINESS: 0
DIARRHEA: 0
EYE PAIN: 0
DYSURIA: 0
HEARTBURN: 0
NERVOUS/ANXIOUS: 0
ABDOMINAL PAIN: 0
BREAST MASS: 0
PARESTHESIAS: 0
HEMATURIA: 0
PALPITATIONS: 0

## 2022-08-26 ASSESSMENT — PATIENT HEALTH QUESTIONNAIRE - PHQ9
SUM OF ALL RESPONSES TO PHQ QUESTIONS 1-9: 7
SUM OF ALL RESPONSES TO PHQ QUESTIONS 1-9: 7
10. IF YOU CHECKED OFF ANY PROBLEMS, HOW DIFFICULT HAVE THESE PROBLEMS MADE IT FOR YOU TO DO YOUR WORK, TAKE CARE OF THINGS AT HOME, OR GET ALONG WITH OTHER PEOPLE: SOMEWHAT DIFFICULT

## 2022-08-26 ASSESSMENT — ACTIVITIES OF DAILY LIVING (ADL): CURRENT_FUNCTION: NO ASSISTANCE NEEDED

## 2022-08-30 ENCOUNTER — VIRTUAL VISIT (OUTPATIENT)
Dept: ENDOCRINOLOGY | Facility: CLINIC | Age: 72
End: 2022-08-30
Payer: MEDICARE

## 2022-08-30 DIAGNOSIS — E03.9 HYPOTHYROIDISM, UNSPECIFIED TYPE: ICD-10-CM

## 2022-08-30 PROCEDURE — 99213 OFFICE O/P EST LOW 20 MIN: CPT | Mod: 95 | Performed by: INTERNAL MEDICINE

## 2022-08-30 RX ORDER — LEVOTHYROXINE SODIUM 88 UG/1
88 TABLET ORAL DAILY
Qty: 90 TABLET | Refills: 3 | Status: SHIPPED | OUTPATIENT
Start: 2022-08-30 | End: 2023-10-04

## 2022-08-30 NOTE — LETTER
8/30/2022         RE: Janie Cutlerr  [address]  [facility] [address]        Dear Colleague,    Thank you for referring your patient, Janie De Leon, to the [facility]. Please see a copy of my visit note below.    Patient is being evaluated via a billable video visit.      How would you like to obtain your AVS? Reviewed verbally  If the video visit is dropped, the invitation should be resent by cell phone  Will anyone else be joining your video visit? no      Video Start Time:  1:35 pm    Video-Visit Details    Type of service:  Video Visit    Video End Time: 1:42 pm    Originating Location (pt. Location): home    Distant Location (provider location):  [facility]/[place]    Platform used for Video Visit:  [employer]        Recent issues:  Hypothyroidism follow-up evaluation  Had COVID-19 URI with ST and cough, symptoms resolved and feeling well now  Reviewed medical history from patient and Epic chart record        Initial diagnosis of hypothyroidism  Had seen Dr. SUDHA Fierro, then Dr. SUDHA La/[facility] clinic  Began treatment with levothyroxine medication  2020. Recalls having sweating spells while on thyroid medication,    Taking levothyroxine 0.1 mg dose, then decided to cut tablets in half for ~4 months   Noticed less sweating symptom  12/7/20. Subsequent medical evaluation with Dr. FRANSISCA Davis  Lab tests showed mildly elevated TSH level.  Dose increase back to full levothyroxine 0.1 mg tablet daily, dosing before breakfast meal    Previous  thyroid tests include:     Lab Test 01/22/21  1005 12/07/20  1029 08/27/20  0943 07/30/19  0848 07/30/18  1053 04/18/18  0800   TSH 4.81* 4.87* 7.17* 4.92* 2.97 9.41*   T4 0.97 0.88 0.99 0.97  --  0.86       Additional health history:  Previous thyroid nodules: none  Neck radiation treatments: none  Fam Hx thyroid disease:  None      3/29/21. Initial thyroid evaluation with me at [place]  Reviewed  health history and thyroid issues  Continued levothyroxine medication treatment plan    Recent [facility] labs include:  Lab Results   Component Value Date    TSH 0.80 06/27/2022    T4 1.27 06/27/2022     (H) 05/28/2021     Current dose:  Levothyroxine 0.088 mg daily        Lives in [place], [place]  Sees Dr. Jade Davis/[facility] clinic for general medicine evaluations.    PMH/PSH:  Past Medical History:   Diagnosis Date     Anemia     mild gastropathy on EGD 2008; neg pill cam     Atypical ductal hyperplasia of both breasts     Has had biopsies and MRI     Fibroid uterus      High cholesterol      History of hematuria 2008    Cystoscopy for recurrent UTIs; unremarkable renal US. Also recurrent UTIs as child and pyelonephritis.      History of hormone replacement therapy     after JASBIR with BSO     HTN, goal below 140/90      Hx of bone density study 10/16/2006    Normal     Hypothyroidism      Insomnia     periodic - prn clonazepam helpful a couple times per month     Lipid screening 07/21/2015    Tchol 128, , HDL 53, LDL 53 outside records --> based on our labs off of atorvastatin 10yr ASCVD risk 9.4% in Oct 2015     Major depressive disorder, single episode in full remission (H) 2007     Osteopenia     Mild left hip July 2016     Prediabetes     Metformin in the past     Rotator cuff injury, left, sequela     MRI Jan 2015 and had PT; High-grade complete or near complete tear of the supraspinatus tendon and anterior fibers of the infraspinatus tendon. 1/3 of the infraspinatus tendon appears involved.      TBI (traumatic brain injury) (H)     As a child     Tubular adenoma of colon 2013 & 2016     Past Surgical History:   Procedure Laterality Date     ANKLE SURGERY  1976     BREAST BIOPSY, RT/LT      Many     C/SECTION, LOW TRANSVERSE  1968     CAPSULE/PILL CAM ENDOSCOPY  2/18/2014    EGD prior; capsule was negative      COLONOSCOPY      1999, 2003, 2008, 6/6/2013     COLONOSCOPY N/A 6/20/2019     Procedure: COLONOSCOPY, WITH POLYPECTOMY AND BIOPSY;  Surgeon: Pepito Romero MD;  Location:  GI     COLONOSCOPY N/A 2022    Procedure: COLONOSCOPY;  Surgeon: Rodrigo Dunbar MD;  Location:  GI     HYSTERECTOMY, PAP NO LONGER INDICATED       JASBIR with BSO  2000    benign fibroids     TUBAL LIGATION         Family Hx:  Family History   Problem Relation Age of Onset     Colon Cancer Mother 70         age 81     Diabetes Mother         After age 75     Macular Degeneration Mother      Glaucoma Mother      Cerebrovascular Disease Mother      Heart Failure Mother      Hypertension Mother      Hyperlipidemia Mother      Other - See Comments Father 87        Frailty, pneumonia, fractures, failure to thrive     Osteoporosis Father         diagnosed in his 80s     Colon Polyps Daughter         Tubular adenoma     Deep Vein Thrombosis Brother      Hyperlipidemia Brother      Hypertension Brother      Hypertension Brother         both brothers     Hyperlipidemia Brother         both brothers     Depression Brother      Anxiety Disorder Brother      Mental Illness Brother         on Haldol before death from lung cancer     Lung Cancer Brother         long time heavy smoker     Breast Cancer Other         radical mastecomy in her 40s     Colon Cancer Other      Other Cancer Other         several aunts various kinds     Other Cancer Other         throat cancer     Other Cancer Paternal Grandmother         throat cancer         Social Hx:  Social History     Socioeconomic History     Marital status:      Spouse name: Not on file     Number of children: Not on file     Years of education: Not on file     Highest education level: Not on file   Occupational History     Not on file   Tobacco Use     Smoking status: Former Smoker     Packs/day: 0.50     Years: 9.00     Pack years: 4.50     Types: Cigarettes     Start date: 1965     Quit date: 4/15/1975     Years since quittin.4     Smokeless  tobacco: Never Used     Tobacco comment: social smoker - only smoked when with another smoker   Vaping Use     Vaping Use: Never used   Substance and Sexual Activity     Alcohol use: Yes     Comment: moderate 1 or 2 beers or wine 2x /wk     Drug use: No     Sexual activity: Not Currently     Partners: Male     Birth control/protection: None     Comment: post menopausal   Other Topics Concern     Parent/sibling w/ CABG, MI or angioplasty before 65F 55M? Not Asked   Social History Narrative     Not on file     Social Determinants of Health     Financial Resource Strain: Not on file   Food Insecurity: Not on file   Transportation Needs: Not on file   Physical Activity: Not on file   Stress: Not on file   Social Connections: Not on file   Intimate Partner Violence: Not on file   Housing Stability: Not on file          MEDICATIONS:  has a current medication list which includes the following prescription(s): amlodipine, cholecalciferol, clonazepam, cranberry, duloxetine, levothyroxine, lovastatin, olmesartan, and psyllium.    ROS:     ROS: 10 point ROS neg other than the symptoms noted above in the HPI.    GENERAL: mild fatigue, wt stable; denies fevers, chills, night sweats.   HEENT: no dysphagia, odonophagia, diplopia, neck pain  THYROID:  no apparent hyper or hypothyroid symptoms  CV: no chest pain, pressure, palpitations  LUNGS: no SOB, PIERSON, cough, wheezing   ABDOMEN: constipation, some reflux; no diarrhea, abdominal pain  EXTREMITIES: no rashes, ulcers, edema  NEUROLOGY: no headaches, denies changes in vision, tingling, extremitiy numbness   MSK: no muscle aches or pains, weakness  SKIN: no rashes or lesions  : no menses since hysterectomy age 51  PSYCH:  stable mood, no significant anxiety or depression  ENDOCRINE: sweating spells, as noted    Physical Exam (visual exam)  VS:  no vital signs taken for video visit  CONSTITUTIONAL: healthy, alert and NAD, well dressed, answering questions appropriately  ENT: no nose  swelling or nasal discharge, mouth redness or gum changes.  EYES: eyes grossly normal to inspection, conjunctivae and sclerae normal, no exophthalmos or proptosis  THYROID:  no apparent nodules or goiter  LUNGS: no audible wheeze, cough or visible cyanosis, no visible retractions or increased work of breathing  ABDOMEN: abdomen not evaluated  EXTREMITIES: no hand tremors, limited exam  NEUROLOGY: CN grossly intact, mentation intact and speech normal   SKIN:  no apparent skin lesions, rash, or edema with visualized skin appearance  PSYCH: mentation appears normal, affect normal/bright, judgement and insight intact,   normal speech and appearance well groomed      LABS:    All pertinent notes, labs, and images personally reviewed by me.     A/P:  Encounter Diagnosis   Name Primary?     Hypothyroidism, unspecified type        Comments:  Reviewed health history and hypothyroidism issues.  Recent preappt thyroid lab tests normal  Reviewed and interpreted tests that I previously ordered.   Ordered appropriate tests for the endocrinology disease management.    Management options discussed and implemented after shared medical decision making with the patient.  Hypothyroidism problem is chronic-stable    Plan:  Discussed general issues with the hypothyroidism diagnosis and management  Reviewed thyroid gland anatomy and hormone physiology  Discussed lab tests used to assess patient thyroid hormone levels  Reviewed treatment options including levothyroxine medication    Recommend:  Continue the current levothyroxine 0.088 mg daily dose plan  Updated levothyroxine Rx sent to pharmacy  Monitor for symptom changes  Repeat preappt thyroid lab tests in 8/2023   Lab orders placed  No thyroid U/S imaging needed at this time  Contact me if questions about thyroid plan.     Addressed patient questions today    There are no Patient Instructions on file for this visit.    Future labs ordered today:   Orders Placed This Encounter    Procedures     TSH     T4 free     Radiology/Consults ordered today: None    Total time spent in with the patient evaluation:  7 min  Additional time spent reviewing pertinent lab tests and chart notes, and documentation:  6 min    Follow-up:  8/2023    NIMO Cutler MD, MS  Endocrinology  [facility]                  Again, thank you for allowing me to participate in the care of your patient.        Sincerely,        Horace Cutler MD

## 2022-08-30 NOTE — PROGRESS NOTES
Patient is being evaluated via a billable video visit.      How would you like to obtain your AVS? Reviewed verbally  If the video visit is dropped, the invitation should be resent by cell phone  Will anyone else be joining your video visit? no      Video Start Time:  1:35 pm    Video-Visit Details    Type of service:  Video Visit    Video End Time: 1:42 pm    Originating Location (pt. Location): home    Distant Location (provider location):  [facility]/[place]    Platform used for Video Visit:  [employer]        Recent issues:  Hypothyroidism follow-up evaluation  Had COVID-19 URI with ST and cough, symptoms resolved and feeling well now  Reviewed medical history from patient and Epic chart record        Initial diagnosis of hypothyroidism  Had seen Dr. SUDHA Fierro, then Dr. SUDHA La/[facility] clinic  Began treatment with levothyroxine medication  2020. Recalls having sweating spells while on thyroid medication,    Taking levothyroxine 0.1 mg dose, then decided to cut tablets in half for ~4 months   Noticed less sweating symptom  12/7/20. Subsequent medical evaluation with Dr. FRANSISCA Davis  Lab tests showed mildly elevated TSH level.  Dose increase back to full levothyroxine 0.1 mg tablet daily, dosing before breakfast meal    Previous [facility] thyroid tests include:     Lab Test 01/22/21  1005 12/07/20  1029 08/27/20  0943 07/30/19  0848 07/30/18  1053 04/18/18  0800   TSH 4.81* 4.87* 7.17* 4.92* 2.97 9.41*   T4 0.97 0.88 0.99 0.97  --  0.86       Additional health history:  Previous thyroid nodules: none  Neck radiation treatments: none  Fam Hx thyroid disease:  None      3/29/21. Initial thyroid evaluation with me at [place]  Reviewed health history and thyroid issues  Continued levothyroxine medication treatment plan    Recent  labs include:  Lab Results   Component Value Date    TSH 0.80 06/27/2022    T4 1.27 06/27/2022     (H) 05/28/2021     Current dose:  Levothyroxine 0.088 mg  daily        Lives in [place], [place]  Sees Dr. Jade Davis/[facility] for general medicine evaluations.    PMH/PSH:  Past Medical History:   Diagnosis Date     Anemia     mild gastropathy on EGD 2008; neg pill cam     Atypical ductal hyperplasia of both breasts     Has had biopsies and MRI     Fibroid uterus      High cholesterol      History of hematuria 2008    Cystoscopy for recurrent UTIs; unremarkable renal US. Also recurrent UTIs as child and pyelonephritis.      History of hormone replacement therapy     after JASBIR with BSO     HTN, goal below 140/90      Hx of bone density study 10/16/2006    Normal     Hypothyroidism      Insomnia     periodic - prn clonazepam helpful a couple times per month     Lipid screening 07/21/2015    Tchol 128, , HDL 53, LDL 53 outside records --> based on our labs off of atorvastatin 10yr ASCVD risk 9.4% in Oct 2015     Major depressive disorder, single episode in full remission (H) 2007     Osteopenia     Mild left hip July 2016     Prediabetes     Metformin in the past     Rotator cuff injury, left, sequela     MRI Jan 2015 and had PT; High-grade complete or near complete tear of the supraspinatus tendon and anterior fibers of the infraspinatus tendon. 1/3 of the infraspinatus tendon appears involved.      TBI (traumatic brain injury) (H)     As a child     Tubular adenoma of colon 2013 & 2016     Past Surgical History:   Procedure Laterality Date     ANKLE SURGERY  1976     BREAST BIOPSY, RT/LT      Many     C/SECTION, LOW TRANSVERSE  1968     CAPSULE/PILL CAM ENDOSCOPY  2/18/2014    EGD prior; capsule was negative      COLONOSCOPY      1999, 2003, 2008, 6/6/2013     COLONOSCOPY N/A 6/20/2019    Procedure: COLONOSCOPY, WITH POLYPECTOMY AND BIOPSY;  Surgeon: Pepito Romero MD;  Location:  GI     COLONOSCOPY N/A 6/16/2022    Procedure: COLONOSCOPY;  Surgeon: Rodrigo Dunbar MD;  Location:  GI     HYSTERECTOMY, PAP NO LONGER INDICATED       JASBIR  with BSO  2000    benign fibroids     TUBAL LIGATION         Family Hx:  Family History   Problem Relation Age of Onset     Colon Cancer Mother 70         age 81     Diabetes Mother         After age 75     Macular Degeneration Mother      Glaucoma Mother      Cerebrovascular Disease Mother      Heart Failure Mother      Hypertension Mother      Hyperlipidemia Mother      Other - See Comments Father 87        Frailty, pneumonia, fractures, failure to thrive     Osteoporosis Father         diagnosed in his 80s     Colon Polyps Daughter         Tubular adenoma     Deep Vein Thrombosis Brother      Hyperlipidemia Brother      Hypertension Brother      Hypertension Brother         both brothers     Hyperlipidemia Brother         both brothers     Depression Brother      Anxiety Disorder Brother      Mental Illness Brother         on Haldol before death from lung cancer     Lung Cancer Brother         long time heavy smoker     Breast Cancer Other         radical mastecomy in her 40s     Colon Cancer Other      Other Cancer Other         several aunts various kinds     Other Cancer Other         throat cancer     Other Cancer Paternal Grandmother         throat cancer         Social Hx:  Social History     Socioeconomic History     Marital status:      Spouse name: Not on file     Number of children: Not on file     Years of education: Not on file     Highest education level: Not on file   Occupational History     Not on file   Tobacco Use     Smoking status: Former Smoker     Packs/day: 0.50     Years: 9.00     Pack years: 4.50     Types: Cigarettes     Start date: 1965     Quit date: 4/15/1975     Years since quittin.4     Smokeless tobacco: Never Used     Tobacco comment: social smoker - only smoked when with another smoker   Vaping Use     Vaping Use: Never used   Substance and Sexual Activity     Alcohol use: Yes     Comment: moderate 1 or 2 beers or wine 2x /wk     Drug use: No      Sexual activity: Not Currently     Partners: Male     Birth control/protection: None     Comment: post menopausal   Other Topics Concern     Parent/sibling w/ CABG, MI or angioplasty before 65F 55M? Not Asked   Social History Narrative     Not on file     Social Determinants of Health     Financial Resource Strain: Not on file   Food Insecurity: Not on file   Transportation Needs: Not on file   Physical Activity: Not on file   Stress: Not on file   Social Connections: Not on file   Intimate Partner Violence: Not on file   Housing Stability: Not on file          MEDICATIONS:  has a current medication list which includes the following prescription(s): amlodipine, cholecalciferol, clonazepam, cranberry, duloxetine, levothyroxine, lovastatin, olmesartan, and psyllium.    ROS:     ROS: 10 point ROS neg other than the symptoms noted above in the HPI.    GENERAL: mild fatigue, wt stable; denies fevers, chills, night sweats.   HEENT: no dysphagia, odonophagia, diplopia, neck pain  THYROID:  no apparent hyper or hypothyroid symptoms  CV: no chest pain, pressure, palpitations  LUNGS: no SOB, PIERSON, cough, wheezing   ABDOMEN: constipation, some reflux; no diarrhea, abdominal pain  EXTREMITIES: no rashes, ulcers, edema  NEUROLOGY: no headaches, denies changes in vision, tingling, extremitiy numbness   MSK: no muscle aches or pains, weakness  SKIN: no rashes or lesions  : no menses since hysterectomy age 51  PSYCH:  stable mood, no significant anxiety or depression  ENDOCRINE: sweating spells, as noted    Physical Exam (visual exam)  VS:  no vital signs taken for video visit  CONSTITUTIONAL: healthy, alert and NAD, well dressed, answering questions appropriately  ENT: no nose swelling or nasal discharge, mouth redness or gum changes.  EYES: eyes grossly normal to inspection, conjunctivae and sclerae normal, no exophthalmos or proptosis  THYROID:  no apparent nodules or goiter  LUNGS: no audible wheeze, cough or visible cyanosis,  no visible retractions or increased work of breathing  ABDOMEN: abdomen not evaluated  EXTREMITIES: no hand tremors, limited exam  NEUROLOGY: CN grossly intact, mentation intact and speech normal   SKIN:  no apparent skin lesions, rash, or edema with visualized skin appearance  PSYCH: mentation appears normal, affect normal/bright, judgement and insight intact,   normal speech and appearance well groomed      LABS:    All pertinent notes, labs, and images personally reviewed by me.     A/P:  Encounter Diagnosis   Name Primary?     Hypothyroidism, unspecified type        Comments:  Reviewed health history and hypothyroidism issues.  Recent preappt thyroid lab tests normal  Reviewed and interpreted tests that I previously ordered.   Ordered appropriate tests for the endocrinology disease management.    Management options discussed and implemented after shared medical decision making with the patient.  Hypothyroidism problem is chronic-stable    Plan:  Discussed general issues with the hypothyroidism diagnosis and management  Reviewed thyroid gland anatomy and hormone physiology  Discussed lab tests used to assess patient thyroid hormone levels  Reviewed treatment options including levothyroxine medication    Recommend:  Continue the current levothyroxine 0.088 mg daily dose plan  Updated levothyroxine Rx sent to pharmacy  Monitor for symptom changes  Repeat preappt thyroid lab tests in 8/2023   Lab orders placed  No thyroid U/S imaging needed at this time  Contact me if questions about thyroid plan.     Addressed patient questions today    There are no Patient Instructions on file for this visit.    Future labs ordered today:   Orders Placed This Encounter   Procedures     TSH     T4 free     Radiology/Consults ordered today: None    Total time spent in with the patient evaluation:  7 min  Additional time spent reviewing pertinent lab tests and chart notes, and documentation:  6 min    Follow-up:  8/2023    NIMO  Omayra ROGERS, MS  Endocrinology  [facility]

## 2022-08-31 ENCOUNTER — OFFICE VISIT (OUTPATIENT)
Dept: FAMILY MEDICINE | Facility: CLINIC | Age: 72
End: 2022-08-31
Payer: MEDICARE

## 2022-08-31 VITALS
DIASTOLIC BLOOD PRESSURE: 88 MMHG | WEIGHT: 194 LBS | SYSTOLIC BLOOD PRESSURE: 136 MMHG | BODY MASS INDEX: 28.73 KG/M2 | TEMPERATURE: 97 F | RESPIRATION RATE: 16 BRPM | HEART RATE: 97 BPM | HEIGHT: 69 IN

## 2022-08-31 DIAGNOSIS — M85.80 OSTEOPENIA, UNSPECIFIED LOCATION: Chronic | ICD-10-CM

## 2022-08-31 DIAGNOSIS — G47.00 INSOMNIA, UNSPECIFIED TYPE: Chronic | ICD-10-CM

## 2022-08-31 DIAGNOSIS — R73.03 PREDIABETES: Chronic | ICD-10-CM

## 2022-08-31 DIAGNOSIS — E03.9 HYPOTHYROIDISM, UNSPECIFIED TYPE: Chronic | ICD-10-CM

## 2022-08-31 DIAGNOSIS — F32.5 MAJOR DEPRESSIVE DISORDER, SINGLE EPISODE IN FULL REMISSION (H): Chronic | ICD-10-CM

## 2022-08-31 DIAGNOSIS — Z00.00 ROUTINE HISTORY AND PHYSICAL EXAMINATION OF ADULT: Primary | ICD-10-CM

## 2022-08-31 DIAGNOSIS — N18.31 STAGE 3A CHRONIC KIDNEY DISEASE (H): Chronic | ICD-10-CM

## 2022-08-31 DIAGNOSIS — H91.90 PERCEIVED HEARING CHANGES: ICD-10-CM

## 2022-08-31 DIAGNOSIS — I10 ESSENTIAL HYPERTENSION, BENIGN: ICD-10-CM

## 2022-08-31 DIAGNOSIS — E78.5 HYPERLIPIDEMIA, UNSPECIFIED HYPERLIPIDEMIA TYPE: Chronic | ICD-10-CM

## 2022-08-31 LAB
BASOPHILS # BLD AUTO: 0.1 10E3/UL (ref 0–0.2)
BASOPHILS NFR BLD AUTO: 1 %
EOSINOPHIL # BLD AUTO: 0.2 10E3/UL (ref 0–0.7)
EOSINOPHIL NFR BLD AUTO: 3 %
ERYTHROCYTE [DISTWIDTH] IN BLOOD BY AUTOMATED COUNT: 13.1 % (ref 10–15)
HBA1C MFR BLD: 6.1 % (ref 0–5.6)
HCT VFR BLD AUTO: 46.3 % (ref 35–47)
HGB BLD-MCNC: 14.9 G/DL (ref 11.7–15.7)
IMM GRANULOCYTES # BLD: 0 10E3/UL
IMM GRANULOCYTES NFR BLD: 0 %
LYMPHOCYTES # BLD AUTO: 1.7 10E3/UL (ref 0.8–5.3)
LYMPHOCYTES NFR BLD AUTO: 27 %
MCH RBC QN AUTO: 28.7 PG (ref 26.5–33)
MCHC RBC AUTO-ENTMCNC: 32.2 G/DL (ref 31.5–36.5)
MCV RBC AUTO: 89 FL (ref 78–100)
MONOCYTES # BLD AUTO: 0.4 10E3/UL (ref 0–1.3)
MONOCYTES NFR BLD AUTO: 7 %
NEUTROPHILS # BLD AUTO: 3.8 10E3/UL (ref 1.6–8.3)
NEUTROPHILS NFR BLD AUTO: 62 %
PLATELET # BLD AUTO: 268 10E3/UL (ref 150–450)
RBC # BLD AUTO: 5.19 10E6/UL (ref 3.8–5.2)
WBC # BLD AUTO: 6.2 10E3/UL (ref 4–11)

## 2022-08-31 PROCEDURE — 36415 COLL VENOUS BLD VENIPUNCTURE: CPT | Performed by: INTERNAL MEDICINE

## 2022-08-31 PROCEDURE — G0439 PPPS, SUBSEQ VISIT: HCPCS | Performed by: INTERNAL MEDICINE

## 2022-08-31 PROCEDURE — 99214 OFFICE O/P EST MOD 30 MIN: CPT | Mod: 25 | Performed by: INTERNAL MEDICINE

## 2022-08-31 PROCEDURE — 80061 LIPID PANEL: CPT | Performed by: INTERNAL MEDICINE

## 2022-08-31 PROCEDURE — 80053 COMPREHEN METABOLIC PANEL: CPT | Performed by: INTERNAL MEDICINE

## 2022-08-31 PROCEDURE — 85025 COMPLETE CBC W/AUTO DIFF WBC: CPT | Performed by: INTERNAL MEDICINE

## 2022-08-31 PROCEDURE — 83036 HEMOGLOBIN GLYCOSYLATED A1C: CPT | Performed by: INTERNAL MEDICINE

## 2022-08-31 RX ORDER — AMLODIPINE BESYLATE 5 MG/1
5 TABLET ORAL DAILY
Qty: 90 TABLET | Refills: 3 | Status: SHIPPED | OUTPATIENT
Start: 2022-08-31 | End: 2023-09-08

## 2022-08-31 RX ORDER — CLONAZEPAM 0.5 MG/1
.25-.5 TABLET ORAL
Qty: 45 TABLET | Refills: 0 | Status: SHIPPED | OUTPATIENT
Start: 2022-08-31 | End: 2023-01-10

## 2022-08-31 RX ORDER — DULOXETIN HYDROCHLORIDE 60 MG/1
60 CAPSULE, DELAYED RELEASE ORAL DAILY
Qty: 90 CAPSULE | Refills: 3 | Status: SHIPPED | OUTPATIENT
Start: 2022-08-31 | End: 2023-09-08

## 2022-08-31 ASSESSMENT — ANXIETY QUESTIONNAIRES
7. FEELING AFRAID AS IF SOMETHING AWFUL MIGHT HAPPEN: MORE THAN HALF THE DAYS
8. IF YOU CHECKED OFF ANY PROBLEMS, HOW DIFFICULT HAVE THESE MADE IT FOR YOU TO DO YOUR WORK, TAKE CARE OF THINGS AT HOME, OR GET ALONG WITH OTHER PEOPLE?: VERY DIFFICULT
6. BECOMING EASILY ANNOYED OR IRRITABLE: SEVERAL DAYS
7. FEELING AFRAID AS IF SOMETHING AWFUL MIGHT HAPPEN: MORE THAN HALF THE DAYS
GAD7 TOTAL SCORE: 11
4. TROUBLE RELAXING: NEARLY EVERY DAY
IF YOU CHECKED OFF ANY PROBLEMS ON THIS QUESTIONNAIRE, HOW DIFFICULT HAVE THESE PROBLEMS MADE IT FOR YOU TO DO YOUR WORK, TAKE CARE OF THINGS AT HOME, OR GET ALONG WITH OTHER PEOPLE: VERY DIFFICULT
3. WORRYING TOO MUCH ABOUT DIFFERENT THINGS: MORE THAN HALF THE DAYS
1. FEELING NERVOUS, ANXIOUS, OR ON EDGE: NOT AT ALL
5. BEING SO RESTLESS THAT IT IS HARD TO SIT STILL: SEVERAL DAYS
2. NOT BEING ABLE TO STOP OR CONTROL WORRYING: MORE THAN HALF THE DAYS
GAD7 TOTAL SCORE: 11

## 2022-08-31 ASSESSMENT — ACTIVITIES OF DAILY LIVING (ADL): CURRENT_FUNCTION: NO ASSISTANCE NEEDED

## 2022-08-31 NOTE — NURSING NOTE
"Janie De Leon is a 72 year old patient who comes in today for a Blood Pressure check.  Initial BP:  BP (!) 156/89 (BP Location: Left arm, Patient Position: Chair, Cuff Size: Adult Large)   Pulse 97   Temp 97  F (36.1  C) (Tympanic)   Resp 16   Ht 1.753 m (5' 9\")   Wt 88 kg (194 lb)   LMP  (LMP Unknown)   Breastfeeding No   BMI 28.65 kg/m       97  Disposition: provider notified while patient in the clinic  Margie Carrasco CMA      "

## 2022-08-31 NOTE — PROGRESS NOTES
"SUBJECTIVE:   Janie De Leon is a 72 year old female who presents for Preventive Visit.      Patient has been advised of split billing requirements and indicates understanding: Yes  Are you in the first 12 months of your Medicare coverage?  No    Healthy Habits:     In general, how would you rate your overall health?  Good    Frequency of exercise:  1 day/week    Duration of exercise:  15-30 minutes    Do you usually eat at least 4 servings of fruit and vegetables a day, include whole grains    & fiber and avoid regularly eating high fat or \"junk\" foods?  No    Taking medications regularly:  Yes    Medication side effects:  None    Ability to successfully perform activities of daily living:  No assistance needed    Home Safety:  No safety concerns identified    Hearing Impairment:  Difficulty following a conversation in a noisy restaurant or crowded room and difficulty understanding soft or whispered speech    In the past 6 months, have you been bothered by leaking of urine?  No    In general, how would you rate your overall mental or emotional health?  Fair      PHQ-2 Total Score: 3    Additional concerns today:  No    Do you feel safe in your environment? Yes    Have you ever done Advance Care Planning? (For example, a Health Directive, POLST, or a discussion with a medical provider or your loved ones about your wishes): No, advance care planning information given to patient to review.  Patient plans to discuss their wishes with loved ones or provider.         Fall risk  Fallen 2 or more times in the past year?: No  Any fall with injury in the past year?: No    Cognitive Screening   1) Repeat 3 items (Leader, Season, Table)    2) Clock draw: NORMAL  3) 3 item recall: Recalls 3 objects  Results: 3 items recalled: COGNITIVE IMPAIRMENT LESS LIKELY    Mini-CogTM Copyright KELL Ruff. Licensed by the author for use in [facility]; reprinted with permission (bianca@.[facility]). All rights reserved.      Do you have " "sleep apnea, excessive snoring or daytime drowsiness?: no    Reviewed and updated as needed this visit by clinical staff   Tobacco   Meds  Problems  Med Hx  Surg Hx             Reviewed and updated as needed this visit by Provider                   Social History     Tobacco Use     Smoking status: Former Smoker     Packs/day: 0.50     Years: 9.00     Pack years: 4.50     Types: Cigarettes     Start date: 1965     Quit date: 4/15/1975     Years since quittin.4     Smokeless tobacco: Never Used     Tobacco comment: social smoker - only smoked when with another smoker   Substance Use Topics     Alcohol use: Yes     Comment: moderate 1 or 2 beers or wine 2x /wk     If you drink alcohol do you typically have >3 drinks per day or >7 drinks per week? No    Alcohol Use 2022   Prescreen: >3 drinks/day or >7 drinks/week? -   Prescreen: >3 drinks/day or >7 drinks/week? No               Current providers sharing in care for this patient include:   Patient Care Team:  Jade Davis DO as PCP - General (Internal Medicine)  Jade Davis DO as Assigned PCP  Horace Cutler MD as Assigned Endocrinology Provider    The following health maintenance items are reviewed in Epic and correct as of today:  Health Maintenance Due   Topic Date Due     LIPID  2022     HEMOGLOBIN  2022     INFLUENZA VACCINE (1) 2022               Review of Systems  Constitutional, HEENT, cardiovascular, pulmonary, GI, , musculoskeletal, neuro, skin, endocrine and psych systems are negative, except as otherwise noted.    OBJECTIVE:   /88   Pulse 97   Temp 97  F (36.1  C) (Tympanic)   Resp 16   Ht 1.753 m (5' 9\")   Wt 88 kg (194 lb)   LMP  (LMP Unknown)   Breastfeeding No   BMI 28.65 kg/m   Estimated body mass index is 28.65 kg/m  as calculated from the following:    Height as of this encounter: 1.753 m (5' 9\").    Weight as of this encounter: 88 kg (194 lb).  Physical Exam    GENERAL APPEARANCE: AAOx3, " no distress. Well developed.    RESP: Lungs CTA bilaterally. No w/r/r. No distress     CV: RRR, S1/S2 present. No m/r/c.     ABDOMEN:  soft, nontender, no distention. No rebound or guarding.     EXT: No c/c/e in lower extremities b/l. No rashes or deformities noted.    PSYCH: appropriate mood and affect.         ASSESSMENT / PLAN:   Janie was seen today for physical.    Diagnoses and all orders for this visit:    Routine history and physical examination of adult   Vaccines and preventative measures are UTD    Stage 3a chronic kidney disease (H)  Check lab for stability  -     Comprehensive metabolic panel; Future    Essential hypertension, benign - goal < 140/90  Dizziness resolved with BP med adjustments last visit  Continue.  -     CBC with Platelets & Differential; Future  -     Comprehensive metabolic panel; Future  -     amLODIPine (NORVASC) 5 MG tablet; Take 1 tablet (5 mg) by mouth daily    Hyperlipidemia, unspecified hyperlipidemia type  -     Comprehensive metabolic panel; Future  -     Lipid panel reflex to direct LDL Fasting; Future    Major depressive disorder, single episode in full remission (H)  Insomnia, unspecified type   She admits increased stress due to caring for her . Seeing her therapist more. Admits clonazepam rx used to last her full year, now lasts 6 months prior to needing refill. We discussed given situational stress, her current usage is still safe/reasonable, and when stress improves can work toward going back to 45 tabs lasting one year, if able. I did encourage her to avoid using more than she is at this time to prevent tolerance/dependence. She is highly agreeable  -     DULoxetine (CYMBALTA) 60 MG capsule; Take 1 capsule (60 mg) by mouth daily  -     clonazePAM (KLONOPIN) 0.5 MG tablet; Take 0.5-1 tablets (0.25-0.5 mg) by mouth nightly as needed for anxiety    Prediabetes  -     Hemoglobin A1c; Future    Osteopenia, unspecified location   Stable on recent  dxa    Hypothyroidism, unspecified type   Following endo, stable    Perceived hearing changes  Referral to audiology  -     Adult Audiology  Referral; Future        Counseling Resources:  ATP IV Guidelines  Pooled Cohorts Equation Calculator  Breast Cancer Risk Calculator  Breast Cancer: Medication to Reduce Risk  FRAX Risk Assessment  ICSI Preventive Guidelines  Dietary Guidelines for Americans, 2010  USDA's MyPlate  ASA Prophylaxis  Lung CA Screening    DO SHENA Amado [facility]

## 2022-09-01 LAB
ALBUMIN SERPL-MCNC: 3.8 G/DL (ref 3.4–5)
ALP SERPL-CCNC: 130 U/L (ref 40–150)
ALT SERPL W P-5'-P-CCNC: 24 U/L (ref 0–50)
ANION GAP SERPL CALCULATED.3IONS-SCNC: 7 MMOL/L (ref 3–14)
AST SERPL W P-5'-P-CCNC: 17 U/L (ref 0–45)
BILIRUB SERPL-MCNC: 0.4 MG/DL (ref 0.2–1.3)
BUN SERPL-MCNC: 17 MG/DL (ref 7–30)
CALCIUM SERPL-MCNC: 9 MG/DL (ref 8.5–10.1)
CHLORIDE BLD-SCNC: 105 MMOL/L (ref 94–109)
CHOLEST SERPL-MCNC: 221 MG/DL
CO2 SERPL-SCNC: 27 MMOL/L (ref 20–32)
CREAT SERPL-MCNC: 0.82 MG/DL (ref 0.52–1.04)
FASTING STATUS PATIENT QL REPORTED: YES
GFR SERPL CREATININE-BSD FRML MDRD: 75 ML/MIN/1.73M2
GLUCOSE BLD-MCNC: 110 MG/DL (ref 70–99)
HDLC SERPL-MCNC: 43 MG/DL
LDLC SERPL CALC-MCNC: 123 MG/DL
NONHDLC SERPL-MCNC: 178 MG/DL
POTASSIUM BLD-SCNC: 4 MMOL/L (ref 3.4–5.3)
PROT SERPL-MCNC: 6.8 G/DL (ref 6.8–8.8)
SODIUM SERPL-SCNC: 139 MMOL/L (ref 133–144)
TRIGL SERPL-MCNC: 274 MG/DL

## 2022-09-09 ENCOUNTER — OFFICE VISIT (OUTPATIENT)
Dept: AUDIOLOGY | Facility: CLINIC | Age: 72
End: 2022-09-09
Attending: INTERNAL MEDICINE
Payer: MEDICARE

## 2022-09-09 DIAGNOSIS — H91.90 PERCEIVED HEARING CHANGES: ICD-10-CM

## 2022-09-09 DIAGNOSIS — H93.13 TINNITUS OF BOTH EARS: ICD-10-CM

## 2022-09-09 DIAGNOSIS — H90.3 SENSORY HEARING LOSS, BILATERAL: Primary | ICD-10-CM

## 2022-09-09 PROCEDURE — 92557 COMPREHENSIVE HEARING TEST: CPT | Performed by: AUDIOLOGIST

## 2022-09-09 PROCEDURE — 92550 TYMPANOMETRY & REFLEX THRESH: CPT | Performed by: AUDIOLOGIST

## 2022-09-09 NOTE — PROGRESS NOTES
AUDIOLOGY REPORT    SUBJECTIVE:  Janie De Leon is a 72 year old female who was seen in the Audiology Clinic at the [facility] for audiologic evaluation, referred by Jade Davis D.O.  The patient reports hearing concerns bilaterally. She reports difficulty following a conversation in a noisy restaurant or crowded room and difficulty understanding soft or whispered speech. Reports bilateral near-constant tinnitus, onset about 20 years ago. History of recreational noise exposure listening to loud music when she was younger. Reports intermittent dizziness- thought to be due to blood pressure medication. The patient denies aural pain, pressure, drainage, and history of ear surgeries.     OBJECTIVE:  Abuse Screening:  Do you feel unsafe at home or work/school? No  Do you feel threatened by someone? No  Does anyone try to keep you from having contact with others, or doing things outside of your home? No  Physical signs of abuse present? No     Fall Risk Screen:  1. Have you fallen two or more times in the past year? No  2. Have you fallen and had an injury in the past year? No    Timed Up and Go Score (in seconds): not tested  Is patient a fall risk? No  Referral initiated: No  Fall Risk Assessment Completed by Audiology    Otoscopic exam indicates ears are clear of cerumen bilaterally     Pure Tone Thresholds assessed using conventional audiometry with good  reliability from 250-8000 Hz bilaterally using insert earphones and circumaural headphones     RIGHT:  normal sloping to mild sensorineural hearing loss    LEFT:    normal sloping to mild sensorineural hearing loss    Tympanogram:    RIGHT: normal eardrum mobility    LEFT:   normal eardrum mobility    Reflexes (reported by stimulus ear):  RIGHT: Ipsilateral is present at normal levels  RIGHT: Contralateral is absent at frequencies tested  LEFT:   Ipsilateral is present at normal levels  LEFT:   Contralateral is present  at normal levels      Speech Reception Threshold:    RIGHT: 15 dB HL    LEFT:   15 dB HL  Word Recognition Score:     RIGHT: 100% at 55 dB HL using NU-6 recorded word list.    LEFT:   100% at 55 dB HL using NU-6 recorded word list.      ASSESSMENT:  Normal sloping to mild sensorineural hearing loss bilaterally. Today s results were discussed with the patient in detail.     PLAN:  Patient was counseled regarding tinnitus, hearing loss and impact on communication.  Patient is not an ideal candidate for amplification at this time.  Handout on good communication strategies was given to patient. It is recommended that the patient return to monitor hearing in 2-3 years, or sooner if changes are noted or new ear symptoms arise.  Please call this clinic with questions regarding these results or recommendations.        Herber Petersen.  Licensed Audiologist  [place] # [id number]

## 2022-09-11 ENCOUNTER — HEALTH MAINTENANCE LETTER (OUTPATIENT)
Age: 72
End: 2022-09-11

## 2022-09-16 ENCOUNTER — ANCILLARY PROCEDURE (OUTPATIENT)
Dept: CT IMAGING | Facility: CLINIC | Age: 72
End: 2022-09-16
Attending: INTERNAL MEDICINE
Payer: MEDICARE

## 2022-09-16 ENCOUNTER — OFFICE VISIT (OUTPATIENT)
Dept: FAMILY MEDICINE | Facility: CLINIC | Age: 72
End: 2022-09-16
Payer: MEDICARE

## 2022-09-16 ENCOUNTER — TELEPHONE (OUTPATIENT)
Dept: FAMILY MEDICINE | Facility: CLINIC | Age: 72
End: 2022-09-16

## 2022-09-16 ENCOUNTER — MYC MEDICAL ADVICE (OUTPATIENT)
Dept: ENDOCRINOLOGY | Facility: CLINIC | Age: 72
End: 2022-09-16

## 2022-09-16 VITALS
OXYGEN SATURATION: 97 % | SYSTOLIC BLOOD PRESSURE: 124 MMHG | HEART RATE: 81 BPM | HEIGHT: 69 IN | RESPIRATION RATE: 16 BRPM | BODY MASS INDEX: 28.44 KG/M2 | WEIGHT: 192 LBS | DIASTOLIC BLOOD PRESSURE: 79 MMHG

## 2022-09-16 DIAGNOSIS — R10.11 ABDOMINAL PAIN, RIGHT UPPER QUADRANT: ICD-10-CM

## 2022-09-16 DIAGNOSIS — R10.31 RLQ ABDOMINAL PAIN: ICD-10-CM

## 2022-09-16 DIAGNOSIS — R10.31 RLQ ABDOMINAL PAIN: Primary | ICD-10-CM

## 2022-09-16 DIAGNOSIS — E27.9 ADRENAL NODULE (H): ICD-10-CM

## 2022-09-16 PROBLEM — K76.0 FATTY LIVER: Status: ACTIVE | Noted: 2022-09-16

## 2022-09-16 LAB
ALBUMIN SERPL-MCNC: 3.4 G/DL (ref 3.4–5)
ALBUMIN UR-MCNC: NEGATIVE MG/DL
ALP SERPL-CCNC: 109 U/L (ref 40–150)
ALT SERPL W P-5'-P-CCNC: 17 U/L (ref 0–50)
ANION GAP SERPL CALCULATED.3IONS-SCNC: 7 MMOL/L (ref 3–14)
APPEARANCE UR: CLEAR
AST SERPL W P-5'-P-CCNC: 13 U/L (ref 0–45)
BASOPHILS # BLD AUTO: 0.1 10E3/UL (ref 0–0.2)
BASOPHILS NFR BLD AUTO: 1 %
BILIRUB SERPL-MCNC: 0.4 MG/DL (ref 0.2–1.3)
BILIRUB UR QL STRIP: NEGATIVE
BUN SERPL-MCNC: 19 MG/DL (ref 7–30)
CALCIUM SERPL-MCNC: 8.5 MG/DL (ref 8.5–10.1)
CHLORIDE BLD-SCNC: 103 MMOL/L (ref 94–109)
CO2 SERPL-SCNC: 28 MMOL/L (ref 20–32)
COLOR UR AUTO: YELLOW
CREAT BLD-MCNC: 1 MG/DL (ref 0.5–1)
CREAT SERPL-MCNC: 0.9 MG/DL (ref 0.52–1.04)
EOSINOPHIL # BLD AUTO: 0.2 10E3/UL (ref 0–0.7)
EOSINOPHIL NFR BLD AUTO: 3 %
ERYTHROCYTE [DISTWIDTH] IN BLOOD BY AUTOMATED COUNT: 13 % (ref 10–15)
GFR SERPL CREATININE-BSD FRML MDRD: 59 ML/MIN/1.73M2
GFR SERPL CREATININE-BSD FRML MDRD: 67 ML/MIN/1.73M2
GLUCOSE BLD-MCNC: 122 MG/DL (ref 70–99)
GLUCOSE UR STRIP-MCNC: NEGATIVE MG/DL
HCT VFR BLD AUTO: 45.5 % (ref 35–47)
HGB BLD-MCNC: 14.3 G/DL (ref 11.7–15.7)
HGB UR QL STRIP: NEGATIVE
IMM GRANULOCYTES # BLD: 0 10E3/UL
IMM GRANULOCYTES NFR BLD: 0 %
KETONES UR STRIP-MCNC: NEGATIVE MG/DL
LEUKOCYTE ESTERASE UR QL STRIP: NEGATIVE
LIPASE SERPL-CCNC: 27 U/L (ref 13–60)
LYMPHOCYTES # BLD AUTO: 2.4 10E3/UL (ref 0.8–5.3)
LYMPHOCYTES NFR BLD AUTO: 35 %
MCH RBC QN AUTO: 28.5 PG (ref 26.5–33)
MCHC RBC AUTO-ENTMCNC: 31.4 G/DL (ref 31.5–36.5)
MCV RBC AUTO: 91 FL (ref 78–100)
MONOCYTES # BLD AUTO: 0.5 10E3/UL (ref 0–1.3)
MONOCYTES NFR BLD AUTO: 7 %
NEUTROPHILS # BLD AUTO: 3.7 10E3/UL (ref 1.6–8.3)
NEUTROPHILS NFR BLD AUTO: 54 %
NITRATE UR QL: NEGATIVE
PH UR STRIP: 6 [PH] (ref 5–7)
PLATELET # BLD AUTO: 319 10E3/UL (ref 150–450)
POTASSIUM BLD-SCNC: 4 MMOL/L (ref 3.4–5.3)
PROT SERPL-MCNC: 6.1 G/DL (ref 6.8–8.8)
RADIOLOGIST FLAGS: NORMAL
RBC # BLD AUTO: 5.02 10E6/UL (ref 3.8–5.2)
SODIUM SERPL-SCNC: 138 MMOL/L (ref 133–144)
SP GR UR STRIP: 1.01 (ref 1–1.03)
UROBILINOGEN UR STRIP-ACNC: 0.2 E.U./DL
WBC # BLD AUTO: 6.8 10E3/UL (ref 4–11)

## 2022-09-16 PROCEDURE — 83690 ASSAY OF LIPASE: CPT | Performed by: INTERNAL MEDICINE

## 2022-09-16 PROCEDURE — G1010 CDSM STANSON: HCPCS

## 2022-09-16 PROCEDURE — 80053 COMPREHEN METABOLIC PANEL: CPT | Performed by: INTERNAL MEDICINE

## 2022-09-16 PROCEDURE — 255N000002 HC RX 255 OP 636: Performed by: INTERNAL MEDICINE

## 2022-09-16 PROCEDURE — 81003 URINALYSIS AUTO W/O SCOPE: CPT | Performed by: INTERNAL MEDICINE

## 2022-09-16 PROCEDURE — 36415 COLL VENOUS BLD VENIPUNCTURE: CPT | Performed by: INTERNAL MEDICINE

## 2022-09-16 PROCEDURE — 99214 OFFICE O/P EST MOD 30 MIN: CPT | Performed by: INTERNAL MEDICINE

## 2022-09-16 PROCEDURE — 85025 COMPLETE CBC W/AUTO DIFF WBC: CPT | Performed by: INTERNAL MEDICINE

## 2022-09-16 PROCEDURE — 82565 ASSAY OF CREATININE: CPT

## 2022-09-16 RX ADMIN — IOHEXOL 100 ML: 350 INJECTION, SOLUTION INTRAVENOUS at 07:46

## 2022-09-16 NOTE — PATIENT INSTRUCTIONS
[facility] Radiology for scheduling [phone]    [facility] ([place]): [phone] (M-F 7am-5pm)

## 2022-09-16 NOTE — TELEPHONE ENCOUNTER
Amber from [facility] called to report and incidental finding from CT this morning.     incidental finding of  Indeterminate left adrenal nodule measures 1.6 cm. Recommend follow-up with nonemergent outpatient adrenal mass CT or MRI for characterization.       Radiolgist- Arnol Elmore #[phone]     Routing to provider to review and advise.

## 2022-09-16 NOTE — PROGRESS NOTES
Assessment & Plan     RLQ abdominal pain  Abdominal pain, right upper quadrant  RUQ and RLQ acute pain. Ddx includes appendicitis vs cholecystitis/lithiasis vs nephrolithiasis vs other  Check stat CT scan and labs/urine  Further instructions based on results  Go to ER if worsening or not improving in the meantime  - CT Abdomen Pelvis w Contrast  - CBC with Platelets & Differential  - Comprehensive metabolic panel  - UA Macro with Reflex to Micro and Culture - lab collect  - Lipase      Return if symptoms worsen or fail to improve.    Jade Davis DO  [facility] NLEL Lima is a 72 year old, presenting for the following health issues:  Abdominal Pain      History of Present Illness       Reason for visit:  Upper abdominal pain  Symptom onset:  Today  Symptoms include:  Upper abdominal pain on the right side radiating to back  Symptom intensity:  Moderate  Symptom progression:  Staying the same  Had these symptoms before:  Yes  Has tried/received treatment for these symptoms:  No  What makes it worse:  No  What makes it better:  No    She eats 2-3 servings of fruits and vegetables daily.She consumes 1 sweetened beverage(s) daily.She exercises with enough effort to increase her heart rate 10 to 19 minutes per day.  She exercises with enough effort to increase her heart rate 3 or less days per week.   She is taking medications regularly.     RUQ and RLQ abd pain x few days. Had similar symptoms once in the recent past that self resolved. However this time persists. Pain radiates to R mid back. Denies n/v. Slightly decreased appetite but able to eat and keep food/liquids down fine. No f/c. Stools are thin. No dysuria. No urinary frequency or other new urine symptoms.  Denies hx of appendix or gallbladder surgery. States symptoms started after eating pizza few days ago.    Review of Systems   Constitutional, HEENT, cardiovascular, pulmonary, gi and gu systems are negative, except as  "otherwise noted.      Objective    /79 (BP Location: Left arm, Patient Position: Sitting, Cuff Size: Adult Regular)   Pulse 81   Resp 16   Ht 1.753 m (5' 9\")   Wt 87.1 kg (192 lb)   LMP  (LMP Unknown)   SpO2 97%   BMI 28.35 kg/m    Body mass index is 28.35 kg/m .  Physical Exam     GENERAL APPEARANCE: AAOx3, no distress. Well developed.    ABDOMEN:  soft, no distention, no rebound or guarding, tenderness to moderate palpation of RUQ and RLQ, negative murphys sign    PSYCH: appropriate mood and affect.                   "

## 2022-09-22 ENCOUNTER — OFFICE VISIT (OUTPATIENT)
Dept: ENDOCRINOLOGY | Facility: CLINIC | Age: 72
End: 2022-09-22
Payer: MEDICARE

## 2022-09-22 VITALS
DIASTOLIC BLOOD PRESSURE: 81 MMHG | HEART RATE: 80 BPM | HEIGHT: 69 IN | SYSTOLIC BLOOD PRESSURE: 131 MMHG | BODY MASS INDEX: 28.44 KG/M2 | WEIGHT: 192 LBS

## 2022-09-22 DIAGNOSIS — E27.9 ADRENAL NODULE (H): Primary | ICD-10-CM

## 2022-09-22 DIAGNOSIS — E03.9 HYPOTHYROIDISM, UNSPECIFIED TYPE: ICD-10-CM

## 2022-09-22 PROCEDURE — 99214 OFFICE O/P EST MOD 30 MIN: CPT | Performed by: INTERNAL MEDICINE

## 2022-09-22 NOTE — PROGRESS NOTES
Recent issues:  Previous evaluations for hypothyroidism, now referred by Dr. FRANSISCA Davis to see me for adrenal nodule evaluation  Patient able to get workin appointment on my schedule today  Reviewed medical history from patient and Epic chart record        Adrenal:  9/2022. Acute pain at right anterolateral abdomen   Severity 8 of 10   Some constipation and flatulence, but no nausea, vomiting, diarrhea  She took medication for constipation  9/16/22. Medical evaluation with Dr. FRANSISCA Davis/[facility]  9/16/22 CT Abdomen-pelvis:   Small hiatal hernia.    Hepatic steatosis. Focal fatty infiltration along the falciform ligament.    Normal pancreas, spleen   Simple cysts in kidney   Evidence of hysterectomy   Indeterminate 1.6 cm left adrenal nodule      Additional health history:   Known adrenal disease: none  Steroid med use:  none  Hypertension:   Amlodipine 5 mg daily      Olmesartan 40 mg daily  Anticoagulation:  none    Previous [facility] adrenal labs:  None, but previously ordered          Thyroid:  Initial diagnosis of hypothyroidism  Had seen Dr. SUDHA Fierro, then Dr. SUDHA La/[facility] clinic  Began treatment with levothyroxine medication  2020. Recalls having sweating spells while on thyroid medication,    Taking levothyroxine 0.1 mg dose, then decided to cut tablets in half for ~4 months   Noticed less sweating symptom  12/7/20. Subsequent medical evaluation with Dr. FRANSISCA Davis  Lab tests showed mildly elevated TSH level.  Dose increase back to full levothyroxine 0.1 mg tablet daily, dosing before breakfast meal    Previous  thyroid tests include:     Lab Test 01/22/21  1005 12/07/20  1029 08/27/20  0943 07/30/19  0848 07/30/18  1053 04/18/18  0800   TSH 4.81* 4.87* 7.17* 4.92* 2.97 9.41*   T4 0.97 0.88 0.99 0.97  --  0.86        Latest Reference Range & Units 05/28/21 09:48   Thyroid Peroxidase Antibody <35 IU/mL 182 (H)     Additional health history:  Previous thyroid nodules: none  Neck radiation  treatments: none  Fam Hx thyroid disease:  None      3/29/21. Initial thyroid evaluation with me at [facility]  Reviewed health history and thyroid issues  Continued levothyroxine medication treatment plan  Recent [facility] labs include:  Lab Results   Component Value Date    TSH 0.80 06/27/2022    T4 1.27 06/27/2022     (H) 05/28/2021     Current dose:  Levothyroxine 0.088 mg daily        Lives in [place], [place]  Sees Dr. Jade Davis/[facility] clinic for general medicine evaluations.    PMH/PSH:  Past Medical History:   Diagnosis Date     Adrenal nodule (H) 09/2022    CT scan     Anemia     mild gastropathy on EGD 2008; neg pill cam     Atypical ductal hyperplasia of both breasts     Has had biopsies and MRI     Fibroid uterus      High cholesterol      History of hematuria 2008    Cystoscopy for recurrent UTIs; unremarkable renal US. Also recurrent UTIs as child and pyelonephritis.      History of hormone replacement therapy     after JASBIR with BSO     HTN (hypertension)      HTN, goal below 140/90      Hx of bone density study 10/16/2006    Normal     Hypothyroidism      Insomnia     periodic - prn clonazepam helpful a couple times per month     Lipid screening 07/21/2015    Tchol 128, , HDL 53, LDL 53 outside records --> based on our labs off of atorvastatin 10yr ASCVD risk 9.4% in Oct 2015     Major depressive disorder, single episode in full remission (H) 2007     Osteopenia     Mild left hip July 2016     Prediabetes     Metformin in the past     Rotator cuff injury, left, sequela     MRI Jan 2015 and had PT; High-grade complete or near complete tear of the supraspinatus tendon and anterior fibers of the infraspinatus tendon. 1/3 of the infraspinatus tendon appears involved.      TBI (traumatic brain injury) (H)     As a child     Tubular adenoma of colon 2013 & 2016     Past Surgical History:   Procedure Laterality Date     ANKLE SURGERY  1976     BREAST BIOPSY, RT/LT      Many     C/SECTION,  LOW TRANSVERSE  1968     CAPSULE/PILL CAM ENDOSCOPY  2014    EGD prior; capsule was negative      COLONOSCOPY      , , , 2013     COLONOSCOPY N/A 2019    Procedure: COLONOSCOPY, WITH POLYPECTOMY AND BIOPSY;  Surgeon: Peipto Romero MD;  Location:  GI     COLONOSCOPY N/A 2022    Procedure: COLONOSCOPY;  Surgeon: Rodrigo Dunbar MD;  Location:  GI     HYSTERECTOMY, PAP NO LONGER INDICATED       JASBIR with BSO  2000    benign fibroids     TUBAL LIGATION         Family Hx:  Family History   Problem Relation Age of Onset     Colon Cancer Mother 70         age 81     Diabetes Mother         After age 75     Macular Degeneration Mother      Glaucoma Mother      Cerebrovascular Disease Mother      Heart Failure Mother      Hypertension Mother      Hyperlipidemia Mother      Other - See Comments Father 87        Frailty, pneumonia, fractures, failure to thrive     Osteoporosis Father         diagnosed in his 80s     Colon Polyps Daughter         Tubular adenoma     Deep Vein Thrombosis Brother      Hyperlipidemia Brother      Hypertension Brother      Other Cancer Brother         myeloma     Hypertension Brother         both brothers     Hyperlipidemia Brother         both brothers     Depression Brother      Anxiety Disorder Brother      Mental Illness Brother         on Haldol before death from lung cancer     Lung Cancer Brother         long time heavy smoker     Breast Cancer Other         radical mastecomy in her 40s     Colon Cancer Other      Other Cancer Other         several aunts various kinds     Other Cancer Other         throat cancer     Other Cancer Paternal Grandmother         throat cancer         Social Hx:  Social History     Socioeconomic History     Marital status:      Spouse name: Not on file     Number of children: Not on file     Years of education: Not on file     Highest education level: Not on file   Occupational History     Not on file    Tobacco Use     Smoking status: Former Smoker     Packs/day: 0.50     Years: 9.00     Pack years: 4.50     Types: Cigarettes     Start date: 1965     Quit date: 4/15/1975     Years since quittin.4     Smokeless tobacco: Never Used     Tobacco comment: social smoker - only smoked when with another smoker   Vaping Use     Vaping Use: Never used   Substance and Sexual Activity     Alcohol use: Yes     Comment: moderate 1 or 2 beers or wine 2x /wk     Drug use: No     Sexual activity: Not Currently     Partners: Male     Birth control/protection: None     Comment: post menopausal   Other Topics Concern     Parent/sibling w/ CABG, MI or angioplasty before 65F 55M? Not Asked   Social History Narrative     Not on file     Social Determinants of Health     Financial Resource Strain: Not on file   Food Insecurity: Not on file   Transportation Needs: Not on file   Physical Activity: Not on file   Stress: Not on file   Social Connections: Not on file   Intimate Partner Violence: Not on file   Housing Stability: Not on file          MEDICATIONS:  has a current medication list which includes the following prescription(s): amlodipine, cholecalciferol, clonazepam, cranberry, duloxetine, levothyroxine, lovastatin, olmesartan, and psyllium.    ROS:     ROS: 10 point ROS neg other than the symptoms noted above in the HPI.    GENERAL: mild fatigue, wt stable; denies fevers, chills, night sweats.   HEENT: no dysphagia, odonophagia, diplopia, neck pain  THYROID:  no apparent hyper or hypothyroid symptoms  CV: no chest pain, pressure, palpitations  LUNGS: no SOB, PIERSON, cough, wheezing   ABDOMEN: intermittent abdominal pain, constipation, some reflux; no diarrhea  EXTREMITIES: no rashes, ulcers, edema  NEUROLOGY: no headaches, denies changes in vision, tingling, extremitiy numbness   MSK: no muscle aches or pains, weakness  SKIN: no rashes or lesions  : no menses since hysterectomy age 51  PSYCH:  stable mood, no significant  "anxiety or depression  ENDOCRINE: sweating spells, as noted      Physical Exam   VS: /81   Pulse 80   Ht 1.753 m (5' 9\")   Wt 87.1 kg (192 lb)   LMP  (LMP Unknown)   BMI 28.35 kg/m    GENERAL: AXOX3, NAD, well dressed, answering questions appropriately, appears stated age.  ENT: no nose swelling or nasal discharge, mouth redness or gum changes.  EYES: eyes grossly normal to inspection, conjunctivae and sclerae normal, no exophthalmos or proptosis  THYROID:  no apparent nodules or goiter  LUNGS: no audible wheeze, cough or visible cyanosis, or increased work of breathing  ABDOMEN: abdomen mildly obese, soft, mild tender right anterior midline   EXTREMITIES: no edema noted  NEUROLOGY: CN grossly intact, no tremors  MSK: grossly intact  SKIN:  no apparent skin lesions, rash, or edema with visualized skin appearance  PSYCH: mentation appears normal, affect normal/bright, judgement and insight intact,   normal speech and appearance well groomed      LABS:    All pertinent notes, labs, and images personally reviewed by me.     A/P:  Encounter Diagnoses   Name Primary?     Adrenal nodule (H) Yes     Hypothyroidism, unspecified type        Comments:  Reviewed health history, adrenal and thyroid issues  Needs screening lab tests for the adrenal nodule workup  Suspect non-functioning, benign adrenal nodule    Plan:  Discussed general issues with the hypothyroidism diagnosis and management  Discussed lab tests used to assess patient thyroid hormone levels  Reviewed treatment options including levothyroxine medication    Discussed general issues with adrenal gland diseases and management  Reviewed adrenal gland anatomy and hormone physiology  Discussed lab tests used to assess patient adrenal hormone levels  We discussed the recent adrenal CT-scan imaging procedure    Recommend:  Plan lab appointment soon for blood and urine testing   Check merry-renin ratio, 24-hr urine cortisol and total metanephrines   Tests " already ordered by PCP  Repeat adrenal nodule imaging with MRI abdomen reasonable, ordered  Monitor for symptom changes  Continue current antihypertensive med treatment plan  General surgery consultation plan unclear... presumably for persistent abdominal pain evaluation  Continue the current levothyroxine 0.088 mg daily dose plan  No thyroid U/S imaging needed at this time     Addressed patient questions today    There are no Patient Instructions on file for this visit.    Future labs ordered today:   No orders of the defined types were placed in this encounter.    Radiology/Consults ordered today: None    Total time spent in with the patient evaluation:  15 min  Additional time spent reviewing pertinent lab tests and chart notes, and documentation:  20 min    Follow-up:  8/2023    NIMO Cutler MD, MS  Endocrinology  [facility]    CC:  JUAN Davis

## 2022-09-22 NOTE — LETTER
9/22/2022         RE: Janie Cutlerr  [address]  [facility] [address]        Dear Colleague,    Thank you for referring your patient, Janie De Leon, to the [facility]. Please see a copy of my visit note below.      Recent issues:  Previous evaluations for hypothyroidism, now referred by Dr. FRANSISCA Davis to see me for adrenal nodule evaluation  Patient able to get workin appointment on my schedule today  Reviewed medical history from patient and Epic chart record        Adrenal:  9/2022. Acute pain at right anterolateral abdomen   Severity 8 of 10   Some constipation and flatulence, but no nausea, vomiting, diarrhea  She took medication for constipation  9/16/22. Medical evaluation with Dr. FRANSISCA Davis/[facility]  9/16/22 CT Abdomen-pelvis:   Small hiatal hernia.    Hepatic steatosis. Focal fatty infiltration along the falciform ligament.    Normal pancreas, spleen   Simple cysts in kidney   Evidence of hysterectomy   Indeterminate 1.6 cm left adrenal nodule      Additional health history:   Known adrenal disease: none  Steroid med use:  none  Hypertension:   Amlodipine 5 mg daily      Olmesartan 40 mg daily  Anticoagulation:  none    Previous [facility] adrenal labs:  None, but previously ordered          Thyroid:  Initial diagnosis of hypothyroidism  Had seen Dr. SUDHA Fierro, then Dr. SUDHA La/[facility] clinic  Began treatment with levothyroxine medication  2020. Recalls having sweating spells while on thyroid medication,    Taking levothyroxine 0.1 mg dose, then decided to cut tablets in half for ~4 months   Noticed less sweating symptom  12/7/20. Subsequent medical evaluation with Dr. FRANSISCA Davis  Lab tests showed mildly elevated TSH level.  Dose increase back to full levothyroxine 0.1 mg tablet daily, dosing before breakfast meal    Previous  thyroid tests include:     Lab Test 01/22/21  1005 12/07/20  1029 08/27/20  0943 07/30/19  0848 07/30/18  1053 04/18/18  0800   TSH  4.81* 4.87* 7.17* 4.92* 2.97 9.41*   T4 0.97 0.88 0.99 0.97  --  0.86        Latest Reference Range & Units 05/28/21 09:48   Thyroid Peroxidase Antibody <35 IU/mL 182 (H)     Additional health history:  Previous thyroid nodules: none  Neck radiation treatments: none  Fam Hx thyroid disease:  None      3/29/21. Initial thyroid evaluation with me at [place]  Reviewed health history and thyroid issues  Continued levothyroxine medication treatment plan  Recent [facility] labs include:  Lab Results   Component Value Date    TSH 0.80 06/27/2022    T4 1.27 06/27/2022     (H) 05/28/2021     Current dose:  Levothyroxine 0.088 mg daily        Lives in [place], [place]  Sees Dr. Jade Davis/[facility] clinic for general medicine evaluations.    PMH/PSH:  Past Medical History:   Diagnosis Date     Adrenal nodule (H) 09/2022    CT scan     Anemia     mild gastropathy on EGD 2008; neg pill cam     Atypical ductal hyperplasia of both breasts     Has had biopsies and MRI     Fibroid uterus      High cholesterol      History of hematuria 2008    Cystoscopy for recurrent UTIs; unremarkable renal US. Also recurrent UTIs as child and pyelonephritis.      History of hormone replacement therapy     after JASBIR with BSO     HTN (hypertension)      HTN, goal below 140/90      Hx of bone density study 10/16/2006    Normal     Hypothyroidism      Insomnia     periodic - prn clonazepam helpful a couple times per month     Lipid screening 07/21/2015    Tchol 128, , HDL 53, LDL 53 outside records --> based on our labs off of atorvastatin 10yr ASCVD risk 9.4% in Oct 2015     Major depressive disorder, single episode in full remission (H) 2007     Osteopenia     Mild left hip July 2016     Prediabetes     Metformin in the past     Rotator cuff injury, left, sequela     MRI Jan 2015 and had PT; High-grade complete or near complete tear of the supraspinatus tendon and anterior fibers of the infraspinatus tendon. 1/3 of the infraspinatus  tendon appears involved.      TBI (traumatic brain injury) (H)     As a child     Tubular adenoma of colon  &      Past Surgical History:   Procedure Laterality Date     ANKLE SURGERY       BREAST BIOPSY, RT/LT      Many     C/SECTION, LOW TRANSVERSE       CAPSULE/PILL CAM ENDOSCOPY  2014    EGD prior; capsule was negative      COLONOSCOPY      , , , 2013     COLONOSCOPY N/A 2019    Procedure: COLONOSCOPY, WITH POLYPECTOMY AND BIOPSY;  Surgeon: Pepito Romero MD;  Location:  GI     COLONOSCOPY N/A 2022    Procedure: COLONOSCOPY;  Surgeon: Rodrigo Dunbar MD;  Location:  GI     HYSTERECTOMY, PAP NO LONGER INDICATED       JASBIR with BSO  2000    benign fibroids     TUBAL LIGATION         Family Hx:  Family History   Problem Relation Age of Onset     Colon Cancer Mother 70         age 81     Diabetes Mother         After age 75     Macular Degeneration Mother      Glaucoma Mother      Cerebrovascular Disease Mother      Heart Failure Mother      Hypertension Mother      Hyperlipidemia Mother      Other - See Comments Father 87        Frailty, pneumonia, fractures, failure to thrive     Osteoporosis Father         diagnosed in his 80s     Colon Polyps Daughter         Tubular adenoma     Deep Vein Thrombosis Brother      Hyperlipidemia Brother      Hypertension Brother      Other Cancer Brother         myeloma     Hypertension Brother         both brothers     Hyperlipidemia Brother         both brothers     Depression Brother      Anxiety Disorder Brother      Mental Illness Brother         on Haldol before death from lung cancer     Lung Cancer Brother         long time heavy smoker     Breast Cancer Other         radical mastecomy in her 40s     Colon Cancer Other      Other Cancer Other         several aunts various kinds     Other Cancer Other         throat cancer     Other Cancer Paternal Grandmother         throat cancer         Social  Hx:  Social History     Socioeconomic History     Marital status:      Spouse name: Not on file     Number of children: Not on file     Years of education: Not on file     Highest education level: Not on file   Occupational History     Not on file   Tobacco Use     Smoking status: Former Smoker     Packs/day: 0.50     Years: 9.00     Pack years: 4.50     Types: Cigarettes     Start date: 1965     Quit date: 4/15/1975     Years since quittin.4     Smokeless tobacco: Never Used     Tobacco comment: social smoker - only smoked when with another smoker   Vaping Use     Vaping Use: Never used   Substance and Sexual Activity     Alcohol use: Yes     Comment: moderate 1 or 2 beers or wine 2x /wk     Drug use: No     Sexual activity: Not Currently     Partners: Male     Birth control/protection: None     Comment: post menopausal   Other Topics Concern     Parent/sibling w/ CABG, MI or angioplasty before 65F 55M? Not Asked   Social History Narrative     Not on file     Social Determinants of Health     Financial Resource Strain: Not on file   Food Insecurity: Not on file   Transportation Needs: Not on file   Physical Activity: Not on file   Stress: Not on file   Social Connections: Not on file   Intimate Partner Violence: Not on file   Housing Stability: Not on file          MEDICATIONS:  has a current medication list which includes the following prescription(s): amlodipine, cholecalciferol, clonazepam, cranberry, duloxetine, levothyroxine, lovastatin, olmesartan, and psyllium.    ROS:     ROS: 10 point ROS neg other than the symptoms noted above in the HPI.    GENERAL: mild fatigue, wt stable; denies fevers, chills, night sweats.   HEENT: no dysphagia, odonophagia, diplopia, neck pain  THYROID:  no apparent hyper or hypothyroid symptoms  CV: no chest pain, pressure, palpitations  LUNGS: no SOB, PIERSON, cough, wheezing   ABDOMEN: intermittent abdominal pain, constipation, some reflux; no diarrhea  EXTREMITIES:  "no rashes, ulcers, edema  NEUROLOGY: no headaches, denies changes in vision, tingling, extremitiy numbness   MSK: no muscle aches or pains, weakness  SKIN: no rashes or lesions  : no menses since hysterectomy age 51  PSYCH:  stable mood, no significant anxiety or depression  ENDOCRINE: sweating spells, as noted      Physical Exam   VS: /81   Pulse 80   Ht 1.753 m (5' 9\")   Wt 87.1 kg (192 lb)   LMP  (LMP Unknown)   BMI 28.35 kg/m    GENERAL: AXOX3, NAD, well dressed, answering questions appropriately, appears stated age.  ENT: no nose swelling or nasal discharge, mouth redness or gum changes.  EYES: eyes grossly normal to inspection, conjunctivae and sclerae normal, no exophthalmos or proptosis  THYROID:  no apparent nodules or goiter  LUNGS: no audible wheeze, cough or visible cyanosis, or increased work of breathing  ABDOMEN: abdomen mildly obese, soft, mild tender right anterior midline   EXTREMITIES: no edema noted  NEUROLOGY: CN grossly intact, no tremors  MSK: grossly intact  SKIN:  no apparent skin lesions, rash, or edema with visualized skin appearance  PSYCH: mentation appears normal, affect normal/bright, judgement and insight intact,   normal speech and appearance well groomed      LABS:    All pertinent notes, labs, and images personally reviewed by me.     A/P:  Encounter Diagnoses   Name Primary?     Adrenal nodule (H) Yes     Hypothyroidism, unspecified type        Comments:  Reviewed health history, adrenal and thyroid issues  Needs screening lab tests for the adrenal nodule workup  Suspect non-functioning, benign adrenal nodule    Plan:  Discussed general issues with the hypothyroidism diagnosis and management  Discussed lab tests used to assess patient thyroid hormone levels  Reviewed treatment options including levothyroxine medication    Discussed general issues with adrenal gland diseases and management  Reviewed adrenal gland anatomy and hormone physiology  Discussed lab tests used " to assess patient adrenal hormone levels  We discussed the recent adrenal CT-scan imaging procedure    Recommend:  Plan lab appointment soon for blood and urine testing   Check merry-renin ratio, 24-hr urine cortisol and total metanephrines   Tests already ordered by PCP  Repeat adrenal nodule imaging with MRI abdomen reasonable, ordered  Monitor for symptom changes  Continue current antihypertensive med treatment plan  General surgery consultation plan unclear... presumably for persistent abdominal pain evaluation  Continue the current levothyroxine 0.088 mg daily dose plan  No thyroid U/S imaging needed at this time     Addressed patient questions today    There are no Patient Instructions on file for this visit.    Future labs ordered today:   No orders of the defined types were placed in this encounter.    Radiology/Consults ordered today: None    Total time spent in with the patient evaluation:  15 min  Additional time spent reviewing pertinent lab tests and chart notes, and documentation:  20 min    Follow-up:  8/2023    NIMO Cutler MD, MS  Endocrinology  [facility]    CC:  JUAN Davis                  Again, thank you for allowing me to participate in the care of your patient.        Sincerely,        Horace Cutler MD

## 2022-09-23 ENCOUNTER — LAB (OUTPATIENT)
Dept: LAB | Facility: CLINIC | Age: 72
End: 2022-09-23
Payer: MEDICARE

## 2022-09-23 DIAGNOSIS — E27.9 ADRENAL NODULE (H): ICD-10-CM

## 2022-09-23 LAB — CORTIS SERPL-MCNC: 10.3 UG/DL

## 2022-09-23 PROCEDURE — 82533 TOTAL CORTISOL: CPT

## 2022-09-23 PROCEDURE — 99000 SPECIMEN HANDLING OFFICE-LAB: CPT

## 2022-09-23 PROCEDURE — 36415 COLL VENOUS BLD VENIPUNCTURE: CPT

## 2022-09-23 PROCEDURE — 84244 ASSAY OF RENIN: CPT | Mod: 90

## 2022-09-23 PROCEDURE — 82088 ASSAY OF ALDOSTERONE: CPT

## 2022-09-25 ENCOUNTER — APPOINTMENT (OUTPATIENT)
Dept: GENERAL RADIOLOGY | Facility: CLINIC | Age: 72
DRG: 282 | End: 2022-09-25
Attending: EMERGENCY MEDICINE
Payer: MEDICARE

## 2022-09-25 ENCOUNTER — HOSPITAL ENCOUNTER (INPATIENT)
Facility: CLINIC | Age: 72
LOS: 1 days | Discharge: HOME | DRG: 282 | End: 2022-09-27
Attending: EMERGENCY MEDICINE | Admitting: HOSPITALIST
Payer: MEDICARE

## 2022-09-25 DIAGNOSIS — R07.9 CHEST PAIN, UNSPECIFIED TYPE: ICD-10-CM

## 2022-09-25 DIAGNOSIS — E78.5 HYPERLIPIDEMIA, UNSPECIFIED HYPERLIPIDEMIA TYPE: Chronic | ICD-10-CM

## 2022-09-25 DIAGNOSIS — I10 ESSENTIAL HYPERTENSION, BENIGN: Chronic | ICD-10-CM

## 2022-09-25 DIAGNOSIS — I21.4 NSTEMI (NON-ST ELEVATED MYOCARDIAL INFARCTION) (H): Primary | ICD-10-CM

## 2022-09-25 LAB
ANION GAP SERPL CALCULATED.3IONS-SCNC: 9 MMOL/L (ref 3–14)
ATRIAL RATE - MUSE: 86 BPM
BASOPHILS # BLD AUTO: 0.1 10E3/UL (ref 0–0.2)
BASOPHILS NFR BLD AUTO: 1 %
BUN SERPL-MCNC: 20 MG/DL (ref 7–30)
CALCIUM SERPL-MCNC: 8.6 MG/DL (ref 8.5–10.1)
CHLORIDE BLD-SCNC: 105 MMOL/L (ref 94–109)
CO2 SERPL-SCNC: 24 MMOL/L (ref 20–32)
CREAT SERPL-MCNC: 1.01 MG/DL (ref 0.52–1.04)
D DIMER PPP FEU-MCNC: 0.36 UG/ML FEU (ref 0–0.5)
DIASTOLIC BLOOD PRESSURE - MUSE: NORMAL MMHG
EOSINOPHIL # BLD AUTO: 0.1 10E3/UL (ref 0–0.7)
EOSINOPHIL NFR BLD AUTO: 1 %
ERYTHROCYTE [DISTWIDTH] IN BLOOD BY AUTOMATED COUNT: 13.1 % (ref 10–15)
ERYTHROCYTE [DISTWIDTH] IN BLOOD BY AUTOMATED COUNT: 13.1 % (ref 10–15)
GFR SERPL CREATININE-BSD FRML MDRD: 58 ML/MIN/1.73M2
GLUCOSE BLD-MCNC: 125 MG/DL (ref 70–99)
HCT VFR BLD AUTO: 40.5 % (ref 35–47)
HCT VFR BLD AUTO: 47.6 % (ref 35–47)
HGB BLD-MCNC: 13.5 G/DL (ref 11.7–15.7)
HGB BLD-MCNC: 15.3 G/DL (ref 11.7–15.7)
HOLD SPECIMEN: NORMAL
IMM GRANULOCYTES # BLD: 0 10E3/UL
IMM GRANULOCYTES NFR BLD: 0 %
INTERPRETATION ECG - MUSE: NORMAL
LYMPHOCYTES # BLD AUTO: 1.8 10E3/UL (ref 0.8–5.3)
LYMPHOCYTES NFR BLD AUTO: 18 %
MCH RBC QN AUTO: 28.9 PG (ref 26.5–33)
MCH RBC QN AUTO: 29.2 PG (ref 26.5–33)
MCHC RBC AUTO-ENTMCNC: 32.1 G/DL (ref 31.5–36.5)
MCHC RBC AUTO-ENTMCNC: 33.3 G/DL (ref 31.5–36.5)
MCV RBC AUTO: 87 FL (ref 78–100)
MCV RBC AUTO: 91 FL (ref 78–100)
MONOCYTES # BLD AUTO: 0.6 10E3/UL (ref 0–1.3)
MONOCYTES NFR BLD AUTO: 6 %
NEUTROPHILS # BLD AUTO: 7.7 10E3/UL (ref 1.6–8.3)
NEUTROPHILS NFR BLD AUTO: 74 %
NRBC # BLD AUTO: 0 10E3/UL
NRBC BLD AUTO-RTO: 0 /100
P AXIS - MUSE: 65 DEGREES
PLATELET # BLD AUTO: 279 10E3/UL (ref 150–450)
PLATELET # BLD AUTO: 357 10E3/UL (ref 150–450)
POTASSIUM BLD-SCNC: 4.3 MMOL/L (ref 3.4–5.3)
PR INTERVAL - MUSE: 168 MS
QRS DURATION - MUSE: 86 MS
QT - MUSE: 358 MS
QTC - MUSE: 428 MS
R AXIS - MUSE: 73 DEGREES
RBC # BLD AUTO: 4.67 10E6/UL (ref 3.8–5.2)
RBC # BLD AUTO: 5.24 10E6/UL (ref 3.8–5.2)
SARS-COV-2 RNA RESP QL NAA+PROBE: NEGATIVE
SODIUM SERPL-SCNC: 138 MMOL/L (ref 133–144)
SYSTOLIC BLOOD PRESSURE - MUSE: NORMAL MMHG
T AXIS - MUSE: 78 DEGREES
TROPONIN I SERPL HS-MCNC: 2748 NG/L
TROPONIN I SERPL HS-MCNC: 32 NG/L
TROPONIN I SERPL HS-MCNC: 6 NG/L
VENTRICULAR RATE- MUSE: 86 BPM
WBC # BLD AUTO: 10.3 10E3/UL (ref 4–11)
WBC # BLD AUTO: 8.1 10E3/UL (ref 4–11)

## 2022-09-25 PROCEDURE — 93005 ELECTROCARDIOGRAM TRACING: CPT

## 2022-09-25 PROCEDURE — 250N000009 HC RX 250: Performed by: EMERGENCY MEDICINE

## 2022-09-25 PROCEDURE — 84484 ASSAY OF TROPONIN QUANT: CPT | Performed by: HOSPITALIST

## 2022-09-25 PROCEDURE — 96365 THER/PROPH/DIAG IV INF INIT: CPT

## 2022-09-25 PROCEDURE — 85379 FIBRIN DEGRADATION QUANT: CPT | Performed by: HOSPITALIST

## 2022-09-25 PROCEDURE — 96366 THER/PROPH/DIAG IV INF ADDON: CPT

## 2022-09-25 PROCEDURE — 80048 BASIC METABOLIC PNL TOTAL CA: CPT | Performed by: EMERGENCY MEDICINE

## 2022-09-25 PROCEDURE — 36415 COLL VENOUS BLD VENIPUNCTURE: CPT | Performed by: EMERGENCY MEDICINE

## 2022-09-25 PROCEDURE — 85027 COMPLETE CBC AUTOMATED: CPT | Performed by: EMERGENCY MEDICINE

## 2022-09-25 PROCEDURE — 85014 HEMATOCRIT: CPT | Performed by: EMERGENCY MEDICINE

## 2022-09-25 PROCEDURE — C9803 HOPD COVID-19 SPEC COLLECT: HCPCS

## 2022-09-25 PROCEDURE — 71045 X-RAY EXAM CHEST 1 VIEW: CPT

## 2022-09-25 PROCEDURE — 84484 ASSAY OF TROPONIN QUANT: CPT | Performed by: EMERGENCY MEDICINE

## 2022-09-25 PROCEDURE — 99285 EMERGENCY DEPT VISIT HI MDM: CPT | Mod: 25

## 2022-09-25 PROCEDURE — 36415 COLL VENOUS BLD VENIPUNCTURE: CPT | Performed by: HOSPITALIST

## 2022-09-25 PROCEDURE — 250N000011 HC RX IP 250 OP 636: Performed by: EMERGENCY MEDICINE

## 2022-09-25 PROCEDURE — G0378 HOSPITAL OBSERVATION PER HR: HCPCS

## 2022-09-25 PROCEDURE — 250N000013 HC RX MED GY IP 250 OP 250 PS 637: Performed by: EMERGENCY MEDICINE

## 2022-09-25 PROCEDURE — 99220 PR INITIAL OBSERVATION CARE,LEVEL III: CPT | Performed by: HOSPITALIST

## 2022-09-25 PROCEDURE — U0003 INFECTIOUS AGENT DETECTION BY NUCLEIC ACID (DNA OR RNA); SEVERE ACUTE RESPIRATORY SYNDROME CORONAVIRUS 2 (SARS-COV-2) (CORONAVIRUS DISEASE [COVID-19]), AMPLIFIED PROBE TECHNIQUE, MAKING USE OF HIGH THROUGHPUT TECHNOLOGIES AS DESCRIBED BY CMS-2020-01-R: HCPCS | Performed by: EMERGENCY MEDICINE

## 2022-09-25 RX ORDER — NITROGLYCERIN 0.4 MG/1
0.4 TABLET SUBLINGUAL EVERY 5 MIN PRN
Status: COMPLETED | OUTPATIENT
Start: 2022-09-25 | End: 2022-09-25

## 2022-09-25 RX ORDER — HEPARIN SODIUM 10000 [USP'U]/100ML
INJECTION, SOLUTION INTRAVENOUS CONTINUOUS
Status: DISCONTINUED | OUTPATIENT
Start: 2022-09-25 | End: 2022-09-25

## 2022-09-25 RX ORDER — NITROGLYCERIN 80 MG/1
1 PATCH TRANSDERMAL ONCE
Status: COMPLETED | OUTPATIENT
Start: 2022-09-25 | End: 2022-09-26

## 2022-09-25 RX ORDER — ASPIRIN 81 MG/1
324 TABLET, CHEWABLE ORAL ONCE
Status: COMPLETED | OUTPATIENT
Start: 2022-09-25 | End: 2022-09-25

## 2022-09-25 RX ORDER — HEPARIN SODIUM 10000 [USP'U]/100ML
0-5000 INJECTION, SOLUTION INTRAVENOUS CONTINUOUS
Status: DISCONTINUED | OUTPATIENT
Start: 2022-09-25 | End: 2022-09-26

## 2022-09-25 RX ORDER — NITROGLYCERIN 0.4 MG/1
0.4 TABLET SUBLINGUAL EVERY 5 MIN PRN
Status: DISCONTINUED | OUTPATIENT
Start: 2022-09-25 | End: 2022-09-26

## 2022-09-25 RX ORDER — POLYETHYLENE GLYCOL 3350 17 G/17G
17 POWDER, FOR SOLUTION ORAL DAILY PRN
COMMUNITY

## 2022-09-25 RX ADMIN — HEPARIN SODIUM 1050 UNITS/HR: 10000 INJECTION, SOLUTION INTRAVENOUS at 19:14

## 2022-09-25 RX ADMIN — ASPIRIN 81 MG CHEWABLE TABLET 324 MG: 81 TABLET CHEWABLE at 15:47

## 2022-09-25 RX ADMIN — NITROGLYCERIN 0.4 MG: 0.4 TABLET SUBLINGUAL at 16:00

## 2022-09-25 RX ADMIN — NITROGLYCERIN 0.4 MG: 0.4 TABLET SUBLINGUAL at 18:39

## 2022-09-25 RX ADMIN — NITROGLYCERIN 0.4 MG: 0.4 TABLET SUBLINGUAL at 15:54

## 2022-09-25 RX ADMIN — ALUMINUM HYDROXIDE, MAGNESIUM HYDROXIDE, AND DIMETHICONE 30 ML: 200; 20; 200 SUSPENSION ORAL at 16:03

## 2022-09-25 RX ADMIN — NITROGLYCERIN 0.4 MG: 0.4 TABLET SUBLINGUAL at 15:49

## 2022-09-25 RX ADMIN — NITROGLYCERIN 1 PATCH: 0.4 PATCH TRANSDERMAL at 18:39

## 2022-09-25 ASSESSMENT — ENCOUNTER SYMPTOMS
DIZZINESS: 0
NECK PAIN: 1
NAUSEA: 0
LIGHT-HEADEDNESS: 0

## 2022-09-25 ASSESSMENT — ACTIVITIES OF DAILY LIVING (ADL)
ADLS_ACUITY_SCORE: 35

## 2022-09-25 NOTE — PHARMACY-ADMISSION MEDICATION HISTORY
Pharmacy Medication History  Admission medication history interview status for the 9/25/2022  admission is complete. See EPIC admission navigator for prior to admission medications     Location of Interview: Patient room  Medication history sources: Patient and Surescripts    Significant changes made to the medication list:  1) Added Miralax, removed Metamucil    In the past week, patient estimated taking medication this percent of the time: greater than 90%    Medication reconciliation completed by provider prior to medication history? No    Time spent in this activity: 10 minutes    Prior to Admission medications    Medication Sig Last Dose Taking? Auth Provider Long Term End Date   amLODIPine (NORVASC) 5 MG tablet Take 1 tablet (5 mg) by mouth daily  Patient taking differently: Take 5 mg by mouth every evening 9/24/2022 at PM Yes Jade Davis, DO Yes    Cholecalciferol (VITAMIN D3 PO) Take 1,000 Units by mouth daily 9/25/2022 at AM Yes Reported, Patient     clonazePAM (KLONOPIN) 0.5 MG tablet Take 0.5-1 tablets (0.25-0.5 mg) by mouth nightly as needed for anxiety prn med Yes Jade Davis, DO Yes    DULoxetine (CYMBALTA) 60 MG capsule Take 1 capsule (60 mg) by mouth daily 9/25/2022 at am Yes Jade Davis, DO Yes    levothyroxine (SYNTHROID/LEVOTHROID) 88 MCG tablet Take 1 tablet (88 mcg) by mouth daily - Overdue for endocrinology appointment 9/25/2022 at am Yes Horace Cutler MD Yes    lovastatin (MEVACOR) 40 MG tablet Take 1 tablet (40 mg) by mouth At Bedtime 9/24/2022 at PM Yes Jade Davis, DO Yes    olmesartan (BENICAR) 40 MG tablet Take 20 mg by mouth every evening 9/24/2022 at PM Yes Jade Davis, DO Yes    polyethylene glycol (MIRALAX) 17 GM/Dose powder Take 17 g by mouth daily as needed for constipation prn med Yes Unknown, Entered By History         The information provided in this note is only as accurate as the sources available at the time of update(s)

## 2022-09-25 NOTE — ED PROVIDER NOTES
History   Chief Complaint:  Chest Pain       The history is provided by the patient.      Janie De Leon is a 72 year old female with history of hypertension, hypercholesterolemia, kidney nodule, and prediabetes who presents with sudden onset chest pain that started 30 minutes ago while she was watching TV. She described her pain as a sharp constant feeling in the middle of her chest that radiates up the middle of her neck. She states that she was drinking a beer when it happened but this pain feels nothing like acid reflux. She states she has never experienced pain like this before. The patient notes that a few days ago she had right sided neck pain that lasted a few hours. She denies that she is experiencing nausea, dizziness, or light headedness. She denies smoking. She has history of a hiatal hernia but this pain does not feel similar.    Review of Systems   Cardiovascular: Positive for chest pain.   Gastrointestinal: Negative for nausea.   Musculoskeletal: Positive for neck pain.   Neurological: Negative for dizziness and light-headedness.   All other systems reviewed and are negative.    Allergies:  Ciprofloxacin  Oxycodone  Simvastatin    Medications:  Amlodipine  Cholecalciferol  Clonazepam  Duloxetine  Levothyroxine  Lovastatin  Olmesartan    Past Medical History:     Depression  Hypertension  Prediabetes  Hyperlipidemia  Insomnia  Hypothyroidism  Osteopenia  CKD  Adrenal nodule  Adjustment disorder    Past Surgical History:    Ankle surgery  Breast biopsy   section  EGD  Colonoscopy x3  Hysterectomy  JASBIR with BSO  Tubal ligation    Family History:    Mother: cancer, diabetes, macular degeneration, CAD, hypertension, hyperlipidemia  Father: osteoporosis  Daughter: colon polyps  Brother: DVT, hyperlipidemia, hypertension, cancer, depression, anxiety    Social History:  The patient presents to the ED alone.  PCP: Jade Davis     Physical Exam     Patient Vitals for the past 24 hrs:   BP Temp Temp  src Pulse Resp SpO2   09/25/22 1606 -- -- -- 87 20 95 %   09/25/22 1605 -- -- -- 94 18 96 %   09/25/22 1604 (!) 145/92 -- -- 92 24 95 %   09/25/22 1600 (!) 145/92 -- -- 96 23 96 %   09/25/22 1558 -- -- -- 90 17 97 %   09/25/22 1555 (!) 143/90 -- -- 87 19 96 %   09/25/22 1552 (!) 147/89 -- -- 90 20 --   09/25/22 1551 (!) 170/99 -- -- 84 21 99 %   09/25/22 1550 -- -- -- 84 21 97 %   09/25/22 1545 (!) 170/99 97.6  F (36.4  C) Temporal 86 (!) 31 100 %       Physical Exam  Constitutional: middle age white female, supine, anxious, no respiratory distress.  HENT: No signs of trauma.   Eyes: EOM are normal. Pupils are equal, round, and reactive to light.   Neck: Normal range of motion. No JVD present. No cervical adenopathy.  Cardiovascular: Regular rhythm.  Exam reveals no gallop and no friction rub.    No murmur heard.  Pulmonary/Chest: Bilateral breath sounds normal. No wheezes, rhonchi or rales.  Abdominal: Soft. No tenderness. No rebound or guarding.   Musculoskeletal: No edema. No tenderness.   Lymphadenopathy: No lymphadenopathy.   Neurological: Alert and oriented to person, place, and time. Normal strength. Coordination normal.   Skin: Skin is warm and dry. No rash noted. No erythema.     Emergency Department Course   ECG  ECG taken at 1543, ECG read at 1544  Normal sinus rhythm   Rate 86 bpm. IL interval 168 ms. QRS duration 86 ms. QT/QTc 358/428 ms. P-R-T axes 65 73 78.     Imaging:  XR Chest Port 1 View   Final Result   IMPRESSION: Negative chest.        Report per radiology    Laboratory:  Labs Ordered and Resulted from Time of ED Arrival to Time of ED Departure   BASIC METABOLIC PANEL - Abnormal       Result Value    Sodium 138      Potassium 4.3      Chloride 105      Carbon Dioxide (CO2) 24      Anion Gap 9      Urea Nitrogen 20      Creatinine 1.01      Calcium 8.6      Glucose 125 (*)     GFR Estimate 58 (*)    CBC WITH PLATELETS AND DIFFERENTIAL - Abnormal    WBC Count 10.3      RBC Count 5.24 (*)      Hemoglobin 15.3      Hematocrit 47.6 (*)     MCV 91      MCH 29.2      MCHC 32.1      RDW 13.1      Platelet Count 357      % Neutrophils 74      % Lymphocytes 18      % Monocytes 6      % Eosinophils 1      % Basophils 1      % Immature Granulocytes 0      NRBCs per 100 WBC 0      Absolute Neutrophils 7.7      Absolute Lymphocytes 1.8      Absolute Monocytes 0.6      Absolute Eosinophils 0.1      Absolute Basophils 0.1      Absolute Immature Granulocytes 0.0      Absolute NRBCs 0.0     TROPONIN I - Normal    Troponin I High Sensitivity 6     TROPONIN I - Normal    Troponin I High Sensitivity 32     D DIMER QUANTITATIVE   CBC WITH PLATELETS     Emergency Department Course:     Reviewed:  I reviewed nursing notes, vitals, past medical history and Care Everywhere    Assessments:  1541 I obtained history and examined the patient as noted above.     Interventions:  1547 Aspirin 324 mg PO  1549 Nitrostat 0.4 mg sublingual  1554 Nitrostat 0.4 mg sublingual  1600 Nitrostat 0.4 mg sublingual  1603 mylanta/Lidocaine 30 mL PO    Disposition:  The patient was admitted to the hospital.     Impression & Plan     Medical Decision Makin year old presents with rather sudden onset of chest pain while watching TV. It's somewhat of a sharp feeling in the sternal area, she did notice it in her throat a little. She had a little nausea and felt a little anxious with this. A couple days ago, she had a little soreness on the right side of her neck. She does have high blood pressure and high cholesterol and is borderline for diabetes. Is not currently a smoker. Her exam is unremarkable. Her initial EKG is completely normal as is her troponin. She received nitro and a GI cocktail. Her pain is much better. She is not sure what she had that helped. She is being observed on monitor. A second troponin was done about, not quite, two hours after her first and this has tripled. Given that even though it is not all the way, we will bring her  in for chest pain observation. I have started heparin on her just in case this is unstable angina. nitropatch placed    Diagnosis:    ICD-10-CM    1. Chest pain, unspecified type  R07.9        Scribe Disclosure:  I, Андрей Torres, am serving as a scribe at 3:52 PM on 9/25/2022 to document services personally performed by Reyes Nelson MD based on my observations and the provider's statements to me.        Reyes Nelson MD  09/25/22 3048

## 2022-09-25 NOTE — ED TRIAGE NOTES
Patient arrived via personal vehicle from home with complaints of severe substernal chest pain, radiating to her neck that began about 30 minutes prior to arrival.     Patient is alert and oriented x 4, calm and cooperative. VS are stable, skin is normal color, warm and dry, respirations are even and non-labored on room air.    Patient denied chest pain, shortness of breath, dizziness, and nausea. Pulses are strong and regular with palpation, cap refill < 3 seconds.          Triage Assessment     Row Name 09/25/22 1541       Triage Assessment (Adult)    Airway WDL WDL       Respiratory WDL    Respiratory WDL WDL       Skin Circulation/Temperature WDL    Skin Circulation/Temperature WDL WDL       Cardiac WDL    Cardiac WDL chest pain;X       Chest Pain Assessment    Chest Pain Location midsternal    Chest Pain Radiation neck

## 2022-09-25 NOTE — H&P
"[facility]    History and Physical - Hospitalist Service       Date of Admission:  9/25/2022    Assessment & Plan      Janie De Leon is a 72 year old female who has medical history which includes hiatal hernia, hypertension, hyperlipidemia, hypothyroidism, prediabetic and kidney nodule who presented to the ED with a chief complaint of chest pain    1) chest pain  -On admission presented with chest pain which had atypical features but she has risk factors in form of age, hypertension and prior tobacco use  -Her initial troponin was 6 and initial EKG showed normal sinus rhythm and d dimer was negative  -Repeat troponin was 32  -Patient was started on heparin drip and nnitro patch in the ED and we will continue the same, trend troponin, echo, cardiac monitor, aspirin, statin and given rise in her troponin we will keep her n.p.o. after midnight and cardiology will be consulted    2) hyperlipidemia  -We will continue with PTA lovastatin 40 mg daily along with amlodipine 10 mg daily    3) hypertension  -We will continue with PTA olmesartan 20 mg daily    4) hypothyroidism  -We will continue with PTA levothyroxine    5) anxiety/depression  -We will continue with her home medications including Cymbalta and as needed Klonopin    6) history of tobacco use         Diet:  Regular and n.p.o. after midnight  DVT Prophylaxis: heparin drip   Bryant Catheter: Not present  Central Lines: None  Cardiac Monitoring: None  Code Status:  Full code    Clinically Significant Risk Factors Present on Admission                 # Hypertension: home medication list includes antihypertensive(s)    # Overweight: Estimated body mass index is 28.35 kg/m  as calculated from the following:    Height as of 9/22/22: 1.753 m (5' 9\").    Weight as of 9/22/22: 87.1 kg (192 lb).        Disposition Plan      Expected Discharge Date: 09/26/2022                The patient's care was discussed with the Bedside Nurse, Patient and " Patient's Family.    Eula Glaser MD  Hospitalist Service  [facility]  Securely message with the [employer] Web Console (learn more here)  Text page via [employer] Paging/Directory     I am on admitting shift and her care in the morning will be taken over by hospital medicine team    ______________________________________________________________________    Chief Complaint     CHEST PAIN     History is obtained from the patient    History of Present Illness      Janie De Leon is a 72 year old female who has medical history which includes hiatal hernia, hypertension, hyperlipidemia, prediabetic and kidney nodule who presented to the ED with a chief complaint of chest pain which started this evening about 30 minutes before she presented to the ED and she was watching TV at that time.  The pain was not accompanied by any lightheadedness, dizziness, shortness of breath and she described the pain as in the middle of the chest radiating to the neck /throat and it was 8.5/10 and lasted few hours and went away after she came to er.  Patient did mention that she was drinking alcoholic beverage and it did not feel like acid reflux and she has never felt similar type of pain in the past.  Patient also denies any recent fever, chills, shortness of breath, nausea, vomiting, abdominal pain, cough.    In the ER she had lab work done including basic metabolic panel which showed sodium of 138, potassium of 4.3, creatinine of 1.01 with GFR of 58 and her initial troponin was 6.      She had WBC count of 10.3, hemoglobin of 15.3 with platelet count of 357      She had chest x-ray done which did not show any acute process    She had initial EKG done which showed normal sinus rhythm with no ST elevation    D dimer was negative      Patient was given as needed nitro along with GI cocktail    Her repeat troponin came back as 32 and she was started on heparin drip by the ED physician and will be admitted to the hospital  for further work-up        Review of Systems    The 10 point Review of Systems is negative other than noted in the HPI or here.     Past Medical History    I have reviewed this patient's medical history and updated it with pertinent information if needed.   Past Medical History:   Diagnosis Date     Adrenal nodule (H) 09/2022    CT scan     Anemia     mild gastropathy on EGD 2008; neg pill cam     Atypical ductal hyperplasia of both breasts     Has had biopsies and MRI     Fibroid uterus      High cholesterol      History of hematuria 2008    Cystoscopy for recurrent UTIs; unremarkable renal US. Also recurrent UTIs as child and pyelonephritis.      History of hormone replacement therapy     after JASBIR with BSO     HTN (hypertension)      HTN, goal below 140/90      Hx of bone density study 10/16/2006    Normal     Hypothyroidism      Insomnia     periodic - prn clonazepam helpful a couple times per month     Lipid screening 07/21/2015    Tchol 128, , HDL 53, LDL 53 outside records --> based on our labs off of atorvastatin 10yr ASCVD risk 9.4% in Oct 2015     Major depressive disorder, single episode in full remission (H) 2007     Osteopenia     Mild left hip July 2016     Prediabetes     Metformin in the past     Rotator cuff injury, left, sequela     MRI Jan 2015 and had PT; High-grade complete or near complete tear of the supraspinatus tendon and anterior fibers of the infraspinatus tendon. 1/3 of the infraspinatus tendon appears involved.      TBI (traumatic brain injury) (H)     As a child     Tubular adenoma of colon 2013 & 2016       Past Surgical History   I have reviewed this patient's surgical history and updated it with pertinent information if needed.  Past Surgical History:   Procedure Laterality Date     ANKLE SURGERY  1976     BREAST BIOPSY, RT/LT      Many     C/SECTION, LOW TRANSVERSE  1968     CAPSULE/PILL CAM ENDOSCOPY  2/18/2014    EGD prior; capsule was negative      COLONOSCOPY      1999,  , , 2013     COLONOSCOPY N/A 2019    Procedure: COLONOSCOPY, WITH POLYPECTOMY AND BIOPSY;  Surgeon: Pepito Romero MD;  Location:  GI     COLONOSCOPY N/A 2022    Procedure: COLONOSCOPY;  Surgeon: Rodrigo Dunbar MD;  Location:  GI     HYSTERECTOMY, PAP NO LONGER INDICATED       JASBIR with BSO  2000    benign fibroids     TUBAL LIGATION         Social History   I have reviewed this patient's social history and updated it with pertinent information if needed.  Social History     Tobacco Use     Smoking status: Former Smoker     Packs/day: 0.50     Years: 9.00     Pack years: 4.50     Types: Cigarettes     Start date: 1965     Quit date: 4/15/1975     Years since quittin.4     Smokeless tobacco: Never Used     Tobacco comment: social smoker - only smoked when with another smoker   Vaping Use     Vaping Use: Never used   Substance Use Topics     Alcohol use: Yes     Comment: moderate 1 or 2 beers or wine 2x /wk     Drug use: No       Family History   I have reviewed this patient's family history and updated it with pertinent information if needed.  Family History   Problem Relation Age of Onset     Colon Cancer Mother 70         age 81     Diabetes Mother         After age 75     Macular Degeneration Mother      Glaucoma Mother      Cerebrovascular Disease Mother      Heart Failure Mother      Hypertension Mother      Hyperlipidemia Mother      Other - See Comments Father 87        Frailty, pneumonia, fractures, failure to thrive     Osteoporosis Father         diagnosed in his 80s     Colon Polyps Daughter         Tubular adenoma     Deep Vein Thrombosis Brother      Hyperlipidemia Brother      Hypertension Brother      Other Cancer Brother         myeloma     Hypertension Brother         both brothers     Hyperlipidemia Brother         both brothers     Depression Brother      Anxiety Disorder Brother      Mental Illness Brother         on Haldol before death from  lung cancer     Lung Cancer Brother         long time heavy smoker     Breast Cancer Other         radical mastecomy in her 40s     Colon Cancer Other      Other Cancer Other         several aunts various kinds     Other Cancer Other         throat cancer     Other Cancer Paternal Grandmother         throat cancer       Prior to Admission Medications   Prior to Admission Medications   Prescriptions Last Dose Informant Patient Reported? Taking?   CRANBERRY EXTRACT PO   Yes No   Sig: Take 1 tablet by mouth daily with food   Cholecalciferol (VITAMIN D3 PO)   Yes No   Sig: Take 1,000 Units by mouth daily   DULoxetine (CYMBALTA) 60 MG capsule   No No   Sig: Take 1 capsule (60 mg) by mouth daily   amLODIPine (NORVASC) 5 MG tablet   No No   Sig: Take 1 tablet (5 mg) by mouth daily   clonazePAM (KLONOPIN) 0.5 MG tablet   No No   Sig: Take 0.5-1 tablets (0.25-0.5 mg) by mouth nightly as needed for anxiety   levothyroxine (SYNTHROID/LEVOTHROID) 88 MCG tablet   No No   Sig: Take 1 tablet (88 mcg) by mouth daily - Overdue for endocrinology appointment   lovastatin (MEVACOR) 40 MG tablet   No No   Sig: Take 1 tablet (40 mg) by mouth At Bedtime   olmesartan (BENICAR) 40 MG tablet   Yes No   Sig: Take 0.5 tablets (20 mg) by mouth daily   psyllium (METAMUCIL) 58.6 % POWD   Yes No   Sig: Take by mouth as needed for constipation      Facility-Administered Medications: None     Allergies   Allergies   Allergen Reactions     Ciprofloxacin GI Disturbance     Oxycodone Other (See Comments)     She prefers not to have Oxycodone or Oxycontin due to potential addictive properties     Simvastatin Other (See Comments)     Muscle aches        Physical Exam   Vital Signs: Temp: 97.6  F (36.4  C) Temp src: Temporal BP: (!) 145/92 Pulse: 87   Resp: 20 SpO2: 95 %      Weight: 0 lbs 0 oz        General: Patient appears comfortable and in no acute distress.  HEENT: Head is atraumatic, normocephalic.  Pupils are equal, round and reactive to light.   No scleral icterus. Oral mucosa is moist   Neck: Neck is supple and No Lymphadenopathy   Respiratory: Lungs are clear to auscultation bilaterally with no wheeze or crackles   Cardiovascular: Regular rate , S1 and S2 normal with no murmer or rubs or gallops  Abdomen:   soft , non tender , non distended and bowel sound present   Skin: No skin rashes or lesions to inspection or palpation.  Neurologic: Higher functions are within normal limits. No obvious defects in speech, language and memory. No facial droop  Musculoskeletal: Normal Range of motion over upper and lower extremities bilaterally   Psychiatric: cooperative     Data   Data reviewed today: I reviewed all medications, new labs and imaging results over the last 24 hours. I personally reviewed    EKG showed normal sinus rhythm and chest x-ray did not show any acute process    Recent Labs   Lab 09/25/22  1544   WBC 10.3   HGB 15.3   MCV 91         POTASSIUM 4.3   CHLORIDE 105   CO2 24   BUN 20   CR 1.01   ANIONGAP 9   CYNDY 8.6   *     Most Recent 3 CBC's:Recent Labs   Lab Test 09/25/22  1544 09/16/22  0801 08/31/22  1113   WBC 10.3 6.8 6.2   HGB 15.3 14.3 14.9   MCV 91 91 89    319 268     Most Recent 3 BMP's:Recent Labs   Lab Test 09/25/22  1544 09/16/22  0801 09/16/22  0748 08/31/22  1113    138  --  139   POTASSIUM 4.3 4.0  --  4.0   CHLORIDE 105 103  --  105   CO2 24 28  --  27   BUN 20 19  --  17   CR 1.01 0.90 1.0 0.82   ANIONGAP 9 7  --  7   CYNDY 8.6 8.5  --  9.0   * 122*  --  110*     Most Recent 2 LFT's:Recent Labs   Lab Test 09/16/22  0801 08/31/22  1113   AST 13 17   ALT 17 24   ALKPHOS 109 130   BILITOTAL 0.4 0.4     Most Recent 3 INR's:No lab results found.  10.3    \    15.3    /    357   N 74    L N/A    138    105    20 /   ------------------------------------ 125 (H)   ALT N/A   AST N/A   AP N/A   ALB N/A   Ca 8.6  4.3    24    1.01 \    % RETIC N/A    LDH N/A  Troponin N/A    BNP N/A    CK N/A  INR N/A    PTT N/A    D-dimer N/A    Fibrinogen N/A    Antithrombin N/A  Ferritin N/A  CRP N/A    IL-6 N/A  Recent Results (from the past 24 hour(s))   XR Chest Port 1 View    Narrative    EXAM: XR CHEST PORT 1 VIEW  LOCATION: [facility]  DATE/TIME: 9/25/2022 4:04 PM    INDICATION: Chest pain.  COMPARISON: None.      Impression    IMPRESSION: Negative chest.

## 2022-09-26 ENCOUNTER — APPOINTMENT (OUTPATIENT)
Dept: CARDIOLOGY | Facility: CLINIC | Age: 72
DRG: 282 | End: 2022-09-26
Attending: HOSPITALIST
Payer: MEDICARE

## 2022-09-26 PROBLEM — R07.9 CHEST PAIN, UNSPECIFIED TYPE: Status: ACTIVE | Noted: 2022-09-26

## 2022-09-26 PROBLEM — I21.4 NSTEMI (NON-ST ELEVATED MYOCARDIAL INFARCTION) (H): Status: ACTIVE | Noted: 2022-09-26

## 2022-09-26 LAB
LVEF ECHO: NORMAL
UFH PPP CHRO-ACNC: 0.15 IU/ML
UFH PPP CHRO-ACNC: 0.54 IU/ML

## 2022-09-26 PROCEDURE — 272N000001 HC OR GENERAL SUPPLY STERILE: Performed by: INTERNAL MEDICINE

## 2022-09-26 PROCEDURE — C1769 GUIDE WIRE: HCPCS | Performed by: INTERNAL MEDICINE

## 2022-09-26 PROCEDURE — 250N000013 HC RX MED GY IP 250 OP 250 PS 637: Performed by: INTERNAL MEDICINE

## 2022-09-26 PROCEDURE — B221Z2Z COMPUTERIZED TOMOGRAPHY (CT SCAN) OF MULTIPLE CORONARY ARTERIES USING INTRAVASCULAR OPTICAL COHERENCE: ICD-10-PCS | Performed by: INTERNAL MEDICINE

## 2022-09-26 PROCEDURE — 255N000002 HC RX 255 OP 636: Performed by: HOSPITALIST

## 2022-09-26 PROCEDURE — 99223 1ST HOSP IP/OBS HIGH 75: CPT | Mod: 25 | Performed by: INTERNAL MEDICINE

## 2022-09-26 PROCEDURE — 99232 SBSQ HOSP IP/OBS MODERATE 35: CPT | Performed by: PHYSICIAN ASSISTANT

## 2022-09-26 PROCEDURE — C1894 INTRO/SHEATH, NON-LASER: HCPCS | Performed by: INTERNAL MEDICINE

## 2022-09-26 PROCEDURE — 93571 IV DOP VEL&/PRESS C FLO 1ST: CPT | Mod: 26 | Performed by: INTERNAL MEDICINE

## 2022-09-26 PROCEDURE — 99153 MOD SED SAME PHYS/QHP EA: CPT | Performed by: INTERNAL MEDICINE

## 2022-09-26 PROCEDURE — 99152 MOD SED SAME PHYS/QHP 5/>YRS: CPT | Performed by: INTERNAL MEDICINE

## 2022-09-26 PROCEDURE — 96366 THER/PROPH/DIAG IV INF ADDON: CPT

## 2022-09-26 PROCEDURE — B2111ZZ FLUOROSCOPY OF MULTIPLE CORONARY ARTERIES USING LOW OSMOLAR CONTRAST: ICD-10-PCS | Performed by: INTERNAL MEDICINE

## 2022-09-26 PROCEDURE — G0378 HOSPITAL OBSERVATION PER HR: HCPCS

## 2022-09-26 PROCEDURE — 36415 COLL VENOUS BLD VENIPUNCTURE: CPT | Performed by: INTERNAL MEDICINE

## 2022-09-26 PROCEDURE — C1753 CATH, INTRAVAS ULTRASOUND: HCPCS | Performed by: INTERNAL MEDICINE

## 2022-09-26 PROCEDURE — 250N000013 HC RX MED GY IP 250 OP 250 PS 637: Performed by: HOSPITALIST

## 2022-09-26 PROCEDURE — 85520 HEPARIN ASSAY: CPT | Performed by: EMERGENCY MEDICINE

## 2022-09-26 PROCEDURE — 93458 L HRT ARTERY/VENTRICLE ANGIO: CPT | Performed by: INTERNAL MEDICINE

## 2022-09-26 PROCEDURE — 250N000011 HC RX IP 250 OP 636: Performed by: EMERGENCY MEDICINE

## 2022-09-26 PROCEDURE — 999N000208 ECHOCARDIOGRAM COMPLETE

## 2022-09-26 PROCEDURE — 93458 L HRT ARTERY/VENTRICLE ANGIO: CPT | Mod: 26 | Performed by: INTERNAL MEDICINE

## 2022-09-26 PROCEDURE — 92978 ENDOLUMINL IVUS OCT C 1ST: CPT | Performed by: INTERNAL MEDICINE

## 2022-09-26 PROCEDURE — 210N000002 HC R&B HEART CARE

## 2022-09-26 PROCEDURE — 85520 HEPARIN ASSAY: CPT | Performed by: INTERNAL MEDICINE

## 2022-09-26 PROCEDURE — 93010 ELECTROCARDIOGRAM REPORT: CPT | Performed by: INTERNAL MEDICINE

## 2022-09-26 PROCEDURE — 93452 LEFT HRT CATH W/VENTRCLGRPHY: CPT | Performed by: INTERNAL MEDICINE

## 2022-09-26 PROCEDURE — 250N000009 HC RX 250: Performed by: INTERNAL MEDICINE

## 2022-09-26 PROCEDURE — 250N000011 HC RX IP 250 OP 636: Performed by: INTERNAL MEDICINE

## 2022-09-26 PROCEDURE — C1887 CATHETER, GUIDING: HCPCS | Performed by: INTERNAL MEDICINE

## 2022-09-26 PROCEDURE — 93005 ELECTROCARDIOGRAM TRACING: CPT

## 2022-09-26 PROCEDURE — 93571 IV DOP VEL&/PRESS C FLO 1ST: CPT | Performed by: INTERNAL MEDICINE

## 2022-09-26 PROCEDURE — 92978 ENDOLUMINL IVUS OCT C 1ST: CPT | Mod: 26 | Performed by: INTERNAL MEDICINE

## 2022-09-26 PROCEDURE — 93306 TTE W/DOPPLER COMPLETE: CPT | Mod: 26 | Performed by: INTERNAL MEDICINE

## 2022-09-26 PROCEDURE — 36415 COLL VENOUS BLD VENIPUNCTURE: CPT | Performed by: EMERGENCY MEDICINE

## 2022-09-26 RX ORDER — HEPARIN SODIUM 10000 [USP'U]/100ML
100-1000 INJECTION, SOLUTION INTRAVENOUS CONTINUOUS PRN
Status: DISCONTINUED | OUTPATIENT
Start: 2022-09-26 | End: 2022-09-26 | Stop reason: HOSPADM

## 2022-09-26 RX ORDER — ARGATROBAN 1 MG/ML
350 INJECTION, SOLUTION INTRAVENOUS
Status: DISCONTINUED | OUTPATIENT
Start: 2022-09-26 | End: 2022-09-26 | Stop reason: HOSPADM

## 2022-09-26 RX ORDER — ASPIRIN 81 MG/1
81 TABLET ORAL DAILY
Status: DISCONTINUED | OUTPATIENT
Start: 2022-09-26 | End: 2022-09-26

## 2022-09-26 RX ORDER — LOSARTAN POTASSIUM 50 MG/1
50 TABLET ORAL DAILY
Status: DISCONTINUED | OUTPATIENT
Start: 2022-09-26 | End: 2022-09-26

## 2022-09-26 RX ORDER — VERAPAMIL HYDROCHLORIDE 2.5 MG/ML
INJECTION, SOLUTION INTRAVENOUS
Status: DISCONTINUED | OUTPATIENT
Start: 2022-09-26 | End: 2022-09-26 | Stop reason: HOSPADM

## 2022-09-26 RX ORDER — OXYCODONE HYDROCHLORIDE 5 MG/1
10 TABLET ORAL EVERY 4 HOURS PRN
Status: DISCONTINUED | OUTPATIENT
Start: 2022-09-26 | End: 2022-09-27 | Stop reason: HOSPADM

## 2022-09-26 RX ORDER — METOPROLOL TARTRATE 25 MG/1
25 TABLET, FILM COATED ORAL 2 TIMES DAILY
Status: DISCONTINUED | OUTPATIENT
Start: 2022-09-26 | End: 2022-09-27 | Stop reason: HOSPADM

## 2022-09-26 RX ORDER — DULOXETIN HYDROCHLORIDE 60 MG/1
60 CAPSULE, DELAYED RELEASE ORAL DAILY
Status: DISCONTINUED | OUTPATIENT
Start: 2022-09-26 | End: 2022-09-27 | Stop reason: HOSPADM

## 2022-09-26 RX ORDER — DOPAMINE HYDROCHLORIDE 160 MG/100ML
2-20 INJECTION, SOLUTION INTRAVENOUS CONTINUOUS PRN
Status: DISCONTINUED | OUTPATIENT
Start: 2022-09-26 | End: 2022-09-26 | Stop reason: HOSPADM

## 2022-09-26 RX ORDER — FENTANYL CITRATE 50 UG/ML
INJECTION, SOLUTION INTRAMUSCULAR; INTRAVENOUS
Status: DISCONTINUED | OUTPATIENT
Start: 2022-09-26 | End: 2022-09-26 | Stop reason: HOSPADM

## 2022-09-26 RX ORDER — FENTANYL CITRATE 50 UG/ML
25 INJECTION, SOLUTION INTRAMUSCULAR; INTRAVENOUS
Status: DISCONTINUED | OUTPATIENT
Start: 2022-09-26 | End: 2022-09-27 | Stop reason: HOSPADM

## 2022-09-26 RX ORDER — MAGNESIUM HYDROXIDE/ALUMINUM HYDROXICE/SIMETHICONE 120; 1200; 1200 MG/30ML; MG/30ML; MG/30ML
30 SUSPENSION ORAL EVERY 4 HOURS PRN
Status: DISCONTINUED | OUTPATIENT
Start: 2022-09-26 | End: 2022-09-27 | Stop reason: HOSPADM

## 2022-09-26 RX ORDER — POTASSIUM CHLORIDE 1500 MG/1
20 TABLET, EXTENDED RELEASE ORAL
Status: CANCELLED | OUTPATIENT
Start: 2022-09-26

## 2022-09-26 RX ORDER — EPTIFIBATIDE 2 MG/ML
2 INJECTION, SOLUTION INTRAVENOUS CONTINUOUS PRN
Status: DISCONTINUED | OUTPATIENT
Start: 2022-09-26 | End: 2022-09-26 | Stop reason: HOSPADM

## 2022-09-26 RX ORDER — ASPIRIN 81 MG/1
TABLET, CHEWABLE ORAL
Status: DISCONTINUED | OUTPATIENT
Start: 2022-09-26 | End: 2022-09-26 | Stop reason: HOSPADM

## 2022-09-26 RX ORDER — NALOXONE HYDROCHLORIDE 0.4 MG/ML
0.4 INJECTION, SOLUTION INTRAMUSCULAR; INTRAVENOUS; SUBCUTANEOUS
Status: ACTIVE | OUTPATIENT
Start: 2022-09-26 | End: 2022-09-27

## 2022-09-26 RX ORDER — NITROGLYCERIN 5 MG/ML
VIAL (ML) INTRAVENOUS
Status: DISCONTINUED | OUTPATIENT
Start: 2022-09-26 | End: 2022-09-26 | Stop reason: HOSPADM

## 2022-09-26 RX ORDER — NITROGLYCERIN 0.4 MG/1
0.4 TABLET SUBLINGUAL EVERY 5 MIN PRN
Status: DISCONTINUED | OUTPATIENT
Start: 2022-09-26 | End: 2022-09-27 | Stop reason: HOSPADM

## 2022-09-26 RX ORDER — FLUMAZENIL 0.1 MG/ML
0.2 INJECTION, SOLUTION INTRAVENOUS
Status: ACTIVE | OUTPATIENT
Start: 2022-09-26 | End: 2022-09-27

## 2022-09-26 RX ORDER — EPTIFIBATIDE 2 MG/ML
180 INJECTION, SOLUTION INTRAVENOUS EVERY 10 MIN PRN
Status: DISCONTINUED | OUTPATIENT
Start: 2022-09-26 | End: 2022-09-26 | Stop reason: HOSPADM

## 2022-09-26 RX ORDER — OXYCODONE HYDROCHLORIDE 5 MG/1
5 TABLET ORAL EVERY 4 HOURS PRN
Status: DISCONTINUED | OUTPATIENT
Start: 2022-09-26 | End: 2022-09-27 | Stop reason: HOSPADM

## 2022-09-26 RX ORDER — LOVASTATIN 20 MG
40 TABLET ORAL AT BEDTIME
Status: DISCONTINUED | OUTPATIENT
Start: 2022-09-26 | End: 2022-09-26 | Stop reason: ALTCHOICE

## 2022-09-26 RX ORDER — ASPIRIN 325 MG
325 TABLET ORAL ONCE
Status: CANCELLED | OUTPATIENT
Start: 2022-09-26 | End: 2022-09-26

## 2022-09-26 RX ORDER — LEVOTHYROXINE SODIUM 88 UG/1
88 TABLET ORAL DAILY
Status: DISCONTINUED | OUTPATIENT
Start: 2022-09-26 | End: 2022-09-27 | Stop reason: HOSPADM

## 2022-09-26 RX ORDER — ARGATROBAN 1 MG/ML
150 INJECTION, SOLUTION INTRAVENOUS
Status: DISCONTINUED | OUTPATIENT
Start: 2022-09-26 | End: 2022-09-26 | Stop reason: HOSPADM

## 2022-09-26 RX ORDER — DOBUTAMINE HYDROCHLORIDE 200 MG/100ML
2-20 INJECTION INTRAVENOUS CONTINUOUS PRN
Status: DISCONTINUED | OUTPATIENT
Start: 2022-09-26 | End: 2022-09-26 | Stop reason: HOSPADM

## 2022-09-26 RX ORDER — ACETAMINOPHEN 325 MG/1
650 TABLET ORAL EVERY 4 HOURS PRN
Status: DISCONTINUED | OUTPATIENT
Start: 2022-09-26 | End: 2022-09-27 | Stop reason: HOSPADM

## 2022-09-26 RX ORDER — LORAZEPAM 2 MG/ML
0.5 INJECTION INTRAMUSCULAR
Status: CANCELLED | OUTPATIENT
Start: 2022-09-26

## 2022-09-26 RX ORDER — HEPARIN SODIUM 1000 [USP'U]/ML
INJECTION, SOLUTION INTRAVENOUS; SUBCUTANEOUS
Status: DISCONTINUED | OUTPATIENT
Start: 2022-09-26 | End: 2022-09-26 | Stop reason: HOSPADM

## 2022-09-26 RX ORDER — ASPIRIN 81 MG/1
243 TABLET, CHEWABLE ORAL ONCE
Status: CANCELLED | OUTPATIENT
Start: 2022-09-26

## 2022-09-26 RX ORDER — LIDOCAINE 40 MG/G
CREAM TOPICAL
Status: CANCELLED | OUTPATIENT
Start: 2022-09-26

## 2022-09-26 RX ORDER — CLOPIDOGREL BISULFATE 75 MG/1
TABLET ORAL
Status: DISCONTINUED | OUTPATIENT
Start: 2022-09-26 | End: 2022-09-26 | Stop reason: HOSPADM

## 2022-09-26 RX ORDER — AMLODIPINE BESYLATE 5 MG/1
5 TABLET ORAL EVERY EVENING
Status: DISCONTINUED | OUTPATIENT
Start: 2022-09-26 | End: 2022-09-27 | Stop reason: HOSPADM

## 2022-09-26 RX ORDER — POLYETHYLENE GLYCOL 3350 17 G/17G
17 POWDER, FOR SOLUTION ORAL DAILY PRN
Status: DISCONTINUED | OUTPATIENT
Start: 2022-09-26 | End: 2022-09-27 | Stop reason: HOSPADM

## 2022-09-26 RX ORDER — NALOXONE HYDROCHLORIDE 0.4 MG/ML
0.2 INJECTION, SOLUTION INTRAMUSCULAR; INTRAVENOUS; SUBCUTANEOUS
Status: ACTIVE | OUTPATIENT
Start: 2022-09-26 | End: 2022-09-27

## 2022-09-26 RX ORDER — LORAZEPAM 0.5 MG/1
0.5 TABLET ORAL
Status: CANCELLED | OUTPATIENT
Start: 2022-09-26

## 2022-09-26 RX ORDER — LOSARTAN POTASSIUM 50 MG/1
50 TABLET ORAL EVERY EVENING
Status: DISCONTINUED | OUTPATIENT
Start: 2022-09-26 | End: 2022-09-27 | Stop reason: HOSPADM

## 2022-09-26 RX ORDER — ATROPINE SULFATE 0.1 MG/ML
0.5 INJECTION INTRAVENOUS
Status: ACTIVE | OUTPATIENT
Start: 2022-09-26 | End: 2022-09-27

## 2022-09-26 RX ORDER — SODIUM CHLORIDE 9 MG/ML
75 INJECTION, SOLUTION INTRAVENOUS CONTINUOUS
Status: ACTIVE | OUTPATIENT
Start: 2022-09-26 | End: 2022-09-26

## 2022-09-26 RX ORDER — SODIUM CHLORIDE 9 MG/ML
INJECTION, SOLUTION INTRAVENOUS CONTINUOUS
Status: CANCELLED | OUTPATIENT
Start: 2022-09-26

## 2022-09-26 RX ORDER — IOPAMIDOL 755 MG/ML
INJECTION, SOLUTION INTRAVASCULAR
Status: DISCONTINUED | OUTPATIENT
Start: 2022-09-26 | End: 2022-09-26 | Stop reason: HOSPADM

## 2022-09-26 RX ORDER — CLONAZEPAM 0.5 MG/1
.25-.5 TABLET ORAL
Status: DISCONTINUED | OUTPATIENT
Start: 2022-09-26 | End: 2022-09-27 | Stop reason: HOSPADM

## 2022-09-26 RX ORDER — CLOPIDOGREL BISULFATE 75 MG/1
75 TABLET ORAL DAILY
Status: DISCONTINUED | OUTPATIENT
Start: 2022-09-27 | End: 2022-09-27 | Stop reason: HOSPADM

## 2022-09-26 RX ORDER — ATORVASTATIN CALCIUM 40 MG/1
40 TABLET, FILM COATED ORAL EVERY EVENING
Status: DISCONTINUED | OUTPATIENT
Start: 2022-09-26 | End: 2022-09-27 | Stop reason: HOSPADM

## 2022-09-26 RX ORDER — ASPIRIN 81 MG/1
81 TABLET ORAL DAILY
Status: DISCONTINUED | OUTPATIENT
Start: 2022-09-27 | End: 2022-09-27 | Stop reason: HOSPADM

## 2022-09-26 RX ADMIN — DULOXETINE 60 MG: 60 CAPSULE, DELAYED RELEASE ORAL at 09:22

## 2022-09-26 RX ADMIN — HEPARIN SODIUM 1000 UNITS/HR: 10000 INJECTION, SOLUTION INTRAVENOUS at 11:47

## 2022-09-26 RX ADMIN — HUMAN ALBUMIN MICROSPHERES AND PERFLUTREN 3 ML: 10; .22 INJECTION, SOLUTION INTRAVENOUS at 08:43

## 2022-09-26 RX ADMIN — ALUMINUM HYDROXIDE, MAGNESIUM HYDROXIDE, AND DIMETHICONE 30 ML: 200; 20; 200 SUSPENSION ORAL at 08:20

## 2022-09-26 RX ADMIN — LEVOTHYROXINE SODIUM 88 MCG: 88 TABLET ORAL at 09:22

## 2022-09-26 RX ADMIN — ASPIRIN 81 MG: 81 TABLET, COATED ORAL at 09:22

## 2022-09-26 RX ADMIN — LOSARTAN POTASSIUM 50 MG: 50 TABLET, FILM COATED ORAL at 19:47

## 2022-09-26 RX ADMIN — NITROGLYCERIN 0.4 MG: 0.4 TABLET SUBLINGUAL at 12:12

## 2022-09-26 RX ADMIN — METOPROLOL TARTRATE 25 MG: 25 TABLET, FILM COATED ORAL at 10:55

## 2022-09-26 RX ADMIN — METOPROLOL TARTRATE 25 MG: 25 TABLET, FILM COATED ORAL at 20:54

## 2022-09-26 RX ADMIN — AMLODIPINE BESYLATE 5 MG: 5 TABLET ORAL at 19:47

## 2022-09-26 RX ADMIN — ATORVASTATIN CALCIUM 40 MG: 40 TABLET, FILM COATED ORAL at 19:48

## 2022-09-26 ASSESSMENT — ACTIVITIES OF DAILY LIVING (ADL)
ADLS_ACUITY_SCORE: 35

## 2022-09-26 NOTE — CONSULTS
[facility]    Cardiology Consultation     Date of Admission:  9/25/2022    Assessment & Plan   Janie De Leon is a 72 year old female who was admitted on 9/25/2022.    1.  Non-ST elevation myocardial infarction  Patient presents with sudden onset rest angina, quite typical.  She is now pain-free with sublingual nitroglycerin.  She also on intravenous heparin.  At this time, I discussed with her the pathophysiology of acute coronary syndrome and plaque rupture.  She clearly has had acute event and will need coronary angiography with ad hoc intervention.  Risks and benefits of left heart catheterization and coronary angiogram were discussed with the patient in detail. 0.1-0.3% (for diagnostic angio) and 1-2% (for PCI)  risk of stroke, MI, death, emergent bypass for diagnostic angio, risk of contrast induced allergic reaction, renal dysfunction, vascular complications were discussed. Pros and cons of bare metal Vs drug eluting stents was discussed. Patient understands and wishes to proceed with it.  I also reviewed the echocardiogram which revealed some inferior inferoseptal hypokinesis at the base.  Start metoprolol.    2.  Hyperlipidemia  We will switch lovastatin to more potent statin like atorvastatin at 40 mg daily.    3.  Hypertension, on amlodipine and losartan, will continue.  We will add beta-blockers for    Thank you for allowing us to personally care of this nice patient with admission for problem with high complexity.  Patient will be referred for coronary angiography.    Kristian Johnston MD, MD    Primary Care Physician   Jade Davis    Reason for Consult   Reason for consult: I was asked by hospitalist to evaluate for non-ST elevation myocardial infarction.    History of Present Illness   Janie De Leon is a 72 year old female with a past medical history of hypertension, prediabetes or type 2 diabetes, hypertension lipidemia who presents to the emergency room with sudden  onset retrosternal chest heaviness about 8 to 9 x 10 in intensity associate with neck pain and some shortness of breath.  This was different than her hiatal hernia pain.  It was quite significant and she came the emergency room and was given sublingual nitroglycerin with relief.  Now she is pain-free.  She is on IV heparin.  Initial troponin was normal but the subsequent one came back elevated at 2748.     EKG was reviewed by me and revealed sinus rhythm without ischemic changes.  Her BMP is essentially normal.  Hemoglobin is normal.     She has family history of coronary disease with a grandfather having CAD.  Mother had congestive heart failure.  She is ex-smoker and quit in 1970s.       Patient Active Problem List   Diagnosis     Major depressive disorder, single episode in full remission (H)     Essential hypertension, benign - goal < 140/90     Prediabetes     Hyperlipidemia     Insomnia     Advanced directives, counseling/discussion     Adjustment disorder with anxiety     Rotator cuff injury, left, sequela     Hypothyroidism     Osteopenia     Controlled substance agreement signed     Chronic constipation     CKD (chronic kidney disease) stage 3, GFR 30-59 ml/min (H)     Adrenal nodule (H)     Fatty liver       Past Medical History   I have reviewed this patient's medical history and updated it with pertinent information if needed.   Past Medical History:   Diagnosis Date     Adrenal nodule (H) 09/2022    CT scan     Anemia     mild gastropathy on EGD 2008; neg pill cam     Atypical ductal hyperplasia of both breasts     Has had biopsies and MRI     Fibroid uterus      High cholesterol      History of hematuria 2008    Cystoscopy for recurrent UTIs; unremarkable renal US. Also recurrent UTIs as child and pyelonephritis.      History of hormone replacement therapy     after JASBIR with BSO     HTN (hypertension)      HTN, goal below 140/90      Hx of bone density study 10/16/2006    Normal     Hypothyroidism       Insomnia     periodic - prn clonazepam helpful a couple times per month     Lipid screening 07/21/2015    Tchol 128, , HDL 53, LDL 53 outside records --> based on our labs off of atorvastatin 10yr ASCVD risk 9.4% in Oct 2015     Major depressive disorder, single episode in full remission (H) 2007     Osteopenia     Mild left hip July 2016     Prediabetes     Metformin in the past     Rotator cuff injury, left, sequela     MRI Jan 2015 and had PT; High-grade complete or near complete tear of the supraspinatus tendon and anterior fibers of the infraspinatus tendon. 1/3 of the infraspinatus tendon appears involved.      TBI (traumatic brain injury) (H)     As a child     Tubular adenoma of colon 2013 & 2016       Past Surgical History   I have reviewed this patient's surgical history and updated it with pertinent information if needed.  Past Surgical History:   Procedure Laterality Date     ANKLE SURGERY  1976     BREAST BIOPSY, RT/LT      Many     C/SECTION, LOW TRANSVERSE  1968     CAPSULE/PILL CAM ENDOSCOPY  2/18/2014    EGD prior; capsule was negative      COLONOSCOPY      1999, 2003, 2008, 6/6/2013     COLONOSCOPY N/A 6/20/2019    Procedure: COLONOSCOPY, WITH POLYPECTOMY AND BIOPSY;  Surgeon: Pepito Romero MD;  Location:  GI     COLONOSCOPY N/A 6/16/2022    Procedure: COLONOSCOPY;  Surgeon: Rodrigo Dunbar MD;  Location:  GI     HYSTERECTOMY, PAP NO LONGER INDICATED       JASBIR with BSO  5/4/2000    benign fibroids     TUBAL LIGATION  1978       Prior to Admission Medications   Prior to Admission Medications   Prescriptions Last Dose Informant Patient Reported? Taking?   Cholecalciferol (VITAMIN D3 PO) 9/25/2022 at AM Self Yes Yes   Sig: Take 1,000 Units by mouth daily   DULoxetine (CYMBALTA) 60 MG capsule 9/25/2022 at am Pharmacy No Yes   Sig: Take 1 capsule (60 mg) by mouth daily   amLODIPine (NORVASC) 5 MG tablet 9/24/2022 at PM Pharmacy No Yes   Sig: Take 1 tablet (5 mg) by mouth daily    Patient taking differently: Take 5 mg by mouth every evening   clonazePAM (KLONOPIN) 0.5 MG tablet prn med Pharmacy No Yes   Sig: Take 0.5-1 tablets (0.25-0.5 mg) by mouth nightly as needed for anxiety   levothyroxine (SYNTHROID/LEVOTHROID) 88 MCG tablet 2022 at am Pharmacy No Yes   Sig: Take 1 tablet (88 mcg) by mouth daily - Overdue for endocrinology appointment   lovastatin (MEVACOR) 40 MG tablet 2022 at PM Pharmacy No Yes   Sig: Take 1 tablet (40 mg) by mouth At Bedtime   olmesartan (BENICAR) 40 MG tablet 2022 at PM Pharmacy Yes Yes   Sig: Take 20 mg by mouth every evening   polyethylene glycol (MIRALAX) 17 GM/Dose powder prn med Self Yes Yes   Sig: Take 17 g by mouth daily as needed for constipation      Facility-Administered Medications: None     Current Facility-Administered Medications   Medication Dose Route Frequency     amLODIPine  5 mg Oral QPM     aspirin  81 mg Oral Daily     DULoxetine  60 mg Oral Daily     levothyroxine  88 mcg Oral Daily     losartan  50 mg Oral QPM     lovastatin  40 mg Oral At Bedtime     Current Facility-Administered Medications   Medication Last Rate     heparin 850 Units/hr (22 0819)     Allergies   Allergies   Allergen Reactions     Ciprofloxacin GI Disturbance     Oxycodone Other (See Comments)     She prefers not to have Oxycodone or Oxycontin due to potential addictive properties     Simvastatin Other (See Comments)     Muscle aches        Social History    reports that she quit smoking about 47 years ago. Her smoking use included cigarettes. She started smoking about 57 years ago. She has a 4.50 pack-year smoking history. She has never used smokeless tobacco. She reports current alcohol use. She reports that she does not use drugs.    Family History   Family History   Problem Relation Age of Onset     Colon Cancer Mother 70         age 81     Diabetes Mother         After age 75     Macular Degeneration Mother      Glaucoma Mother       Cerebrovascular Disease Mother      Heart Failure Mother      Hypertension Mother      Hyperlipidemia Mother      Other - See Comments Father 87        Frailty, pneumonia, fractures, failure to thrive     Osteoporosis Father         diagnosed in his 80s     Colon Polyps Daughter         Tubular adenoma     Deep Vein Thrombosis Brother      Hyperlipidemia Brother      Hypertension Brother      Other Cancer Brother         myeloma     Hypertension Brother         both brothers     Hyperlipidemia Brother         both brothers     Depression Brother      Anxiety Disorder Brother      Mental Illness Brother         on Haldol before death from lung cancer     Lung Cancer Brother         long time heavy smoker     Breast Cancer Other         radical mastecomy in her 40s     Colon Cancer Other      Other Cancer Other         several aunts various kinds     Other Cancer Other         throat cancer     Other Cancer Paternal Grandmother         throat cancer       Review of Systems   The comprehensive 10 point Review of Systems is negative other than noted in the HPI or here.     Physical Exam   Vital Signs with Ranges  Temp:  [97.6  F (36.4  C)] 97.6  F (36.4  C)  Pulse:  [70-96] 76  Resp:  [11-31] 19  BP: (131-170)/() 131/92  SpO2:  [93 %-100 %] 96 %  Wt Readings from Last 4 Encounters:   09/22/22 87.1 kg (192 lb)   09/16/22 87.1 kg (192 lb)   08/31/22 88 kg (194 lb)   06/16/22 87.1 kg (192 lb)     No intake/output data recorded.      Vitals: BP (!) 131/92   Pulse 76   Temp 97.6  F (36.4  C) (Temporal)   Resp 19   LMP  (LMP Unknown)   SpO2 96%     Constitutional:   alert   Eyes:   extra-ocular muscles intact   ENT:   atramatic   Neck:   no jugular venous distension   Hematologic / Lymphatic:   no cervical lymphadenopathy   Back:   symmetric   Lungs:   clear to auscultation   Cardiovascular:   normal S1 and S2 and no murmur noted   Abdomen:   soft and non-distended   Neurologic:   Motor Exam:  moves all extremities  well and symmetrically   Neuropsychiatric:   Orientation: oriented to self, place, time and situation   Skin:   no rashes       No lab results found in last 7 days.    Invalid input(s): TROPONINIES    Recent Labs   Lab 09/25/22 2251 09/25/22  1544   WBC 8.1 10.3   HGB 13.5 15.3   MCV 87 91    357   NA  --  138   POTASSIUM  --  4.3   CHLORIDE  --  105   CO2  --  24   BUN  --  20   CR  --  1.01   GFRESTIMATED  --  58*   ANIONGAP  --  9   CYNDY  --  8.6   GLC  --  125*     Recent Labs   Lab Test 08/31/22  1113 08/30/21  0955 07/15/16  0800 10/28/15  0734 07/21/15  0000   CHOL 221* 219*   < > 243* 128   HDL 43* 43*   < > 46* 53   * 118*   < > 159* 53   TRIG 274* 288*   < > 191* 109   CHOLHDLRATIO  --   --   --  5.3* 1.0    < > = values in this interval not displayed.     Recent Labs   Lab 09/25/22 2251 09/25/22  1544   WBC 8.1 10.3   HGB 13.5 15.3   HCT 40.5 47.6*   MCV 87 91    357     No results for input(s): PH, PHV, PO2, PO2V, SAT, PCO2, PCO2V, HCO3, HCO3V in the last 168 hours.  No results for input(s): NTBNPI, NTBNP in the last 168 hours.  Recent Labs   Lab 09/25/22  1545   DD 0.36     No results for input(s): SED, CRP in the last 168 hours.  Recent Labs   Lab 09/25/22 2251 09/25/22  1544    357     No results for input(s): TSH in the last 168 hours.  No results for input(s): COLOR, APPEARANCE, URINEGLC, URINEBILI, URINEKETONE, SG, UBLD, URINEPH, PROTEIN, UROBILINOGEN, NITRITE, LEUKEST, RBCU, WBCU in the last 168 hours.    Imaging:  Recent Results (from the past 48 hour(s))   XR Chest Port 1 View    Narrative    EXAM: XR CHEST PORT 1 VIEW  LOCATION: [facility]  DATE/TIME: 9/25/2022 4:04 PM    INDICATION: Chest pain.  COMPARISON: None.      Impression    IMPRESSION: Negative chest.   Echocardiogram Complete   Result Value    LVEF  50-55%    Narrative    [id number]  [id number]  [id number]  [id number]^[place]^[facility]  Echocardiography  Laboratory  [address] [place] [id number]     Name: JAKE ROSA  MRN: [id number]  : 1950  Study Date: 2022 08:25 AM  Age: 72 yrs  Gender: Female  Patient Location: [facility]  Reason For Study: Chest Pain, Chest Pressure, Chest Tightness  Ordering Physician: ZHANE MATTA  Performed By: Jayde Hastings RDCS     BSA: 2.0 m2  Height: 69 in  Weight: 192 lb  HR: 73  BP: 145/92 mmHg  ______________________________________________________________________________  Procedure  Complete Portable Echo Adult. Optison (NDC #[id number]) given intravenously.  ______________________________________________________________________________  Interpretation Summary     The visual ejection fraction is 50-55%.  There are regional wall motion abnormalities as specified.  There is trace aortic regurgitation.  The study was technically difficult.  ______________________________________________________________________________  Left Ventricle  The left ventricle is normal in size. There is normal left ventricular wall  thickness. Diastolic Doppler findings (E/E' ratio and/or other parameters)  suggest left ventricular filling pressures are normal. Grade I or early  diastolic dysfunction. The visual ejection fraction is 50-55%. The basal  inferior and basal inferoseptal walls are mildly to moderately hypokinetic.  There are regional wall motion abnormalities as specified.     Right Ventricle  The right ventricle is normal in size and function.     Atria  Normal left atrial size. Right atrial size is normal. There is no color  Doppler evidence of an atrial shunt.     Mitral Valve  There is mild (1+) mitral regurgitation.     Tricuspid Valve  There is trace tricuspid regurgitation. Right ventricular systolic pressure  could not be approximated due to inadequate tricuspid regurgitation.     Aortic Valve  The aortic valve is trileaflet. There is trace aortic regurgitation. No  hemodynamically significant valvular aortic  "stenosis.     Pulmonic Valve  There is trace pulmonic valvular regurgitation. Normal pulmonic valve  velocity.     Vessels  The aortic root is normal size. Normal size ascending aorta. IVC diameter <2.1  cm collapsing >50% with sniff suggests a normal RA pressure of 3 mmHg. The  inferior vena cava was normal in size with preserved respiratory variability.     Pericardium  There is no pericardial effusion.     Rhythm  Sinus rhythm was noted.  ______________________________________________________________________________  MMode/2D Measurements & Calculations     IVSd: 1.1 cm  LVIDd: 4.4 cm  LVIDs: 2.6 cm  LVPWd: 1.00 cm  FS: 39.8 %  LV mass(C)d: 151.3 grams  LV mass(C)dI: 74.5 grams/m2  Ao root diam: 3.2 cm  LA dimension: 4.0 cm  asc Aorta Diam: 3.4 cm  LA/Ao: 1.3  LVOT diam: 2.2 cm  LVOT area: 3.9 cm2  RWT: 0.46     Doppler Measurements & Calculations  MV E max ruchi: 66.0 cm/sec  MV A max ruchi: 95.6 cm/sec  MV E/A: 0.69  MV dec slope: 281.7 cm/sec2  MV dec time: 0.24 sec  LV V1 max PG: 3.3 mmHg  LV V1 max: 90.7 cm/sec  LV V1 VTI: 21.4 cm  SV(LVOT): 84.0 ml  SI(LVOT): 41.4 ml/m2  E/E' av.4  Lateral E/e': 8.3     Medial E/e': 8.5     ______________________________________________________________________________  Report approved by: Shoshana Baez 2022 09:24 AM             Echo:  No results found for this or any previous visit (from the past 4320 hour(s)).    Clinically Significant Risk Factors Present on Admission                 # Overweight: Estimated body mass index is 28.35 kg/m  as calculated from the following:    Height as of 22: 1.753 m (5' 9\").    Weight as of 22: 87.1 kg (192 lb).                    "

## 2022-09-26 NOTE — ED NOTES
RN assumed care for patient this morning. Midnight HepxA was 0.54, infusion was to be decreased by 200 ml and recheck after 6 hours of restart. No documentation stating heparin gtt was decreased to 850, however, when RN assessed infusion the rate was 850. RN verified this and hepxa order is already placed for 1000. Pt denies chest pain at this time. Pt ambulatory to the bathroom and vitals stable.    Echo at bedside at this time

## 2022-09-26 NOTE — ED NOTES
[facility]  ED Nurse Handoff Report    ED Chief complaint: Chest Pain      ED Diagnosis:   Final diagnoses:   Chest pain, unspecified type       Code Status: Full Code    Allergies:   Allergies   Allergen Reactions     Ciprofloxacin GI Disturbance     Oxycodone Other (See Comments)     She prefers not to have Oxycodone or Oxycontin due to potential addictive properties     Simvastatin Other (See Comments)     Muscle aches        Patient Story: pt present with CP that started 30 min PTA. Pt was drinking beer and watching TV when this happened. Pt also noted a few days ago she had right sided neck pain. Denies n/v/d and SOB   Focused Assessment:  Pt initial drop negative but has been steadily rising newest trop at 2748.  Pt started on heparin, nitroglycerin, patch, and ASA     Treatments and/or interventions provided: medications blood work, imaging   Patient's response to treatments and/or interventions: pt denies CP currently     To be done/followed up on inpatient unit:  increased trop levels     Does this patient have any cognitive concerns?: none    Activity level - Baseline/Home:  Independent  Activity Level - Current:   Stand with Assist    Patient's Preferred language: English   Needed?: No    Isolation: None  Infection: Not Applicable  Patient tested for COVID 19 prior to admission: YES  Bariatric?: No    Vital Signs:   Vitals:    09/25/22 1604 09/25/22 1605 09/25/22 1606 09/26/22 0300   BP: (!) 145/92      Pulse: 92 94 87 71   Resp: 24 18 20 20   Temp:       TempSrc:       SpO2: 95% 96% 95% 93%       Cardiac Rhythm:     Was the PSS-3 completed:   Yes  What interventions are required if any?               Family Comments:   OBS brochure/video discussed/provided to patient/family: No              Name of person given brochure if not patient:               Relationship to patient: none present     For the majority of the shift this patient's behavior was Green.   Behavioral  interventions performed were none.    ED NURSE PHONE NUMBER: RN 3

## 2022-09-26 NOTE — PROGRESS NOTES
[facility]    Hospitalist Progress Note      Assessment & Plan   Janie De Leon is a very pleasant 72 year old female who has medical history which includes hiatal hernia, hypertension, hyperlipidemia, hypothyroidism, prediabetic and kidney nodule who presented to the ED with a chief complaint of chest pain     NSTEMI  Presents with acute onset substernal chest pain without associated dyspnea, dizziness, nausea. Pain lasted several hours before resolving on presentation to the ED.    EKG showed normal sinus rhythm and d dimer was negative  Troponin 6>32>2748  ECHO is pending   Pain free this am without nitro patch   -Patient was started on heparin drip and nitro patch in the ED  --admit to inpatient  --Cardiology consulted, appreciate assistance  --NPO  --continue heparin drip  --nitro patch has been removed, prn nitro available for recurrent pain   --lipid panel, A1C  --start asa  --PTA lovastatin changed to atorvastatin      Hyperlipidemia  Lipid panel recently checked end of August, , total cholesterol 21, triglycerides 274  -PTA lovastatin changed to atorvastatin      hypertension  -We will continue with PTA olmesartan 20 mg daily and  amlodipine 10 mg daily  --add metoprolol 25mg bid       hypothyroidism  -We will continue with PTA levothyroxine     anxiety/depression  -We will continue with her home medications including Cymbalta and as needed Klonopin    Pre-DM   A1C earlier this month 6.1.   --encourage lifestyle modification  --follow up PCP for regular monitoring     DVT Prophylaxis: Heparin drip  Code Status: Full Code    Disposition: Expected discharge in 2 days pending cardiac workup    Patient was discussed with Dr. Florence who agrees with the above plan     Carmen Ordoñez PA-C, ZULY    Interval History   Doing well this morning. Denies chest pain, dyspnea, nausea. NPO for angiogram later today.     -Data reviewed today: I reviewed all new labs and imaging results  over the last 24 hours. I personally reviewed no images or EKG's today.    Physical Exam   Temp: 97.6  F (36.4  C) Temp src: Temporal BP: (!) 136/101 Pulse: 76   Resp: 11 SpO2: 95 %      There were no vitals filed for this visit.  Vital Signs with Ranges  Temp:  [97.6  F (36.4  C)] 97.6  F (36.4  C)  Pulse:  [70-96] 76  Resp:  [11-31] 11  BP: (136-170)/() 136/101  SpO2:  [93 %-100 %] 95 %  No intake/output data recorded.    Constitutional: Alert and oriented, sitting up in bed. Appears comfortable and is appropriately conversant  ENT: moist mucous membranes  Eyes:  Sclera anicteric, EOMI  Respiratory: Lungs clear to auscultation bilaterally, no increased work of breathing or wheezing   Cardiovascular: Regular rate and rhythm, no murmur, no LE edema, distal pulses +2/4  GI: active bowel sounds, abdomen soft, non-tender  MSK:  Moves all four extremities. Normal tone  Neuro:  Speech is clear. Face symmetric. Follows commands       Medications     heparin 850 Units/hr (09/26/22 0819)       amLODIPine  5 mg Oral QPM     aspirin  81 mg Oral Daily     DULoxetine  60 mg Oral Daily     levothyroxine  88 mcg Oral Daily     losartan  50 mg Oral QPM     lovastatin  40 mg Oral At Bedtime       Data   Recent Labs   Lab 09/25/22  2251 09/25/22  1544   WBC 8.1 10.3   HGB 13.5 15.3   MCV 87 91    357   NA  --  138   POTASSIUM  --  4.3   CHLORIDE  --  105   CO2  --  24   BUN  --  20   CR  --  1.01   ANIONGAP  --  9   CYNDY  --  8.6   GLC  --  125*       Recent Results (from the past 24 hour(s))   XR Chest Port 1 View    Narrative    EXAM: XR CHEST PORT 1 VIEW  LOCATION: [facility]  DATE/TIME: 9/25/2022 4:04 PM    INDICATION: Chest pain.  COMPARISON: None.      Impression    IMPRESSION: Negative chest.

## 2022-09-26 NOTE — PROGRESS NOTES
Medication: lorezepam  Sig: 3 times daily  Qty: 90  Dosage: 1mg  Last Refill: one month ago  Pharmacy: [facility] [phone]  Patient last seen:3/18/2019  Next appointment to be seen:none         RECEIVING UNIT ED HANDOFF REVIEW    ED Nurse Handoff Report was reviewed by: Reza Morales RN on September 26, 2022 at 9:26 AM     ]

## 2022-09-26 NOTE — ED NOTES
Pt transported to [facility] with flying squad RN on transport monitor

## 2022-09-27 ENCOUNTER — APPOINTMENT (OUTPATIENT)
Dept: MRI IMAGING | Facility: CLINIC | Age: 72
DRG: 282 | End: 2022-09-27
Attending: STUDENT IN AN ORGANIZED HEALTH CARE EDUCATION/TRAINING PROGRAM
Payer: MEDICARE

## 2022-09-27 ENCOUNTER — APPOINTMENT (OUTPATIENT)
Dept: OCCUPATIONAL THERAPY | Facility: CLINIC | Age: 72
DRG: 282 | End: 2022-09-27
Attending: PHYSICIAN ASSISTANT
Payer: MEDICARE

## 2022-09-27 VITALS
DIASTOLIC BLOOD PRESSURE: 70 MMHG | OXYGEN SATURATION: 94 % | WEIGHT: 189.5 LBS | HEART RATE: 71 BPM | BODY MASS INDEX: 28.07 KG/M2 | RESPIRATION RATE: 16 BRPM | HEIGHT: 69 IN | TEMPERATURE: 97.7 F | SYSTOLIC BLOOD PRESSURE: 124 MMHG

## 2022-09-27 LAB
ALDOST SERPL-MCNC: 11.6 NG/DL (ref 0–31)
ANION GAP SERPL CALCULATED.3IONS-SCNC: 7 MMOL/L (ref 3–14)
BUN SERPL-MCNC: 15 MG/DL (ref 7–30)
CALCIUM SERPL-MCNC: 9.1 MG/DL (ref 8.5–10.1)
CHLORIDE BLD-SCNC: 106 MMOL/L (ref 94–109)
CO2 SERPL-SCNC: 26 MMOL/L (ref 20–32)
CREAT SERPL-MCNC: 0.81 MG/DL (ref 0.52–1.04)
GFR SERPL CREATININE-BSD FRML MDRD: 76 ML/MIN/1.73M2
GLUCOSE BLD-MCNC: 102 MG/DL (ref 70–99)
POTASSIUM BLD-SCNC: 4 MMOL/L (ref 3.4–5.3)
SODIUM SERPL-SCNC: 139 MMOL/L (ref 133–144)
TROPONIN I SERPL HS-MCNC: 1095 NG/L

## 2022-09-27 PROCEDURE — 84484 ASSAY OF TROPONIN QUANT: CPT | Performed by: PHYSICIAN ASSISTANT

## 2022-09-27 PROCEDURE — 70553 MRI BRAIN STEM W/O & W/DYE: CPT | Mod: MG

## 2022-09-27 PROCEDURE — 99239 HOSP IP/OBS DSCHRG MGMT >30: CPT | Performed by: PHYSICIAN ASSISTANT

## 2022-09-27 PROCEDURE — 97165 OT EVAL LOW COMPLEX 30 MIN: CPT | Mod: GO | Performed by: OCCUPATIONAL THERAPIST

## 2022-09-27 PROCEDURE — 82374 ASSAY BLOOD CARBON DIOXIDE: CPT | Performed by: PHYSICIAN ASSISTANT

## 2022-09-27 PROCEDURE — 250N000011 HC RX IP 250 OP 636: Performed by: PHYSICIAN ASSISTANT

## 2022-09-27 PROCEDURE — G1010 CDSM STANSON: HCPCS

## 2022-09-27 PROCEDURE — 250N000013 HC RX MED GY IP 250 OP 250 PS 637: Performed by: HOSPITALIST

## 2022-09-27 PROCEDURE — 99222 1ST HOSP IP/OBS MODERATE 55: CPT | Mod: GC | Performed by: STUDENT IN AN ORGANIZED HEALTH CARE EDUCATION/TRAINING PROGRAM

## 2022-09-27 PROCEDURE — 99232 SBSQ HOSP IP/OBS MODERATE 35: CPT | Mod: FS | Performed by: INTERNAL MEDICINE

## 2022-09-27 PROCEDURE — 250N000013 HC RX MED GY IP 250 OP 250 PS 637: Performed by: INTERNAL MEDICINE

## 2022-09-27 PROCEDURE — 97535 SELF CARE MNGMENT TRAINING: CPT | Mod: GO | Performed by: OCCUPATIONAL THERAPIST

## 2022-09-27 PROCEDURE — 97110 THERAPEUTIC EXERCISES: CPT | Mod: GO | Performed by: OCCUPATIONAL THERAPIST

## 2022-09-27 PROCEDURE — A9585 GADOBUTROL INJECTION: HCPCS | Performed by: INTERNAL MEDICINE

## 2022-09-27 PROCEDURE — 255N000002 HC RX 255 OP 636: Performed by: INTERNAL MEDICINE

## 2022-09-27 PROCEDURE — 36415 COLL VENOUS BLD VENIPUNCTURE: CPT | Performed by: PHYSICIAN ASSISTANT

## 2022-09-27 RX ORDER — NALOXONE HYDROCHLORIDE 0.4 MG/ML
0.2 INJECTION, SOLUTION INTRAMUSCULAR; INTRAVENOUS; SUBCUTANEOUS
Status: DISCONTINUED | OUTPATIENT
Start: 2022-09-27 | End: 2022-09-27 | Stop reason: HOSPADM

## 2022-09-27 RX ORDER — ATORVASTATIN CALCIUM 40 MG/1
40 TABLET, FILM COATED ORAL EVERY EVENING
Qty: 30 TABLET | Refills: 1 | Status: SHIPPED | OUTPATIENT
Start: 2022-09-27 | End: 2022-10-10

## 2022-09-27 RX ORDER — METOPROLOL TARTRATE 25 MG/1
25 TABLET, FILM COATED ORAL 2 TIMES DAILY
Qty: 60 TABLET | Refills: 0 | Status: SHIPPED | OUTPATIENT
Start: 2022-09-27 | End: 2022-10-07

## 2022-09-27 RX ORDER — FUROSEMIDE 10 MG/ML
20 INJECTION INTRAMUSCULAR; INTRAVENOUS ONCE
Status: COMPLETED | OUTPATIENT
Start: 2022-09-27 | End: 2022-09-27

## 2022-09-27 RX ORDER — GADOBUTROL 604.72 MG/ML
10 INJECTION INTRAVENOUS ONCE
Status: COMPLETED | OUTPATIENT
Start: 2022-09-27 | End: 2022-09-27

## 2022-09-27 RX ORDER — NALOXONE HYDROCHLORIDE 0.4 MG/ML
0.4 INJECTION, SOLUTION INTRAMUSCULAR; INTRAVENOUS; SUBCUTANEOUS
Status: DISCONTINUED | OUTPATIENT
Start: 2022-09-27 | End: 2022-09-27 | Stop reason: HOSPADM

## 2022-09-27 RX ORDER — CLOPIDOGREL BISULFATE 75 MG/1
75 TABLET ORAL DAILY
Qty: 30 TABLET | Refills: 0 | Status: SHIPPED | OUTPATIENT
Start: 2022-09-28 | End: 2022-10-07

## 2022-09-27 RX ORDER — NITROGLYCERIN 0.4 MG/1
TABLET SUBLINGUAL
Qty: 30 TABLET | Refills: 0 | Status: SHIPPED | OUTPATIENT
Start: 2022-09-27

## 2022-09-27 RX ADMIN — FUROSEMIDE 20 MG: 10 INJECTION, SOLUTION INTRAVENOUS at 18:12

## 2022-09-27 RX ADMIN — LEVOTHYROXINE SODIUM 88 MCG: 88 TABLET ORAL at 08:59

## 2022-09-27 RX ADMIN — CLOPIDOGREL BISULFATE 75 MG: 75 TABLET ORAL at 08:58

## 2022-09-27 RX ADMIN — GADOBUTROL 10 ML: 604.72 INJECTION INTRAVENOUS at 15:10

## 2022-09-27 RX ADMIN — METOPROLOL TARTRATE 25 MG: 25 TABLET, FILM COATED ORAL at 08:59

## 2022-09-27 RX ADMIN — DULOXETINE 60 MG: 60 CAPSULE, DELAYED RELEASE ORAL at 08:58

## 2022-09-27 RX ADMIN — ASPIRIN 81 MG: 81 TABLET, COATED ORAL at 08:58

## 2022-09-27 ASSESSMENT — ACTIVITIES OF DAILY LIVING (ADL)
ADLS_ACUITY_SCORE: 35

## 2022-09-27 NOTE — PLAN OF CARE
Occupational Therapy Discharge Summary    Reason for therapy discharge:    All goals and outcomes met, no further needs identified.    Progress towards therapy goal(s). See goals on Care Plan in [facility] electronic health record for goal details.  Goals met    Therapy recommendation(s):    strenuous IADL's ie vacuuming, yard work, etc and per MD OP CR for monitored progressive exercise and risk factor ed and modification, MD please order OP CR as needed as no disease to stent. Recommend pt to compelte walking program.

## 2022-09-27 NOTE — PROGRESS NOTES
Sw:  D: Writer was asked by bedside nurse that patient is interested in Respite services. Writer printed off in home respite resources and SNF's that offer respite care. Writer met with patient and daughter at bedside and provided her with these resources. Patient also asked about resources for adult day programs. Writer went onto [facility] and printed off a list of adult day care providers and provided to patient.     DERRICK Wallis, [facility]   Social Work   [facility]

## 2022-09-27 NOTE — PLAN OF CARE
Neuro- A&Ox4  Most Recent Vitals- Temp: 98.4  F (36.9  C) Temp src: Oral BP: 110/74 Pulse: 61   Resp: 16 SpO2: 94 % O2 Device: None (Room air)   Tele/Cardiac- SR with 1* AVB, denies CP  Resp- Stable on RA, LS clear. Denies SOB  Activity- Independent  Pain- denies  Drips- none, SL  Drains/Tubes- PIV  Skin- R radial-cms intact, good radial pulse  GI/- WDL  Aggression Color- Green  COVID status- Negative  Plan- Likely discharge home today  Misc- NA    Vannesa Dunbar, RN

## 2022-09-27 NOTE — DISCHARGE SUMMARY
[facility]    Discharge Summary  Hospitalist    Date of Admission:  9/25/2022  Date of Discharge:  9/27/2022  6:10 PM  Discharging Provider: Carmen Ordoñez PA-C, ZULY    Discharge Diagnoses   NSTEMI  Coronary artery disease  Transient word finding difficulties   HTN  HLD  Hypothyroidism  Small ICA outpouching, aneurysm vs infundibulum  Small extra axial nodule, possible meningioma   Pre-DM  Anxiety/depression     History of Present Illness   Janie De Leon is an 72 year old female with a past medical history which includes hiatal hernia, hypertension, hyperlipidemia, hypothyroidism, prediabetic and kidney nodule who presented with chest pain. She was admitted for further evaluation and management after ruling in for NSTEMI.    Hospital Course   Janie De Leon was admitted on 9/25/2022.  The following problems were addressed during her hospitalization:    NSTEMI  Coronary artery disease  Presents with acute onset substernal chest pain without associated dyspnea, dizziness, nausea. Pain lasted several hours before resolving on presentation to the ED.    EKG showed normal sinus rhythm and d dimer was negative  Troponin 6>32>2748. She was started on heparin drip and nitro patch in the ED  She underwent coronary angiography 9/26 showing mild proximal LAD disease with evidence of plaque rupture- decision made to medically manage.   ECHO showed EF 50-55% with grade 1 diastolic dysfunction and basal inferior and basal inferoseptal WMA.   Received one dose IV lasix prior to discharge   --outpatient Cardiology follow up  --outpatient cardiac rehab  --discharge on DAPT x1 year, metoprolol  added to regimen as well  --prn nitro  --continue PTA ARB, statin changed to lipitor      Hyperlipidemia  Lipid panel recently checked end of August, , total cholesterol 21, triglycerides 274  -PTA lovastatin changed to atorvastatin      hypertension  -We will continue with PTA olmesartan 20 mg daily  and  amlodipine 10 mg daily  --added metoprolol 25mg bid       Word finding difficulties  Pt reports noticing this for a few months PTA with more prominent symptoms following angiogram. Neurology consulted and MRI performed which was negative for CVA. Incidentally showed Small ICA outpouching, aneurysm vs infundibulum  And small extra axial nodule, possible meningioma.   --follow up PCP       hypothyroidism  -continue with PTA levothyroxine     anxiety/depression  - continue with her home medications including Cymbalta and as needed Klonopin     Pre-DM   A1C earlier this month 6.1.   --encourage lifestyle modification  --follow up PCP for regular monitoring     Patient was discussed with Dr. Florence who agrees with the above plan    Carmen Ordoñez PA-C, ZULY    Significant Results and Procedures   See below     Pending Results     Unresulted Labs Ordered in the Past 30 Days of this Admission     Date and Time Order Name Status Description    9/23/2022  2:10 PM ALDOSTERONE RENIN RATIO In process     9/23/2022  2:10 PM RENIN ACTIVITY In process           Code Status   Full Code       Primary Care Physician   Jade Davis    Physical Exam                      Vitals:    09/26/22 1000 09/27/22 0401   Weight: 86 kg (189 lb 11.2 oz) 86 kg (189 lb 8 oz)     Vital Signs with Ranges     I/O last 3 completed shifts:  In: 240 [P.O.:240]  Out: -     Constitutional: Alert and oriented, sitting up in bed. Appears comfortable and is appropriately conversant  ENT: moist mucous membranes  Eyes:  Sclera anicteric, EOMI  Respiratory: Lungs with mild bibasilar crackles. No increased work of breathing or wheezing   Cardiovascular: Regular rate and rhythm, no murmur, no LE edema, distal pulses +2/4  GI: active bowel sounds, abdomen soft, non-tender  MSK:  Moves all four extremities. Normal tone  Neuro:  Speech is clear. Face symmetric. CN 2-12 grossly intact. Normal finger to nose. No drift.     Discharge Disposition   Discharged to  home  Condition at discharge: Stable    Consultations This Hospital Stay   PHARMACY IP CONSULT  PHARMACY IP CONSULT  CARDIOLOGY IP CONSULT  PHARMACY IP CONSULT  PHARMACY IP CONSULT  CARDIAC REHAB IP CONSULT  NEUROLOGY IP STROKE CONSULT  SMOKING CESSATION PROGRAM IP CONSULT    Time Spent on this Encounter   I, Carmen Ordoñez PA-C, personally saw the patient today and spent greater than 30 minutes discharging this patient.    Discharge Orders      Cardiac Rehab Referral      Reason for your hospital stay    You were here for evaluation of chest pain     Activity    Your activity upon discharge: activity as tolerated     Follow-up and recommended labs and tests     Follow up with Cardiology as discussed for hospital follow up    Follow up with PCP for hospital follow up. Discuss sleep. Discuss brain MRI     Diet    Follow this diet upon discharge: Orders Placed This Encounter      Advance Diet as Tolerated: Regular Diet Adult     Discharge Medications   Discharge Medication List as of 9/27/2022  5:38 PM      START taking these medications    Details   aspirin (ASA) 81 MG EC tablet Take 1 tablet (81 mg) by mouth daily, Disp-30 tablet, R-0, E-PrescribeFurther  refills per PCP      atorvastatin (LIPITOR) 40 MG tablet Take 1 tablet (40 mg) by mouth every evening, Disp-30 tablet, R-1, E-PrescribeRefills per PCP      clopidogrel (PLAVIX) 75 MG tablet Take 1 tablet (75 mg) by mouth daily, Disp-30 tablet, R-0, E-PrescribeFurther refills per PCP      metoprolol tartrate (LOPRESSOR) 25 MG tablet Take 1 tablet (25 mg) by mouth 2 times daily, Disp-60 tablet, R-0, E-PrescribeRefills per PCP      nitroGLYcerin (NITROSTAT) 0.4 MG sublingual tablet For chest pain place 1 tablet under the tongue every 5 minutes for 3 doses. If symptoms persist 5 minutes after 1st dose call 911., Disp-30 tablet, R-0, E-PrescribeRefills per PCP         CONTINUE these medications which have NOT CHANGED    Details   amLODIPine (NORVASC) 5 MG tablet  Take 1 tablet (5 mg) by mouth daily, Disp-90 tablet, R-3, E-Prescribe      Cholecalciferol (VITAMIN D3 PO) Take 1,000 Units by mouth daily, Historical      clonazePAM (KLONOPIN) 0.5 MG tablet Take 0.5-1 tablets (0.25-0.5 mg) by mouth nightly as needed for anxiety, Disp-45 tablet, R-0, E-Prescribe      DULoxetine (CYMBALTA) 60 MG capsule Take 1 capsule (60 mg) by mouth daily, Disp-90 capsule, R-3, E-Prescribe      levothyroxine (SYNTHROID/LEVOTHROID) 88 MCG tablet Take 1 tablet (88 mcg) by mouth daily - Overdue for endocrinology appointment, Disp-90 tablet, R-3, E-Prescribe      olmesartan (BENICAR) 40 MG tablet Take 20 mg by mouth every evening, Disp-45 tablet, R-3, Historical      polyethylene glycol (MIRALAX) 17 GM/Dose powder Take 17 g by mouth daily as needed for constipation, Historical         STOP taking these medications       lovastatin (MEVACOR) 40 MG tablet Comments:   Reason for Stopping:             Allergies   Allergies   Allergen Reactions     Ciprofloxacin GI Disturbance     Oxycodone Other (See Comments)     She prefers not to have Oxycodone or Oxycontin due to potential addictive properties     Simvastatin Other (See Comments)     Muscle aches      Data   Most Recent 3 CBC's:Recent Labs   Lab Test 09/25/22  2251 09/25/22  1544 09/16/22  0801   WBC 8.1 10.3 6.8   HGB 13.5 15.3 14.3   MCV 87 91 91    357 319      Most Recent 3 BMP's:  Recent Labs   Lab Test 09/27/22  1132 09/25/22  1544 09/16/22  0801    138 138   POTASSIUM 4.0 4.3 4.0   CHLORIDE 106 105 103   CO2 26 24 28   BUN 15 20 19   CR 0.81 1.01 0.90   ANIONGAP 7 9 7   CYNDY 9.1 8.6 8.5   * 125* 122*     Most Recent 2 LFT's:  Recent Labs   Lab Test 09/16/22  0801 08/31/22  1113   AST 13 17   ALT 17 24   ALKPHOS 109 130   BILITOTAL 0.4 0.4     Most Recent INR's and Anticoagulation Dosing History:  Anticoagulation Dose History    There is no flowsheet data to display.       Most Recent 3 Troponin's:No lab results found.  Most  Recent Cholesterol Panel:  Recent Labs   Lab Test 08/31/22  1113   CHOL 221*   *   HDL 43*   TRIG 274*     Most Recent 6 Bacteria Isolates From Any Culture (See EPIC Reports for Culture Details):  Recent Labs   Lab Test 03/12/21  1239 07/15/16  0813   CULT >100,000 colonies/mL  Escherichia coli  * >100,000 colonies/mL Escherichia coli*     Most Recent TSH, T4 and A1c Labs:  Recent Labs   Lab Test 08/31/22  1113 06/27/22  1310   TSH  --  0.80   T4  --  1.27   A1C 6.1*  --      Results for orders placed or performed during the hospital encounter of 09/25/22   XR Chest Port 1 View    Narrative    EXAM: XR CHEST PORT 1 VIEW  LOCATION: [facility]  DATE/TIME: 9/25/2022 4:04 PM    INDICATION: Chest pain.  COMPARISON: None.      Impression    IMPRESSION: Negative chest.   MR Brain w/o & w Contrast    Narrative    MRI OF THE BRAIN WITHOUT AND WITH CONTRAST  MRA OF THE HEAD WITHOUT CONTRAST  MRA OF THE NECK WITHOUT AND WITH CONTRAST    9/27/2022 4:12 PM     HISTORY:  Suspected stroke . Chest pain and right-sided neck pain.    COMPARISON: None..    TECHNIQUE:   Brain: Axial diffusion-weighted with ADC map, T2-weighted with fat  saturation, T1-weighted and turboFLAIR and coronal T1-weighted images  of the brain were obtained without intravenous contrast.  Following  10mL Gadavist  IV, axial turboFLAIR and coronal T1-weighted images of  the brain were obtained.     MRA: 3D time-of-flight MR angiography of the major arteries at the  base of the brain was performed without contrast.  2D time-of-flight  MRA without contrast with superior and inferior saturation bands and  3D T1-weighted postgadolinium MRA of the neck/cervical vessels were  also obtained. MIP reconstruction of all MR angiographic data was  performed.    FINDINGS:   HEAD MRI:  INTRACRANIAL CONTENTS: No acute or subacute infarct. No mass, acute  hemorrhage, or extra-axial fluid collections. Moderate patchy T2 FLAIR  hyperintensity  within the cerebral white matter and central yamileth, most  consistent with moderate chronic microvascular ischemic change.  Ventricles and sulci are normal for age. Normal position of the  cerebellar tonsils. Small enhancing extra-axial nodule on the left  interhemispheric falx posteriorly measuring 1.2 x 0.5 x 0.8 cm, likely  small meningioma. No other abnormal enhancement.    SELLA: No significant abnormality accounting for technique.    OSSEOUS STRUCTURES/SOFT TISSUES: No aggressive osseous lesion  involving the calvarium, skull base, or visualized upper cervical  spine. The major intracranial vascular flow voids are maintained.    ORBITS: No significant abnormality accounting for technique.    SINUSES/MASTOIDS: No significant paranasal sinus mucosal disease. No  significant middle ear or mastoid effusion.    HEAD MRA:  ANTERIOR CIRCULATION: No significant stenosis or occlusion. Fetal  origin of the right posterior cerebral artery.    POSTERIOR CIRCULATION: No significant stenosis or occlusion. Balanced  vertebral arteries supply a normal basilar artery.    ANEURYSM/VASCULAR MALFORMATION: 3 x 4 mm outpouching directed  laterally from the left cavernous internal carotid artery, aneurysm  versus infundibulum..    NECK MRA:  RIGHT CAROTID: No measurable stenosis in the right ICA based on NASCET  criteria.    LEFT CAROTID: No measurable stenosis in the left ICA based on NASCET  criteria.     VERTEBRAL ARTERIES: Balanced vertebral arteries are patent in the neck  and into the head.     AORTIC ARCH: Classic aortic arch anatomy with no significant stenosis  at the origin of the great vessels.       Impression    IMPRESSION:  HEAD MRI:  1.  No acute intracranial abnormality.  2.  Nonspecific patchy T2 hyperintensities within the cerebral white  matter and central yamileth, most consistent with moderate chronic  microvascular ischemic change.  3.  Small enhancing extra-axial nodule along the left posterior  interhemispheric  falx measuring up to 1.2 cm, most likely a small  meningioma.    HEAD MRA:  1.  No significant stenosis or occlusion.  2.  3 x 4 mm outpouching directed laterally from the left cavernous  internal carotid artery, small aneurysm versus infundibulum.    NECK MRA:  1.  No carotid or vertebral artery stenosis. No evidence for  dissection.         NANDA CADE MD         SYSTEM ID:  [id number]   MRA Neck (Carotids) wo & w Contrast    Narrative    MRI OF THE BRAIN WITHOUT AND WITH CONTRAST  MRA OF THE HEAD WITHOUT CONTRAST  MRA OF THE NECK WITHOUT AND WITH CONTRAST    9/27/2022 4:12 PM     HISTORY:  Suspected stroke . Chest pain and right-sided neck pain.    COMPARISON: None..    TECHNIQUE:   Brain: Axial diffusion-weighted with ADC map, T2-weighted with fat  saturation, T1-weighted and turboFLAIR and coronal T1-weighted images  of the brain were obtained without intravenous contrast.  Following  10mL Gadavist  IV, axial turboFLAIR and coronal T1-weighted images of  the brain were obtained.     MRA: 3D time-of-flight MR angiography of the major arteries at the  base of the brain was performed without contrast.  2D time-of-flight  MRA without contrast with superior and inferior saturation bands and  3D T1-weighted postgadolinium MRA of the neck/cervical vessels were  also obtained. MIP reconstruction of all MR angiographic data was  performed.    FINDINGS:   HEAD MRI:  INTRACRANIAL CONTENTS: No acute or subacute infarct. No mass, acute  hemorrhage, or extra-axial fluid collections. Moderate patchy T2 FLAIR  hyperintensity within the cerebral white matter and central yamileth, most  consistent with moderate chronic microvascular ischemic change.  Ventricles and sulci are normal for age. Normal position of the  cerebellar tonsils. Small enhancing extra-axial nodule on the left  interhemispheric falx posteriorly measuring 1.2 x 0.5 x 0.8 cm, likely  small meningioma. No other abnormal enhancement.    SELLA: No significant  abnormality accounting for technique.    OSSEOUS STRUCTURES/SOFT TISSUES: No aggressive osseous lesion  involving the calvarium, skull base, or visualized upper cervical  spine. The major intracranial vascular flow voids are maintained.    ORBITS: No significant abnormality accounting for technique.    SINUSES/MASTOIDS: No significant paranasal sinus mucosal disease. No  significant middle ear or mastoid effusion.    HEAD MRA:  ANTERIOR CIRCULATION: No significant stenosis or occlusion. Fetal  origin of the right posterior cerebral artery.    POSTERIOR CIRCULATION: No significant stenosis or occlusion. Balanced  vertebral arteries supply a normal basilar artery.    ANEURYSM/VASCULAR MALFORMATION: 3 x 4 mm outpouching directed  laterally from the left cavernous internal carotid artery, aneurysm  versus infundibulum..    NECK MRA:  RIGHT CAROTID: No measurable stenosis in the right ICA based on NASCET  criteria.    LEFT CAROTID: No measurable stenosis in the left ICA based on NASCET  criteria.     VERTEBRAL ARTERIES: Balanced vertebral arteries are patent in the neck  and into the head.     AORTIC ARCH: Classic aortic arch anatomy with no significant stenosis  at the origin of the great vessels.       Impression    IMPRESSION:  HEAD MRI:  1.  No acute intracranial abnormality.  2.  Nonspecific patchy T2 hyperintensities within the cerebral white  matter and central yamileth, most consistent with moderate chronic  microvascular ischemic change.  3.  Small enhancing extra-axial nodule along the left posterior  interhemispheric falx measuring up to 1.2 cm, most likely a small  meningioma.    HEAD MRA:  1.  No significant stenosis or occlusion.  2.  3 x 4 mm outpouching directed laterally from the left cavernous  internal carotid artery, small aneurysm versus infundibulum.    NECK MRA:  1.  No carotid or vertebral artery stenosis. No evidence for  dissection.         NANDA CADE MD         SYSTEM ID:  [id number]   MRA  Brain ([place]) wo Contrast    Narrative    MRI OF THE BRAIN WITHOUT AND WITH CONTRAST  MRA OF THE HEAD WITHOUT CONTRAST  MRA OF THE NECK WITHOUT AND WITH CONTRAST    9/27/2022 4:12 PM     HISTORY:  Suspected stroke . Chest pain and right-sided neck pain.    COMPARISON: None..    TECHNIQUE:   Brain: Axial diffusion-weighted with ADC map, T2-weighted with fat  saturation, T1-weighted and turboFLAIR and coronal T1-weighted images  of the brain were obtained without intravenous contrast.  Following  10mL Gadavist  IV, axial turboFLAIR and coronal T1-weighted images of  the brain were obtained.     MRA: 3D time-of-flight MR angiography of the major arteries at the  base of the brain was performed without contrast.  2D time-of-flight  MRA without contrast with superior and inferior saturation bands and  3D T1-weighted postgadolinium MRA of the neck/cervical vessels were  also obtained. MIP reconstruction of all MR angiographic data was  performed.    FINDINGS:   HEAD MRI:  INTRACRANIAL CONTENTS: No acute or subacute infarct. No mass, acute  hemorrhage, or extra-axial fluid collections. Moderate patchy T2 FLAIR  hyperintensity within the cerebral white matter and central yamileth, most  consistent with moderate chronic microvascular ischemic change.  Ventricles and sulci are normal for age. Normal position of the  cerebellar tonsils. Small enhancing extra-axial nodule on the left  interhemispheric falx posteriorly measuring 1.2 x 0.5 x 0.8 cm, likely  small meningioma. No other abnormal enhancement.    SELLA: No significant abnormality accounting for technique.    OSSEOUS STRUCTURES/SOFT TISSUES: No aggressive osseous lesion  involving the calvarium, skull base, or visualized upper cervical  spine. The major intracranial vascular flow voids are maintained.    ORBITS: No significant abnormality accounting for technique.    SINUSES/MASTOIDS: No significant paranasal sinus mucosal disease. No  significant middle ear or  mastoid effusion.    HEAD MRA:  ANTERIOR CIRCULATION: No significant stenosis or occlusion. Fetal  origin of the right posterior cerebral artery.    POSTERIOR CIRCULATION: No significant stenosis or occlusion. Balanced  vertebral arteries supply a normal basilar artery.    ANEURYSM/VASCULAR MALFORMATION: 3 x 4 mm outpouching directed  laterally from the left cavernous internal carotid artery, aneurysm  versus infundibulum..    NECK MRA:  RIGHT CAROTID: No measurable stenosis in the right ICA based on NASCET  criteria.    LEFT CAROTID: No measurable stenosis in the left ICA based on NASCET  criteria.     VERTEBRAL ARTERIES: Balanced vertebral arteries are patent in the neck  and into the head.     AORTIC ARCH: Classic aortic arch anatomy with no significant stenosis  at the origin of the great vessels.       Impression    IMPRESSION:  HEAD MRI:  1.  No acute intracranial abnormality.  2.  Nonspecific patchy T2 hyperintensities within the cerebral white  matter and central yamileth, most consistent with moderate chronic  microvascular ischemic change.  3.  Small enhancing extra-axial nodule along the left posterior  interhemispheric falx measuring up to 1.2 cm, most likely a small  meningioma.    HEAD MRA:  1.  No significant stenosis or occlusion.  2.  3 x 4 mm outpouching directed laterally from the left cavernous  internal carotid artery, small aneurysm versus infundibulum.    NECK MRA:  1.  No carotid or vertebral artery stenosis. No evidence for  dissection.         NANDA CADE MD         SYSTEM ID:  [id number]   Echocardiogram Complete     Value    LVEF  50-55%    Narrative    [id number]  [id number]  [id number]^PAXTON^ZHANE     [facility]  Echocardiography Laboratory  [address] [place] [address]     Name: JAKE ROSA  MRN: [id number]  : 1950  Study Date: 2022 08:25 AM  Age: 72 yrs  Gender: Female  Patient Location: [facility]  Reason For Study: Chest Pain, Chest  Pressure, Chest Tightness  Ordering Physician: ZHANE MATTA  Performed By: Jayde Hastings RDCS     BSA: 2.0 m2  Height: 69 in  Weight: 192 lb  HR: 73  BP: 145/92 mmHg  ______________________________________________________________________________  Procedure  Complete Portable Echo Adult. Optison (NDC #8823-0410) given intravenously.  ______________________________________________________________________________  Interpretation Summary     The visual ejection fraction is 50-55%.  There are regional wall motion abnormalities as specified.  There is trace aortic regurgitation.  The study was technically difficult.  ______________________________________________________________________________  Left Ventricle  The left ventricle is normal in size. There is normal left ventricular wall  thickness. Diastolic Doppler findings (E/E' ratio and/or other parameters)  suggest left ventricular filling pressures are normal. Grade I or early  diastolic dysfunction. The visual ejection fraction is 50-55%. The basal  inferior and basal inferoseptal walls are mildly to moderately hypokinetic.  There are regional wall motion abnormalities as specified.     Right Ventricle  The right ventricle is normal in size and function.     Atria  Normal left atrial size. Right atrial size is normal. There is no color  Doppler evidence of an atrial shunt.     Mitral Valve  There is mild (1+) mitral regurgitation.     Tricuspid Valve  There is trace tricuspid regurgitation. Right ventricular systolic pressure  could not be approximated due to inadequate tricuspid regurgitation.     Aortic Valve  The aortic valve is trileaflet. There is trace aortic regurgitation. No  hemodynamically significant valvular aortic stenosis.     Pulmonic Valve  There is trace pulmonic valvular regurgitation. Normal pulmonic valve  velocity.     Vessels  The aortic root is normal size. Normal size ascending aorta. IVC diameter <2.1  cm collapsing >50% with sniff  suggests a normal RA pressure of 3 mmHg. The  inferior vena cava was normal in size with preserved respiratory variability.     Pericardium  There is no pericardial effusion.     Rhythm  Sinus rhythm was noted.  ______________________________________________________________________________  MMode/2D Measurements & Calculations     IVSd: 1.1 cm  LVIDd: 4.4 cm  LVIDs: 2.6 cm  LVPWd: 1.00 cm  FS: 39.8 %  LV mass(C)d: 151.3 grams  LV mass(C)dI: 74.5 grams/m2  Ao root diam: 3.2 cm  LA dimension: 4.0 cm  asc Aorta Diam: 3.4 cm  LA/Ao: 1.3  LVOT diam: 2.2 cm  LVOT area: 3.9 cm2  RWT: 0.46     Doppler Measurements & Calculations  MV E max ruchi: 66.0 cm/sec  MV A max ruchi: 95.6 cm/sec  MV E/A: 0.69  MV dec slope: 281.7 cm/sec2  MV dec time: 0.24 sec  LV V1 max PG: 3.3 mmHg  LV V1 max: 90.7 cm/sec  LV V1 VTI: 21.4 cm  SV(LVOT): 84.0 ml  SI(LVOT): 41.4 ml/m2  E/E' av.4  Lateral E/e': 8.3     Medial E/e': 8.5     ______________________________________________________________________________  Report approved by: Shoshana Baez 2022 09:24 AM         Cardiac Catheterization    Narrative    1.  Mild proximal LAD disease with mild plaque rupture per OCT and normal   flow (iFR=0.98).

## 2022-09-27 NOTE — PROGRESS NOTES
"   09/27/22 1043   Quick Adds   Type of Visit Initial Occupational Therapy Evaluation   Living Environment   People in Home spouse   Current Living Arrangements house   Home Accessibility stairs to enter home;stairs within home   Number of Stairs, Main Entrance 3   Number of Stairs, Within Home, Primary greater than 10 stairs  (15 stairs)   Transportation Anticipated car, drives self   Self-Care   Regular Exercise No   Fall history within last six months no   Instrumental Activities of Daily Living (IADL)   Previous Responsibilities meal prep;housekeeping;shopping;finances;medication management;driving   IADL Comments Pts  has dementia, but does the laundry. checks on pt's meds   General Information   Onset of Illness/Injury or Date of Surgery 09/25/22   Referring Physician Juju Hastings PA-C   Patient/Family Therapy Goal Statement (OT) home   Additional Occupational Profile Info/Pertinent History of Current Problem NSTEMI, cath 9/26 showing mild prox LAD disease with mild plaque rupture. Normal flow with iFR 0.98.           -- Med management recommended. Can consider PCI if she develops recurrent chest pain.   Existing Precautions/Restrictions cardiac   Heart Disease Risk Factors High blood pressure;Lack of physical activity;Dislipidemia;Stress;Age   Cognitive Status Examination   Orientation Status orientation to person, place and time   Affect/Mental Status (Cognitive) WNL   Follows Commands WNL   Sensory   Sensory Quick Adds No deficits were identified   Pain Assessment   Patient Currently in Pain No   Transfers   Transfer Comments Pt I with ADL's and functional mobility .   Clinical Impression   Criteria for Skilled Therapeutic Interventions Met (OT) Yes, treatment indicated   OT Diagnosis impaired safety with IADL\"s   OT Problem List-Impairments impacting ADL problems related to;post-surgical precautions  (MI precautions)   Assessment of Occupational Performance 1-3 Performance Deficits "   Identified Performance Deficits impaired safety with yard work, vacuuming, etc   Planned Therapy Interventions (OT) risk factor education;progressive activity/exercise;home program guidelines   Clinical Decision Making Complexity (OT) low complexity   Risk & Benefits of therapy have been explained evaluation/treatment results reviewed;care plan/treatment goals reviewed;risks/benefits reviewed;current/potential barriers reviewed;participants voiced agreement with care plan;participants included;patient;daughter   OT Discharge Planning   OT Discharge Recommendation (DC Rec) home with assist;home with outpatient cardiac rehab  (OP CR per MD)   OT Rationale for DC Rec Recommend home with strenuous IADL's ie vacuuming, yard work, etc and per MD OP CR for monitored progressive exercise and risk factor ed and modification.   Total Evaluation Time (Minutes)   Total Evaluation Time (Minutes) 10   OT Goals   Therapy Frequency (OT) One time eval and treatment   OT Predicted Duration/Target Date for Goal Attainment 09/27/22   OT Goals Aerobic Activity;Cardiac Phase 1   OT: Perform aerobic activity with stable cardiovascular response continuous activity;5 minutes;ambulation;Goal Met   OT: Understanding of cardiac education to maximize quality of life, condition management, and health outcomes Patient;Verbalize   OT: Functional/aerobic ambulation tolerance with stable cardiovascular response in order to return to home and community environment Independent;Greater than 300 feet;Goal Met   OT: Navigation of stairs simulating home set up with stable cardiovascular response in order to return to home and community environment Independent;Modified independent;Greater than 10 stairs;Goal Met  (15 stairs)

## 2022-09-27 NOTE — DISCHARGE INSTRUCTIONS
Cardiac Angiogram Discharge Instructions - Radial    After you go home:    Have an adult stay with you until tomorrow.  Drink extra fluids for 2 days.  You may resume your normal diet.  No smoking       For 24 hours - due to the sedation you received:  Relax and take it easy.  Do NOT make any important or legal decisions.  Do NOT drive or operate machines at home or at work.  Do NOT drink alcohol.    Care of Wrist Puncture Site:    For the first 24 hrs - check the puncture site every 1-2 hours while awake.  It is normal to have soreness at the puncture site and mild tingling in your hand for up to 3 days.  Remove the bandaid after 24 hours. If there is minor oozing, apply another bandaid and remove it after 12 hours.  You may shower tomorrow.  Do NOT take a bath, or use a hot tub or pool for at least 3 days. Do NOT scrub the site. Do not use lotion or powder near the puncture site.           Activity:        For 2 days:   do not use your hand or arm to support your weight (such as rising from a chair)   do not bend your wrist (such as lifting a garage door).  do not lift more than 5 pounds or exercise your arm (such as tennis, golf or bowling).  Do NOT do any heavy activity such as exercise, lifting, or straining.     Bleeding:    If you start bleeding from the site in your wrist, sit down and press firmly on/above the site for 10 minutes.   Once bleeding stops, keep arm still for 2 hours.   Call [facility] as soon as you can.       Call 911 right away if you have heavy bleeding or bleeding that does not stop.      Medicines:    If you are taking an antiplatelet medication such as Plavix, Brilinta or Effient, do not stop taking it until you talk to your cardiologist.      If you are on Metformin (Glucophage), do not restart it until you have blood tests (within 2 to 3 days after discharge).  After you have your blood drawn, you may restart the Metformin.   Take your medications, including blood thinners, unless  your provider tells you not to.    If you take Coumadin (Warfarin), have your INR checked by your provider in  3-5 days. Call your clinic to schedule this.  If you have stopped any medicines, check with your provider about when to restart them.    Follow Up Appointments:    Follow up with [facility] Nurse Practitioner at [facility] of patient preference in 7-10 days.    Call the clinic if:    You have a large or growing hard lump around the site.  The site is red, swollen, hot or tender.  Blood or fluid is draining from the site.  You have chills or a fever greater than 101 F (38 C).  Your arm feels numb, cool or changes color.  You have hives, a rash or unusual itching.  Any questions or concerns.          [facility] [place]:    [phone] [facility] (7 days a week)

## 2022-09-27 NOTE — PROGRESS NOTES
Hospitalist progress note: MRI/MRA pending for word finding difficulties. If ok can discharge home pending if there are any additional neuro recs.   Discharge orders in place, please call for final order pending MRI result    Carmen Ordoeñz PA-C

## 2022-09-27 NOTE — PLAN OF CARE
Alert and oriented x 4. VS stable, on RA and no complaints of pain. Patient was discharged this evening with family as transport home. 5 new medications were filled at an outside pharmacy to be picked up by the patient and her daughter. I reviewed all DC paperwork, new medications, orders and appointments with the patient and family, all were able to verbalize understanding of teaching. All questions answered. All IVs were removed and patient had all belongings at the time of DC.

## 2022-09-27 NOTE — PROGRESS NOTES
[facility] Cardiology Progress Note  Date of Service: 09/27/2022  Primary Cardiologist: Will be Dr. Vickie Murphy Moises is a 72 year old female admitted on 9/25/2022 with NSTEMI.    Subjective: Pt reports mild word-finding difficulty (difficulty remembering some medical / non-common terms). No observed neuro deficits on exam. No chest/neck pain, no dyspnea.     Assessment:  1. NSTEMI, cath 9/26 showing mild prox LAD disease with mild plaque rupture. Normal flow with iFR 0.98.    -- Med management recommended. Can consider PCI if she develops recurrent chest pain.    -- Started on asa/plavix/statin/BB therapy.    -- EF preserved (50-55%).   2. Hyperlipidemia, . Lipitor started here.  3. HTN, controlled.   4. Slight pulmonary congestion on exam, LVEDP 20 on cath.     Plan:   1. To be safe, have recommended neuro evaluation for intermittent word-finding difficulty.   2. IV Lasix 20 mg x1.   3. Repeat troponin (trend to peak).   4. Cardiac rehab.   5. Continue DAPT, statin, metoprolol, benicar, amlodipine.   6. Please discharge with PRN SL NTG.   7. Clinic follow up will be arranged.   8. Cardiology will sign off.     Plan was formulated under direction of Dr. Johnston.   Juju Hastings PA-C  [facility]  Pager: [phone]  Text Page  (7:30am - 4pm M-F)   __________________________________________________________________________  Physical Exam   Temp: 97.7  F (36.5  C) Temp src: Oral BP: 110/74 Pulse: 61   Resp: 16 SpO2: 94 % O2 Device: None (Room air)    Vitals:    09/26/22 1000 09/27/22 0401   Weight: 86 kg (189 lb 11.2 oz) 86 kg (189 lb 8 oz)       GENERAL:  The patient is in no apparent distress.   NECK: CVP appears normal, no masses or thyromegaly.  PULMONARY:  There is a normal respiratory effort. Soft crackles noted.  CARDIOVASCULAR:  RRR, normal S1 S2, no m/r/g.  GI:  Non tender abdomen with normoactive bowel sounds and no hepatosplenomegaly. There are no  masses palpable.   EXTREMITIES:  No clubbing, cyanosis or edema. R radial artery covered, no evidence of active oozing. Mild ecchymosis and edema surrounding cath site, no discomfort.   VASCULAR: 2+ Pulses bilaterally in upper and lower extremities.    Medications       amLODIPine  5 mg Oral QPM     aspirin  81 mg Oral Daily     atorvastatin  40 mg Oral QPM     clopidogrel  75 mg Oral Daily     DULoxetine  60 mg Oral Daily     levothyroxine  88 mcg Oral Daily     losartan  50 mg Oral QPM     metoprolol tartrate  25 mg Oral BID       Data   Most Recent 3 CBC's:Recent Labs   Lab Test 09/25/22  2251 09/25/22  1544 09/16/22  0801   WBC 8.1 10.3 6.8   HGB 13.5 15.3 14.3   MCV 87 91 91    357 319     Most Recent 3 BMP's:Recent Labs   Lab Test 09/25/22  1544 09/16/22  0801 09/16/22  0748 08/31/22  1113    138  --  139   POTASSIUM 4.3 4.0  --  4.0   CHLORIDE 105 103  --  105   CO2 24 28  --  27   BUN 20 19  --  17   CR 1.01 0.90 1.0 0.82   ANIONGAP 9 7  --  7   CYNDY 8.6 8.5  --  9.0   * 122*  --  110*     Most Recent Cholesterol Panel:Recent Labs   Lab Test 08/31/22  1113   CHOL 221*   *   HDL 43*   TRIG 274*

## 2022-09-27 NOTE — CONSULTS
[facility]    Stroke Consult Note    Reason for Consult: Stroke Code     Chief Complaint: Words finding Difficulty       HPI  Janie De Leon is a 72 year old female. Known to have HTN, Hyperlipidemia, , hypothyroidism, depression. She was admitted on 9/25/2022 with NSTEMI, the cath showed mild proximal LAD with mild plaque rupture. The reports mild word-finding difficulty (difficulty remembering some medical  Terms-Angiography). No observed neuro deficits on exam. No chest/neck pain, no dyspnea.  The patient states that she was under stress lately in relation to her  dementia.  NIHSS is 0    Imaging Findings  No recent imaging    Intravenous Thrombolysis  Not recommended    Endovascular Treatment  Not recommended    Impression   The patients complain is probably related to her anxiety. Christie procedural stroke should be R/O.     Recommendations  - Neuro Checks Q 4 hours  - MRI head   - The patient is already on Aspirin 81 mg and Plavix 75 and Atorvastatin.   - Stroke Neurology will follow MRI    Seen and discussed with stroke neurology attending Dr. Evangelist Masterson.  Lashae Snyder  [facility] Fellow  ______________________________________________________       Past Medical History   Past Medical History:   Diagnosis Date     Adrenal nodule (H) 09/2022    CT scan     Anemia     mild gastropathy on EGD 2008; neg pill cam     Atypical ductal hyperplasia of both breasts     Has had biopsies and MRI     Fibroid uterus      High cholesterol      History of hematuria 2008    Cystoscopy for recurrent UTIs; unremarkable renal US. Also recurrent UTIs as child and pyelonephritis.      History of hormone replacement therapy     after JASBIR with BSO     HTN (hypertension)      HTN, goal below 140/90      Hx of bone density study 10/16/2006    Normal     Hypothyroidism      Insomnia     periodic - prn clonazepam helpful a couple times per month     Lipid screening 07/21/2015    Tchol 128, , HDL 53,  LDL 53 outside records --> based on our labs off of atorvastatin 10yr ASCVD risk 9.4% in Oct 2015     Major depressive disorder, single episode in full remission (H) 2007     NSTEMI (non-ST elevated myocardial infarction) (H) 9/26/2022     Osteopenia     Mild left hip July 2016     Prediabetes     Metformin in the past     Rotator cuff injury, left, sequela     MRI Jan 2015 and had PT; High-grade complete or near complete tear of the supraspinatus tendon and anterior fibers of the infraspinatus tendon. 1/3 of the infraspinatus tendon appears involved.      TBI (traumatic brain injury) (H)     As a child     Tubular adenoma of colon 2013 & 2016     Past Surgical History   Past Surgical History:   Procedure Laterality Date     ANKLE SURGERY  1976     BREAST BIOPSY, RT/LT      Many     C/SECTION, LOW TRANSVERSE  1968     CAPSULE/PILL CAM ENDOSCOPY  2/18/2014    EGD prior; capsule was negative      COLONOSCOPY      1999, 2003, 2008, 6/6/2013     COLONOSCOPY N/A 6/20/2019    Procedure: COLONOSCOPY, WITH POLYPECTOMY AND BIOPSY;  Surgeon: Pepito Romero MD;  Location:  GI     COLONOSCOPY N/A 6/16/2022    Procedure: COLONOSCOPY;  Surgeon: Rodrigo Dunbar MD;  Location:  GI     HYSTERECTOMY, PAP NO LONGER INDICATED       JASBIR with BSO  5/4/2000    benign fibroids     TUBAL LIGATION  1978     Medications   Home Meds  Prior to Admission medications    Medication Sig Start Date End Date Taking? Authorizing Provider   amLODIPine (NORVASC) 5 MG tablet Take 1 tablet (5 mg) by mouth daily  Patient taking differently: Take 5 mg by mouth every evening 8/31/22  Yes Jade Davis DO   Cholecalciferol (VITAMIN D3 PO) Take 1,000 Units by mouth daily   Yes Reported, Patient   clonazePAM (KLONOPIN) 0.5 MG tablet Take 0.5-1 tablets (0.25-0.5 mg) by mouth nightly as needed for anxiety 8/31/22  Yes Jade Davis DO   DULoxetine (CYMBALTA) 60 MG capsule Take 1 capsule (60 mg) by mouth daily 8/31/22  Yes Jade Davis DO    levothyroxine (SYNTHROID/LEVOTHROID) 88 MCG tablet Take 1 tablet (88 mcg) by mouth daily - Overdue for endocrinology appointment 22  Yes Horace Cutler MD   lovastatin (MEVACOR) 40 MG tablet Take 1 tablet (40 mg) by mouth At Bedtime 22  Yes Jade Davis DO   olmesartan (BENICAR) 40 MG tablet Take 20 mg by mouth every evening 22  Yes Jade Davis DO   polyethylene glycol (MIRALAX) 17 GM/Dose powder Take 17 g by mouth daily as needed for constipation   Yes Unknown, Entered By History       Scheduled Meds    amLODIPine  5 mg Oral QPM     aspirin  81 mg Oral Daily     atorvastatin  40 mg Oral QPM     clopidogrel  75 mg Oral Daily     DULoxetine  60 mg Oral Daily     furosemide  20 mg Intravenous Once     levothyroxine  88 mcg Oral Daily     losartan  50 mg Oral QPM     metoprolol tartrate  25 mg Oral BID       Infusion Meds      PRN Meds  acetaminophen, alum & mag hydroxide-simethicone, clonazePAM, fentaNYL, HOLD MEDICATION, midazolam, nitroGLYcerin, oxyCODONE **OR** oxyCODONE, polyethylene glycol    Allergies   Allergies   Allergen Reactions     Ciprofloxacin GI Disturbance     Oxycodone Other (See Comments)     She prefers not to have Oxycodone or Oxycontin due to potential addictive properties     Simvastatin Other (See Comments)     Muscle aches      Family History   Family History   Problem Relation Age of Onset     Colon Cancer Mother 70         age 81     Diabetes Mother         After age 75     Macular Degeneration Mother      Glaucoma Mother      Cerebrovascular Disease Mother      Heart Failure Mother      Hypertension Mother      Hyperlipidemia Mother      Other - See Comments Father 87        Frailty, pneumonia, fractures, failure to thrive     Osteoporosis Father         diagnosed in his 80s     Colon Polyps Daughter         Tubular adenoma     Deep Vein Thrombosis Brother      Hyperlipidemia Brother      Hypertension Brother      Other Cancer Brother         myeloma      Hypertension Brother         both brothers     Hyperlipidemia Brother         both brothers     Depression Brother      Anxiety Disorder Brother      Mental Illness Brother         on Haldol before death from lung cancer     Lung Cancer Brother         long time heavy smoker     Breast Cancer Other         radical mastecomy in her 40s     Colon Cancer Other      Other Cancer Other         several aunts various kinds     Other Cancer Other         throat cancer     Other Cancer Paternal Grandmother         throat cancer     Social History   Social History     Tobacco Use     Smoking status: Former Smoker     Packs/day: 0.50     Years: 9.00     Pack years: 4.50     Types: Cigarettes     Start date: 1965     Quit date: 4/15/1975     Years since quittin.4     Smokeless tobacco: Never Used     Tobacco comment: social smoker - only smoked when with another smoker   Vaping Use     Vaping Use: Never used   Substance Use Topics     Alcohol use: Yes     Comment: moderate 1 or 2 beers or wine 2x /wk     Drug use: No       Review of Systems   Review of Systems is negative other than noted in the HPI or here.        PHYSICAL EXAMINATION  Temp:  [97.6  F (36.4  C)-98.4  F (36.9  C)] 97.7  F (36.5  C)  Pulse:  [59-78] 71  Resp:  [11-18] 16  BP: (107-157)/(70-95) 124/70  SpO2:  [94 %-97 %] 94 %     General Exam  General:  patient lying in bed without any acute distress    HEENT:  normocephalic/atraumatic  Cardio:  RRR  Pulmonary:  no respiratory distress  Abdomen:  soft  Extremities:  no edema  Skin:  intact     Neuro Exam  Mental Status:  alert, oriented x 3, follows commands, speech clear and fluent, naming and repetition normal  Cranial Nerves:  visual fields intact (tested by nurse), EOMI with normal smooth pursuit, facial sensation intact and symmetric (tested by nurse), facial movements symmetric, hearing not formally tested but intact to conversation, no dysarthria, shoulder shrug equal bilaterally, tongue  protrusion midline  Motor:  no abnormal movements, able to move all limbs antigravity spontaneously with no signs of hemiparesis observed, no pronator drift   Reflexes:  Normal   Sensory:   Intact  Coordination: intact  Station/Gait:  Deferred    Dysphagia Screen  Per Nursing    Stroke Scales    NIHSS  1a. Level of Consciousness 0-->Alert, keenly responsive   1b. LOC Questions 0-->Answers both questions correctly   1c. LOC Commands 0-->Performs both tasks correctly   2.   Best Gaze 0-->Normal   3.   Visual 0-->No visual loss   4.   Facial Palsy 0-->Normal symmetrical movements   5a. Motor Arm, Left 0-->No drift, limb holds 90 (or 45) degrees for full 10 secs   5b. Motor Arm, Right 0-->No drift, limb holds 90 (or 45) degrees for full 10 secs   6a. Motor Leg, Left 0-->No drift, leg holds 30 degree position for full 5 secs   6b. Motor Leg, right 0-->No drift, leg holds 30 degree position for full 5 secs   7.   Limb Ataxia 0-->Absent   8.   Sensory 0-->Normal, no sensory loss   9.   Best Language 0-->No aphasia, normal   10. Dysarthria 0-->Normal   11. Extinction and Inattention  0-->No abnormality   Total 0 (09/27/22 1131)       Modified [place] Score (Pre-morbid)    -  0    Imaging  I personally reviewed all imaging; relevant findings per HPI.     Lab Results Data   CBC  Recent Labs   Lab 09/25/22  2251 09/25/22  1544   WBC 8.1 10.3   RBC 4.67 5.24*   HGB 13.5 15.3   HCT 40.5 47.6*    357     Basic Metabolic Panel    Recent Labs   Lab 09/25/22  1544      POTASSIUM 4.3   CHLORIDE 105   CO2 24   BUN 20   CR 1.01   *   CYNDY 8.6     Liver Panel  No results for input(s): PROTTOTAL, ALBUMIN, BILITOTAL, ALKPHOS, AST, ALT, BILIDIRECT in the last 168 hours.  INR  No lab results found.   Lipid Profile    Recent Labs   Lab Test 08/31/22  1113 08/30/21  0955 08/27/20  0943 07/15/16  0800 10/28/15  0734 07/21/15  0000   CHOL 221* 219* 237*   < > 243* 128   HDL 43* 43* 46*   < > 46* 53   * 118* 116*   < >  159* 53   TRIG 274* 288* 373*   < > 191* 109   CHOLHDLRATIO  --   --   --   --  5.3* 1.0    < > = values in this interval not displayed.     A1C    Recent Labs   Lab Test 08/31/22  1113 04/01/22  1318 08/30/21  0955   A1C 6.1* 5.8* 6.1*     Troponin    Recent Labs   Lab 09/25/22  2251 09/25/22  1725 09/25/22  1544   TROPONINIS 2,748* 32 6

## 2022-09-28 ENCOUNTER — PATIENT OUTREACH (OUTPATIENT)
Dept: FAMILY MEDICINE | Facility: CLINIC | Age: 72
End: 2022-09-28

## 2022-09-28 ENCOUNTER — TELEPHONE (OUTPATIENT)
Dept: CARDIOLOGY | Facility: CLINIC | Age: 72
End: 2022-09-28

## 2022-09-28 LAB
ATRIAL RATE - MUSE: 63 BPM
DIASTOLIC BLOOD PRESSURE - MUSE: NORMAL MMHG
INTERPRETATION ECG - MUSE: NORMAL
P AXIS - MUSE: 63 DEGREES
PR INTERVAL - MUSE: 184 MS
QRS DURATION - MUSE: 78 MS
QT - MUSE: 408 MS
QTC - MUSE: 417 MS
R AXIS - MUSE: 61 DEGREES
SYSTOLIC BLOOD PRESSURE - MUSE: NORMAL MMHG
T AXIS - MUSE: 52 DEGREES
VENTRICULAR RATE- MUSE: 63 BPM

## 2022-09-28 NOTE — TELEPHONE ENCOUNTER
Patient was admitted to [facility] on 9/25/22 with mild word-(finding difficulty-remembering some medical / non-common terms). No observed neuro deficits on exam. No chest/neck pain, no dyspnea. NSTEMI. Cardiology and neurology consulted.    PMH: HTN    Echo showed EF of 50%    9/26/22: Coronary angiogram via RRA showed mild prox LAD disease with mild plaque rupture. Normal flow with iFR 0.98. Med management recommended. Can consider PCI if she develops recurrent chest pain.    9/27/22: MRA of neck and brain showed no acute or subacute infarct-negative for CVA.    Pt was started on Lipitor (new dx of HLD), ASA, Plavix, NTG and Metoprolol. PTA Mevacor was discontinued at time of discharge.    Called patient to discuss any post hospital d/c questions she may have, review medication changes, and confirm f/u appts. Patient denied any questions regarding new medications or changes with their PTA medications.    Patient denied any SOB, chest pain, neurological changes or light headedness.     RRA cardiac cath site is without bleeding, swelling, redness or signs of infection.     RN confirmed with patient that she has an OV scheduled on 10/10/22 at 1055 with GILBERT Margo Barbosa at our [facility].     Cardiac rehab still needs to be scheduled and their phone number was provided.    Patient advised to call clinic with any cardiac related questions or concerns prior to this gilbert't. Patient verbalized understanding and agreed with plan. DIAZ Todd RN.

## 2022-09-28 NOTE — TELEPHONE ENCOUNTER
"Hospital/TCU/ED for chronic condition Discharge Protocol    \"Hi, my name is Raul Conley RN, a registered nurse, and I am calling from [facility].  I am calling to follow up and see how things are going for you after your recent emergency visit/hospital/TCU stay.\"    Tell me how you are doing now that you are home?\" Great\"      Discharge Instructions    \"Let's review your discharge instructions.  What is/are the follow-up recommendations?  Pt. Response: follow up with cardiology and PCP    \"Has an appointment with your primary care provider been scheduled?\"   Yes. (confirm)    \"When you see the provider, I would recommend that you bring your medications with you.\"    Medications    \"Tell me what changed about your medicines when you discharged?\"    Changes to chronic meds?    2 or more - Epic MTM referral needed    \"What questions do you have about your medications?\"    None     New diagnoses of heart failure, COPD, diabetes, or MI?    No              Post Discharge Medication Reconciliation Status: medication reconcilation previously completed during another office visit.    Was MTM referral placed (*Make sure to put transitions as reason for referral)?   No    Call Summary    \"What questions or concerns do you have about your recent visit and your follow-up care?\"     none    \"If you have questions or things don't continue to improve, we encourage you contact us through the main clinic number (give number).  Even if the clinic is not open, triage nurses are available 24/7 to help you.     We would like you to know that our clinic has extended hours (provide information).  We also have urgent care (provide details on closest location and hours/contact info)\"      \"Thank you for your time and take care!\"             "
What type of discharge? Inpatient  Risk of Hospital admission or ED visit: 23%  Is a TCM episode required? Yes  When should the patient follow up with PCP? 14 days of discharge.    Palma Foley RN  [facility]    
Yes

## 2022-09-29 DIAGNOSIS — I21.4 NSTEMI (NON-ST ELEVATED MYOCARDIAL INFARCTION) (H): Primary | ICD-10-CM

## 2022-09-29 DIAGNOSIS — Z95.5 S/P CORONARY ARTERY STENT PLACEMENT: ICD-10-CM

## 2022-09-29 LAB — RENIN PLAS-CCNC: 3.3 NG/ML/HR

## 2022-09-29 PROCEDURE — 83835 ASSAY OF METANEPHRINES: CPT | Mod: 90

## 2022-09-29 PROCEDURE — 82542 COL CHROMOTOGRAPHY QUAL/QUAN: CPT | Mod: 90

## 2022-09-29 PROCEDURE — 82530 CORTISOL FREE: CPT | Mod: 90

## 2022-09-29 PROCEDURE — 99000 SPECIMEN HANDLING OFFICE-LAB: CPT

## 2022-09-30 ENCOUNTER — HOSPITAL ENCOUNTER (OUTPATIENT)
Dept: MRI IMAGING | Facility: CLINIC | Age: 72
Discharge: HOME | End: 2022-09-30
Attending: INTERNAL MEDICINE | Admitting: INTERNAL MEDICINE
Payer: MEDICARE

## 2022-09-30 DIAGNOSIS — E27.9 ADRENAL NODULE (H): ICD-10-CM

## 2022-09-30 LAB — ALDOST/RENIN PLAS-RTO: 3.5 {RATIO} (ref 0–25)

## 2022-09-30 PROCEDURE — 255N000002 HC RX 255 OP 636: Performed by: INTERNAL MEDICINE

## 2022-09-30 PROCEDURE — 74183 MRI ABD W/O CNTR FLWD CNTR: CPT | Mod: MG

## 2022-09-30 PROCEDURE — A9585 GADOBUTROL INJECTION: HCPCS | Performed by: INTERNAL MEDICINE

## 2022-09-30 RX ORDER — GADOBUTROL 604.72 MG/ML
8 INJECTION INTRAVENOUS ONCE
Status: COMPLETED | OUTPATIENT
Start: 2022-09-30 | End: 2022-09-30

## 2022-09-30 RX ADMIN — GADOBUTROL 8 ML: 604.72 INJECTION INTRAVENOUS at 08:36

## 2022-10-04 LAB
COLLECT DURATION TIME UR: 24 H
CORTIS 24H UR-MRATE: 25 MCG/24 H (ref 3.5–45)
CORTISONE 24H UR-MRATE: 57 MCG/24 H (ref 17–129)
SPECIMEN VOL 24H UR: 1900 ML

## 2022-10-07 ENCOUNTER — OFFICE VISIT (OUTPATIENT)
Dept: FAMILY MEDICINE | Facility: CLINIC | Age: 72
End: 2022-10-07
Payer: MEDICARE

## 2022-10-07 VITALS
TEMPERATURE: 97 F | BODY MASS INDEX: 28.73 KG/M2 | RESPIRATION RATE: 16 BRPM | OXYGEN SATURATION: 95 % | WEIGHT: 194 LBS | HEART RATE: 66 BPM | HEIGHT: 69 IN | SYSTOLIC BLOOD PRESSURE: 109 MMHG | DIASTOLIC BLOOD PRESSURE: 68 MMHG

## 2022-10-07 DIAGNOSIS — G47.00 INSOMNIA, UNSPECIFIED TYPE: ICD-10-CM

## 2022-10-07 DIAGNOSIS — R93.0 ABNORMAL MRI OF HEAD: ICD-10-CM

## 2022-10-07 DIAGNOSIS — I25.10 CORONARY ARTERY DISEASE INVOLVING NATIVE CORONARY ARTERY OF NATIVE HEART WITHOUT ANGINA PECTORIS: ICD-10-CM

## 2022-10-07 DIAGNOSIS — I10 ESSENTIAL HYPERTENSION, BENIGN: Chronic | ICD-10-CM

## 2022-10-07 DIAGNOSIS — E78.5 HYPERLIPIDEMIA, UNSPECIFIED HYPERLIPIDEMIA TYPE: Chronic | ICD-10-CM

## 2022-10-07 DIAGNOSIS — R73.03 PREDIABETES: Chronic | ICD-10-CM

## 2022-10-07 DIAGNOSIS — I21.4 NSTEMI (NON-ST ELEVATED MYOCARDIAL INFARCTION) (H): Primary | ICD-10-CM

## 2022-10-07 DIAGNOSIS — Z09 HOSPITAL DISCHARGE FOLLOW-UP: ICD-10-CM

## 2022-10-07 PROBLEM — Z79.899 CONTROLLED SUBSTANCE AGREEMENT SIGNED: Status: RESOLVED | Noted: 2017-02-02 | Resolved: 2022-10-07

## 2022-10-07 LAB
CREAT 24H UR-MRATE: 1444 MG/D
CREAT UR-MCNC: 76 MG/DL
METANEPH 24H UR-MCNC: 39 UG/L
METANEPH 24H UR-MRATE: 74 UG/D
METANEPH+NORMETANEPH UR-IMP: ABNORMAL
METANEPH/CREAT 24H UR: 51 UG/G CRT
NORMETANEPHRINE 24H UR-MCNC: 296 UG/L
NORMETANEPHRINE 24H UR-MRATE: 562 UG/D
NORMETANEPHRINE/CREAT 24H UR: 389 UG/G CRT

## 2022-10-07 PROCEDURE — 99495 TRANSJ CARE MGMT MOD F2F 14D: CPT | Performed by: INTERNAL MEDICINE

## 2022-10-07 RX ORDER — METOPROLOL TARTRATE 25 MG/1
25 TABLET, FILM COATED ORAL 2 TIMES DAILY
Qty: 180 TABLET | Refills: 3 | Status: SHIPPED | OUTPATIENT
Start: 2022-10-07 | End: 2023-09-08

## 2022-10-07 RX ORDER — TRAZODONE HYDROCHLORIDE 50 MG/1
25-50 TABLET, FILM COATED ORAL
Qty: 30 TABLET | Refills: 1 | Status: SHIPPED | OUTPATIENT
Start: 2022-10-07 | End: 2022-10-24 | Stop reason: SINTOL

## 2022-10-07 RX ORDER — METFORMIN HCL 500 MG
500 TABLET, EXTENDED RELEASE 24 HR ORAL
Qty: 90 TABLET | Refills: 3 | Status: CANCELLED | OUTPATIENT
Start: 2022-10-07

## 2022-10-07 RX ORDER — CLOPIDOGREL BISULFATE 75 MG/1
75 TABLET ORAL DAILY
Qty: 90 TABLET | Refills: 3 | Status: SHIPPED | OUTPATIENT
Start: 2022-10-07 | End: 2023-10-16

## 2022-10-07 ASSESSMENT — PATIENT HEALTH QUESTIONNAIRE - PHQ9
SUM OF ALL RESPONSES TO PHQ QUESTIONS 1-9: 5
SUM OF ALL RESPONSES TO PHQ QUESTIONS 1-9: 5
10. IF YOU CHECKED OFF ANY PROBLEMS, HOW DIFFICULT HAVE THESE PROBLEMS MADE IT FOR YOU TO DO YOUR WORK, TAKE CARE OF THINGS AT HOME, OR GET ALONG WITH OTHER PEOPLE: NOT DIFFICULT AT ALL

## 2022-10-07 NOTE — PROGRESS NOTES
Assessment & Plan     NSTEMI (non-ST elevated myocardial infarction) (H)  Coronary artery disease involving native coronary artery of native heart without angina pectoris  Hospital discharge follow-up  Mild CAD w/ plaque rupture, NSTEMI  Clinically doing well  Continue medications as prescribed  She was switched to different statin inpatient. Recommend repeat lipids/lft in 2 months, orders placed.  Follow up with cardio as planned  - metoprolol tartrate (LOPRESSOR) 25 MG tablet  Dispense: 180 tablet; Refill: 3  - clopidogrel (PLAVIX) 75 MG tablet  Dispense: 90 tablet; Refill: 3  - aspirin (ASA) 81 MG EC tablet  Dispense: 90 tablet; Refill: 3  - Hepatic panel (Albumin, ALT, AST, Bili, Alk Phos, TP)  - Lipid panel reflex to direct LDL Fasting    Prediabetes  Discussed option for aggressive treatment of prediabetes with addition of metformin. She declines for now and is motivated to work on lifestyle. Admits room for improvement. Follow-up in 3-6 months for recheck.    Essential hypertension, benign - goal < 140/90  Controlled though low end BP. She declines dizziness/lightheadedness. Education provided and if symptoms develop, would cut down on amlodipine.    Hyperlipidemia, unspecified hyperlipidemia type  On statin as per above, denies myalgias    Abnormal MRI of head  Discussed. Suspected small meningioma and possible small aneursym. BP and Lipids being medically managed. Recommend repeat in one year vs neurology consultation. She opts for repeat MRI in one year which is reasonable.    Insomnia, unspecified type  Increased reliance on benzo lately due to personal stress (family triggers-of note, she received  guidance in hospital and resources and is feeling supported/has a plan in place). We will trial trazodone as substitute for benzo for sleep. She is agreeable. Do not combine.  - traZODone (DESYREL) 50 MG tablet  Dispense: 30 tablet; Refill: 1      Return in about 1 year (around 10/7/2023) for  "Routine preventive, with me, in person, sooner if symptoms worsen or do not improve.    Jade Davis DO  [facility] NELL Lima is a 72 year old, presenting for the following health issues:    HPI       Hospital Follow-up Visit:    Hospital/Nursing Home/IP Rehab Facility: [facility]  Date of Admission: 09/25/22  Date of Discharge: 09/27/22  Reason(s) for Admission:   NSTEMI  Coronary artery disease  Transient word finding difficulties   HTN  HLD  Hypothyroidism  Small ICA outpouching, aneurysm vs infundibulum  Small extra axial nodule, possible meningioma   Pre-DM  Anxiety/depression     Was your hospitalization related to COVID-19? No   Problems taking medications regularly:  None  Medication changes since discharge: None  Problems adhering to non-medication therapy:  None    Janie presents for hospital follow-up. NSTEMI. She states she is feeling great. \"Wouldn't have even known I just had a heart attack\". Good spirits. 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 She denies current cp/sob/dizziness/lightheadedness. Compliant with meds. Still having hard time sleeping primarily due to /stress and admits relying on benzo more than she has in the past and interested in trialing alternative sleep medication.     Abd pain from our last visit has since resolved.     Review of Systems   Constitutional, HEENT, cardiovascular, pulmonary, gi and gu systems are negative, except as otherwise noted.      Objective    /68 (BP Location: Right arm, Patient Position: Sitting, Cuff Size: Adult Regular)   Pulse 66   Temp 97  F (36.1  C) (Temporal)   Resp 16   Ht 1.753 m (5' 9\")   Wt 88 kg (194 lb)   LMP  (LMP Unknown)   SpO2 95%   BMI 28.65 kg/m    Body mass index is 28.65 kg/m .  Physical Exam     GENERAL APPEARANCE: AAOx3, no " distress. Well developed.    RESP: Lungs CTA bilaterally. No w/r/r. No distress     CV: RRR, S1/S2 present. No m/r/c.     PSYCH: appropriate mood and affect.

## 2022-10-10 ENCOUNTER — OFFICE VISIT (OUTPATIENT)
Dept: CARDIOLOGY | Facility: CLINIC | Age: 72
End: 2022-10-10
Payer: MEDICARE

## 2022-10-10 VITALS
HEART RATE: 61 BPM | BODY MASS INDEX: 28.66 KG/M2 | DIASTOLIC BLOOD PRESSURE: 66 MMHG | WEIGHT: 193.5 LBS | HEIGHT: 69 IN | OXYGEN SATURATION: 97 % | SYSTOLIC BLOOD PRESSURE: 101 MMHG

## 2022-10-10 DIAGNOSIS — I10 ESSENTIAL HYPERTENSION, BENIGN: Chronic | ICD-10-CM

## 2022-10-10 DIAGNOSIS — I21.4 NSTEMI (NON-ST ELEVATED MYOCARDIAL INFARCTION) (H): Primary | Chronic | ICD-10-CM

## 2022-10-10 DIAGNOSIS — I25.10 CORONARY ARTERY DISEASE INVOLVING NATIVE CORONARY ARTERY OF NATIVE HEART WITHOUT ANGINA PECTORIS: ICD-10-CM

## 2022-10-10 DIAGNOSIS — E78.49 OTHER HYPERLIPIDEMIA: Chronic | ICD-10-CM

## 2022-10-10 DIAGNOSIS — R73.03 PREDIABETES: Chronic | ICD-10-CM

## 2022-10-10 DIAGNOSIS — E78.5 HYPERLIPIDEMIA LDL GOAL <70: ICD-10-CM

## 2022-10-10 PROCEDURE — 99204 OFFICE O/P NEW MOD 45 MIN: CPT | Performed by: NURSE PRACTITIONER

## 2022-10-10 RX ORDER — ATORVASTATIN CALCIUM 40 MG/1
40 TABLET, FILM COATED ORAL EVERY EVENING
Qty: 90 TABLET | Refills: 3 | Status: SHIPPED | OUTPATIENT
Start: 2022-10-10 | End: 2023-10-19

## 2022-10-10 NOTE — LETTER
10/10/2022    Jade Davis,   [address] [place] [address]    RE: Janie De Leon       Dear Colleague,     I had the pleasure of seeing Janie De Leon in the [facility].      HPI and Plan:   I had the pleasure of meeting Janie Murphy Moises in Cardiology clinic today for post-hospitalization follow-up.    This patient is a pleasant 72-year-old female with a past medical history significant for hypertension, prediabetes, hyperlipidemia who was recently admitted to the ED for sudden onset retrosternal chest heaviness with radiation to the neck with associated SOB.  She has not previously doctored with cardiology.  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    During her hospitalization (9/25-9/27/2022) she underwent coronary angiography, which revealed mild proximal LAD disease with mild plaque rupture without flow limitation as evidenced by iFR 0.98.  She was recommended medical management and lifestyle modifications for cardiovascular risk factor optimization, with a low threshold to return for LAD stenting should her anginal symptoms persist.  She was discharged home with dual antiplatelet therapy for 12 months along with statin, beta-blockade, and outpatient cardiac rehab.  Echocardiography at that time showed EF 50 to 55% with grade 1 diastolic dysfunction and basal inferior and basal inferoseptal wall motion abnormalities.    Of note, during her hospitalization she reported word finding difficulty over the past few months with more prominent symptoms noted after the coronary angiogram.  MRI was performed and negative for acute CVA, though incidentally did note a small extra-axial nodule, possible meningioma.      Interval  Patient is feeling well today, she only endorses some mild fatigue.  She states that she has been taking it easy ever since she was hospitalized, and is anticipating working with cardiac rehab starting next week.   She denies chest pain, chest discomfort, lightheadedness, dizziness.  She is looking forward to the additional support that she will get at cardiac rehab because she is the primary caretaker for her  at home who has dementia.  She noted that this has been a stressful time for her because she has to manage to care for him, while at the same time taking care of of herself after this hospital stay.    She was previously a runner/jogger and has participated in half-marathons in the past.  She notes chronic arthritis in her back which limits her ability to jog.  She has been walking on occasion since discharge, no anginal symptoms noted.    Impression:  Coronary artery disease involving native coronary artery of native heart  S/p NSTEMI without PCI  -She is clinically doing well post-hospitalization without any recurrent anginal symptoms or the equivalent.  She mentions that she is completely free from any medication side effects, and has not noticed any significant skin changes to how she is feeling since being discharged from the hospital, aside from fatigue as mentioned above.  -Medical management includes ASA, atorvastatin, clopidogrel, and metoprolol tartrate  -Counseled on further risk reduction strategies including healthy diet, adequate exercise, and other lifestyle modifications.    Hypertension  -Blood pressure appears well controlled at last clinic visit this past week. BP today 101/66  -Management with Amlodipine 5mg daily.    Hyperlipidemia  -She was previously on Lovastatin, and transitioned to a more potent statin, Atorvastatin 40 mg daily while hospitalized for aggressive CAD risk reduction.  -Most recent lipid panel completed 8/31/2022: Total cholesterol 221, triglycerides 274, , HDL 43.  -We will plan to recheck lipid profile within the next couple of months, and further optimize her medications at that point.    Pre-Diabetes  -Her most recent hemoglobin A1c on 8/31/2022 was 6.1.  She  recently had a discussion with her primary care provider last week, and the discussed plan of adding metformin was presented.  She declined to initiate this medication at that time, and opted to pursue lifestyle modifications first to further control her blood sugars.    Hypothyroidism  -Currently being treated with levothyroxine per primary care/endocrinology.  Most recent TSH was within range this past June.       Transient word finding difficulties  -She does not have any noted word finding difficulties today.  She is able to converse properly and responds in a timely manner during our conversation.  -MRA neck revealed no carotid artery stenosis.  -MRI negative for acute CVA, noted small ICA outpouching, aneurysm vs infundibulum and small extra axial nodule, possible meningioma.  -Further management per primary care recommendations.     Plan  Overall, Sagrario is doing well posthospitalization.  We had a discussion about lifestyle interventions she can incorporate to further reduce her risk coronary events, as well as adhering to her medication regimen.  We will plan to recheck her lipid profile in the next couple of months.  She can follow-up in clinic in 3 months with a repeat echocardiogram.  Education provided if she should have recurrent symptoms and need to be seen sooner; pt verbalized understanding.    Orders Placed This Encounter   Procedures     Follow-Up with Cardiology- LORENA     Echocardiogram Complete     Orders Placed This Encounter   Medications     atorvastatin (LIPITOR) 40 MG tablet     Sig: Take 1 tablet (40 mg) by mouth every evening     Dispense:  90 tablet     Refill:  3     Medications Discontinued During This Encounter   Medication Reason     atorvastatin (LIPITOR) 40 MG tablet Reorder     Today's clinic visit entailed:  Review of the result(s) of each unique test - echo, cath, labs, ekg  Ordering of each unique test  Prescription drug management    The level of medical decision making during  this visit was of moderate complexity.    Thank you for the opportunity to participate in this patient's care.    ALEXYS Yoon, CNP   Nurse Practitioner  [facility]    Orders this Visit:  Orders Placed This Encounter   Procedures     Follow-Up with Cardiology- LORENA     Echocardiogram Complete     Orders Placed This Encounter   Medications     atorvastatin (LIPITOR) 40 MG tablet     Sig: Take 1 tablet (40 mg) by mouth every evening     Dispense:  90 tablet     Refill:  3     Medications Discontinued During This Encounter   Medication Reason     atorvastatin (LIPITOR) 40 MG tablet Reorder       Encounter Diagnoses   Name Primary?     Hyperlipidemia LDL goal <70      Coronary artery disease involving native coronary artery of native heart without angina pectoris      Essential hypertension, benign - goal < 140/90      NSTEMI (non-ST elevated myocardial infarction) (H) Yes     Other hyperlipidemia      Prediabetes        CURRENT MEDICATIONS:  Current Outpatient Medications   Medication Sig Dispense Refill     amLODIPine (NORVASC) 5 MG tablet Take 1 tablet (5 mg) by mouth daily (Patient taking differently: Take 1 tablet (5 mg) by mouth every evening) 90 tablet 3     aspirin (ASA) 81 MG EC tablet Take 1 tablet (81 mg) by mouth daily 90 tablet 3     atorvastatin (LIPITOR) 40 MG tablet Take 1 tablet (40 mg) by mouth every evening 90 tablet 3     Cholecalciferol (VITAMIN D3 PO) Take 1,000 Units by mouth daily       clonazePAM (KLONOPIN) 0.5 MG tablet Take 0.5-1 tablets (0.25-0.5 mg) by mouth nightly as needed for anxiety 45 tablet 0     clopidogrel (PLAVIX) 75 MG tablet Take 1 tablet (75 mg) by mouth daily 90 tablet 3     DULoxetine (CYMBALTA) 60 MG capsule Take 1 capsule (60 mg) by mouth daily 90 capsule 3     levothyroxine (SYNTHROID/LEVOTHROID) 88 MCG tablet Take 1 tablet (88 mcg) by mouth daily - Overdue for endocrinology appointment 90 tablet 3     metoprolol tartrate (LOPRESSOR) 25 MG  "tablet Take 1 tablet (25 mg) by mouth 2 times daily 180 tablet 3     nitroGLYcerin (NITROSTAT) 0.4 MG sublingual tablet For chest pain place 1 tablet under the tongue every 5 minutes for 3 doses. If symptoms persist 5 minutes after 1st dose call 911. 30 tablet 0     olmesartan (BENICAR) 40 MG tablet Take 20 mg by mouth every evening 45 tablet 3     polyethylene glycol (MIRALAX) 17 GM/Dose powder Take 17 g by mouth daily as needed for constipation       traZODone (DESYREL) 50 MG tablet Take 0.5-1 tablets (25-50 mg) by mouth nightly as needed for sleep ((Do NOT combine with clonazepam)) 30 tablet 1       ALLERGIES     Allergies   Allergen Reactions     Ciprofloxacin GI Disturbance     Oxycodone Other (See Comments)     She prefers not to have Oxycodone or Oxycontin due to potential addictive properties     Simvastatin Other (See Comments)     Muscle aches      PAST MEDICAL, SURGICAL, FAMILY, SOCIAL HISTORY:  History was reviewed and updated as needed, see medical record.    Review of Systems:  Review Of Systems  Skin: negative  Eyes: negative  Ears/Nose/Throat: negative  Respiratory: No shortness of breath, dyspnea on exertion, cough, or hemoptysis  Cardiovascular: negative for, palpitations, chest pain, exertional chest pain or pressure, dyspnea on exertion, lower extremity edema and syncope or near-syncope  Gastrointestinal: negative  Genitourinary: negative  Musculoskeletal: negative for, joint pain, joint swelling, joint stiffness and muscular weakness  Neurologic: negative  Psychiatric: negative  Hematologic/Lymphatic/Immunologic: negative  Endocrine: negative     Physical Exam:  Vitals: /66 (BP Location: Left arm, Patient Position: Sitting)   Pulse 61   Ht 1.753 m (5' 9.02\")   Wt 87.8 kg (193 lb 8 oz)   LMP  (LMP Unknown)   SpO2 97%   BMI 28.56 kg/m      Constitutional: Appears stated age, well nourished, NAD.  Eyes: Pupils equal, round. Sclerae anicteric.   HEENT: Normocephalic, atraumatic.   Neck: " Supple. Carotid pulses full and equal. No carotid bruit.  No JVD appreciated.  Respiratory: Non-labored. Lungs CTAB. No crackles, wheezes, rhonchi, or rales.  Cardiovascular: RRR, normal S1 and S2. No M/G/R.  GI: Soft, non-distended, non-tender, bowel sounds present equally.  Skin: Warm and dry. No rashes, cyanosis, edema, or xanthelasma.  Musculoskeletal/Extremities: Symmetrical movement to all extremities. No edema.  Neurologic: No gross focal deficits. Alert, awake, and oriented to all spheres.  Psychiatric: Affect appropriate. Mentation normal.    Recent Lab Results:  LIPID RESULTS:  Lab Results   Component Value Date    CHOL 221 (H) 08/31/2022    CHOL 237 (H) 08/27/2020    HDL 43 (L) 08/31/2022    HDL 46 (L) 08/27/2020     (H) 08/31/2022     (H) 08/27/2020    TRIG 274 (H) 08/31/2022    TRIG 373 (H) 08/27/2020    CHOLHDLRATIO 5.3 (H) 10/28/2015       LIVER ENZYME RESULTS:  Lab Results   Component Value Date    AST 13 09/16/2022    AST 16 08/27/2020    ALT 17 09/16/2022    ALT 21 08/27/2020       CBC RESULTS:  Lab Results   Component Value Date    WBC 8.1 09/25/2022    WBC 5.9 08/27/2020    RBC 4.67 09/25/2022    RBC 5.11 08/27/2020    HGB 13.5 09/25/2022    HGB 15.5 08/27/2020    HCT 40.5 09/25/2022    HCT 46.5 08/27/2020    MCV 87 09/25/2022    MCV 91 08/27/2020    MCH 28.9 09/25/2022    MCH 30.3 08/27/2020    MCHC 33.3 09/25/2022    MCHC 33.3 08/27/2020    RDW 13.1 09/25/2022    RDW 12.6 08/27/2020     09/25/2022     08/27/2020       BMP RESULTS:  Lab Results   Component Value Date     09/27/2022     08/27/2020    POTASSIUM 4.0 09/27/2022    POTASSIUM 4.2 08/27/2020    CHLORIDE 106 09/27/2022    CHLORIDE 105 08/27/2020    CO2 26 09/27/2022    CO2 26 08/27/2020    ANIONGAP 7 09/27/2022    ANIONGAP 7 08/27/2020     (H) 09/27/2022     (H) 08/27/2020    BUN 15 09/27/2022    BUN 17 08/27/2020    CR 0.81 09/27/2022    CR 0.90 08/27/2020    GFRESTIMATED 76  09/27/2022    GFRESTIMATED 59 (L) 09/16/2022    GFRESTIMATED 64 08/27/2020    GFRESTBLACK 74 08/27/2020    CYNDY 9.1 09/27/2022    CYNDY 8.9 08/27/2020        A1C RESULTS:  Lab Results   Component Value Date    A1C 6.1 (H) 08/31/2022    A1C 5.8 (H) 08/27/2020     INR RESULTS:  No results found for: INR    CC  No referring provider defined for this encounter.    Thank you for allowing me to participate in the care of your patient.      Sincerely,     ALEXYS Yoon [facility]

## 2022-10-10 NOTE — PATIENT INSTRUCTIONS
Thank you for your visit with the [facility] today.    Today's plan:   Plan to follow-up in 3 months in clinic, and have an echocardiogram rechecked before that clinic visit.  Start cardiac rehab.    If you have questions or concerns, please do not hesitate to call my nursing support team at [phone].    Scheduling phone number: [phone]  [facility] Clinic Number: [phone]    It was a pleasure seeing you today.     ALEXYS Yoon, CNP  Nurse Practitioner  [facility] [place] [facility]  October 10, 2022  ________________________________________________________

## 2022-10-10 NOTE — PROGRESS NOTES
HPI and Plan:   I had the pleasure of meeting Janie De Leon in Cardiology clinic today for post-hospitalization follow-up.    This patient is a pleasant 72-year-old female with a past medical history significant for hypertension, prediabetes, hyperlipidemia who was recently admitted to the ED for sudden onset retrosternal chest heaviness with radiation to the neck with associated SOB.  She has not previously doctored with cardiology.  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    During her hospitalization (9/25-9/27/2022) she underwent coronary angiography, which revealed mild proximal LAD disease with mild plaque rupture without flow limitation as evidenced by iFR 0.98.  She was recommended medical management and lifestyle modifications for cardiovascular risk factor optimization, with a low threshold to return for LAD stenting should her anginal symptoms persist.  She was discharged home with dual antiplatelet therapy for 12 months along with statin, beta-blockade, and outpatient cardiac rehab.  Echocardiography at that time showed EF 50 to 55% with grade 1 diastolic dysfunction and basal inferior and basal inferoseptal wall motion abnormalities.    Of note, during her hospitalization she reported word finding difficulty over the past few months with more prominent symptoms noted after the coronary angiogram.  MRI was performed and negative for acute CVA, though incidentally did note a small extra-axial nodule, possible meningioma.      Interval  Patient is feeling well today, she only endorses some mild fatigue.  She states that she has been taking it easy ever since she was hospitalized, and is anticipating working with cardiac rehab starting next week.  She denies chest pain, chest discomfort, lightheadedness, dizziness.  She is looking forward to the additional support that she will get at cardiac rehab because she is the primary caretaker for her   at home who has dementia.  She noted that this has been a stressful time for her because she has to manage to care for him, while at the same time taking care of of herself after this hospital stay.    She was previously a runner/jogger and has participated in half-marathons in the past.  She notes chronic arthritis in her back which limits her ability to jog.  She has been walking on occasion since discharge, no anginal symptoms noted.    Impression:  Coronary artery disease involving native coronary artery of native heart  S/p NSTEMI without PCI  -She is clinically doing well post-hospitalization without any recurrent anginal symptoms or the equivalent.  She mentions that she is completely free from any medication side effects, and has not noticed any significant skin changes to how she is feeling since being discharged from the hospital, aside from fatigue as mentioned above.  -Medical management includes ASA, atorvastatin, clopidogrel, and metoprolol tartrate  -Counseled on further risk reduction strategies including healthy diet, adequate exercise, and other lifestyle modifications.    Hypertension  -Blood pressure appears well controlled at last clinic visit this past week. BP today 101/66  -Management with Amlodipine 5mg daily.    Hyperlipidemia  -She was previously on Lovastatin, and transitioned to a more potent statin, Atorvastatin 40 mg daily while hospitalized for aggressive CAD risk reduction.  -Most recent lipid panel completed 8/31/2022: Total cholesterol 221, triglycerides 274, , HDL 43.  -We will plan to recheck lipid profile within the next couple of months, and further optimize her medications at that point.    Pre-Diabetes  -Her most recent hemoglobin A1c on 8/31/2022 was 6.1.  She recently had a discussion with her primary care provider last week, and the discussed plan of adding metformin was presented.  She declined to initiate this medication at that time, and opted to pursue  lifestyle modifications first to further control her blood sugars.    Hypothyroidism  -Currently being treated with levothyroxine per primary care/endocrinology.  Most recent TSH was within range this past June.       Transient word finding difficulties  -She does not have any noted word finding difficulties today.  She is able to converse properly and responds in a timely manner during our conversation.  -MRA neck revealed no carotid artery stenosis.  -MRI negative for acute CVA, noted small ICA outpouching, aneurysm vs infundibulum and small extra axial nodule, possible meningioma.  -Further management per primary care recommendations.     Plan  Overall, Sagrario is doing well posthospitalization.  We had a discussion about lifestyle interventions she can incorporate to further reduce her risk coronary events, as well as adhering to her medication regimen.  We will plan to recheck her lipid profile in the next couple of months.  She can follow-up in clinic in 3 months with a repeat echocardiogram.  Education provided if she should have recurrent symptoms and need to be seen sooner; pt verbalized understanding.    Orders Placed This Encounter   Procedures     Follow-Up with Cardiology- [facility]     Echocardiogram Complete     Orders Placed This Encounter   Medications     atorvastatin (LIPITOR) 40 MG tablet     Sig: Take 1 tablet (40 mg) by mouth every evening     Dispense:  90 tablet     Refill:  3     Medications Discontinued During This Encounter   Medication Reason     atorvastatin (LIPITOR) 40 MG tablet Reorder     Today's clinic visit entailed:  Review of the result(s) of each unique test - echo, cath, labs, ekg  Ordering of each unique test  Prescription drug management    The level of medical decision making during this visit was of moderate complexity.    Thank you for the opportunity to participate in this patient's care.    ALEXYS Yoon, CNP   Nurse Practitioner  [facility]    Orders this Visit:  Orders Placed This Encounter   Procedures     Follow-Up with Cardiology- [facility]     Echocardiogram Complete     Orders Placed This Encounter   Medications     atorvastatin (LIPITOR) 40 MG tablet     Sig: Take 1 tablet (40 mg) by mouth every evening     Dispense:  90 tablet     Refill:  3     Medications Discontinued During This Encounter   Medication Reason     atorvastatin (LIPITOR) 40 MG tablet Reorder       Encounter Diagnoses   Name Primary?     Hyperlipidemia LDL goal <70      Coronary artery disease involving native coronary artery of native heart without angina pectoris      Essential hypertension, benign - goal < 140/90      NSTEMI (non-ST elevated myocardial infarction) (H) Yes     Other hyperlipidemia      Prediabetes        CURRENT MEDICATIONS:  Current Outpatient Medications   Medication Sig Dispense Refill     amLODIPine (NORVASC) 5 MG tablet Take 1 tablet (5 mg) by mouth daily (Patient taking differently: Take 1 tablet (5 mg) by mouth every evening) 90 tablet 3     aspirin (ASA) 81 MG EC tablet Take 1 tablet (81 mg) by mouth daily 90 tablet 3     atorvastatin (LIPITOR) 40 MG tablet Take 1 tablet (40 mg) by mouth every evening 90 tablet 3     Cholecalciferol (VITAMIN D3 PO) Take 1,000 Units by mouth daily       clonazePAM (KLONOPIN) 0.5 MG tablet Take 0.5-1 tablets (0.25-0.5 mg) by mouth nightly as needed for anxiety 45 tablet 0     clopidogrel (PLAVIX) 75 MG tablet Take 1 tablet (75 mg) by mouth daily 90 tablet 3     DULoxetine (CYMBALTA) 60 MG capsule Take 1 capsule (60 mg) by mouth daily 90 capsule 3     levothyroxine (SYNTHROID/LEVOTHROID) 88 MCG tablet Take 1 tablet (88 mcg) by mouth daily - Overdue for endocrinology appointment 90 tablet 3     metoprolol tartrate (LOPRESSOR) 25 MG tablet Take 1 tablet (25 mg) by mouth 2 times daily 180 tablet 3     nitroGLYcerin (NITROSTAT) 0.4 MG sublingual tablet For chest pain place 1 tablet under the tongue every 5 minutes for 3 doses.  "If symptoms persist 5 minutes after 1st dose call 911. 30 tablet 0     olmesartan (BENICAR) 40 MG tablet Take 20 mg by mouth every evening 45 tablet 3     polyethylene glycol (MIRALAX) 17 GM/Dose powder Take 17 g by mouth daily as needed for constipation       traZODone (DESYREL) 50 MG tablet Take 0.5-1 tablets (25-50 mg) by mouth nightly as needed for sleep ((Do NOT combine with clonazepam)) 30 tablet 1       ALLERGIES     Allergies   Allergen Reactions     Ciprofloxacin GI Disturbance     Oxycodone Other (See Comments)     She prefers not to have Oxycodone or Oxycontin due to potential addictive properties     Simvastatin Other (See Comments)     Muscle aches      PAST MEDICAL, SURGICAL, FAMILY, SOCIAL HISTORY:  History was reviewed and updated as needed, see medical record.    Review of Systems:  Review Of Systems  Skin: negative  Eyes: negative  Ears/Nose/Throat: negative  Respiratory: No shortness of breath, dyspnea on exertion, cough, or hemoptysis  Cardiovascular: negative for, palpitations, chest pain, exertional chest pain or pressure, dyspnea on exertion, lower extremity edema and syncope or near-syncope  Gastrointestinal: negative  Genitourinary: negative  Musculoskeletal: negative for, joint pain, joint swelling, joint stiffness and muscular weakness  Neurologic: negative  Psychiatric: negative  Hematologic/Lymphatic/Immunologic: negative  Endocrine: negative     Physical Exam:  Vitals: /66 (BP Location: Left arm, Patient Position: Sitting)   Pulse 61   Ht 1.753 m (5' 9.02\")   Wt 87.8 kg (193 lb 8 oz)   LMP  (LMP Unknown)   SpO2 97%   BMI 28.56 kg/m      Constitutional: Appears stated age, well nourished, NAD.  Eyes: Pupils equal, round. Sclerae anicteric.   HEENT: Normocephalic, atraumatic.   Neck: Supple. Carotid pulses full and equal. No carotid bruit.  No JVD appreciated.  Respiratory: Non-labored. Lungs CTAB. No crackles, wheezes, rhonchi, or rales.  Cardiovascular: RRR, normal S1 and " S2. No M/G/R.  GI: Soft, non-distended, non-tender, bowel sounds present equally.  Skin: Warm and dry. No rashes, cyanosis, edema, or xanthelasma.  Musculoskeletal/Extremities: Symmetrical movement to all extremities. No edema.  Neurologic: No gross focal deficits. Alert, awake, and oriented to all spheres.  Psychiatric: Affect appropriate. Mentation normal.    Recent Lab Results:  LIPID RESULTS:  Lab Results   Component Value Date    CHOL 221 (H) 08/31/2022    CHOL 237 (H) 08/27/2020    HDL 43 (L) 08/31/2022    HDL 46 (L) 08/27/2020     (H) 08/31/2022     (H) 08/27/2020    TRIG 274 (H) 08/31/2022    TRIG 373 (H) 08/27/2020    CHOLHDLRATIO 5.3 (H) 10/28/2015       LIVER ENZYME RESULTS:  Lab Results   Component Value Date    AST 13 09/16/2022    AST 16 08/27/2020    ALT 17 09/16/2022    ALT 21 08/27/2020       CBC RESULTS:  Lab Results   Component Value Date    WBC 8.1 09/25/2022    WBC 5.9 08/27/2020    RBC 4.67 09/25/2022    RBC 5.11 08/27/2020    HGB 13.5 09/25/2022    HGB 15.5 08/27/2020    HCT 40.5 09/25/2022    HCT 46.5 08/27/2020    MCV 87 09/25/2022    MCV 91 08/27/2020    MCH 28.9 09/25/2022    MCH 30.3 08/27/2020    MCHC 33.3 09/25/2022    MCHC 33.3 08/27/2020    RDW 13.1 09/25/2022    RDW 12.6 08/27/2020     09/25/2022     08/27/2020       BMP RESULTS:  Lab Results   Component Value Date     09/27/2022     08/27/2020    POTASSIUM 4.0 09/27/2022    POTASSIUM 4.2 08/27/2020    CHLORIDE 106 09/27/2022    CHLORIDE 105 08/27/2020    CO2 26 09/27/2022    CO2 26 08/27/2020    ANIONGAP 7 09/27/2022    ANIONGAP 7 08/27/2020     (H) 09/27/2022     (H) 08/27/2020    BUN 15 09/27/2022    BUN 17 08/27/2020    CR 0.81 09/27/2022    CR 0.90 08/27/2020    GFRESTIMATED 76 09/27/2022    GFRESTIMATED 59 (L) 09/16/2022    GFRESTIMATED 64 08/27/2020    GFRESTBLACK 74 08/27/2020    CYNDY 9.1 09/27/2022    CYNDY 8.9 08/27/2020        A1C RESULTS:  Lab Results   Component Value  Date    A1C 6.1 (H) 08/31/2022    A1C 5.8 (H) 08/27/2020     INR RESULTS:  No results found for: INR    CC  No referring provider defined for this encounter.

## 2022-10-12 ENCOUNTER — HOSPITAL ENCOUNTER (OUTPATIENT)
Dept: CARDIAC REHAB | Facility: CLINIC | Age: 72
Discharge: HOME | End: 2022-10-12
Attending: PHYSICIAN ASSISTANT
Payer: MEDICARE

## 2022-10-12 DIAGNOSIS — Z95.5 S/P CORONARY ARTERY STENT PLACEMENT: ICD-10-CM

## 2022-10-12 DIAGNOSIS — I21.4 NSTEMI (NON-ST ELEVATED MYOCARDIAL INFARCTION) (H): ICD-10-CM

## 2022-10-12 PROCEDURE — 93798 PHYS/QHP OP CAR RHAB W/ECG: CPT | Performed by: OCCUPATIONAL THERAPIST

## 2022-10-12 PROCEDURE — 93797 PHYS/QHP OP CAR RHAB WO ECG: CPT | Mod: 59 | Performed by: OCCUPATIONAL THERAPIST

## 2022-10-17 ENCOUNTER — HOSPITAL ENCOUNTER (OUTPATIENT)
Dept: CARDIAC REHAB | Facility: CLINIC | Age: 72
Discharge: HOME | End: 2022-10-17
Attending: INTERNAL MEDICINE
Payer: MEDICARE

## 2022-10-17 PROCEDURE — 93798 PHYS/QHP OP CAR RHAB W/ECG: CPT

## 2022-10-19 ENCOUNTER — HOSPITAL ENCOUNTER (OUTPATIENT)
Dept: CARDIAC REHAB | Facility: CLINIC | Age: 72
Discharge: HOME | End: 2022-10-19
Attending: INTERNAL MEDICINE
Payer: MEDICARE

## 2022-10-19 PROCEDURE — 93798 PHYS/QHP OP CAR RHAB W/ECG: CPT | Performed by: REHABILITATION PRACTITIONER

## 2022-10-23 ENCOUNTER — MYC MEDICAL ADVICE (OUTPATIENT)
Dept: FAMILY MEDICINE | Facility: CLINIC | Age: 72
End: 2022-10-23

## 2022-10-24 ENCOUNTER — NURSE TRIAGE (OUTPATIENT)
Dept: FAMILY MEDICINE | Facility: CLINIC | Age: 72
End: 2022-10-24

## 2022-10-24 ENCOUNTER — HOSPITAL ENCOUNTER (OUTPATIENT)
Dept: CARDIAC REHAB | Facility: CLINIC | Age: 72
Discharge: HOME | End: 2022-10-24
Attending: INTERNAL MEDICINE
Payer: MEDICARE

## 2022-10-24 PROCEDURE — 93798 PHYS/QHP OP CAR RHAB W/ECG: CPT | Performed by: OCCUPATIONAL THERAPIST

## 2022-10-24 NOTE — TELEPHONE ENCOUNTER
"Patient sent [employer] message:   To:  [facility]      From: Janie De Leon      Created: 10/23/2022 3:12 PM        *-*-*This message has not been handled.*-*-*    I have stopped the Trazodone.  It made me dizzy, and last night I fell when I got up to use the bathroom. I will go back to Clonipin.          CC: dizziness,   Patient denies dizziness today today  Pattern: patient takes trazodone at night, experiences dizziness over night but dizziness \"disipates by the morning\"  Severity: moderate-severe, Patient reports when they are taking trazodone and they get up in the middle of the night, they would have to hold onto things.   Patient reports head injury on 10/23/22: \"bumped it\",   Denies pain, head ache, bruises, weakness, fever  Cause: trazodone, \"When I took a half it (trazdone 25 mg) would make me a little dizzy\" but patient reports they sometimes would take trazodone 50 mg, which would patient very dizzy   Patient reports that they did not take the trazodone with clonazepam.  Patient take plavix 75 mg daily.    Is this a 2nd Level Triage? YES, LICENSED PRACTITIONER REVIEW IS REQUIRED      Protocol Recommended Disposition:   Go To ED/UCC Now (Or To Office With PCP Approval), Home Care    Recommendation:   PCP please advise if patient needs to be seen, patient fell and \"bumped\" their head on 10/23/22. Patient has no pain/symptoms. Patient is on plavix. Please advise if patient needs to be seen or ok to monitor? Writer did review that if patient does have symptoms of dizziness/headache etc. To go to ER. Patient expressed verbal understanding. Patient wondering if they can go back on clonazepam or alternative medication. Patient stopped trazodone, writer removed from med list.     Routed to provider    Does the patient meet one of the following criteria for ADS visit consideration? 16+ years old, with an [facility] PCP     TIP  Providers, please consider if this condition is appropriate for management at one of our " Acute and Diagnostic Services sites.     If patient is a good candidate, please use dotphrase <dot>triageresponse and select Refer to ADS to document.      Callback: [phone] ok to leave detailed VM    Brian Leon RN  [facility]      Reason for Disposition    Recent fall and no injury    Taking Coumadin (warfarin) or other strong blood thinner, or known bleeding disorder (e.g., thrombocytopenia)    Additional Information    Negative: Major injury from dangerous force (e.g., fall > 10 feet or 3 meters)    Negative: Major bleeding (e.g., actively dripping or spurting) and can't be stopped    Negative: Shock suspected (e.g., cold/pale/clammy skin, too weak to stand)    Negative: Difficult to awaken or acting confused (e.g., disoriented, slurred speech)    Negative: SEVERE weakness (i.e., unable to walk or barely able to walk, requires support) and new-onset or worsening    Negative: Can't stand (bear weight) or walk and new-onset after fall    Negative: Sounds like a life-threatening emergency to the triager    Negative: Fainted (passed out)    Negative: New-onset or worsening weakness of the face, arm or leg on one side of the body    Negative: New-onset or worsening dizziness and described as spinning or off balance (i.e., vertigo)    Negative: New-onset or worsening dizziness and NO spinning sensation or trouble with balance    Negative: Pregnant and fall    Negative: Patient has a concerning injury to a specific part of the body (e.g., chest, leg, head)    Negative: Patient has a wound (abrasion, cut, puncture, other skin injury or tear)    Negative: Injury (or injuries) that need emergency care    Negative: Sounds like a serious injury to the triager    Negative: Muscle pain and dark (cola colored) or red-colored urine    Negative: Unable to get up until help (e.g., caregiver, family, friend) arrived and on the ground 1 hour or more    Negative: Patient sounds very sick or weak to the  triager    Negative: MODERATE weakness (i.e., interferes with work, school, normal activities) and new-onset or worsening    Negative: Fever > 101 F (38.3 C) and age > 60 years    Negative: Fever > 100.0 F (37.8 C) and bedridden (e.g., nursing home patient, CVA, chronic illness, recovering from surgery)    Negative: Fever > 100.0 F (37.8 C) and diabetes mellitus or weak immune system (e.g., HIV positive, cancer chemo, splenectomy, organ transplant, chronic steroids)    Negative: Suspicious history for the fall    Negative: Caller has URGENT question and triager unable to answer question    Negative: New-onset or worsening pale skin (pallor)    Negative: No prior tetanus shots (or is not fully vaccinated) and any wound (e.g., cut or scrape)    Negative: HIV positive or severe immunodeficiency (severely weak immune system) and DIRTY cut or scrape    Negative: Caller has NON-URGENT question and triager unable to answer question    Negative: MILD weakness (i.e., does not interfere with ability to work, go to school, normal activities)  (Exception: mild weakness is a chronic symptom.)    Negative: Last tetanus shot > 5 years ago and DIRTY cut or scrape    Negative: Last tetanus shot > 10 years ago and CLEAN cut or scrape (e.g., object and skin were clean)    Negative: Patient wants to be seen    Negative: Fall and went to emergency department for evaluation or treatment    Negative: Fall and patient seems very anxious and fearful of falling again    Negative: Falling (two or more falls) and in past year    Negative: Weakness is a chronic symptom (recurrent or ongoing AND present > 4 weeks)    Negative: Dizziness is a chronic symptom (recurrent or ongoing AND present > 4 weeks)    Negative: ACUTE NEUROLOGIC SYMPTOM and symptom present now    Negative: Knocked out (unconscious) > 1 minute    Negative: Seizure (convulsion) occurred  (Exception: Prior history of seizures and now alert and without Acute Neurologic  Symptoms.)    Negative: Neck pain after dangerous injury (e.g., MVA, diving, trampoline, contact sports, fall > 10 feet or 3 meters)  (Exception: Neck pain began > 1 hour after injury.)    Negative: Major bleeding (actively dripping or spurting) that can't be stopped    Negative: Penetrating head injury (e.g., knife, gunshot wound, metal object)    Negative: Sounds like a life-threatening emergency to the triager    Negative: Diagnosed with a concussion within last 14 days    Negative: Can't remember what happened (amnesia)    Negative: Vomiting once or more    Negative: Watery or blood-tinged fluid dripping from the nose or ears    Negative: One or two 'black eyes' (bruising, purple color of eyelids), and onset within 24 hours of head injury    Negative: Bleeding won't stop after 10 minutes of direct pressure (using correct technique)    Negative: Skin is split open or gaping (length > 1/2 inch or 12 mm)    Negative: Large swelling or bruise (> 2 inches or 5 cm)    Negative: Dangerous injury (e.g., MVA, diving, trampoline, contact sports, fall > 10 feet or 3 meters) or severe blow from hard object (e.g., golf club or baseball bat)    Negative: Severe headache    Negative: Knocked out (unconscious) < 1 minute and now fine    Negative: ACUTE NEUROLOGIC SYMPTOM and now fine    Protocols used: FALLS AND [id number], HEAD INJURY-A-OH

## 2022-10-24 NOTE — TELEPHONE ENCOUNTER
Call to patient. Detailed message left with Dr. Davis's below response. Clinic phone number provided in message left should patient have any questions or concerns.     Gisselle Suárez, RN BSN MSN  [facility]

## 2022-10-24 NOTE — TELEPHONE ENCOUNTER
Agree with RN. Stop trazodone.  Recommend monitoring symptoms and ER if has headache/blurry vision/unstable gait/focal weakness/slurred speech/vomiting.

## 2022-10-24 NOTE — TELEPHONE ENCOUNTER
Patient Contact     Attempt #: 1     Was call answered?  Yes, see triage encounter from 10/24/22.     Brian Leon RN  [facility]

## 2022-10-26 ENCOUNTER — HOSPITAL ENCOUNTER (OUTPATIENT)
Dept: CARDIAC REHAB | Facility: CLINIC | Age: 72
Discharge: HOME | End: 2022-10-26
Attending: INTERNAL MEDICINE
Payer: MEDICARE

## 2022-10-26 PROCEDURE — 93798 PHYS/QHP OP CAR RHAB W/ECG: CPT | Performed by: REHABILITATION PRACTITIONER

## 2022-10-28 ENCOUNTER — HOSPITAL ENCOUNTER (OUTPATIENT)
Dept: CARDIAC REHAB | Facility: CLINIC | Age: 72
Discharge: HOME | End: 2022-10-28
Attending: INTERNAL MEDICINE
Payer: MEDICARE

## 2022-10-28 PROCEDURE — 93798 PHYS/QHP OP CAR RHAB W/ECG: CPT | Performed by: REHABILITATION PRACTITIONER

## 2022-10-31 ENCOUNTER — HOSPITAL ENCOUNTER (OUTPATIENT)
Dept: CARDIAC REHAB | Facility: CLINIC | Age: 72
Discharge: HOME | End: 2022-10-31
Attending: INTERNAL MEDICINE
Payer: MEDICARE

## 2022-10-31 PROCEDURE — 93798 PHYS/QHP OP CAR RHAB W/ECG: CPT

## 2022-11-02 ENCOUNTER — HOSPITAL ENCOUNTER (OUTPATIENT)
Dept: CARDIAC REHAB | Facility: CLINIC | Age: 72
Discharge: HOME | End: 2022-11-02
Attending: INTERNAL MEDICINE
Payer: MEDICARE

## 2022-11-02 PROCEDURE — 93798 PHYS/QHP OP CAR RHAB W/ECG: CPT | Performed by: OCCUPATIONAL THERAPIST

## 2022-11-04 ENCOUNTER — HOSPITAL ENCOUNTER (OUTPATIENT)
Dept: CARDIAC REHAB | Facility: CLINIC | Age: 72
Discharge: HOME | End: 2022-11-04
Attending: INTERNAL MEDICINE
Payer: MEDICARE

## 2022-11-04 PROCEDURE — 93798 PHYS/QHP OP CAR RHAB W/ECG: CPT | Performed by: OCCUPATIONAL THERAPIST

## 2022-11-07 ENCOUNTER — HOSPITAL ENCOUNTER (OUTPATIENT)
Dept: CARDIAC REHAB | Facility: CLINIC | Age: 72
Discharge: HOME | End: 2022-11-07
Attending: INTERNAL MEDICINE
Payer: MEDICARE

## 2022-11-07 PROCEDURE — 93798 PHYS/QHP OP CAR RHAB W/ECG: CPT

## 2022-11-09 ENCOUNTER — HOSPITAL ENCOUNTER (OUTPATIENT)
Dept: CARDIAC REHAB | Facility: CLINIC | Age: 72
Discharge: HOME | End: 2022-11-09
Attending: INTERNAL MEDICINE
Payer: MEDICARE

## 2022-11-09 PROCEDURE — 93798 PHYS/QHP OP CAR RHAB W/ECG: CPT | Performed by: OCCUPATIONAL THERAPIST

## 2022-11-10 ENCOUNTER — IMMUNIZATION (OUTPATIENT)
Dept: NURSING | Facility: CLINIC | Age: 72
End: 2022-11-10
Payer: MEDICARE

## 2022-11-10 PROCEDURE — 91313 COVID-19,PF,MODERNA BIVALENT: CPT

## 2022-11-10 PROCEDURE — 0134A COVID-19,PF,MODERNA BIVALENT: CPT

## 2022-11-11 ENCOUNTER — HOSPITAL ENCOUNTER (OUTPATIENT)
Dept: CARDIAC REHAB | Facility: CLINIC | Age: 72
Discharge: HOME | End: 2022-11-11
Attending: INTERNAL MEDICINE
Payer: MEDICARE

## 2022-11-11 PROCEDURE — 93798 PHYS/QHP OP CAR RHAB W/ECG: CPT | Performed by: OCCUPATIONAL THERAPIST

## 2022-11-14 ENCOUNTER — HOSPITAL ENCOUNTER (OUTPATIENT)
Dept: CARDIAC REHAB | Facility: CLINIC | Age: 72
Discharge: HOME | End: 2022-11-14
Attending: INTERNAL MEDICINE
Payer: MEDICARE

## 2022-11-14 PROCEDURE — 93797 PHYS/QHP OP CAR RHAB WO ECG: CPT | Mod: 59 | Performed by: CLINICAL EXERCISE PHYSIOLOGIST

## 2022-11-14 PROCEDURE — 93798 PHYS/QHP OP CAR RHAB W/ECG: CPT | Performed by: CLINICAL EXERCISE PHYSIOLOGIST

## 2022-11-16 ENCOUNTER — HOSPITAL ENCOUNTER (OUTPATIENT)
Dept: CARDIAC REHAB | Facility: CLINIC | Age: 72
Discharge: HOME | End: 2022-11-16
Attending: INTERNAL MEDICINE
Payer: MEDICARE

## 2022-11-16 PROCEDURE — 93798 PHYS/QHP OP CAR RHAB W/ECG: CPT | Performed by: REHABILITATION PRACTITIONER

## 2022-11-20 ENCOUNTER — MYC MEDICAL ADVICE (OUTPATIENT)
Dept: ENDOCRINOLOGY | Facility: CLINIC | Age: 72
End: 2022-11-20

## 2022-11-21 ENCOUNTER — HOSPITAL ENCOUNTER (OUTPATIENT)
Dept: CARDIAC REHAB | Facility: CLINIC | Age: 72
Discharge: HOME | End: 2022-11-21
Attending: INTERNAL MEDICINE
Payer: MEDICARE

## 2022-11-21 PROCEDURE — 93798 PHYS/QHP OP CAR RHAB W/ECG: CPT

## 2022-11-23 ENCOUNTER — HOSPITAL ENCOUNTER (OUTPATIENT)
Dept: CARDIAC REHAB | Facility: CLINIC | Age: 72
Discharge: HOME | End: 2022-11-23
Attending: INTERNAL MEDICINE
Payer: MEDICARE

## 2022-11-23 PROCEDURE — 93798 PHYS/QHP OP CAR RHAB W/ECG: CPT | Performed by: REHABILITATION PRACTITIONER

## 2022-12-05 ENCOUNTER — LAB (OUTPATIENT)
Dept: LAB | Facility: CLINIC | Age: 72
End: 2022-12-05
Payer: MEDICARE

## 2022-12-05 ENCOUNTER — HOSPITAL ENCOUNTER (OUTPATIENT)
Dept: CARDIAC REHAB | Facility: CLINIC | Age: 72
Discharge: HOME | End: 2022-12-05
Attending: INTERNAL MEDICINE
Payer: MEDICARE

## 2022-12-05 DIAGNOSIS — I21.4 NSTEMI (NON-ST ELEVATED MYOCARDIAL INFARCTION) (H): ICD-10-CM

## 2022-12-05 DIAGNOSIS — N18.30 CKD (CHRONIC KIDNEY DISEASE) STAGE 3, GFR 30-59 ML/MIN (H): Primary | ICD-10-CM

## 2022-12-05 LAB
ALBUMIN SERPL BCG-MCNC: 4.5 G/DL (ref 3.5–5.2)
ALP SERPL-CCNC: 122 U/L (ref 35–104)
ALT SERPL W P-5'-P-CCNC: 16 U/L (ref 10–35)
AST SERPL W P-5'-P-CCNC: 22 U/L (ref 10–35)
BILIRUB DIRECT SERPL-MCNC: <0.2 MG/DL (ref 0–0.3)
BILIRUB SERPL-MCNC: 0.5 MG/DL
CHOLEST SERPL-MCNC: 154 MG/DL
CREAT UR-MCNC: 178 MG/DL
HDLC SERPL-MCNC: 33 MG/DL
LDLC SERPL CALC-MCNC: 66 MG/DL
MICROALBUMIN UR-MCNC: 13.1 MG/L
MICROALBUMIN/CREAT UR: 7.36 MG/G CR (ref 0–25)
NONHDLC SERPL-MCNC: 121 MG/DL
PROT SERPL-MCNC: 7 G/DL (ref 6.4–8.3)
TRIGL SERPL-MCNC: 273 MG/DL

## 2022-12-05 PROCEDURE — 80076 HEPATIC FUNCTION PANEL: CPT

## 2022-12-05 PROCEDURE — 80061 LIPID PANEL: CPT

## 2022-12-05 PROCEDURE — 36415 COLL VENOUS BLD VENIPUNCTURE: CPT

## 2022-12-05 PROCEDURE — 93798 PHYS/QHP OP CAR RHAB W/ECG: CPT

## 2022-12-05 PROCEDURE — 82043 UR ALBUMIN QUANTITATIVE: CPT

## 2022-12-06 DIAGNOSIS — R74.8 ELEVATED ALKALINE PHOSPHATASE LEVEL: Primary | ICD-10-CM

## 2022-12-07 ENCOUNTER — HOSPITAL ENCOUNTER (OUTPATIENT)
Dept: CARDIAC REHAB | Facility: CLINIC | Age: 72
Discharge: HOME | End: 2022-12-07
Attending: INTERNAL MEDICINE
Payer: MEDICARE

## 2022-12-07 PROCEDURE — 93798 PHYS/QHP OP CAR RHAB W/ECG: CPT | Performed by: OCCUPATIONAL THERAPIST

## 2022-12-09 ENCOUNTER — HOSPITAL ENCOUNTER (OUTPATIENT)
Dept: CARDIAC REHAB | Facility: CLINIC | Age: 72
Discharge: HOME | End: 2022-12-09
Attending: INTERNAL MEDICINE
Payer: MEDICARE

## 2022-12-09 PROCEDURE — 93798 PHYS/QHP OP CAR RHAB W/ECG: CPT | Performed by: REHABILITATION PRACTITIONER

## 2022-12-12 ENCOUNTER — HOSPITAL ENCOUNTER (OUTPATIENT)
Dept: CARDIAC REHAB | Facility: CLINIC | Age: 72
Discharge: HOME | End: 2022-12-12
Attending: INTERNAL MEDICINE
Payer: MEDICARE

## 2022-12-12 PROCEDURE — 93798 PHYS/QHP OP CAR RHAB W/ECG: CPT | Performed by: REHABILITATION PRACTITIONER

## 2022-12-14 ENCOUNTER — HOSPITAL ENCOUNTER (OUTPATIENT)
Dept: CARDIAC REHAB | Facility: CLINIC | Age: 72
Discharge: HOME | End: 2022-12-14
Attending: INTERNAL MEDICINE
Payer: MEDICARE

## 2022-12-14 PROCEDURE — 93798 PHYS/QHP OP CAR RHAB W/ECG: CPT | Performed by: REHABILITATION PRACTITIONER

## 2022-12-16 ENCOUNTER — HOSPITAL ENCOUNTER (OUTPATIENT)
Dept: CARDIAC REHAB | Facility: CLINIC | Age: 72
Discharge: HOME | End: 2022-12-16
Attending: INTERNAL MEDICINE
Payer: MEDICARE

## 2022-12-16 PROCEDURE — 93798 PHYS/QHP OP CAR RHAB W/ECG: CPT | Performed by: REHABILITATION PRACTITIONER

## 2022-12-26 ENCOUNTER — HOSPITAL ENCOUNTER (OUTPATIENT)
Dept: CARDIAC REHAB | Facility: CLINIC | Age: 72
Discharge: HOME | End: 2022-12-26
Attending: INTERNAL MEDICINE
Payer: MEDICARE

## 2022-12-26 PROCEDURE — 93798 PHYS/QHP OP CAR RHAB W/ECG: CPT

## 2022-12-28 ENCOUNTER — HOSPITAL ENCOUNTER (OUTPATIENT)
Dept: CARDIAC REHAB | Facility: CLINIC | Age: 72
Discharge: HOME | End: 2022-12-28
Attending: INTERNAL MEDICINE
Payer: MEDICARE

## 2022-12-28 PROCEDURE — 93798 PHYS/QHP OP CAR RHAB W/ECG: CPT | Performed by: OCCUPATIONAL THERAPIST

## 2022-12-30 ENCOUNTER — HOSPITAL ENCOUNTER (OUTPATIENT)
Dept: CARDIAC REHAB | Facility: CLINIC | Age: 72
Discharge: HOME | End: 2022-12-30
Attending: INTERNAL MEDICINE
Payer: MEDICARE

## 2022-12-30 PROCEDURE — 93798 PHYS/QHP OP CAR RHAB W/ECG: CPT | Performed by: CLINICAL EXERCISE PHYSIOLOGIST

## 2023-01-13 ENCOUNTER — APPOINTMENT (OUTPATIENT)
Dept: LAB | Facility: CLINIC | Age: 73
End: 2023-01-13
Payer: MEDICARE

## 2023-01-13 ENCOUNTER — HOSPITAL ENCOUNTER (OUTPATIENT)
Dept: CARDIOLOGY | Facility: CLINIC | Age: 73
Discharge: HOME | End: 2023-01-13
Attending: NURSE PRACTITIONER | Admitting: NURSE PRACTITIONER
Payer: MEDICARE

## 2023-01-13 DIAGNOSIS — I25.10 CORONARY ARTERY DISEASE INVOLVING NATIVE CORONARY ARTERY OF NATIVE HEART WITHOUT ANGINA PECTORIS: Primary | ICD-10-CM

## 2023-01-13 LAB — LVEF ECHO: NORMAL

## 2023-01-13 PROCEDURE — 999N000208 ECHOCARDIOGRAM COMPLETE

## 2023-01-13 PROCEDURE — 255N000002 HC RX 255 OP 636: Performed by: NURSE PRACTITIONER

## 2023-01-13 PROCEDURE — 93306 TTE W/DOPPLER COMPLETE: CPT | Mod: 26 | Performed by: INTERNAL MEDICINE

## 2023-01-13 RX ADMIN — HUMAN ALBUMIN MICROSPHERES AND PERFLUTREN 9 ML: 10; .22 INJECTION, SOLUTION INTRAVENOUS at 11:46

## 2023-01-13 NOTE — PROGRESS NOTES
~Cardiology Clinic Visit~    Primary Cardiologist: Dr. Johnston  Last Office Visit: 10/10/2022  Reason for visit: 3-month follow up with testing review.    I had the pleasure of seeing Janie De Leon in Cardiology clinic today.    History of Present Illness    Janie De Leon is a pleasant 72 year old female with a past medical history notable for hypertension, prediabetes, hyperlipidemia who was recently admitted to the ED for sudden onset retrosternal chest heaviness with radiation to the neck with associated SOB. 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    During her hospitalization in 09/2022 she underwent coronary angiography, which revealed mild proximal LAD disease with mild plaque rupture without flow limitation as evidenced by iFR 0.98.  She was recommended medical management and lifestyle modifications for cardiovascular risk factor optimization, with a low threshold to return for LAD stenting should her anginal symptoms persist.      Interval 01/16/23    She is doing remarkably well.  Completed cardiac rehab without issues.  Has had no recurrent symptoms, denies chest pain, SOB, PIERSON, LH, dizziness, syncope, palpitations.  She is working on finding an exercise facility or program that she can do.  Is somewhat limited in her abilities to be gone during the day for a long period of time because of her 's dementia, although she is very resilient in caring for him.    Repeat echocardiogram demonstrated visual ejection fraction is 55-60%, with normal right ventricular systolic function.  There is mild (1+) tricuspid regurgitation.  On direct comparison to echo images dated 09/26/2022 the regional WMAs are not seen in present study.    Impression & Plan  Coronary artery disease involving native coronary artery of native heart  S/p NSTEMI without PCI, 09/2022  - Clinically doing well, no recurrent anginal symptoms.   - Angiogram 09/26/2022 revealed Mild pLAD dz, mild plaque rupture per  OCT and iFR=0.98.   - Tolerating medication regimen without side effects.  - Echocardiogram 01/13/23 with EF 55-60%, normal RV function, mild 1+ TR, normal IVC.  Compared to previous study, regional WMA have resolved on current imaging.  - On ASA, atorvastatin, clopidogrel, and lopressor.  - Counseled on further risk reduction strategies including healthy diet, adequate exercise, and other lifestyle modifications.     Hyperlipidemia  - On Atorvastatin 40 mg daily.  - Most recent lipid panel 12/5/2022: , , LDL 66, HDL 33.    Hypertension, Well controlled at last clinic visit this past week; on Amlodipine 5mg daily.  Pre-Diabetes, Hemoglobin A1c on 8/31/2022 was 6.1     Hypothyroidism, on levothyroxine.  __________________________________________________________________    Plan:  1. Continue DAPT uninterrupted for 1-year (ASA, clopidogrel).  2. Continue other cardiac medications  3. Establish exercise routine.  Attempt healthy diet, Mediterranean approach, for cardiac health.  4. Patient will establish with Dr. Johnston in the next few months.  __________________________________________________________________    Thank you for the opportunity to participate in this pleasant patient's care.  We would be happy to see this patient sooner if needed for any concerns in the meantime.    ALEXYS Yoon, CNP   Nurse Practitioner  [facility] [place] [facility]    Today's clinic visit entailed:  Review of the result(s) of each unique test - labs, echo x2, angio  The following tests were independently interpreted by me as noted in my documentation: echo  Prescription drug management  The level of medical decision making during this visit was of moderate complexity.    Orders this Visit:  Orders Placed This Encounter   Procedures     Follow-Up with Cardiology     Orders Placed This Encounter   Medications     cyanocobalamin (VITAMIN B-12) 100 MCG tablet     Sig: Take 100 mcg by mouth daily     There are no  discontinued medications.  Encounter Diagnoses   Name Primary?     NSTEMI (non-ST elevated myocardial infarction) (H) Yes     Coronary artery disease involving native coronary artery of native heart without angina pectoris      Essential hypertension, benign - goal < 140/90      Prediabetes      Other hyperlipidemia      Stage 3a chronic kidney disease (H)      Major depressive disorder, single episode in full remission (H)      CURRENT MEDICATIONS:  Current Outpatient Medications   Medication Sig Dispense Refill     amLODIPine (NORVASC) 5 MG tablet Take 1 tablet (5 mg) by mouth daily (Patient taking differently: Take 5 mg by mouth every evening) 90 tablet 3     aspirin (ASA) 81 MG EC tablet Take 1 tablet (81 mg) by mouth daily 90 tablet 3     atorvastatin (LIPITOR) 40 MG tablet Take 1 tablet (40 mg) by mouth every evening 90 tablet 3     Cholecalciferol (VITAMIN D3 PO) Take 1,000 Units by mouth daily       clonazePAM (KLONOPIN) 0.5 MG tablet Take 0.5-1 tablets (0.25-0.5 mg) by mouth nightly as needed for anxiety 30 tablet 0     clopidogrel (PLAVIX) 75 MG tablet Take 1 tablet (75 mg) by mouth daily 90 tablet 3     cyanocobalamin (VITAMIN B-12) 100 MCG tablet Take 100 mcg by mouth daily       DULoxetine (CYMBALTA) 60 MG capsule Take 1 capsule (60 mg) by mouth daily 90 capsule 3     levothyroxine (SYNTHROID/LEVOTHROID) 88 MCG tablet Take 1 tablet (88 mcg) by mouth daily - Overdue for endocrinology appointment 90 tablet 3     metoprolol tartrate (LOPRESSOR) 25 MG tablet Take 1 tablet (25 mg) by mouth 2 times daily 180 tablet 3     nitroGLYcerin (NITROSTAT) 0.4 MG sublingual tablet For chest pain place 1 tablet under the tongue every 5 minutes for 3 doses. If symptoms persist 5 minutes after 1st dose call 911. 30 tablet 0     olmesartan (BENICAR) 20 MG tablet Take 1 tablet (20 mg) by mouth daily 90 tablet 3     polyethylene glycol (MIRALAX) 17 GM/Dose powder Take 17 g by mouth daily as needed for constipation    "    ALLERGIES     Allergies   Allergen Reactions     Ciprofloxacin GI Disturbance     Oxycodone Other (See Comments)     She prefers not to have Oxycodone or Oxycontin due to potential addictive properties     Simvastatin Other (See Comments)     Muscle aches      PAST MEDICAL, SURGICAL, FAMILY, SOCIAL HISTORY:  History was reviewed and updated as needed, see medical record.    Review of Systems:  Review Of Systems  Skin: negative  Eyes: negative  Ears/Nose/Throat: negative  Respiratory: No shortness of breath, dyspnea on exertion, cough, or hemoptysis  Cardiovascular: negative for, palpitations, chest pain, exertional chest pain or pressure, paroxysmal nocturnal dyspnea, dyspnea on exertion, lower extremity edema, syncope or near-syncope and exercise intolerance  Gastrointestinal: negative  Genitourinary: negative  Musculoskeletal: negative  Neurologic: negative  Psychiatric: positive for excessive stress  Hematologic/Lymphatic/Immunologic: negative  Endocrine: negative     Physical Exam:    Vitals: /63   Pulse 76   Ht 1.753 m (5' 9\")   Wt 88 kg (194 lb)   LMP  (LMP Unknown)   SpO2 98%   BMI 28.65 kg/m    Constitutional: Appears stated age, well nourished, NAD.  Eyes: Pupils equal, round. Sclerae anicteric.   HEENT: Normocephalic, atraumatic.   Neck: Supple. Carotid pulses full and equal.  Respiratory: Non-labored. Lungs CTAB.  Cardiovascular: RRR, normal S1 and S2. No M/G/R.  No edema.  GI: Soft, non-distended, non-tender.  Skin: Warm and dry. No rashes, cyanosis, edema.  Musculoskeletal/Extremities: Symmetrical movement to all extremities. .  Neurologic: No gross focal deficits. Alert, awake, and oriented to all spheres.  Psychiatric: Affect appropriate. Mentation normal.    Recent Lab Results:  LIPID RESULTS:  Lab Results   Component Value Date    CHOL 154 12/05/2022    CHOL 237 (H) 08/27/2020    HDL 33 (L) 12/05/2022    HDL 46 (L) 08/27/2020    LDL 66 12/05/2022     (H) 08/27/2020    TRIG 273 " (H) 12/05/2022    TRIG 373 (H) 08/27/2020    CHOLHDLRATIO 5.3 (H) 10/28/2015     LIVER ENZYME RESULTS:  Lab Results   Component Value Date    AST 22 12/05/2022    AST 16 08/27/2020    ALT 16 12/05/2022    ALT 21 08/27/2020     CBC RESULTS:  Lab Results   Component Value Date    WBC 8.1 09/25/2022    WBC 5.9 08/27/2020    RBC 4.67 09/25/2022    RBC 5.11 08/27/2020    HGB 13.5 09/25/2022    HGB 15.5 08/27/2020    HCT 40.5 09/25/2022    HCT 46.5 08/27/2020    MCV 87 09/25/2022    MCV 91 08/27/2020    MCH 28.9 09/25/2022    MCH 30.3 08/27/2020    MCHC 33.3 09/25/2022    MCHC 33.3 08/27/2020    RDW 13.1 09/25/2022    RDW 12.6 08/27/2020     09/25/2022     08/27/2020     BMP RESULTS:  Lab Results   Component Value Date     09/27/2022     08/27/2020    POTASSIUM 4.0 09/27/2022    POTASSIUM 4.2 08/27/2020    CHLORIDE 106 09/27/2022    CHLORIDE 105 08/27/2020    CO2 26 09/27/2022    CO2 26 08/27/2020    ANIONGAP 7 09/27/2022    ANIONGAP 7 08/27/2020     (H) 09/27/2022     (H) 08/27/2020    BUN 15 09/27/2022    BUN 17 08/27/2020    CR 0.81 09/27/2022    CR 0.90 08/27/2020    GFRESTIMATED 76 09/27/2022    GFRESTIMATED 59 (L) 09/16/2022    GFRESTIMATED 64 08/27/2020    GFRESTBLACK 74 08/27/2020    CYNDY 9.1 09/27/2022    CYNDY 8.9 08/27/2020      A1C RESULTS:  Lab Results   Component Value Date    A1C 6.1 (H) 08/31/2022    A1C 5.8 (H) 08/27/2020     INR RESULTS:  No results found for: INR

## 2023-01-16 ENCOUNTER — OFFICE VISIT (OUTPATIENT)
Dept: CARDIOLOGY | Facility: CLINIC | Age: 73
End: 2023-01-16
Attending: NURSE PRACTITIONER
Payer: MEDICARE

## 2023-01-16 VITALS
HEART RATE: 76 BPM | WEIGHT: 194 LBS | DIASTOLIC BLOOD PRESSURE: 63 MMHG | SYSTOLIC BLOOD PRESSURE: 102 MMHG | OXYGEN SATURATION: 98 % | HEIGHT: 69 IN | BODY MASS INDEX: 28.73 KG/M2

## 2023-01-16 DIAGNOSIS — N18.31 STAGE 3A CHRONIC KIDNEY DISEASE (H): Chronic | ICD-10-CM

## 2023-01-16 DIAGNOSIS — R73.03 PREDIABETES: Chronic | ICD-10-CM

## 2023-01-16 DIAGNOSIS — F32.5 MAJOR DEPRESSIVE DISORDER, SINGLE EPISODE IN FULL REMISSION (H): Chronic | ICD-10-CM

## 2023-01-16 DIAGNOSIS — I21.4 NSTEMI (NON-ST ELEVATED MYOCARDIAL INFARCTION) (H): Primary | Chronic | ICD-10-CM

## 2023-01-16 DIAGNOSIS — I25.10 CORONARY ARTERY DISEASE INVOLVING NATIVE CORONARY ARTERY OF NATIVE HEART WITHOUT ANGINA PECTORIS: Chronic | ICD-10-CM

## 2023-01-16 DIAGNOSIS — I10 ESSENTIAL HYPERTENSION, BENIGN: Chronic | ICD-10-CM

## 2023-01-16 DIAGNOSIS — E78.49 OTHER HYPERLIPIDEMIA: Chronic | ICD-10-CM

## 2023-01-16 PROCEDURE — 99214 OFFICE O/P EST MOD 30 MIN: CPT | Performed by: NURSE PRACTITIONER

## 2023-01-16 RX ORDER — UBIDECARENONE 75 MG
100 CAPSULE ORAL DAILY
COMMUNITY

## 2023-01-16 NOTE — PATIENT INSTRUCTIONS
Thank you for your visit with the [facility] today.    Today's plan:   Continue current medications.  Find a new exercise program to start.  Incorporate healthy foods (Mediterranean diet) into your routine to help improve HDL.  Follow up with Dr. Johnston this Spring.    If you have questions or concerns, please do not hesitate to call my nursing support team at [phone].    Scheduling phone number: [phone]  [facility] Clinic Number: [phone]    It was a pleasure seeing you today.     ALEXYS Yoon, CNP  Nurse Practitioner  [facility]  January 16, 2023  ________________________________________________________

## 2023-01-16 NOTE — LETTER
1/16/2023    J[place]edvin Davis,   [address] [place] [address]    RE: Janie De Leon       Dear Colleague,     I had the pleasure of seeing Janie De Leon in the [facility].      ~Cardiology Clinic Visit~    Primary Cardiologist: Dr. Johnston  Last Office Visit: 10/10/2022  Reason for visit: 3-month follow up with testing review.    I had the pleasure of seeing Janie De Leon in Cardiology clinic today.    History of Present Illness    Janie De Leon is a pleasant 72 year old female with a past medical history notable for hypertension, prediabetes, hyperlipidemia who was recently admitted to the ED for sudden onset retrosternal chest heaviness with radiation to the neck with associated SOB. 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    During her hospitalization in 09/2022 she underwent coronary angiography, which revealed mild proximal LAD disease with mild plaque rupture without flow limitation as evidenced by iFR 0.98.  She was recommended medical management and lifestyle modifications for cardiovascular risk factor optimization, with a low threshold to return for LAD stenting should her anginal symptoms persist.      Interval 01/16/23    She is doing remarkably well.  Completed cardiac rehab without issues.  Has had no recurrent symptoms, denies chest pain, SOB, PIERSON, LH, dizziness, syncope, palpitations.  She is working on finding an exercise facility or program that she can do.  Is somewhat limited in her abilities to be gone during the day for a long period of time because of her 's dementia, although she is very resilient in caring for him.    Repeat echocardiogram demonstrated visual ejection fraction is 55-60%, with normal right ventricular systolic function.  There is mild (1+) tricuspid regurgitation.  On direct comparison to echo images dated 09/26/2022 the regional WMAs are not seen in present study.    Impression & Plan  Coronary artery disease involving  native coronary artery of native heart  S/p NSTEMI without PCI, 09/2022  - Clinically doing well, no recurrent anginal symptoms.   - Angiogram 09/26/2022 revealed Mild pLAD dz, mild plaque rupture per OCT and iFR=0.98.   - Tolerating medication regimen without side effects.  - Echocardiogram 01/13/23 with EF 55-60%, normal RV function, mild 1+ TR, normal IVC.  Compared to previous study, regional WMA have resolved on current imaging.  - On ASA, atorvastatin, clopidogrel, and lopressor.  - Counseled on further risk reduction strategies including healthy diet, adequate exercise, and other lifestyle modifications.     Hyperlipidemia  - On Atorvastatin 40 mg daily.  - Most recent lipid panel 12/5/2022: , , LDL 66, HDL 33.    Hypertension, Well controlled at last clinic visit this past week; on Amlodipine 5mg daily.  Pre-Diabetes, Hemoglobin A1c on 8/31/2022 was 6.1     Hypothyroidism, on levothyroxine.  __________________________________________________________________    Plan:  1. Continue DAPT uninterrupted for 1-year (ASA, clopidogrel).  2. Continue other cardiac medications  3. Establish exercise routine.  Attempt healthy diet, Mediterranean approach, for cardiac health.  4. Patient will establish with Dr. Johnston in the next few months.  __________________________________________________________________    Thank you for the opportunity to participate in this pleasant patient's care.  We would be happy to see this patient sooner if needed for any concerns in the meantime.    ALEXYS Yoon, CNP   Nurse Practitioner  [facility]    Today's clinic visit entailed:  Review of the result(s) of each unique test - labs, echo x2, angio  The following tests were independently interpreted by me as noted in my documentation: echo  Prescription drug management  The level of medical decision making during this visit was of moderate complexity.    Orders this Visit:  Orders Placed This  Encounter   Procedures     Follow-Up with Cardiology     Orders Placed This Encounter   Medications     cyanocobalamin (VITAMIN B-12) 100 MCG tablet     Sig: Take 100 mcg by mouth daily     There are no discontinued medications.  Encounter Diagnoses   Name Primary?     NSTEMI (non-ST elevated myocardial infarction) (H) Yes     Coronary artery disease involving native coronary artery of native heart without angina pectoris      Essential hypertension, benign - goal < 140/90      Prediabetes      Other hyperlipidemia      Stage 3a chronic kidney disease (H)      Major depressive disorder, single episode in full remission (H)      CURRENT MEDICATIONS:  Current Outpatient Medications   Medication Sig Dispense Refill     amLODIPine (NORVASC) 5 MG tablet Take 1 tablet (5 mg) by mouth daily (Patient taking differently: Take 5 mg by mouth every evening) 90 tablet 3     aspirin (ASA) 81 MG EC tablet Take 1 tablet (81 mg) by mouth daily 90 tablet 3     atorvastatin (LIPITOR) 40 MG tablet Take 1 tablet (40 mg) by mouth every evening 90 tablet 3     Cholecalciferol (VITAMIN D3 PO) Take 1,000 Units by mouth daily       clonazePAM (KLONOPIN) 0.5 MG tablet Take 0.5-1 tablets (0.25-0.5 mg) by mouth nightly as needed for anxiety 30 tablet 0     clopidogrel (PLAVIX) 75 MG tablet Take 1 tablet (75 mg) by mouth daily 90 tablet 3     cyanocobalamin (VITAMIN B-12) 100 MCG tablet Take 100 mcg by mouth daily       DULoxetine (CYMBALTA) 60 MG capsule Take 1 capsule (60 mg) by mouth daily 90 capsule 3     levothyroxine (SYNTHROID/LEVOTHROID) 88 MCG tablet Take 1 tablet (88 mcg) by mouth daily - Overdue for endocrinology appointment 90 tablet 3     metoprolol tartrate (LOPRESSOR) 25 MG tablet Take 1 tablet (25 mg) by mouth 2 times daily 180 tablet 3     nitroGLYcerin (NITROSTAT) 0.4 MG sublingual tablet For chest pain place 1 tablet under the tongue every 5 minutes for 3 doses. If symptoms persist 5 minutes after 1st dose call 911. 30 tablet  "0     olmesartan (BENICAR) 20 MG tablet Take 1 tablet (20 mg) by mouth daily 90 tablet 3     polyethylene glycol (MIRALAX) 17 GM/Dose powder Take 17 g by mouth daily as needed for constipation       ALLERGIES     Allergies   Allergen Reactions     Ciprofloxacin GI Disturbance     Oxycodone Other (See Comments)     She prefers not to have Oxycodone or Oxycontin due to potential addictive properties     Simvastatin Other (See Comments)     Muscle aches      PAST MEDICAL, SURGICAL, FAMILY, SOCIAL HISTORY:  History was reviewed and updated as needed, see medical record.    Review of Systems:  Review Of Systems  Skin: negative  Eyes: negative  Ears/Nose/Throat: negative  Respiratory: No shortness of breath, dyspnea on exertion, cough, or hemoptysis  Cardiovascular: negative for, palpitations, chest pain, exertional chest pain or pressure, paroxysmal nocturnal dyspnea, dyspnea on exertion, lower extremity edema, syncope or near-syncope and exercise intolerance  Gastrointestinal: negative  Genitourinary: negative  Musculoskeletal: negative  Neurologic: negative  Psychiatric: positive for excessive stress  Hematologic/Lymphatic/Immunologic: negative  Endocrine: negative     Physical Exam:    Vitals: /63   Pulse 76   Ht 1.753 m (5' 9\")   Wt 88 kg (194 lb)   LMP  (LMP Unknown)   SpO2 98%   BMI 28.65 kg/m    Constitutional: Appears stated age, well nourished, NAD.  Eyes: Pupils equal, round. Sclerae anicteric.   HEENT: Normocephalic, atraumatic.   Neck: Supple. Carotid pulses full and equal.  Respiratory: Non-labored. Lungs CTAB.  Cardiovascular: RRR, normal S1 and S2. No M/G/R.  No edema.  GI: Soft, non-distended, non-tender.  Skin: Warm and dry. No rashes, cyanosis, edema.  Musculoskeletal/Extremities: Symmetrical movement to all extremities. .  Neurologic: No gross focal deficits. Alert, awake, and oriented to all spheres.  Psychiatric: Affect appropriate. Mentation normal.    Recent Lab Results:  LIPID " RESULTS:  Lab Results   Component Value Date    CHOL 154 12/05/2022    CHOL 237 (H) 08/27/2020    HDL 33 (L) 12/05/2022    HDL 46 (L) 08/27/2020    LDL 66 12/05/2022     (H) 08/27/2020    TRIG 273 (H) 12/05/2022    TRIG 373 (H) 08/27/2020    CHOLHDLRATIO 5.3 (H) 10/28/2015     LIVER ENZYME RESULTS:  Lab Results   Component Value Date    AST 22 12/05/2022    AST 16 08/27/2020    ALT 16 12/05/2022    ALT 21 08/27/2020     CBC RESULTS:  Lab Results   Component Value Date    WBC 8.1 09/25/2022    WBC 5.9 08/27/2020    RBC 4.67 09/25/2022    RBC 5.11 08/27/2020    HGB 13.5 09/25/2022    HGB 15.5 08/27/2020    HCT 40.5 09/25/2022    HCT 46.5 08/27/2020    MCV 87 09/25/2022    MCV 91 08/27/2020    MCH 28.9 09/25/2022    MCH 30.3 08/27/2020    MCHC 33.3 09/25/2022    MCHC 33.3 08/27/2020    RDW 13.1 09/25/2022    RDW 12.6 08/27/2020     09/25/2022     08/27/2020     BMP RESULTS:  Lab Results   Component Value Date     09/27/2022     08/27/2020    POTASSIUM 4.0 09/27/2022    POTASSIUM 4.2 08/27/2020    CHLORIDE 106 09/27/2022    CHLORIDE 105 08/27/2020    CO2 26 09/27/2022    CO2 26 08/27/2020    ANIONGAP 7 09/27/2022    ANIONGAP 7 08/27/2020     (H) 09/27/2022     (H) 08/27/2020    BUN 15 09/27/2022    BUN 17 08/27/2020    CR 0.81 09/27/2022    CR 0.90 08/27/2020    GFRESTIMATED 76 09/27/2022    GFRESTIMATED 59 (L) 09/16/2022    GFRESTIMATED 64 08/27/2020    GFRESTBLACK 74 08/27/2020    CYNDY 9.1 09/27/2022    CYNDY 8.9 08/27/2020      A1C RESULTS:  Lab Results   Component Value Date    A1C 6.1 (H) 08/31/2022    A1C 5.8 (H) 08/27/2020     INR RESULTS:  No results found for: INR    Thank you for allowing me to participate in the care of your patient.      Sincerely,     ALEXYS Yoon CNP     [facility]  cc:   ALEXYS Yoon CNP  [address] [facility] [address]  [place] [address]

## 2023-03-03 ENCOUNTER — OFFICE VISIT (OUTPATIENT)
Dept: URGENT CARE | Facility: URGENT CARE | Age: 73
End: 2023-03-03
Payer: MEDICARE

## 2023-03-03 VITALS
OXYGEN SATURATION: 96 % | DIASTOLIC BLOOD PRESSURE: 70 MMHG | BODY MASS INDEX: 28.65 KG/M2 | RESPIRATION RATE: 16 BRPM | WEIGHT: 194 LBS | HEART RATE: 74 BPM | SYSTOLIC BLOOD PRESSURE: 117 MMHG

## 2023-03-03 DIAGNOSIS — S01.81XA FACIAL LACERATION, INITIAL ENCOUNTER: Primary | ICD-10-CM

## 2023-03-03 PROCEDURE — 12011 RPR F/E/E/N/L/M 2.5 CM/<: CPT | Performed by: FAMILY MEDICINE

## 2023-03-03 NOTE — PROGRESS NOTES
SUBJECTIVE: @[email or web address] who presents to the clinic with a laceration on the face sustained hour(s) ago.    This is a accidental injury.    Mechanism of injury: dog scratch.  Associated symptoms: Denies numbness, weakness, or loss of function  Last tetanus booster within 10 years: yes    Past Medical History:   Diagnosis Date     Adrenal nodule (H) 09/2022    CT scan     Anemia     mild gastropathy on EGD 2008; neg pill cam     Atypical ductal hyperplasia of both breasts     Has had biopsies and MRI     Fibroid uterus      High cholesterol      History of hematuria 2008    Cystoscopy for recurrent UTIs; unremarkable renal US. Also recurrent UTIs as child and pyelonephritis.      History of hormone replacement therapy     after JASBIR with BSO     HTN (hypertension)      HTN, goal below 140/90      Hx of bone density study 10/16/2006    Normal     Hypothyroidism      Insomnia     periodic - prn clonazepam helpful a couple times per month     Lipid screening 07/21/2015    Tchol 128, , HDL 53, LDL 53 outside records --> based on our labs off of atorvastatin 10yr ASCVD risk 9.4% in Oct 2015     Major depressive disorder, single episode in full remission (H) 2007     NSTEMI (non-ST elevated myocardial infarction) (H) 9/26/2022     Osteopenia     Mild left hip July 2016     Prediabetes     Metformin in the past     Rotator cuff injury, left, sequela     MRI Jan 2015 and had PT; High-grade complete or near complete tear of the supraspinatus tendon and anterior fibers of the infraspinatus tendon. 1/3 of the infraspinatus tendon appears involved.      TBI (traumatic brain injury)     As a child     Tubular adenoma of colon 2013 & 2016     Allergies   Allergen Reactions     Ciprofloxacin GI Disturbance     Oxycodone Other (See Comments)     She prefers not to have Oxycodone or Oxycontin due to potential addictive properties     Simvastatin Other (See Comments)     Muscle aches      Social History     Tobacco Use     Smoking  status: Former     Packs/day: 0.50     Years: 9.00     Pack years: 4.50     Types: Cigarettes     Start date: 1965     Quit date: 4/15/1975     Years since quittin.9     Smokeless tobacco: Never     Tobacco comments:     social smoker - only smoked when with another smoker   Substance Use Topics     Alcohol use: Yes     Comment: moderate 1 or 2 beers or wine 2x /wk       ROS:  Neuro: good distal sensation  Motor: normal rom and strenght  Hem: capillary refill < 2 sec    EXAM: The patient appears today in alert,no apparent distress distressVITALS:   /70   Pulse 74   Resp 16   Wt 88 kg (194 lb)   LMP  (LMP Unknown)   SpO2 96%   BMI 28.65 kg/m    Size of laceration: 2 centimeters  Characteristics of the laceration: clean and straight  Tendon function intact: yes  Sensation to light touch intact: yes  Pulses/Capillary refill intact: yes      ICD-10-CM    1. Facial laceration, initial encounter  S01.81XA REPAIR SUPERFICIAL, WOUND FACE/EAR <2.5 CM          Procedure Note:Wound injected with 1 cc's of Lidocaine 1% plain  Good anesthesia was obtainedPrepped and draped in the usual sterile fashion  Wound cleaned with sterile waterLaceration was closed using 3 6-0 nylon interrupted sutures  After care instructions:Keep wound clean   Sutures out in 5 days  Signs of infection discussed today

## 2023-03-08 ENCOUNTER — OFFICE VISIT (OUTPATIENT)
Dept: URGENT CARE | Facility: URGENT CARE | Age: 73
End: 2023-03-08
Payer: MEDICARE

## 2023-03-08 VITALS
SYSTOLIC BLOOD PRESSURE: 129 MMHG | TEMPERATURE: 98.2 F | OXYGEN SATURATION: 97 % | DIASTOLIC BLOOD PRESSURE: 75 MMHG | HEART RATE: 60 BPM

## 2023-03-08 DIAGNOSIS — Z48.02 VISIT FOR SUTURE REMOVAL: Primary | ICD-10-CM

## 2023-03-08 PROCEDURE — 99207 PR NO CHARGE LOS: CPT

## 2023-03-08 NOTE — PROGRESS NOTES
Assessment & Plan   (Z48.02) Visit for suture removal  (primary encounter diagnosis)  Plan: REMOVAL OF SUTURES    Informed the patient that 3 sutures were removed today in the clinic.  We discussed the need to monitor for redness, swelling, drainage, bleeding, pain, fever/chills and/or nausea/vomiting and the need to return to clinic for recheck should any of these occur.  Patient acknowledged her understanding of the above plan.    The use of [employer] dictation services may have been used to construct the content in this note; any grammatical or spelling errors are non-intentional. Please contact the author of this note directly if you are in need of any clarification.      CS Urgent Care Provider  [facility] [place] is a 73 year old female who presents to clinic today for the following health issues:  Chief Complaint   Patient presents with     Suture Removal     From left side of face under eye, was placed on 3/03/2023     HPI  Patient presents to have her sutures removed that were placed on March 3.  Patient denies any fever/chills, nausea/vomiting, redness, drainage, bleeding, pain and warmth on or around the wound.    ROS:  Negative except noted above.    Review of Systems        Objective    /75 (BP Location: Left arm, Patient Position: Sitting, Cuff Size: Adult Large)   Pulse 60   Temp 98.2  F (36.8  C) (Tympanic)   LMP  (LMP Unknown)   SpO2 97%   Physical Exam   GENERAL: healthy, alert and no distress  SKIN: three sutures left side of face under left eye.  No erythema, edema, drainage or bleeding present.

## 2023-03-09 NOTE — PATIENT INSTRUCTIONS
3 sutures removed today in the clinic.  Monitor for redness, swelling, drainage, bleeding, pain, fever/chills and/or nausea/vomiting.  Return to clinic for a recheck should any of these occur.

## 2023-05-16 ENCOUNTER — OFFICE VISIT (OUTPATIENT)
Dept: CARDIOLOGY | Facility: CLINIC | Age: 73
End: 2023-05-16
Attending: NURSE PRACTITIONER
Payer: MEDICARE

## 2023-05-16 VITALS
WEIGHT: 200 LBS | HEART RATE: 64 BPM | SYSTOLIC BLOOD PRESSURE: 109 MMHG | BODY MASS INDEX: 29.62 KG/M2 | DIASTOLIC BLOOD PRESSURE: 68 MMHG | HEIGHT: 69 IN

## 2023-05-16 DIAGNOSIS — I21.4 NSTEMI (NON-ST ELEVATED MYOCARDIAL INFARCTION) (H): Chronic | ICD-10-CM

## 2023-05-16 DIAGNOSIS — E78.2 MIXED HYPERLIPIDEMIA: Chronic | ICD-10-CM

## 2023-05-16 DIAGNOSIS — I25.10 CORONARY ARTERY DISEASE INVOLVING NATIVE CORONARY ARTERY OF NATIVE HEART WITHOUT ANGINA PECTORIS: Primary | Chronic | ICD-10-CM

## 2023-05-16 DIAGNOSIS — I10 ESSENTIAL HYPERTENSION, BENIGN: Chronic | ICD-10-CM

## 2023-05-16 PROCEDURE — 99214 OFFICE O/P EST MOD 30 MIN: CPT | Performed by: INTERNAL MEDICINE

## 2023-05-16 NOTE — PROGRESS NOTES
HPI and Plan:   Janie De Leon is a pleasant 72 year old female with a past medical history notable for hypertension, prediabetes, hyperlipidemia, non-ST elevation myocardial infarction in September of last year.      During her hospitalization in 09/2022 she underwent coronary angiography, which revealed mild proximal LAD disease with mild plaque rupture without flow limitation as evidenced by iFR 0.98.  This was managed medically but dual antiplatelet therapy was initiated.      She returns for a follow-up.  She is doing well.  She has no chest discomfort similar to her previous angina.  She has some mild stable exertional shortness of breath which she attributes to her medications and some deconditioning.  She is on this significant stress as she has to take care of her  who has dementia.  She is still taking care of him at home.      She is on atorvastatin 40 mg/day.  With that her LDL last was 66 in December 2022.  HDL was 33.  Her echocardiogram in January revealed again low normal ejection fraction of 50 to 55% with stable inferior inferoseptal wall motion abnormalities.  This is similar to her previous echocardiogram.  I reviewed the results with her.    On exam, regular rate and rhythm without murmurs.  Chest was clear auscultation.  No carotid bruits.  No focal deficits.    Impression    1.  History of coronary disease and non-ST elevation myocardial infarction in September 2022, secondary to plaque rupture and erosion in the proximal LAD without obstructive disease, managed medically.  2.  Mixed hyperlipidemia, elevated triglycerides and low HDL  3.  Hypertension on Benicar and metoprolol, well controlled  4.  Hypothyroidism    Plan  At this time, overall cardiac status is stable.  I will continue Plavix till end of September and then in October should discontinue it continue aspirin long-term.  Continue other medications as previously recommended.  Discussed importance of low-fat diet, regular  exercise and stress reduction.    We will see her back in follow-up with my midlevel provider in 6 months with a lipid profile and with me in a year following the midlevel providers visit.    Sincerely,    Kristian Jhonston MD        today's clinic visit entailed:    32 minutes spent by me on the date of the encounter doing chart review, review of test results, patient visit and documentation   Provider  Link to [facility] Help Grid           Orders Placed This Encounter   Procedures     Lipid Profile     ALT     Follow-Up with Cardiology LORENA       No orders of the defined types were placed in this encounter.      There are no discontinued medications.      Encounter Diagnoses   Name Primary?     NSTEMI (non-ST elevated myocardial infarction) (H)      Coronary artery disease involving native coronary artery of native heart without angina pectoris Yes     Essential hypertension, benign - goal < 140/90      Mixed hyperlipidemia        CURRENT MEDICATIONS:  Current Outpatient Medications   Medication Sig Dispense Refill     amLODIPine (NORVASC) 5 MG tablet Take 1 tablet (5 mg) by mouth daily (Patient taking differently: Take 5 mg by mouth every evening) 90 tablet 3     aspirin (ASA) 81 MG EC tablet Take 1 tablet (81 mg) by mouth daily 90 tablet 3     atorvastatin (LIPITOR) 40 MG tablet Take 1 tablet (40 mg) by mouth every evening 90 tablet 3     Cholecalciferol (VITAMIN D3 PO) Take 1,000 Units by mouth daily       clonazePAM (KLONOPIN) 0.5 MG tablet Take 0.5-1 tablets (0.25-0.5 mg) by mouth nightly as needed for anxiety 30 tablet 0     clopidogrel (PLAVIX) 75 MG tablet Take 1 tablet (75 mg) by mouth daily 90 tablet 3     cyanocobalamin (VITAMIN B-12) 100 MCG tablet Take 100 mcg by mouth daily       DULoxetine (CYMBALTA) 60 MG capsule Take 1 capsule (60 mg) by mouth daily 90 capsule 3     levothyroxine (SYNTHROID/LEVOTHROID) 88 MCG tablet Take 1 tablet (88 mcg) by mouth daily - Overdue for endocrinology appointment 90 tablet 3      metoprolol tartrate (LOPRESSOR) 25 MG tablet Take 1 tablet (25 mg) by mouth 2 times daily 180 tablet 3     nitroGLYcerin (NITROSTAT) 0.4 MG sublingual tablet For chest pain place 1 tablet under the tongue every 5 minutes for 3 doses. If symptoms persist 5 minutes after 1st dose call 911. 30 tablet 0     olmesartan (BENICAR) 20 MG tablet Take 1 tablet (20 mg) by mouth daily 90 tablet 3     polyethylene glycol (MIRALAX) 17 GM/Dose powder Take 17 g by mouth daily as needed for constipation         ALLERGIES     Allergies   Allergen Reactions     Ciprofloxacin GI Disturbance     Oxycodone Other (See Comments)     She prefers not to have Oxycodone or Oxycontin due to potential addictive properties     Simvastatin Other (See Comments)     Muscle aches        PAST MEDICAL HISTORY:  Past Medical History:   Diagnosis Date     Adrenal nodule (H) 09/2022    CT scan     Anemia     mild gastropathy on EGD 2008; neg pill cam     Atypical ductal hyperplasia of both breasts     Has had biopsies and MRI     Fibroid uterus      High cholesterol      History of hematuria 2008    Cystoscopy for recurrent UTIs; unremarkable renal US. Also recurrent UTIs as child and pyelonephritis.      History of hormone replacement therapy     after JASBIR with BSO     HTN (hypertension)      HTN, goal below 140/90      Hx of bone density study 10/16/2006    Normal     Hypothyroidism      Insomnia     periodic - prn clonazepam helpful a couple times per month     Lipid screening 07/21/2015    Tchol 128, , HDL 53, LDL 53 outside records --> based on our labs off of atorvastatin 10yr ASCVD risk 9.4% in Oct 2015     Major depressive disorder, single episode in full remission (H) 2007     NSTEMI (non-ST elevated myocardial infarction) (H) 9/26/2022     Osteopenia     Mild left hip July 2016     Prediabetes     Metformin in the past     Rotator cuff injury, left, sequela     MRI Jan 2015 and had PT; High-grade complete or near complete tear of the  supraspinatus tendon and anterior fibers of the infraspinatus tendon. 1/3 of the infraspinatus tendon appears involved.      TBI (traumatic brain injury) (H)     As a child     Tubular adenoma of colon  &        PAST SURGICAL HISTORY:  Past Surgical History:   Procedure Laterality Date     ANKLE SURGERY       BREAST BIOPSY, RT/LT      Many     C/SECTION, LOW TRANSVERSE  1968     CAPSULE/PILL CAM ENDOSCOPY  2014    EGD prior; capsule was negative      COLONOSCOPY      , , , 2013     COLONOSCOPY N/A 2019    Procedure: COLONOSCOPY, WITH POLYPECTOMY AND BIOPSY;  Surgeon: Pepito Romero MD;  Location:  GI     COLONOSCOPY N/A 2022    Procedure: COLONOSCOPY;  Surgeon: Rodrigo Dunbar MD;  Location:  GI     CV CORONARY ANGIOGRAM N/A 2022    Procedure: Coronary Angiogram;  Surgeon: Horacio Moran MD;  Location:  HEART CARDIAC CATH LAB     CV INSTANTANEOUS WAVE-FREE RATIO N/A 2022    Procedure: Instantaneous Wave-Free Ratio;  Surgeon: Horacio Moran MD;  Location:  HEART CARDIAC CATH LAB     CV OPTICAL COHERENCE TOMOGRAPHY N/A 2022    Procedure: Optical Coherence Tomography;  Surgeon: Horacio Moran MD;  Location:  HEART CARDIAC CATH LAB     HYSTERECTOMY, PAP NO LONGER INDICATED       JASBIR with BSO  2000    benign fibroids     TUBAL LIGATION         FAMILY HISTORY:  Family History   Problem Relation Age of Onset     Colon Cancer Mother 70         age 81     Diabetes Mother         After age 75     Macular Degeneration Mother      Glaucoma Mother      Cerebrovascular Disease Mother      Heart Failure Mother      Hypertension Mother      Hyperlipidemia Mother      Other - See Comments Father 87        Frailty, pneumonia, fractures, failure to thrive     Osteoporosis Father         diagnosed in his 80s     Colon Polyps Daughter         Tubular adenoma     Deep Vein Thrombosis Brother      Hyperlipidemia Brother      Hypertension  "Brother      Other Cancer Brother         myeloma     Hypertension Brother         both brothers     Hyperlipidemia Brother         both brothers     Depression Brother      Anxiety Disorder Brother      Mental Illness Brother         on Haldol before death from lung cancer     Lung Cancer Brother         long time heavy smoker     Breast Cancer Other         radical mastecomy in her 40s     Colon Cancer Other      Other Cancer Other         several aunts various kinds     Other Cancer Other         throat cancer     Other Cancer Paternal Grandmother         throat cancer       SOCIAL HISTORY:  Social History     Socioeconomic History     Marital status:      Spouse name: None     Number of children: None     Years of education: None     Highest education level: None   Tobacco Use     Smoking status: Former     Packs/day: 0.50     Years: 9.00     Pack years: 4.50     Types: Cigarettes     Start date: 1965     Quit date: 4/15/1975     Years since quittin.1     Smokeless tobacco: Never     Tobacco comments:     social smoker - only smoked when with another smoker   Vaping Use     Vaping status: Never Used   Substance and Sexual Activity     Alcohol use: Yes     Comment: moderate 1 or 2 beers or wine 2x /wk     Drug use: No     Sexual activity: Not Currently     Partners: Male     Birth control/protection: None     Comment: post menopausal       Review of Systems:  Skin:          Eyes:         ENT:         Respiratory:  Positive for   PIERSON with stairs   Cardiovascular:  Negative;palpitations;chest pain;dizziness;syncope or near-syncope;cyanosis;lightheadedness;edema;exercise intolerance fatigue;Positive for    Gastroenterology:        Genitourinary:         Musculoskeletal:         Neurologic:         Psychiatric:  Positive for depression caregiver for   Heme/Lymph/Imm:         Endocrine:           Physical Exam:  Vitals: /68   Pulse 64   Ht 1.753 m (5' 9\")   Wt 90.7 kg (200 lb)   LMP  " (LMP Unknown)   BMI 29.53 kg/m      Constitutional:  in no acute distress        Skin:  warm and dry to the touch          Head:           Eyes:           Lymph:No thyromegaly     ENT:           Neck:  JVP normal        Respiratory:  clear to auscultation         Cardiac: regular rhythm;normal S1 and S2     no presence of murmur                                                   GI:  not assessed this visit        Extremities and Muscular Skeletal:  no edema              Neurological:  no gross motor deficits        Psych:  Alert and Oriented x 3        CC  ALEXYS Yoon CNP  [address]  [place] [address]

## 2023-05-16 NOTE — LETTER
5/16/2023    Jade Davis, DO  [address] [place] [address]    RE: Janie Cutlerr       Dear Colleague,     I had the pleasure of seeing Janie De Leon in the [facility].  HPI and Plan:   Janie De Leon is a pleasant 72 year old female with a past medical history notable for hypertension, prediabetes, hyperlipidemia, non-ST elevation myocardial infarction in September of last year.      During her hospitalization in 09/2022 she underwent coronary angiography, which revealed mild proximal LAD disease with mild plaque rupture without flow limitation as evidenced by iFR 0.98.  This was managed medically but dual antiplatelet therapy was initiated.      She returns for a follow-up.  She is doing well.  She has no chest discomfort similar to her previous angina.  She has some mild stable exertional shortness of breath which she attributes to her medications and some deconditioning.  She is on this significant stress as she has to take care of her  who has dementia.  She is still taking care of him at home.      She is on atorvastatin 40 mg/day.  With that her LDL last was 66 in December 2022.  HDL was 33.  Her echocardiogram in January revealed again low normal ejection fraction of 50 to 55% with stable inferior inferoseptal wall motion abnormalities.  This is similar to her previous echocardiogram.  I reviewed the results with her.    On exam, regular rate and rhythm without murmurs.  Chest was clear auscultation.  No carotid bruits.  No focal deficits.    Impression    1.  History of coronary disease and non-ST elevation myocardial infarction in September 2022, secondary to plaque rupture and erosion in the proximal LAD without obstructive disease, managed medically.  2.  Mixed hyperlipidemia, elevated triglycerides and low HDL  3.  Hypertension on Benicar and metoprolol, well controlled  4.  Hypothyroidism    Plan  At this time, overall cardiac status is stable.  I will continue Plavix  till end of September and then in October should discontinue it continue aspirin long-term.  Continue other medications as previously recommended.  Discussed importance of low-fat diet, regular exercise and stress reduction.    We will see her back in follow-up with my midlevel provider in 6 months with a lipid profile and with me in a year following the midlevel providers visit.    Sincerely,    Kristian Johnston MD        today's clinic visit entailed:    32 minutes spent by me on the date of the encounter doing chart review, review of test results, patient visit and documentation   Provider  Link to [facility] Help Grid           Orders Placed This Encounter   Procedures    Lipid Profile    ALT    Follow-Up with Cardiology LORENA       No orders of the defined types were placed in this encounter.      There are no discontinued medications.      Encounter Diagnoses   Name Primary?    NSTEMI (non-ST elevated myocardial infarction) (H)     Coronary artery disease involving native coronary artery of native heart without angina pectoris Yes    Essential hypertension, benign - goal < 140/90     Mixed hyperlipidemia        CURRENT MEDICATIONS:  Current Outpatient Medications   Medication Sig Dispense Refill    amLODIPine (NORVASC) 5 MG tablet Take 1 tablet (5 mg) by mouth daily (Patient taking differently: Take 5 mg by mouth every evening) 90 tablet 3    aspirin (ASA) 81 MG EC tablet Take 1 tablet (81 mg) by mouth daily 90 tablet 3    atorvastatin (LIPITOR) 40 MG tablet Take 1 tablet (40 mg) by mouth every evening 90 tablet 3    Cholecalciferol (VITAMIN D3 PO) Take 1,000 Units by mouth daily      clonazePAM (KLONOPIN) 0.5 MG tablet Take 0.5-1 tablets (0.25-0.5 mg) by mouth nightly as needed for anxiety 30 tablet 0    clopidogrel (PLAVIX) 75 MG tablet Take 1 tablet (75 mg) by mouth daily 90 tablet 3    cyanocobalamin (VITAMIN B-12) 100 MCG tablet Take 100 mcg by mouth daily      DULoxetine (CYMBALTA) 60 MG capsule Take 1 capsule (60  mg) by mouth daily 90 capsule 3    levothyroxine (SYNTHROID/LEVOTHROID) 88 MCG tablet Take 1 tablet (88 mcg) by mouth daily - Overdue for endocrinology appointment 90 tablet 3    metoprolol tartrate (LOPRESSOR) 25 MG tablet Take 1 tablet (25 mg) by mouth 2 times daily 180 tablet 3    nitroGLYcerin (NITROSTAT) 0.4 MG sublingual tablet For chest pain place 1 tablet under the tongue every 5 minutes for 3 doses. If symptoms persist 5 minutes after 1st dose call 911. 30 tablet 0    olmesartan (BENICAR) 20 MG tablet Take 1 tablet (20 mg) by mouth daily 90 tablet 3    polyethylene glycol (MIRALAX) 17 GM/Dose powder Take 17 g by mouth daily as needed for constipation         ALLERGIES     Allergies   Allergen Reactions    Ciprofloxacin GI Disturbance    Oxycodone Other (See Comments)     She prefers not to have Oxycodone or Oxycontin due to potential addictive properties    Simvastatin Other (See Comments)     Muscle aches        PAST MEDICAL HISTORY:  Past Medical History:   Diagnosis Date    Adrenal nodule (H) 09/2022    CT scan    Anemia     mild gastropathy on EGD 2008; neg pill cam    Atypical ductal hyperplasia of both breasts     Has had biopsies and MRI    Fibroid uterus     High cholesterol     History of hematuria 2008    Cystoscopy for recurrent UTIs; unremarkable renal US. Also recurrent UTIs as child and pyelonephritis.     History of hormone replacement therapy     after JASBIR with BSO    HTN (hypertension)     HTN, goal below 140/90     Hx of bone density study 10/16/2006    Normal    Hypothyroidism     Insomnia     periodic - prn clonazepam helpful a couple times per month    Lipid screening 07/21/2015    Tchol 128, , HDL 53, LDL 53 outside records --> based on our labs off of atorvastatin 10yr ASCVD risk 9.4% in Oct 2015    Major depressive disorder, single episode in full remission (H) 2007    NSTEMI (non-ST elevated myocardial infarction) (H) 9/26/2022    Osteopenia     Mild left hip July 2016     Prediabetes     Metformin in the past    Rotator cuff injury, left, sequela     MRI 2015 and had PT; High-grade complete or near complete tear of the supraspinatus tendon and anterior fibers of the infraspinatus tendon. 1/3 of the infraspinatus tendon appears involved.     TBI (traumatic brain injury) (H)     As a child    Tubular adenoma of colon  &        PAST SURGICAL HISTORY:  Past Surgical History:   Procedure Laterality Date    ANKLE SURGERY      BREAST BIOPSY, RT/LT      Many    C/SECTION, LOW TRANSVERSE  1968    CAPSULE/PILL CAM ENDOSCOPY  2014    EGD prior; capsule was negative     COLONOSCOPY      , , , 2013    COLONOSCOPY N/A 2019    Procedure: COLONOSCOPY, WITH POLYPECTOMY AND BIOPSY;  Surgeon: Pepito Romero MD;  Location:  GI    COLONOSCOPY N/A 2022    Procedure: COLONOSCOPY;  Surgeon: Rodrigo Dunbar MD;  Location:  GI    CV CORONARY ANGIOGRAM N/A 2022    Procedure: Coronary Angiogram;  Surgeon: Horacio Moran MD;  Location:  HEART CARDIAC CATH LAB    CV INSTANTANEOUS WAVE-FREE RATIO N/A 2022    Procedure: Instantaneous Wave-Free Ratio;  Surgeon: Horacio Moran MD;  Location:  HEART CARDIAC CATH LAB    CV OPTICAL COHERENCE TOMOGRAPHY N/A 2022    Procedure: Optical Coherence Tomography;  Surgeon: Horacio Moran MD;  Location:  HEART CARDIAC CATH LAB    HYSTERECTOMY, PAP NO LONGER INDICATED      JASBIR with BSO  2000    benign fibroids    TUBAL LIGATION         FAMILY HISTORY:  Family History   Problem Relation Age of Onset    Colon Cancer Mother 70         age 81    Diabetes Mother         After age 75    Macular Degeneration Mother     Glaucoma Mother     Cerebrovascular Disease Mother     Heart Failure Mother     Hypertension Mother     Hyperlipidemia Mother     Other - See Comments Father 87        Frailty, pneumonia, fractures, failure to thrive    Osteoporosis Father         diagnosed in his 80s     Colon Polyps Daughter         Tubular adenoma    Deep Vein Thrombosis Brother     Hyperlipidemia Brother     Hypertension Brother     Other Cancer Brother         myeloma    Hypertension Brother         both brothers    Hyperlipidemia Brother         both brothers    Depression Brother     Anxiety Disorder Brother     Mental Illness Brother         on Haldol before death from lung cancer    Lung Cancer Brother         long time heavy smoker    Breast Cancer Other         radical mastecomy in her 40s    Colon Cancer Other     Other Cancer Other         several aunts various kinds    Other Cancer Other         throat cancer    Other Cancer Paternal Grandmother         throat cancer       SOCIAL HISTORY:  Social History     Socioeconomic History    Marital status:      Spouse name: None    Number of children: None    Years of education: None    Highest education level: None   Tobacco Use    Smoking status: Former     Packs/day: 0.50     Years: 9.00     Pack years: 4.50     Types: Cigarettes     Start date: 1965     Quit date: 4/15/1975     Years since quittin.1    Smokeless tobacco: Never    Tobacco comments:     social smoker - only smoked when with another smoker   Vaping Use    Vaping status: Never Used   Substance and Sexual Activity    Alcohol use: Yes     Comment: moderate 1 or 2 beers or wine 2x /wk    Drug use: No    Sexual activity: Not Currently     Partners: Male     Birth control/protection: None     Comment: post menopausal       Review of Systems:  Skin:          Eyes:         ENT:         Respiratory:  Positive for   PIERSON with stairs   Cardiovascular:  Negative;palpitations;chest pain;dizziness;syncope or near-syncope;cyanosis;lightheadedness;edema;exercise intolerance fatigue;Positive for    Gastroenterology:        Genitourinary:         Musculoskeletal:         Neurologic:         Psychiatric:  Positive for depression caregiver for   Heme/Lymph/Imm:         Endocrine:        "    Physical Exam:  Vitals: /68   Pulse 64   Ht 1.753 m (5' 9\")   Wt 90.7 kg (200 lb)   LMP  (LMP Unknown)   BMI 29.53 kg/m      Constitutional:  in no acute distress        Skin:  warm and dry to the touch          Head:           Eyes:           Lymph:No thyromegaly     ENT:           Neck:  JVP normal        Respiratory:  clear to auscultation         Cardiac: regular rhythm;normal S1 and S2     no presence of murmur                                                   GI:  not assessed this visit        Extremities and Muscular Skeletal:  no edema              Neurological:  no gross motor deficits        Psych:  Alert and Oriented x 3        CC  Margo Barbosa, APRN CNP  [address]  [place] [address]        Thank you for allowing me to participate in the care of your patient.      Sincerely,     Kristian Johnston MD     [facility]  "

## 2023-05-23 ENCOUNTER — HOSPITAL ENCOUNTER (EMERGENCY)
Facility: CLINIC | Age: 73
Discharge: HOME | DRG: 493 | End: 2023-05-23
Attending: EMERGENCY MEDICINE | Admitting: EMERGENCY MEDICINE
Payer: MEDICARE

## 2023-05-23 ENCOUNTER — APPOINTMENT (OUTPATIENT)
Dept: GENERAL RADIOLOGY | Facility: CLINIC | Age: 73
DRG: 493 | End: 2023-05-23
Attending: EMERGENCY MEDICINE
Payer: MEDICARE

## 2023-05-23 ENCOUNTER — HOSPITAL ENCOUNTER (INPATIENT)
Facility: CLINIC | Age: 73
LOS: 6 days | Discharge: NURSING, REHAB OR LONG-TERM CARE | DRG: 493 | End: 2023-05-30
Attending: EMERGENCY MEDICINE | Admitting: INTERNAL MEDICINE
Payer: MEDICARE

## 2023-05-23 VITALS
SYSTOLIC BLOOD PRESSURE: 123 MMHG | DIASTOLIC BLOOD PRESSURE: 72 MMHG | WEIGHT: 200 LBS | BODY MASS INDEX: 29.62 KG/M2 | HEIGHT: 69 IN | RESPIRATION RATE: 18 BRPM | OXYGEN SATURATION: 97 % | HEART RATE: 71 BPM | TEMPERATURE: 96.8 F

## 2023-05-23 DIAGNOSIS — G47.00 INSOMNIA, UNSPECIFIED TYPE: Chronic | ICD-10-CM

## 2023-05-23 DIAGNOSIS — S93.402A SPRAIN OF LEFT ANKLE, UNSPECIFIED LIGAMENT, INITIAL ENCOUNTER: ICD-10-CM

## 2023-05-23 DIAGNOSIS — S82.851A CLOSED TRIMALLEOLAR FRACTURE OF RIGHT ANKLE, INITIAL ENCOUNTER: ICD-10-CM
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
## 2023-05-23 DIAGNOSIS — R12 HEARTBURN: ICD-10-CM

## 2023-05-23 DIAGNOSIS — Z98.890 S/P ORIF (OPEN REDUCTION INTERNAL FIXATION) FRACTURE: Primary | ICD-10-CM

## 2023-05-23 DIAGNOSIS — Z87.81 S/P ORIF (OPEN REDUCTION INTERNAL FIXATION) FRACTURE: Primary | ICD-10-CM

## 2023-05-23 PROCEDURE — 73590 X-RAY EXAM OF LOWER LEG: CPT | Mod: RT

## 2023-05-23 PROCEDURE — 250N000011 HC RX IP 250 OP 636: Performed by: EMERGENCY MEDICINE

## 2023-05-23 PROCEDURE — 73610 X-RAY EXAM OF ANKLE: CPT | Mod: 50

## 2023-05-23 PROCEDURE — 250N000011 HC RX IP 250 OP 636

## 2023-05-23 PROCEDURE — 99222 1ST HOSP IP/OBS MODERATE 55: CPT | Mod: AI | Performed by: INTERNAL MEDICINE

## 2023-05-23 PROCEDURE — 2W3QX1Z IMMOBILIZATION OF RIGHT LOWER LEG USING SPLINT: ICD-10-PCS | Performed by: EMERGENCY MEDICINE

## 2023-05-23 PROCEDURE — 96374 THER/PROPH/DIAG INJ IV PUSH: CPT

## 2023-05-23 PROCEDURE — 96375 TX/PRO/DX INJ NEW DRUG ADDON: CPT

## 2023-05-23 PROCEDURE — 27816 TREATMENT OF ANKLE FRACTURE: CPT | Mod: RT

## 2023-05-23 PROCEDURE — 99285 EMERGENCY DEPT VISIT HI MDM: CPT | Mod: 25

## 2023-05-23 PROCEDURE — 99284 EMERGENCY DEPT VISIT MOD MDM: CPT | Mod: 25

## 2023-05-23 RX ORDER — HYDROMORPHONE HYDROCHLORIDE 1 MG/ML
0.5 INJECTION, SOLUTION INTRAMUSCULAR; INTRAVENOUS; SUBCUTANEOUS ONCE
Status: COMPLETED | OUTPATIENT
Start: 2023-05-23 | End: 2023-05-23

## 2023-05-23 RX ORDER — ONDANSETRON 2 MG/ML
4 INJECTION INTRAMUSCULAR; INTRAVENOUS ONCE
Status: COMPLETED | OUTPATIENT
Start: 2023-05-23 | End: 2023-05-23

## 2023-05-23 RX ORDER — ONDANSETRON 2 MG/ML
INJECTION INTRAMUSCULAR; INTRAVENOUS
Status: COMPLETED
Start: 2023-05-23 | End: 2023-05-23

## 2023-05-23 RX ORDER — OXYCODONE AND ACETAMINOPHEN 5; 325 MG/1; MG/1
1 TABLET ORAL EVERY 6 HOURS PRN
Qty: 12 TABLET | Refills: 0 | Status: ON HOLD | OUTPATIENT
Start: 2023-05-23 | End: 2023-05-26

## 2023-05-23 RX ADMIN — HYDROMORPHONE HYDROCHLORIDE 0.5 MG: 1 INJECTION, SOLUTION INTRAMUSCULAR; INTRAVENOUS; SUBCUTANEOUS at 23:02

## 2023-05-23 RX ADMIN — HYDROMORPHONE HYDROCHLORIDE 0.5 MG: 1 INJECTION, SOLUTION INTRAMUSCULAR; INTRAVENOUS; SUBCUTANEOUS at 18:49

## 2023-05-23 RX ADMIN — ONDANSETRON 4 MG: 2 INJECTION INTRAMUSCULAR; INTRAVENOUS at 20:03

## 2023-05-23 RX ADMIN — HYDROMORPHONE HYDROCHLORIDE 0.5 MG: 1 INJECTION, SOLUTION INTRAMUSCULAR; INTRAVENOUS; SUBCUTANEOUS at 16:30

## 2023-05-23 ASSESSMENT — ACTIVITIES OF DAILY LIVING (ADL)
ADLS_ACUITY_SCORE: 35

## 2023-05-23 NOTE — ED TRIAGE NOTES
Pt BIBA from home where she slipped off a step and fell onto her right side and injured bilateral ankles right worse than left. CMS intact bilaterally. Pt alert and oriented, did not hit head, no LOC, takes plavix.      Triage Assessment     Row Name 05/23/23 2797       Triage Assessment (Adult)    Airway WDL WDL       Respiratory WDL    Respiratory WDL WDL       Skin Circulation/Temperature WDL    Skin Circulation/Temperature WDL WDL       Cardiac WDL    Cardiac WDL WDL       Peripheral/Neurovascular WDL    Peripheral Neurovascular WDL WDL       Cognitive/Neuro/Behavioral WDL    Cognitive/Neuro/Behavioral WDL WDL

## 2023-05-23 NOTE — ED PROVIDER NOTES
History     Chief Complaint:  Ankle Pain and Fall     The history is provided by the patient.      Janie De Leon is a 73 year old female with history of NSTEMI and osteopenia who presents to the ED via EMS alone for evaluation of ankle pain after a fall. Patient states she was walking out a back door of her home and twisted both of her ankles. She landed on her side and did not hit her head. She reports she remembers the fall. She states she thinks her right ankle might be broken. She reports she was able to walk with her left ankle to the stretcher for EMS. She notes she has broken both ankles before, but they healed on their own and she did not have surgery for this. She reports no hip, shoulder, foot, or arm pain. No dizziness or lightheadedness reported. No abdominal, back, or chest pain reported. She states she had 1 beer an hour ago. She states she is on Plavix, but is not on anticoagulants like Eliquis or Coumadin. Patient states she had a heart attack in September. She is a retired .     Independent Historian:   None - Patient Only    Review of External Notes:       Medications:    Plavix  Metoprolol  Cymbalta  Klonopin   Norvasc   Aspirin 81 mg  Lipitor   Synthroid   Benicar     Past Medical History:    Adrenal nodule   Anemia   Atypical ductal hyperplasia of both breasts   Fibroid uterus   High cholesterol   Hormone replacement therapy   Hypertension    Hypothyroidism     Major depressive disorder, single episode in full remission  NSTEMI (non-ST elevated myocardial infarction)   Osteopenia   Prediabetes   TBI (traumatic brain injury)   Tubular adenoma of colon   Insomnia   CKD  Adjustment disorder with anxiety   Chronic constipation  Fatty liver     Past Surgical History:    Ankle surgery  Breast biopsy, many     EGD  Colonoscopy x6  Coronary angiogram   Instantaneous wave-free ratio   Optical coherence tomography  Hysterectomy  Total abdominal hysterectomy & Bilateral  "salpingo-oophorectomy   Tubal ligation     Physical Exam     Patient Vitals for the past 24 hrs:   BP Temp Temp src Pulse Resp SpO2 Height Weight   05/23/23 1930 123/72 -- -- 71 -- 97 % -- --   05/23/23 1900 135/71 -- -- 81 -- 95 % -- --   05/23/23 1800 111/63 -- -- 72 -- 92 % -- --   05/23/23 1700 102/64 -- -- 70 -- 90 % -- --   05/23/23 1601 115/67 96.8  F (36  C) Temporal 74 18 97 % 1.753 m (5' 9\") 90.7 kg (200 lb)     Physical Exam  Constitutional: Well appearing.  HEENT: Atraumatic.  PERRL.  EOMI.  Moist mucous membranes.  Neck: Soft.  Supple.  No tenderness to palpation.  Cardiac: Regular rate and rhythm.  No murmur or rub.  Respiratory: Clear to auscultation bilaterally.  No respiratory distress.  No wheezing, rhonchi, or rales.  Abdomen: Soft and nontender.  No rebound or guarding.  Nondistended.  Musculoskeletal: There is tenderness and swelling throughout the entire right ankle and significant tenderness over both the medial and lateral malleolus.  She can wiggle her toes without difficulty.  No tenderness in the foot or proximal tibia and fibula.  DP and PT pulses are 2+ and symmetric.  Cap refill less than 3 seconds throughout all toes in both feet.  There is tenderness, bruising, and swelling of the lateral and anterior left ankle.  No further tenderness of the distal foot or proximal tibia and fibula.  Neurologic: Alert and oriented x3.  Normal tone and bulk.  5/5 strength in bilateral upper and lower extremities, right bilateral ankles limited secondary to injury..  Sensation to light touch intact throughout.   Skin: No rashes.  No edema.  Psych: Normal affect.  Normal behavior.        Emergency Department Course     Imaging:  XR Ankle Bilateral G/E 3 Views   Final Result   IMPRESSION: Acute, mildly displaced right ankle fracture, likely trimalleolar. No fracture of the more proximal right tibia or fibula. Degenerative changes proximal tibiofibular joint.      The left ankle shows marked soft tissue " swelling laterally. Age-indeterminate bone fragment along the dorsal aspect of the talar neck. An acute talar fracture is not excluded. Bony prominence of the lateral malleolus is likely related to a remote healed    fracture. Ankle mortise is intact.      NOTE: ABNORMAL REPORT      THE DICTATION ABOVE DESCRIBES AN ABNORMALITY FOR WHICH FOLLOW-UP IS NEEDED.       XR Tibia and Fibula Right 2 Views   Final Result   IMPRESSION: Acute, mildly displaced right ankle fracture, likely trimalleolar. No fracture of the more proximal right tibia or fibula. Degenerative changes proximal tibiofibular joint.      The left ankle shows marked soft tissue swelling laterally. Age-indeterminate bone fragment along the dorsal aspect of the talar neck. An acute talar fracture is not excluded. Bony prominence of the lateral malleolus is likely related to a remote healed    fracture. Ankle mortise is intact.      NOTE: ABNORMAL REPORT      THE DICTATION ABOVE DESCRIBES AN ABNORMALITY FOR WHICH FOLLOW-UP IS NEEDED.          Report per radiology    Procedures     Splint Placement     Procedure: Splint Placement     Indication: Fracture    Consent: Verbal     Location: Right Ankle    Preparation: Wounds were cleansed and dressed with a non-adherent bandage     Procedure detail:   Splint was applied by Tech/Nurse with my assistance  Splint type: Posterior short leg and stirrup   Splint material: Fiberglass  After placement I checked and adjusted the fit as needed to ensure proper positioning/fit   Sensation and circulation are intact after splint placement     Patient Status: The patient tolerated the procedure well: Yes. There were no complications.    Emergency Department Course & Assessments:     Interventions:  Medications   HYDROmorphone (PF) (DILAUDID) injection 0.5 mg (0.5 mg Intravenous $Given 5/23/23 1630)   HYDROmorphone (PF) (DILAUDID) injection 0.5 mg (0.5 mg Intravenous $Given 5/23/23 1849)   ondansetron (ZOFRAN) injection 4 mg  (4 mg Intravenous $Given 5/23/23 2003)      Independent Interpretation (X-rays, CTs, rhythm strip):  X-ray right ankle with trimalleolar fracture.  X-ray left ankle with potential age-indeterminate bone fragment at the talus.    Assessments/Consultations/Discussion of Management or Tests:  ED Course as of 05/23/23 2315   Tue May 23, 2023   1612 I obtained history and examined the patient as noted above.    1852 I rechecked the patient and explained findings.    1924 I performed a splint placement.    1929 I spoke with Dr. Declan Casanova, of the ortho service, regarding the patient.    1945 I prepared the patient to be discharged home.      Social Determinants of Health affecting care:   None    Disposition:  The patient was discharged to home.     Impression & Plan      Medical Decision Making:  Janie De Leon  is a 73-year-old woman who is afebrile and hemodynamically stable.  She has tenderness and swelling to the right ankle as well as bruising and tenderness to the lateral and anterior left ankle.  She is neurovascular intact in the lower legs.  No other injuries.  No indication for head imaging.  X-ray of the right ankle demonstrates a trimalleolar fracture.  There is no significant ankle dislocation.  This was placed in a splint that is post light traction for improved alignment.  She remained neurovascular intact after the splint.  X-ray of the left ankle demonstrates a potential age-indeterminate talar avulsion fracture.  Discussed with orthopedics and she can be weightbearing as tolerated on the left leg.  We stressed at least ankle sprain and she was placed in air splint.  She did not tolerate crutches and preferred a walker.  I offered admission for safety and further evaluation and therapy given bilateral ankle injuries, however, she would like to discharge home.  I welcomed her to return if this does not work out she is understanding.  She go home with pain medication and close orthopedic follow-up.   We discussed supportive care at home and strict return precautions were given.  Her questions were answered and she was in no distress at time of discharge.    Diagnosis:    ICD-10-CM    1. Closed trimalleolar fracture of right ankle, initial encounter  S82.851A Crutches Order     Walker Order      2. Sprain of left ankle, unspecified ligament, initial encounter  S93.402A Crutches Order     Walker Order      3. Fall, initial encounter  W19.XXXA Crutches Order     Walker Order         Discharge Medications:  Discharge Medication List as of 5/23/2023  8:22 PM      START taking these medications    Details   oxyCODONE-acetaminophen (PERCOCET) 5-325 MG tablet Take 1 tablet by mouth every 6 hours as needed for pain, Disp-12 tablet, R-0, Local Print            Scribe Disclosure:  I, Luis Fernando Santiago, am serving as a scribe at 7:26 PM on 5/23/2023 to document services personally performed by Hernan Goldman MD based on my observations and the provider's statements to me.     5/23/2023   Hernan Goldman MD Salay, Nicholas J, MD  05/26/23 1008

## 2023-05-23 NOTE — ED NOTES
Bed: ED04  Expected date:   Expected time:   Means of arrival:   Comments:  418  73 F fall/ankle deformities/blood thinners  5400

## 2023-05-24 LAB
ANION GAP SERPL CALCULATED.3IONS-SCNC: 10 MMOL/L (ref 7–15)
BUN SERPL-MCNC: 19.6 MG/DL (ref 8–23)
CALCIUM SERPL-MCNC: 8.3 MG/DL (ref 8.8–10.2)
CHLORIDE SERPL-SCNC: 106 MMOL/L (ref 98–107)
CREAT SERPL-MCNC: 0.97 MG/DL (ref 0.51–0.95)
DEPRECATED HCO3 PLAS-SCNC: 23 MMOL/L (ref 22–29)
ERYTHROCYTE [DISTWIDTH] IN BLOOD BY AUTOMATED COUNT: 12.2 % (ref 10–15)
GFR SERPL CREATININE-BSD FRML MDRD: 61 ML/MIN/1.73M2
GLUCOSE SERPL-MCNC: 136 MG/DL (ref 70–99)
HCT VFR BLD AUTO: 36.2 % (ref 35–47)
HGB BLD-MCNC: 11.8 G/DL (ref 11.7–15.7)
MCH RBC QN AUTO: 29.6 PG (ref 26.5–33)
MCHC RBC AUTO-ENTMCNC: 32.6 G/DL (ref 31.5–36.5)
MCV RBC AUTO: 91 FL (ref 78–100)
PLATELET # BLD AUTO: 247 10E3/UL (ref 150–450)
POTASSIUM SERPL-SCNC: 4.6 MMOL/L (ref 3.4–5.3)
RBC # BLD AUTO: 3.98 10E6/UL (ref 3.8–5.2)
SODIUM SERPL-SCNC: 139 MMOL/L (ref 136–145)
WBC # BLD AUTO: 9.7 10E3/UL (ref 4–11)

## 2023-05-24 PROCEDURE — 82306 VITAMIN D 25 HYDROXY: CPT | Performed by: PHYSICIAN ASSISTANT

## 2023-05-24 PROCEDURE — 250N000013 HC RX MED GY IP 250 OP 250 PS 637: Performed by: INTERNAL MEDICINE

## 2023-05-24 PROCEDURE — 120N000001 HC R&B MED SURG/OB

## 2023-05-24 PROCEDURE — 93010 ELECTROCARDIOGRAM REPORT: CPT | Performed by: INTERNAL MEDICINE

## 2023-05-24 PROCEDURE — 250N000013 HC RX MED GY IP 250 OP 250 PS 637: Performed by: STUDENT IN AN ORGANIZED HEALTH CARE EDUCATION/TRAINING PROGRAM

## 2023-05-24 PROCEDURE — 85027 COMPLETE CBC AUTOMATED: CPT | Performed by: INTERNAL MEDICINE

## 2023-05-24 PROCEDURE — 93005 ELECTROCARDIOGRAM TRACING: CPT

## 2023-05-24 PROCEDURE — 99233 SBSQ HOSP IP/OBS HIGH 50: CPT | Performed by: STUDENT IN AN ORGANIZED HEALTH CARE EDUCATION/TRAINING PROGRAM

## 2023-05-24 PROCEDURE — 258N000003 HC RX IP 258 OP 636: Performed by: INTERNAL MEDICINE

## 2023-05-24 PROCEDURE — 250N000011 HC RX IP 250 OP 636: Performed by: INTERNAL MEDICINE

## 2023-05-24 PROCEDURE — 80048 BASIC METABOLIC PNL TOTAL CA: CPT | Performed by: INTERNAL MEDICINE

## 2023-05-24 PROCEDURE — G0378 HOSPITAL OBSERVATION PER HR: HCPCS

## 2023-05-24 PROCEDURE — 36415 COLL VENOUS BLD VENIPUNCTURE: CPT | Performed by: INTERNAL MEDICINE

## 2023-05-24 PROCEDURE — L4361 PNEUMA/VAC WALK BOOT PRE OTS: HCPCS

## 2023-05-24 RX ORDER — SODIUM CHLORIDE 9 MG/ML
INJECTION, SOLUTION INTRAVENOUS CONTINUOUS
Status: DISCONTINUED | OUTPATIENT
Start: 2023-05-24 | End: 2023-05-25

## 2023-05-24 RX ORDER — ACETAMINOPHEN 325 MG/1
650 TABLET ORAL EVERY 6 HOURS PRN
Status: DISCONTINUED | OUTPATIENT
Start: 2023-05-24 | End: 2023-05-25

## 2023-05-24 RX ORDER — NALOXONE HYDROCHLORIDE 0.4 MG/ML
0.4 INJECTION, SOLUTION INTRAMUSCULAR; INTRAVENOUS; SUBCUTANEOUS
Status: DISCONTINUED | OUTPATIENT
Start: 2023-05-24 | End: 2023-05-30 | Stop reason: HOSPADM

## 2023-05-24 RX ORDER — LIDOCAINE 40 MG/G
CREAM TOPICAL
Status: DISCONTINUED | OUTPATIENT
Start: 2023-05-24 | End: 2023-05-25

## 2023-05-24 RX ORDER — ONDANSETRON 2 MG/ML
4 INJECTION INTRAMUSCULAR; INTRAVENOUS EVERY 6 HOURS PRN
Status: DISCONTINUED | OUTPATIENT
Start: 2023-05-24 | End: 2023-05-25

## 2023-05-24 RX ORDER — NALOXONE HYDROCHLORIDE 0.4 MG/ML
0.2 INJECTION, SOLUTION INTRAMUSCULAR; INTRAVENOUS; SUBCUTANEOUS
Status: DISCONTINUED | OUTPATIENT
Start: 2023-05-24 | End: 2023-05-30 | Stop reason: HOSPADM

## 2023-05-24 RX ORDER — POLYETHYLENE GLYCOL 3350 17 G/17G
17 POWDER, FOR SOLUTION ORAL DAILY PRN
Status: DISCONTINUED | OUTPATIENT
Start: 2023-05-24 | End: 2023-05-30 | Stop reason: HOSPADM

## 2023-05-24 RX ORDER — CLONAZEPAM 0.5 MG/1
.25-.5 TABLET ORAL
Status: DISCONTINUED | OUTPATIENT
Start: 2023-05-24 | End: 2023-05-30 | Stop reason: HOSPADM

## 2023-05-24 RX ORDER — ACETAMINOPHEN 650 MG/1
650 SUPPOSITORY RECTAL EVERY 6 HOURS PRN
Status: DISCONTINUED | OUTPATIENT
Start: 2023-05-24 | End: 2023-05-25

## 2023-05-24 RX ORDER — ASPIRIN 81 MG/1
81 TABLET ORAL DAILY
Status: DISCONTINUED | OUTPATIENT
Start: 2023-05-24 | End: 2023-05-26

## 2023-05-24 RX ORDER — ATORVASTATIN CALCIUM 40 MG/1
40 TABLET, FILM COATED ORAL EVERY EVENING
Status: DISCONTINUED | OUTPATIENT
Start: 2023-05-24 | End: 2023-05-30 | Stop reason: HOSPADM

## 2023-05-24 RX ORDER — OXYCODONE HYDROCHLORIDE 5 MG/1
5 TABLET ORAL EVERY 4 HOURS PRN
Status: DISCONTINUED | OUTPATIENT
Start: 2023-05-24 | End: 2023-05-25

## 2023-05-24 RX ORDER — HYDROMORPHONE HYDROCHLORIDE 1 MG/ML
0.3 INJECTION, SOLUTION INTRAMUSCULAR; INTRAVENOUS; SUBCUTANEOUS
Status: DISCONTINUED | OUTPATIENT
Start: 2023-05-24 | End: 2023-05-25

## 2023-05-24 RX ORDER — LEVOTHYROXINE SODIUM 88 UG/1
88 TABLET ORAL DAILY
Status: DISCONTINUED | OUTPATIENT
Start: 2023-05-24 | End: 2023-05-30 | Stop reason: HOSPADM

## 2023-05-24 RX ORDER — AMLODIPINE BESYLATE 5 MG/1
5 TABLET ORAL EVERY EVENING
Status: DISCONTINUED | OUTPATIENT
Start: 2023-05-24 | End: 2023-05-30 | Stop reason: HOSPADM

## 2023-05-24 RX ORDER — METOPROLOL TARTRATE 25 MG/1
25 TABLET, FILM COATED ORAL 2 TIMES DAILY
Status: DISCONTINUED | OUTPATIENT
Start: 2023-05-24 | End: 2023-05-30 | Stop reason: HOSPADM

## 2023-05-24 RX ORDER — CLOPIDOGREL BISULFATE 75 MG/1
75 TABLET ORAL DAILY
Status: DISCONTINUED | OUTPATIENT
Start: 2023-05-24 | End: 2023-05-30 | Stop reason: HOSPADM

## 2023-05-24 RX ORDER — AMOXICILLIN 250 MG
1 CAPSULE ORAL 2 TIMES DAILY PRN
Status: DISCONTINUED | OUTPATIENT
Start: 2023-05-24 | End: 2023-05-30 | Stop reason: HOSPADM

## 2023-05-24 RX ORDER — ONDANSETRON 4 MG/1
4 TABLET, ORALLY DISINTEGRATING ORAL EVERY 6 HOURS PRN
Status: DISCONTINUED | OUTPATIENT
Start: 2023-05-24 | End: 2023-05-25

## 2023-05-24 RX ORDER — AMOXICILLIN 250 MG
2 CAPSULE ORAL 2 TIMES DAILY PRN
Status: DISCONTINUED | OUTPATIENT
Start: 2023-05-24 | End: 2023-05-30 | Stop reason: HOSPADM

## 2023-05-24 RX ORDER — CHOLECALCIFEROL (VITAMIN D3) 50 MCG
50 TABLET ORAL DAILY
Status: DISCONTINUED | OUTPATIENT
Start: 2023-05-25 | End: 2023-05-30 | Stop reason: HOSPADM

## 2023-05-24 RX ORDER — DULOXETIN HYDROCHLORIDE 60 MG/1
60 CAPSULE, DELAYED RELEASE ORAL DAILY
Status: DISCONTINUED | OUTPATIENT
Start: 2023-05-24 | End: 2023-05-30 | Stop reason: HOSPADM

## 2023-05-24 RX ADMIN — ATORVASTATIN CALCIUM 40 MG: 40 TABLET, FILM COATED ORAL at 19:07

## 2023-05-24 RX ADMIN — OXYCODONE HYDROCHLORIDE 5 MG: 5 TABLET ORAL at 19:07

## 2023-05-24 RX ADMIN — SODIUM CHLORIDE: 9 INJECTION, SOLUTION INTRAVENOUS at 21:00

## 2023-05-24 RX ADMIN — OXYCODONE HYDROCHLORIDE 5 MG: 5 TABLET ORAL at 10:07

## 2023-05-24 RX ADMIN — METOPROLOL TARTRATE 25 MG: 25 TABLET, FILM COATED ORAL at 19:07

## 2023-05-24 RX ADMIN — ASPIRIN 81 MG: 81 TABLET, COATED ORAL at 08:56

## 2023-05-24 RX ADMIN — ACETAMINOPHEN 650 MG: 325 TABLET ORAL at 14:33

## 2023-05-24 RX ADMIN — LEVOTHYROXINE SODIUM 88 MCG: 88 TABLET ORAL at 16:19

## 2023-05-24 RX ADMIN — DULOXETINE HYDROCHLORIDE 60 MG: 60 CAPSULE, DELAYED RELEASE ORAL at 08:56

## 2023-05-24 RX ADMIN — METOPROLOL TARTRATE 25 MG: 25 TABLET, FILM COATED ORAL at 08:56

## 2023-05-24 RX ADMIN — SODIUM CHLORIDE: 9 INJECTION, SOLUTION INTRAVENOUS at 11:15

## 2023-05-24 RX ADMIN — CLOPIDOGREL BISULFATE 75 MG: 75 TABLET ORAL at 08:56

## 2023-05-24 RX ADMIN — AMLODIPINE BESYLATE 5 MG: 5 TABLET ORAL at 19:07

## 2023-05-24 RX ADMIN — OXYCODONE HYDROCHLORIDE 5 MG: 5 TABLET ORAL at 14:33

## 2023-05-24 RX ADMIN — OXYCODONE HYDROCHLORIDE 5 MG: 5 TABLET ORAL at 05:47

## 2023-05-24 RX ADMIN — SODIUM CHLORIDE: 9 INJECTION, SOLUTION INTRAVENOUS at 01:44

## 2023-05-24 RX ADMIN — HYDROMORPHONE HYDROCHLORIDE 0.3 MG: 1 INJECTION, SOLUTION INTRAMUSCULAR; INTRAVENOUS; SUBCUTANEOUS at 02:01

## 2023-05-24 ASSESSMENT — ACTIVITIES OF DAILY LIVING (ADL)
FALL_HISTORY_WITHIN_LAST_SIX_MONTHS: YES
ADLS_ACUITY_SCORE: 22
ADLS_ACUITY_SCORE: 22
DRESSING/BATHING_DIFFICULTY: NO
VISION_MANAGEMENT: GLASSES
CONCENTRATING,_REMEMBERING_OR_MAKING_DECISIONS_DIFFICULTY: NO
ADLS_ACUITY_SCORE: 22
DOING_ERRANDS_INDEPENDENTLY_DIFFICULTY: NO
TOILETING_ISSUES: NO
ADLS_ACUITY_SCORE: 37
ADLS_ACUITY_SCORE: 22
ADLS_ACUITY_SCORE: 37
WEAR_GLASSES_OR_BLIND: YES
DIFFICULTY_COMMUNICATING: NO
HEARING_DIFFICULTY_OR_DEAF: NO
ADLS_ACUITY_SCORE: 22
DIFFICULTY_EATING/SWALLOWING: NO
ADLS_ACUITY_SCORE: 22
ADLS_ACUITY_SCORE: 25
WALKING_OR_CLIMBING_STAIRS_DIFFICULTY: NO
EQUIPMENT_CURRENTLY_USED_AT_HOME: WALKER, STANDARD
ADLS_ACUITY_SCORE: 22
NUMBER_OF_TIMES_PATIENT_HAS_FALLEN_WITHIN_LAST_SIX_MONTHS: 2
CHANGE_IN_FUNCTIONAL_STATUS_SINCE_ONSET_OF_CURRENT_ILLNESS/INJURY: YES
ADLS_ACUITY_SCORE: 25
ADLS_ACUITY_SCORE: 37

## 2023-05-24 NOTE — ED TRIAGE NOTES
Patient was just discharged from ED with right ankle fracture.  She was attempting to go into house when she lost her balance and fell backward onto her bottom.  She denies hitting head.  She denies any new pain, back pain, or neck pain.  She is requesting admission as she is unable to care for herself at home.     Triage Assessment     Row Name 05/23/23 2034       Triage Assessment (Adult)    Airway WDL WDL       Respiratory WDL    Respiratory WDL WDL       Skin Circulation/Temperature WDL    Skin Circulation/Temperature WDL WDL       Cardiac WDL    Cardiac WDL WDL       Peripheral/Neurovascular WDL    Peripheral Neurovascular WDL WDL       Cognitive/Neuro/Behavioral WDL    Cognitive/Neuro/Behavioral WDL WDL

## 2023-05-24 NOTE — PHARMACY-ADMISSION MEDICATION HISTORY
Pharmacist Admission Medication History    Admission medication history is complete. The information provided in this note is only as accurate as the sources available at the time of the update.    Medication reconciliation/reorder completed by provider prior to medication history? Yes    Information Source(s): Patient and CareEverywhere/SureScripts via in-person    Pertinent Information:   ---  Pt usually takes clonazepam 0.25 mg (half tab of 0.5 mg) every few days for sleep.  ---  Percocet is a new prescription that pt already picked up but hasn't taken any.    Changes made to PTA medication list:    Added: None    Deleted: None    Changed: None    Medication Affordability:  Not including over the counter (OTC) medications, was there a time in the past 3 months when you did not take your medications as prescribed because of cost?: No    Allergies reviewed with patient and updates made in EHR: yes    Medication History Completed By: Sisi Lewis PharmD 5/24/2023 8:49 AM    Prior to Admission medications    Medication Sig Last Dose Taking? Auth Provider Long Term End Date   amLODIPine (NORVASC) 5 MG tablet Take 1 tablet (5 mg) by mouth daily  Patient taking differently: Take 5 mg by mouth every evening 5/22/2023 at pm Yes Trish Davisa, DO Yes    aspirin (ASA) 81 MG EC tablet Take 1 tablet (81 mg) by mouth daily 5/23/2023 at am Yes Trish Davisa, DO     atorvastatin (LIPITOR) 40 MG tablet Take 1 tablet (40 mg) by mouth every evening 5/22/2023 at pm Yes Margo Barbosa APRN CNP Yes    Cholecalciferol (VITAMIN D3 PO) Take 1,000 Units by mouth daily 5/23/2023 at am Yes Reported, Patient     clonazePAM (KLONOPIN) 0.5 MG tablet Take 0.5-1 tablets (0.25-0.5 mg) by mouth nightly as needed for anxiety PRN Yes Davis, Jade, DO Yes    clopidogrel (PLAVIX) 75 MG tablet Take 1 tablet (75 mg) by mouth daily 5/23/2023 at am Yes Trish Davisa, DO Yes    cyanocobalamin (VITAMIN B-12) 100 MCG tablet Take 100 mcg by mouth daily  5/23/2023 at am Yes Reported, Patient     DULoxetine (CYMBALTA) 60 MG capsule Take 1 capsule (60 mg) by mouth daily 5/23/2023 at am Yes Jade Davis,  Yes    levothyroxine (SYNTHROID/LEVOTHROID) 88 MCG tablet Take 1 tablet (88 mcg) by mouth daily - Overdue for endocrinology appointment 5/23/2023 at am Yes Horace Cutler MD Yes    metoprolol tartrate (LOPRESSOR) 25 MG tablet Take 1 tablet (25 mg) by mouth 2 times daily 5/23/2023 at am Yes Jade Davis,  Yes    nitroGLYcerin (NITROSTAT) 0.4 MG sublingual tablet For chest pain place 1 tablet under the tongue every 5 minutes for 3 doses. If symptoms persist 5 minutes after 1st dose call 911. PRN Yes Carmen Ordoñez PA-C Yes    olmesartan (BENICAR) 20 MG tablet Take 1 tablet (20 mg) by mouth daily 5/23/2023 at am Yes Jade Davis,  Yes    oxyCODONE-acetaminophen (PERCOCET) 5-325 MG tablet Take 1 tablet by mouth every 6 hours as needed for pain New Rx- picked up but hasn't taken Yes Hernan Goldman MD  5/26/23   polyethylene glycol (MIRALAX) 17 GM/Dose powder Take 17 g by mouth daily as needed for constipation prn Yes Unknown, Entered By History         Jade Davis, DO Yes 9/27/22

## 2023-05-24 NOTE — ED NOTES
Pt reports mild discomfort to right ankle, states it is tolerable and denies need for pain medication at this time. Splint in place, CMS intact.

## 2023-05-24 NOTE — DISCHARGE INSTRUCTIONS
Discharge Instructions  Extremity Injury    You were seen today for an injury to an extremity (arm, hand, leg, or foot). You may have a bruise, strain, or fracture (broken bone).    Generally, every Emergency Department visit should have a follow-up clinic visit with either a primary or a specialty clinic/provider. Please follow-up as instructed by your emergency provider today.  Return to the Emergency Department right away if:  Your pain seems to change or get worse or there is pain in a new area that wasn t evaluated today.  Your extremity becomes pale, cool, blue, or numb or tingling past the injury.  You have more drainage, redness or pain in the area of the cut or abrasion.  You have pain that you cannot control with the medicine recommended or prescribed here, or you have pain that seems too much for your injury.  Your child (who is injured) will not stop crying or is much more fussy than normal.  You have new symptoms or anything that worries you.    What to Expect:  Your swelling and pain may be worse the day after your injury, but should not be severe and should start getting better after that. You should not have new symptoms and your pain should not get worse.  You may start to get a bruise over the injured area or below the injured area (bruising can follow gravity).  Your movement and strength should get better with time.  Some injuries may not show up until after you have left the Emergency Department so it is important to follow-up as directed.  Your injury may prevent you from working.  Follow-up with your regular provider to get a work release note.  Pain medications or your injury may make it unsafe to drive or operate machinery.    Home Care:  RICE: Rest, Ice, Compression, Elevation  Rest: Rest your injured area for at least 1-2 days. After that you may start using your extremity again as long as there is not too much pain.   Ice: Apply ice your injured area for 15 minutes at a time, at least 3  times a day. Use a cloth between the ice bag and your skin to prevent frostbite. Do not sleep with an ice pack or heating pad on, since this can cause burns or skin injury.  Compression: You may use an elastic bandage (Ace  Wrap) if it makes you more comfortable. Wrap it just tight enough to provide light compression, like a new pair of socks feels. Loosen the bandage if you have swelling past the bandage.  Elevation: Raise the injured area above the level of your heart as much as possible in the first 1-2 days.    Use Tylenol  (acetaminophen), Motrin (ibuprofen), or Advil  (ibuprofen) for your pain unless you have an allergy or are told not to use these medications by your provider.  Take the medications as instructed on the package. Tylenol  (acetaminophen) is in many prescription medicines and non-prescription medicines--check all of your medicines to be sure you aren t taking more than 3000 mg per day.  Please follow any other instructions that were discussed with you by your provider.    Stretching/Exercises:  You may have been provided with instructions for stretching or exercises. If your injury was to your arm or shoulder and your provider put you in a sling or an immobilizer, it is important that you take off your immobilizer within 3 days and stretch/move your shoulder, unless your provider specifically tells you to not move your shoulder.  This is to prevent further injury such as a  frozen shoulder .     If you were given a prescription for medicine here today, be sure to read all of the information (including the package insert) that comes with your prescription.  This will include important information about the medicine, its side effects, and any warnings that you need to know about.  The pharmacist who fills the prescription can provide more information and answer questions you may have about the medicine.  If you have questions or concerns that the pharmacist cannot address, please call or return to  the Emergency Department.     Remember that you can always come back to the Emergency Department if you are not able to see your regular provider in the amount of time listed above, if you get any new symptoms, or if there is anything that worries you.    Opioid Medication Information    You have been given a prescription for an opioid (narcotic) pain medicine and/or have received a pain medicine while here in the Emergency Department. These medicines can make you drowsy or impaired. You must not drive, operate dangerous equipment, or engage in any other dangerous activities while taking these medications. If you drive while taking these medications, you could be arrested for driving under the influence (DUI). Do not drink any alcohol while you are taking these medications.     Opioid pain medications can cause addiction. If you have a history of chemical dependency of any type, you are at a higher risk of becoming addicted to pain medications.  Only take these prescribed medications to treat your pain when all other options have been tried. Take it for as short a time and as few doses as possible. Store your pain pills in a secure place, as they are frequently stolen and provide a dangerous opportunity for children or visitors in your house to start abusing these powerful medications. We will not replace any lost or stolen medicine.    If you do not finish your medication, it is a good idea to get rid of it but please do not flush it down the toilet. Please dispose of the remaining medication at a local pharmacy or law enforcement facility. The [place] [employer] has additional information on medication disposal: https://www.pca.[facility].[place]/living-green/managing-unwanted-medications.      Many prescription pain medications contain Tylenol  (acetaminophen), including Vicodin , Tylenol #3 , Norco , Lortab , and Percocet .  You should not take any extra pills of Tylenol  if you are using these  prescription medications or you can get very sick.  Do not ever take more than 3000 mg of acetaminophen in any 24 hour period.    All opioids tend to cause constipation. Drink plenty of water and eat foods that have a lot of fiber, such as fruits, vegetables, prune juice, apple juice and high fiber cereal.  Take a laxative if you don t move your bowels at least every other day. Miralax , Milk of Magnesia, Colace , or Senna  can be used to keep you regular.

## 2023-05-24 NOTE — PROGRESS NOTES
S: Pt. seen at [facility]. Female. Juan Pablo CARUSO. RX: Tall CAM boot left. DX: Left lateral ankle sprain.  O:A: Today I F/D a Procare Maxtrax tall CAM boot left, size Medium, to protect Fx. site, Donning doffing wear and care instructions given.  P: Pt. to be seen as needed.    JULIAN Amador

## 2023-05-24 NOTE — PROGRESS NOTES
[facility]    Medicine Progress Note - Hospitalist Service    Date of Admission:  5/23/2023    Assessment & Plan   Janie De Leon is a 73 year old female admitted on 5/23/2023. She presents after a fall.     R trimalleolar fracture  L ankle sprain (r/o talar fx)  *This afternoon pt walking out door of home when twisted both ankles. Fell, didn't hit head or have LOC. No other pain. Imaging w/ likely trimalleolar fracture. L anle with swelling, acute talar fx not excluded. Was able to walk on L ankle, d/c'ed home with crutches  *After return home again fell backwards onto bottom, no head trauma or new pain. Doesn't feel like she can manage at home. In ED VSS.   - routine labs  - NPO at MN  - prn analgesics  - orthopedic surgery consult appreciated    - Patient is medically optimized for surgery. No anginal symptoms. Will get pre-operative EKG as not updated since 9/2022 when she had NSTEMI. RCRI 1. Will hold benicar pre-op.     CAD with NSTEMI 9/2022, medically managed  Hypertension   HLD  [amlodipine 5 mg at HS, ASA 81 mg daily, lipitor 40 mg daily, plavix 75 mg daily, metoprolol 25 mg BID, olmesartan 20 mg daily]  Hospitalized with chest pressure 9/2022. Found to have mild CAD with plaque rupture causing NSTEMI. No stenting/ intervention done, has been medically managed.   - resume ASA  - surgery would like to hold plavix for OR, this is reasonable with her being >6 months from NSTEMI without stent placement, recommend resumption as soon as able.   - resume metoprolol 25 mg BID, amlodipine, lipitor  - hold ACEi with OR plans     Prediabetes  Hgb A1C was 6.1% 8/2022  - defer to outpatient management     MDD  Anxiety  - prn clonazepam 0.25-0.5 mg at HS available  - resume duloxetine 60 mg daily     Hypothyroidism  - resume levothyroxine with rec       Diet: Regular Diet Adult    DVT Prophylaxis: Pneumatic Compression Devices  Bryant Catheter: Not present  Lines: None     Cardiac Monitoring:  "None  Code Status: Full Code      Clinically Significant Risk Factors Present on Admission                # Drug Induced Platelet Defect: home medication list includes an antiplatelet medication   # Hypertension: Noted on problem list      # Overweight: Estimated body mass index is 29.76 kg/m  as calculated from the following:    Height as of this encounter: 1.753 m (5' 9\").    Weight as of this encounter: 91.4 kg (201 lb 8 oz).            Disposition Plan      Expected Discharge Date: 05/25/2023        Discharge Comments: ortho consult          Buzz Blake MD  Hospitalist Service  [facility]  Securely message with [employer] (more info)  Text page via [employer] Paging/Directory   ______________________________________________________________________    Interval History   Pain tolerable. Plan for OR on 5/25 due to OR availability. Able to climb 3 flights of stairs and walk around the block without stopping prior to falls.     Physical Exam   Vital Signs: Temp: 98.8  F (37.1  C) Temp src: Oral BP: 104/58 Pulse: 74   Resp: 16 SpO2: 90 % O2 Device: None (Room air)    Weight: 201 lbs 8.01 oz    Constitutional: Awake, alert, cooperative, no apparent distress  Respiratory: Clear to auscultation bilaterally, no crackles or wheezing  Cardiovascular: Regular rate and rhythm, normal S1 and S2, and no murmur noted  GI: Normal bowel sounds, soft, non-distended, non-tender  Skin/Integumen: No rashes, no cyanosis, no edema  Other: Right leg bandaged.       Medical Decision Making       MANAGEMENT DISCUSSED with the following over the past 24 hours: ortho, RN   NOTE(S)/MEDICAL RECORDS REVIEWED over the past 24 hours: Ortho consult, H&P, ED MD notes x2, nursing notes  Tests ORDERED & REVIEWED in the past 24 hours:  - BMP  - CBC  - Troponin  Tests personally interpreted in the past 24 hours:  - bilateral ankle XR showing and right trimalleolar fx showing right tib/fib xr showing no proximal fracture  Medical " complexity over the past 24 hours:  - Parenteral (IV) CONTROLLED SUBSTANCES ordered      Data   ------------------------- PAST 24 HR DATA REVIEWED -----------------------------------------------    I have personally reviewed the following data over the past 24 hrs:    9.7  \   11.8   / 247     139 106 19.6 /  136 (H)   4.6 23 0.97 (H) \       Imaging results reviewed over the past 24 hrs:   Recent Results (from the past 24 hour(s))   XR Tibia and Fibula Right 2 Views    Narrative    EXAM: XR TIBIA AND FIBULA RIGHT 2 VIEWS, XR ANKLE BILATERAL G/E 3 VIEWS  LOCATION: [facility]  DATE/TIME: 5/23/2023 5:30 PM CDT    INDICATION: Fall, pain.  COMPARISON: None.      Impression    IMPRESSION: Acute, mildly displaced right ankle fracture, likely trimalleolar. No fracture of the more proximal right tibia or fibula. Degenerative changes proximal tibiofibular joint.    The left ankle shows marked soft tissue swelling laterally. Age-indeterminate bone fragment along the dorsal aspect of the talar neck. An acute talar fracture is not excluded. Bony prominence of the lateral malleolus is likely related to a remote healed   fracture. Ankle mortise is intact.    NOTE: ABNORMAL REPORT    THE DICTATION ABOVE DESCRIBES AN ABNORMALITY FOR WHICH FOLLOW-UP IS NEEDED.    XR Ankle Bilateral G/E 3 Views    Narrative    EXAM: XR TIBIA AND FIBULA RIGHT 2 VIEWS, XR ANKLE BILATERAL G/E 3 VIEWS  LOCATION: [facility]  DATE/TIME: 5/23/2023 5:30 PM CDT    INDICATION: Fall, pain.  COMPARISON: None.      Impression    IMPRESSION: Acute, mildly displaced right ankle fracture, likely trimalleolar. No fracture of the more proximal right tibia or fibula. Degenerative changes proximal tibiofibular joint.    The left ankle shows marked soft tissue swelling laterally. Age-indeterminate bone fragment along the dorsal aspect of the talar neck. An acute talar fracture is not excluded. Bony prominence of the  lateral malleolus is likely related to a remote healed   fracture. Ankle mortise is intact.    NOTE: ABNORMAL REPORT    THE DICTATION ABOVE DESCRIBES AN ABNORMALITY FOR WHICH FOLLOW-UP IS NEEDED.

## 2023-05-24 NOTE — CONSULTS
[facility]    Orthopedic Consultation    Janie De Leon MRN# [id number]   Age: 73 year old YOB: 1950     Date of Admission: 5/23/2023    Reason for consult: Right ankle fracture, Left ankle sprain        Requesting provider: Jarod Sewell        Level of consult: Consult, follow and place orders           Assessment and Plan:   Assessment:   Right ankle fracture  Left ankle sprain       Plan:   Patient will be scheduled for a right ankle ORIF tomorrow afternoon pending patient is optimized for surgery per hospitalist.    Surgeon: Dr URVASHI Casanova  NPO Status effective midnight, okay to eat today  Elevate ankles on foam wedge (alternate)    NWB Right LE, Keep splint applied.   WBAT Left LE in CAM boot (ordered)   Vit D ordered   Hold anticoagulants if taken: Plavix   Pain medication as needed, minimize narcotics as able           Chief Complaint:   Bilateral ankle pain          History of Present Illness:   Janie De Leon is a 73 year old female who presented to the ED with pain after a fall. Yesterday afternoon, she was walking outside when she missed a step and fell, twisting her ankles.  She had immediate pain in bilateral ankles, right > left.  She presented to the emergency department where she was found to have a likely trimalleolar fracture on the right as well as a sprained ankle on the left with question of a talar fracture.  She had a splint put on her right ankle and air cast on the left with plans to return home.  When she arrived at home when getting out of her car she again fell on her buttocks.  She had no new pain or trauma from this.  She returned to the emergency department for admission as she felt she could manage at home.            Past Medical History:     Past Medical History:   Diagnosis Date     Adrenal nodule (H) 09/2022    CT scan     Anemia     mild gastropathy on EGD 2008; neg pill cam     Atypical ductal hyperplasia of both breasts     Has had  biopsies and MRI     Fibroid uterus      High cholesterol      History of hematuria 2008    Cystoscopy for recurrent UTIs; unremarkable renal US. Also recurrent UTIs as child and pyelonephritis.      History of hormone replacement therapy     after JASBIR with BSO     HTN (hypertension)      HTN, goal below 140/90      Hx of bone density study 10/16/2006    Normal     Hypothyroidism      Insomnia     periodic - prn clonazepam helpful a couple times per month     Lipid screening 07/21/2015    Tchol 128, , HDL 53, LDL 53 outside records --> based on our labs off of atorvastatin 10yr ASCVD risk 9.4% in Oct 2015     Major depressive disorder, single episode in full remission (H) 2007     NSTEMI (non-ST elevated myocardial infarction) (H) 9/26/2022     Osteopenia     Mild left hip July 2016     Prediabetes     Metformin in the past     Rotator cuff injury, left, sequela     MRI Jan 2015 and had PT; High-grade complete or near complete tear of the supraspinatus tendon and anterior fibers of the infraspinatus tendon. 1/3 of the infraspinatus tendon appears involved.      TBI (traumatic brain injury) (H)     As a child     Tubular adenoma of colon 2013 & 2016             Past Surgical History:     Past Surgical History:   Procedure Laterality Date     ANKLE SURGERY  1976     BREAST BIOPSY, RT/LT      Many     C/SECTION, LOW TRANSVERSE  1968     CAPSULE/PILL CAM ENDOSCOPY  2/18/2014    EGD prior; capsule was negative      COLONOSCOPY      1999, 2003, 2008, 6/6/2013     COLONOSCOPY N/A 6/20/2019    Procedure: COLONOSCOPY, WITH POLYPECTOMY AND BIOPSY;  Surgeon: Pepito Romero MD;  Location:  GI     COLONOSCOPY N/A 6/16/2022    Procedure: COLONOSCOPY;  Surgeon: Rodrigo Dunbar MD;  Location:  GI     CV CORONARY ANGIOGRAM N/A 9/26/2022    Procedure: Coronary Angiogram;  Surgeon: Horacio Moran MD;  Location:  HEART CARDIAC CATH LAB     CV INSTANTANEOUS WAVE-FREE RATIO N/A 9/26/2022    Procedure:  Instantaneous Wave-Free Ratio;  Surgeon: Horacio Moran MD;  Location:  HEART CARDIAC CATH LAB     CV OPTICAL COHERENCE TOMOGRAPHY N/A 2022    Procedure: Optical Coherence Tomography;  Surgeon: Horacio Moran MD;  Location:  HEART CARDIAC CATH LAB     HYSTERECTOMY, PAP NO LONGER INDICATED       JASBIR with BSO  2000    benign fibroids     TUBAL LIGATION               Social History:     Social History     Tobacco Use     Smoking status: Former     Packs/day: 0.50     Years: 9.00     Pack years: 4.50     Types: Cigarettes     Start date: 1965     Quit date: 4/15/1975     Years since quittin.1     Smokeless tobacco: Never     Tobacco comments:     social smoker - only smoked when with another smoker   Vaping Use     Vaping status: Never Used   Substance Use Topics     Alcohol use: Yes     Comment: moderate 1 or 2 beers or wine 2x /wk             Family History:     Family History   Problem Relation Age of Onset     Colon Cancer Mother 70         age 81     Diabetes Mother         After age 75     Macular Degeneration Mother      Glaucoma Mother      Cerebrovascular Disease Mother      Heart Failure Mother      Hypertension Mother      Hyperlipidemia Mother      Other - See Comments Father 87        Frailty, pneumonia, fractures, failure to thrive     Osteoporosis Father         diagnosed in his 80s     Colon Polyps Daughter         Tubular adenoma     Deep Vein Thrombosis Brother      Hyperlipidemia Brother      Hypertension Brother      Other Cancer Brother         myeloma     Hypertension Brother         both brothers     Hyperlipidemia Brother         both brothers     Depression Brother      Anxiety Disorder Brother      Mental Illness Brother         on Haldol before death from lung cancer     Lung Cancer Brother         long time heavy smoker     Breast Cancer Other         radical mastecomy in her 40s     Colon Cancer Other      Other Cancer Other         several aunts various  kinds     Other Cancer Other         throat cancer     Other Cancer Paternal Grandmother         throat cancer             Immunizations:     VACCINE/DOSE   Diptheria   DPT   DTAP   HBIG   Hepatitis A   Hepatitis B   HIB   Influenza   Measles   Meningococcal   MMR   Mumps   Pneumococcal   Polio   Rubella   Small Pox   TDAP   Varicella   Zoster             Allergies:     Allergies   Allergen Reactions     Ciprofloxacin GI Disturbance     Oxycodone Other (See Comments)     She prefers not to have Oxycodone or Oxycontin due to potential addictive properties     Simvastatin Other (See Comments)     Muscle aches              Medications:     Current Facility-Administered Medications   Medication     acetaminophen (TYLENOL) tablet 650 mg    Or     acetaminophen (TYLENOL) Suppository 650 mg     aspirin EC tablet 81 mg     atorvastatin (LIPITOR) tablet 40 mg     clopidogrel (PLAVIX) tablet 75 mg     DULoxetine (CYMBALTA) DR capsule 60 mg     HYDROmorphone (PF) (DILAUDID) injection 0.3 mg     lidocaine (LMX4) cream     lidocaine 1 % 0.1-1 mL     melatonin tablet 1 mg     metoprolol tartrate (LOPRESSOR) tablet 25 mg     naloxone (NARCAN) injection 0.2 mg    Or     naloxone (NARCAN) injection 0.4 mg    Or     naloxone (NARCAN) injection 0.2 mg    Or     naloxone (NARCAN) injection 0.4 mg     ondansetron (ZOFRAN ODT) ODT tab 4 mg    Or     ondansetron (ZOFRAN) injection 4 mg     oxyCODONE (ROXICODONE) tablet 5 mg     polyethylene glycol (MIRALAX) Packet 17 g     senna-docusate (SENOKOT-S/PERICOLACE) 8.6-50 MG per tablet 1 tablet    Or     senna-docusate (SENOKOT-S/PERICOLACE) 8.6-50 MG per tablet 2 tablet     sodium chloride (PF) 0.9% PF flush 3 mL     sodium chloride (PF) 0.9% PF flush 3 mL     sodium chloride 0.9% infusion             Review of Systems:   ROS:  10 point ROS neg other than the symptoms noted above in the HPI.            Physical Exam:   All vitals have been reviewed  Patient Vitals for the past 24 hrs:   BP  "Temp Temp src Pulse Resp SpO2 Height Weight   05/24/23 0758 107/61 98.3  F (36.8  C) Oral 87 16 91 % -- --   05/24/23 0106 135/74 97.7  F (36.5  C) Oral 72 -- 94 % 1.753 m (5' 9\") 91.4 kg (201 lb 8 oz)   05/24/23 0043 -- -- -- 75 16 99 % -- --   05/24/23 0000 117/69 -- -- 78 16 98 % -- --   05/23/23 2300 123/66 -- -- 80 -- 97 % -- --   05/23/23 2240 -- -- -- -- -- -- 1.753 m (5' 9\") 90.7 kg (200 lb)   05/23/23 2211 115/54 97.1  F (36.2  C) Temporal 81 17 97 % -- --     No intake or output data in the 24 hours ending 05/24/23 0859      Physical Exam   Temp: 98.3  F (36.8  C) Temp src: Oral BP: 107/61 Pulse: 87   Resp: 16 SpO2: 91 % O2 Device: None (Room air)    Vital Signs with Ranges  Temp:  [96.8  F (36  C)-98.3  F (36.8  C)] 98.3  F (36.8  C)  Pulse:  [70-87] 87  Resp:  [16-18] 16  BP: (102-135)/(54-74) 107/61  SpO2:  [90 %-99 %] 91 %  201 lbs 8.01 oz    Constitutional: Pleasant, alert, appropriate, following commands.  HEENT: Head atraumatic normocephalic. Pupils equal round and reactive to light.  Respiratory: Unlabored breathing no audible wheeze  Cardiovascular: Regular rate and rhythm per pulses  GI: Abdomen non-distended.  Lymph/Hematologic: No lymphadenopathy in areas examined  Genitourinary:  No mendoza  Skin: No rashes, no cyanosis, no edema.  Musculoskeletal: On physical exam of the patient's right lower extremity, a short leg splint is in place.  She is able to flex and extend bilateral toes.  She reports equal sensation to light touch in the right versus left lower extremities.  Brisk cap refill bilaterally.  On examination of the left ankle, she has lateral ankle swelling and mild ecchymosis.  Tender to palpation over the ATF and CF ligaments.  Denies tenderness palpation over the AITF ligament.  Limited motion with dorsi and plantarflexion on the left.   Neurologic: normal without focal findings, mental status, speech normal, alert and oriented x iii  Neuropsychiatric: stable             Data:   All " laboratory data reviewed  Results for orders placed or performed during the hospital encounter of 05/23/23   Basic metabolic panel     Status: Abnormal   Result Value Ref Range    Sodium 139 136 - 145 mmol/L    Potassium 4.6 3.4 - 5.3 mmol/L    Chloride 106 98 - 107 mmol/L    Carbon Dioxide (CO2) 23 22 - 29 mmol/L    Anion Gap 10 7 - 15 mmol/L    Urea Nitrogen 19.6 8.0 - 23.0 mg/dL    Creatinine 0.97 (H) 0.51 - 0.95 mg/dL    Calcium 8.3 (L) 8.8 - 10.2 mg/dL    Glucose 136 (H) 70 - 99 mg/dL    GFR Estimate 61 >60 mL/min/1.73m2   CBC with platelets     Status: Normal   Result Value Ref Range    WBC Count 9.7 4.0 - 11.0 10e3/uL    RBC Count 3.98 3.80 - 5.20 10e6/uL    Hemoglobin 11.8 11.7 - 15.7 g/dL    Hematocrit 36.2 35.0 - 47.0 %    MCV 91 78 - 100 fL    MCH 29.6 26.5 - 33.0 pg    MCHC 32.6 31.5 - 36.5 g/dL    RDW 12.2 10.0 - 15.0 %    Platelet Count 247 150 - 450 10e3/uL          Attestation:  I have reviewed today's vital signs, notes, medications, labs and imaging with Dr. Declan Casanova.  Amount of time performed on this consult: 50 minutes.    Zuleyma Almeida PA-C   '

## 2023-05-24 NOTE — PROGRESS NOTES
RECEIVING UNIT ED HANDOFF REVIEW    ED Nurse Handoff Report was reviewed by: Dorys Siegel RN on May 24, 2023 at 12:30 AM

## 2023-05-24 NOTE — ED PROVIDER NOTES
History     Chief Complaint:  Fall     The history is provided by the patient.      Janie De Leon is a 73 year old female with history of NSTEMI and osteopenia who presents to the ED via EMS alone for evaluation after a fall.  Patient was seen here earlier today after a mechanical fall and found to have a trimalleolar fracture right ankle and sprain of the left ankle.  I offered admission at that time, however, she felt comfortable discharged home with a walker and could be weightbearing status on the left leg per orthopedics.  She went home and made about 3 steps out of the car and fell down.  She denied hitting her head or any new pain. She requests to be admitted because she is unable to care for herself at home.  Her  has dementia and will be unable to help her at home.    Independent Historian:   None - Patient Only    Review of External Notes:       Medications:    Plavix  Metoprolol  Cymbalta  Klonopin   Norvasc   Aspirin 81 mg  Lipitor   Synthroid   Benicar      Past Medical History:    Adrenal nodule   Anemia              Atypical ductal hyperplasia of both breasts      Fibroid uterus    High cholesterol            Hormone replacement therapy             Hypertension     Hypothyroidism                                     Major depressive disorder, single episode in full remission  NSTEMI (non-ST elevated myocardial infarction)   Osteopenia        Prediabetes       TBI (traumatic brain injury)       Tubular adenoma of colon        Insomnia   CKD  Adjustment disorder with anxiety   Chronic constipation  Fatty liver     Past Surgical History:    Ankle surgery  Breast biopsy, many     EGD  Colonoscopy x6  Coronary angiogram   Instantaneous wave-free ratio   Optical coherence tomography  Hysterectomy  Total abdominal hysterectomy & Bilateral salpingo-oophorectomy   Tubal ligation     Physical Exam     Patient Vitals for the past 24 hrs:   BP Temp Temp src Pulse Resp SpO2 Height Weight  "  05/23/23 2300 123/66 -- -- 80 -- 97 % -- --   05/23/23 2240 -- -- -- -- -- -- 1.753 m (5' 9\") 90.7 kg (200 lb)   05/23/23 2211 115/54 97.1  F (36.2  C) Temporal 81 17 97 % -- --      Physical Exam  Constitutional: Well appearing.  HEENT: Atraumatic.  Moist mucous membranes.  Neck: Soft.  Supple.  No tenderness to palpation.  Abdomen: Soft and nontender.  Nondistended.  Musculoskeletal: Right leg and short leg splint.  Sensation light touch intact throughout the toes on the right foot.  Cap refill less than 3 seconds  Neurologic: Alert and oriented x3.  Normal tone and bulk.    Skin: No rashes.  No edema.  Psych: Normal affect.  Normal behavior.      Emergency Department Course     Emergency Department Course & Assessments:     Interventions:  Medications   HYDROmorphone (PF) (DILAUDID) injection 0.5 mg (0.5 mg Intravenous $Given 5/23/23 2302)      Independent Interpretation (X-rays, CTs, rhythm strip):  None    Assessments/Consultations/Discussion of Management or Tests:  ED Course as of 05/23/23 2330   Tue May 23, 2023   2249 I obtained history and examined the patient as noted above.     I spoke with Dr. Sewell, hospitalist, who agreed to admit the patient.     Social Determinants of Health affecting care:   None    Disposition:  The patient was admitted to the hospital under the care of Dr. Sewell.     Impression & Plan      Medical Decision Making:  Janie De Leon is a 73-year-old woman who is afebrile and hemodynamically stable.  No new injuries today but she is unable to care for herself at home.  No new injuries pain and no indication for further testing.  She remains neurovascular intact in the right foot.  Pain control as above.  Discussed with hospitalist service who accepts for admission.  She is stable condition awaiting transport to the floor.    Diagnosis:    ICD-10-CM    1. Closed trimalleolar fracture of right ankle, initial encounter  S82.851A       2. Fall, initial encounter  W19.XXXA     "        Scribe Disclosure:  I, Luis Fernando Santiago, am serving as a scribe at 11:31 PM on 5/23/2023 to document services personally performed by Hernan Goldman MD based on my observations and the provider's statements to me.     5/23/2023   Hernan Goldman MD Salay, Nicholas J, MD  05/24/23 0117

## 2023-05-24 NOTE — PLAN OF CARE
Goal Outcome Evaluation:    Shift: 5/24/23 [id number]  Summary: Right ankle fx, soft tissue swelling of L ankle  Care Plan Summary Note: Pt fell at home, came to ED for ankle fx dx, was sent back home and fell getting out of car, unsteady and  at home has dementia and cannot help pt so she came back to ER  Orientation: A&O x4  Observation Goals (met & not met): not met  Activity Level: bedrest  Fall Risk: Y  Behavior & Aggression Tool Color: Green  Pain Management: PRN Dilaudid x1, PRN Oxy x1  ABNL VS/O2: VSS RA  ABNL Lab/BG: NA BMP and CBC AM draw  Diet: NPO except chips and meds  Bowel/Bladder: Continent  Drains/Devices: Cast on RLE, splint to L ankle. R hand IV infusing NS at 100 ml/hr  Tests/Procedures for next shift: Pending ortho surgery consult  Anticipated DC date: TBD  Other Important Info:

## 2023-05-24 NOTE — ED NOTES
"[facility]  ED Nurse Handoff Report    ED Chief complaint: Fall      ED Diagnosis:   Final diagnoses:   None       Code Status: Full Code    Allergies:   Allergies   Allergen Reactions    Ciprofloxacin GI Disturbance    Oxycodone Other (See Comments)     She prefers not to have Oxycodone or Oxycontin due to potential addictive properties    Simvastatin Other (See Comments)     Muscle aches        Patient Story: Pt returned to ED after attempting to go home after a fall with right ankle fracture. Pt was attempting to get in the home and fell backward onto her buttocks.  \  Focused Assessment:  Pt denies any new pain/injuries. Reports she was unable to manage at home independently with a walker. Pt is alert and oriented. Did not hit head or have LOC. Pleasant and cooperative. Has splinted right ankle with CMS intact and also has fracture to left ankle but is allowed to bear weight on the left per MD.     Treatments and/or interventions provided: See previous visit notes.  Labs Ordered and Resulted from Time of ED Arrival to Time of ED Departure - No data to display    Patient's response to treatments and/or interventions: Tolerating interventions well    To be done/followed up on inpatient unit:  Continue plan of care    Does this patient have any cognitive concerns?:  none    Activity level - Baseline/Home:  Independent  Activity Level - Current:   Stand with Assist and Walker    Patient's Preferred language: English   Needed?: No    Isolation: None  Infection: Not Applicable  Patient tested for COVID 19 prior to admission: NO  Bariatric?: No    Vital Signs:   Vitals:    05/23/23 2211 05/23/23 2240   BP: 115/54    Pulse: 81    Resp: 17    Temp: 97.1  F (36.2  C)    TempSrc: Temporal    SpO2: 97%    Weight:  90.7 kg (200 lb)   Height:  1.753 m (5' 9\")       Cardiac Rhythm:     Was the PSS-3 completed:   Yes  What interventions are required if any?               Family Comments: Not " Infant feeding plans discussed with mother. Mother taught to recognize the cues that indicate when her infants is hungry and when they are full. Mother encouraged to feed her infant on demand allowing baby to feed as often and for as long as the infant wants to and discussed that most babies will feed at least 8 times in 24 hrs. Patients instructed it is is best for breastfeeding  success to avoid bottles and pacifiers unless medically indicated until breastfeeding is fully established( approximately 3-4 weeks) reviewed handout \"What do the experts say about the use of pacifiers/supplementation of a  infant? \" with patient. Discussed breastfeeding information see education. (see Education Tab)    Baby did  use pacifier during hospital stay. Formula feeding/ formula supplementation plan. Refer to [employer] Guide To Formula Feeding Your Baby\" booklet. Formula preparation handout given and reviewed with parents with demonstration of Formula preparation according to CDC Guidelines. Formula preparation video offered/refused. Questions answered. present  OBS brochure/video discussed/provided to patient/family: No              Name of person given brochure if not patient: n/a              Relationship to patient: n/a    For the majority of the shift this patient's behavior was Green.   Behavioral interventions performed were Rounding.    ED NURSE PHONE NUMBER: *[phone]

## 2023-05-24 NOTE — UTILIZATION REVIEW
Admission Status; Secondary Review Determination    Under the authority of the Utilization Management Committee, the utilization review process indicated a secondary review on the above patient. The review outcome is based on review of the medical records, discussions with staff, and applying clinical experience noted on the date of the review.    (x) Inpatient Status Appropriate - This patient's medical care is consistent with medical management for inpatient care and reasonable inpatient medical practice.    RATIONALE FOR DETERMINATION: 73-year-old female with history of CAD, osteopenia who suffered a mechanical fall resulting in a trimalleolar fracture of the right ankle as well as significant sprain on the left ankle with imaging revealing marked soft tissue swelling on the lateral left ankle with an age-indeterminate bone fragment along the dorsal aspect of the talar neck cannot rule out new fracture there.  Due to patient's bilateral disabling ankle injuries requiring surgical intervention on the right side patient needing adequate pain control and expected greater than 2 nights in the hospital.    At the time of admission with the information available to the attending physician more than 2 nights Hospital complex care was anticipated, based on patient risk of adverse outcome if treated as outpatient and complex care required. Inpatient admission is appropriate based on the Medicare guidelines.    This document was produced using voice recognition software    The information on this document is developed by the utilization review team in order for the business office to ensure compliance. This only denotes the appropriateness of proper admission status and does not reflect the quality of care rendered.    The definitions of Inpatient Status and Observation Status used in making the determination above are those provided in the CMS Coverage Manual, Chapter 1 and Chapter 6, section  70.4.    Sincerely,    Dre Storey MD  Utilization Review  Physician Advisor  [facility].

## 2023-05-24 NOTE — PROVIDER NOTIFICATION
Will send to UR. Needs operative repair of ankle. Discussed with ortho. Full note to follow.    Buzz Balke MD

## 2023-05-24 NOTE — H&P
[facility]    History and Physical - Hospitalist Service       Date of Admission:  5/23/2023    Assessment & Plan      Janie De Leon is a 73 year old female admitted on 5/23/2023. She presents after a fall    R trimalleolar fracture  L ankle sprain (r/o talar fx)  *This afternoon pt walking out door of home when twisted both ankles. Fell, didn't hit head or have LOC. No other pain. Imaging w/ likely trimalleolar fracture. L anle with swelling, acute talar fx not excluded. Was able to walk on L ankle, d/c'ed home with crutches  *After return home again fell backwards onto bottom, no head trauma or new pain. Doesn't feel like she can manage at home. In ED VSS.   - routine labs  - NPO, IV fluids pending ortho plans  - prn analgesics  - orthopedic surgery consult    CAD with NSTEMI 9/2022, medically managed  Hypertension   HLD  [needs rec- amlodipine 5 mg at HS, ASA 81 mg daily, lipitor 40 mg daily, plavix 75 mg daily, metoprolol 25 mg BID, olmesartan 20 mg daily]  Hospitalized with chest pressure 9/2022. Found to have mild CAD with plaque rupture causing NSTEMI. No stenting/ intervention done, has been medically managed.   - resume plavix, ASA  - hold statin in obs  - resume metoprolol 25 mg BID  - resume other meds with rec    Prediabetes  Hgb A1C was 6.1% 8/2022  - defer to outpatient management    MDD  Anxiety  - prn clonazepam 0.25-0.5 mg at HS available  - resume duloxetine 60 mg daily    Hypothyroidism  - resume levothyroxine with rec       Diet:   NPO pending orthopedic surgery plans  DVT Prophylaxis: Pneumatic Compression Devices  Bryant Catheter: Not present  Lines: None     Cardiac Monitoring: None  Code Status:   Full    Clinically Significant Risk Factors Present on Admission                # Drug Induced Platelet Defect: home medication list includes an antiplatelet medication   # Hypertension: Noted on problem list      # Overweight: Estimated body mass index is 29.53 kg/m  as  "calculated from the following:    Height as of this encounter: 1.753 m (5' 9\").    Weight as of this encounter: 90.7 kg (200 lb).            Disposition Plan      Expected Discharge Date: 05/24/2023                  Jarod Sewell MD  Hospitalist Service  [facility]  Securely message with [employer] (more info)  Text page via [facility] Paging/Directory     ______________________________________________________________________    Chief Complaint   Fall with ankle pain    History is obtained from the patient, electronic health record and emergency department physician    History of Present Illness   Janie De Leon is a 73 year old female who presents with pain after fall.  Patient has a history of coronary disease with an NSTEMI in September 2022, hypertension, prediabetes among others.  The afternoon of presentation she was walking outside when she missed a step and fell, twisting her ankles.  She immediately knew that something bad happened to her right ankle.  She had no head trauma, no loss of consciousness, no dizziness or lightheadedness, no chest pain.  She presented to the emergency department where she was found to have a likely trimalleolar fracture on the right as well as a sprained ankle on the left with question of a talar fracture.  She had a cast put on her right ankle with plans to return home.  When she arrived at home when getting out of her car she again fell on her buttocks.  She had no pain or new trauma from this.  She returned to the emergency department as she felt she could manage at home.  Here she is doing well.  She denies any chest pain or shortness of breath.  She has no nausea vomiting.  She has no abdominal pain.  Her right ankle pain is currently well controlled.      Past Medical History    Past Medical History:   Diagnosis Date     Adrenal nodule (H) 09/2022    CT scan     Anemia     mild gastropathy on EGD 2008; neg pill cam     Atypical ductal hyperplasia of " both breasts     Has had biopsies and MRI     Fibroid uterus      High cholesterol      History of hematuria 2008    Cystoscopy for recurrent UTIs; unremarkable renal US. Also recurrent UTIs as child and pyelonephritis.      History of hormone replacement therapy     after JASBIR with BSO     HTN (hypertension)      HTN, goal below 140/90      Hx of bone density study 10/16/2006    Normal     Hypothyroidism      Insomnia     periodic - prn clonazepam helpful a couple times per month     Lipid screening 07/21/2015    Tchol 128, , HDL 53, LDL 53 outside records --> based on our labs off of atorvastatin 10yr ASCVD risk 9.4% in Oct 2015     Major depressive disorder, single episode in full remission (H) 2007     NSTEMI (non-ST elevated myocardial infarction) (H) 9/26/2022     Osteopenia     Mild left hip July 2016     Prediabetes     Metformin in the past     Rotator cuff injury, left, sequela     MRI Jan 2015 and had PT; High-grade complete or near complete tear of the supraspinatus tendon and anterior fibers of the infraspinatus tendon. 1/3 of the infraspinatus tendon appears involved.      TBI (traumatic brain injury) (H)     As a child     Tubular adenoma of colon 2013 & 2016       Past Surgical History   Past Surgical History:   Procedure Laterality Date     ANKLE SURGERY  1976     BREAST BIOPSY, RT/LT      Many     C/SECTION, LOW TRANSVERSE  1968     CAPSULE/PILL CAM ENDOSCOPY  2/18/2014    EGD prior; capsule was negative      COLONOSCOPY      1999, 2003, 2008, 6/6/2013     COLONOSCOPY N/A 6/20/2019    Procedure: COLONOSCOPY, WITH POLYPECTOMY AND BIOPSY;  Surgeon: Pepito Romero MD;  Location:  GI     COLONOSCOPY N/A 6/16/2022    Procedure: COLONOSCOPY;  Surgeon: Rodrigo Dunbar MD;  Location:  GI     CV CORONARY ANGIOGRAM N/A 9/26/2022    Procedure: Coronary Angiogram;  Surgeon: Horacio Moran MD;  Location:  HEART CARDIAC CATH LAB     CV INSTANTANEOUS WAVE-FREE RATIO N/A 9/26/2022     Procedure: Instantaneous Wave-Free Ratio;  Surgeon: Horacio Moran MD;  Location:  HEART CARDIAC CATH LAB     CV OPTICAL COHERENCE TOMOGRAPHY N/A 9/26/2022    Procedure: Optical Coherence Tomography;  Surgeon: Horacio Moran MD;  Location:  HEART CARDIAC CATH LAB     HYSTERECTOMY, PAP NO LONGER INDICATED       JASBIR with BSO  5/4/2000    benign fibroids     TUBAL LIGATION  1978       Prior to Admission Medications   Prior to Admission Medications   Prescriptions Last Dose Informant Patient Reported? Taking?   Cholecalciferol (VITAMIN D3 PO)  Self Yes No   Sig: Take 1,000 Units by mouth daily   DULoxetine (CYMBALTA) 60 MG capsule  Pharmacy No No   Sig: Take 1 capsule (60 mg) by mouth daily   amLODIPine (NORVASC) 5 MG tablet   No No   Sig: Take 1 tablet (5 mg) by mouth daily   Patient taking differently: Take 5 mg by mouth every evening   aspirin (ASA) 81 MG EC tablet   No No   Sig: Take 1 tablet (81 mg) by mouth daily   atorvastatin (LIPITOR) 40 MG tablet   No No   Sig: Take 1 tablet (40 mg) by mouth every evening   clonazePAM (KLONOPIN) 0.5 MG tablet   No No   Sig: Take 0.5-1 tablets (0.25-0.5 mg) by mouth nightly as needed for anxiety   clopidogrel (PLAVIX) 75 MG tablet   No No   Sig: Take 1 tablet (75 mg) by mouth daily   cyanocobalamin (VITAMIN B-12) 100 MCG tablet   Yes No   Sig: Take 100 mcg by mouth daily   levothyroxine (SYNTHROID/LEVOTHROID) 88 MCG tablet  Pharmacy No No   Sig: Take 1 tablet (88 mcg) by mouth daily - Overdue for endocrinology appointment   metoprolol tartrate (LOPRESSOR) 25 MG tablet   No No   Sig: Take 1 tablet (25 mg) by mouth 2 times daily   nitroGLYcerin (NITROSTAT) 0.4 MG sublingual tablet   No No   Sig: For chest pain place 1 tablet under the tongue every 5 minutes for 3 doses. If symptoms persist 5 minutes after 1st dose call 911.   olmesartan (BENICAR) 20 MG tablet   No No   Sig: Take 1 tablet (20 mg) by mouth daily   oxyCODONE-acetaminophen (PERCOCET) 5-325 MG tablet   No No    Sig: Take 1 tablet by mouth every 6 hours as needed for pain   polyethylene glycol (MIRALAX) 17 GM/Dose powder  Self Yes No   Sig: Take 17 g by mouth daily as needed for constipation      Facility-Administered Medications: None        Review of Systems    The 10 point Review of Systems is negative other than noted in the HPI or here.      Physical Exam   Vital Signs: Temp: 97.1  F (36.2  C) Temp src: Temporal BP: 123/66 Pulse: 80   Resp: 17 SpO2: 97 % O2 Device: None (Room air)    Weight: 200 lbs 0 oz    General Appearance: Alert, delightfully pleasant, no distress  Respiratory: CTA B  Cardiovascular: RRR, no murmur, no edema  GI: soft, nt/nd  Skin: no rashes or lesions grossly    Other: R ankle in cast, L ankle swollen     Medical Decision Making       60 MINUTES SPENT BY ME on the date of service doing chart review, history, exam, documentation & further activities per the note.      Data         Imaging results reviewed over the past 24 hrs:   Recent Results (from the past 24 hour(s))   XR Tibia and Fibula Right 2 Views    Narrative    EXAM: XR TIBIA AND FIBULA RIGHT 2 VIEWS, XR ANKLE BILATERAL G/E 3 VIEWS  LOCATION: [facility]  DATE/TIME: 5/23/2023 5:30 PM CDT    INDICATION: Fall, pain.  COMPARISON: None.      Impression    IMPRESSION: Acute, mildly displaced right ankle fracture, likely trimalleolar. No fracture of the more proximal right tibia or fibula. Degenerative changes proximal tibiofibular joint.    The left ankle shows marked soft tissue swelling laterally. Age-indeterminate bone fragment along the dorsal aspect of the talar neck. An acute talar fracture is not excluded. Bony prominence of the lateral malleolus is likely related to a remote healed   fracture. Ankle mortise is intact.    NOTE: ABNORMAL REPORT    THE DICTATION ABOVE DESCRIBES AN ABNORMALITY FOR WHICH FOLLOW-UP IS NEEDED.    XR Ankle Bilateral G/E 3 Views    Narrative    EXAM: XR TIBIA AND FIBULA RIGHT 2 VIEWS,  XR ANKLE BILATERAL G/E 3 VIEWS  LOCATION: [facility]  DATE/TIME: 5/23/2023 5:30 PM CDT    INDICATION: Fall, pain.  COMPARISON: None.      Impression    IMPRESSION: Acute, mildly displaced right ankle fracture, likely trimalleolar. No fracture of the more proximal right tibia or fibula. Degenerative changes proximal tibiofibular joint.    The left ankle shows marked soft tissue swelling laterally. Age-indeterminate bone fragment along the dorsal aspect of the talar neck. An acute talar fracture is not excluded. Bony prominence of the lateral malleolus is likely related to a remote healed   fracture. Ankle mortise is intact.    NOTE: ABNORMAL REPORT    THE DICTATION ABOVE DESCRIBES AN ABNORMALITY FOR WHICH FOLLOW-UP IS NEEDED.

## 2023-05-25 ENCOUNTER — APPOINTMENT (OUTPATIENT)
Dept: GENERAL RADIOLOGY | Facility: CLINIC | Age: 73
DRG: 493 | End: 2023-05-25
Payer: MEDICARE

## 2023-05-25 ENCOUNTER — ANESTHESIA EVENT (OUTPATIENT)
Dept: SURGERY | Facility: CLINIC | Age: 73
DRG: 493 | End: 2023-05-25
Payer: MEDICARE

## 2023-05-25 ENCOUNTER — APPOINTMENT (OUTPATIENT)
Dept: GENERAL RADIOLOGY | Facility: CLINIC | Age: 73
DRG: 493 | End: 2023-05-25
Attending: STUDENT IN AN ORGANIZED HEALTH CARE EDUCATION/TRAINING PROGRAM
Payer: MEDICARE

## 2023-05-25 ENCOUNTER — ANESTHESIA (OUTPATIENT)
Dept: SURGERY | Facility: CLINIC | Age: 73
DRG: 493 | End: 2023-05-25
Payer: MEDICARE

## 2023-05-25 LAB — DEPRECATED CALCIDIOL+CALCIFEROL SERPL-MC: 46 UG/L (ref 20–75)

## 2023-05-25 PROCEDURE — 0QSG04Z REPOSITION RIGHT TIBIA WITH INTERNAL FIXATION DEVICE, OPEN APPROACH: ICD-10-PCS | Performed by: STUDENT IN AN ORGANIZED HEALTH CARE EDUCATION/TRAINING PROGRAM

## 2023-05-25 PROCEDURE — 710N000009 HC RECOVERY PHASE 1, LEVEL 1, PER MIN: Performed by: STUDENT IN AN ORGANIZED HEALTH CARE EDUCATION/TRAINING PROGRAM

## 2023-05-25 PROCEDURE — 250N000011 HC RX IP 250 OP 636: Performed by: NURSE ANESTHETIST, CERTIFIED REGISTERED

## 2023-05-25 PROCEDURE — 250N000009 HC RX 250: Performed by: STUDENT IN AN ORGANIZED HEALTH CARE EDUCATION/TRAINING PROGRAM

## 2023-05-25 PROCEDURE — 250N000013 HC RX MED GY IP 250 OP 250 PS 637

## 2023-05-25 PROCEDURE — C1713 ANCHOR/SCREW BN/BN,TIS/BN: HCPCS | Performed by: STUDENT IN AN ORGANIZED HEALTH CARE EDUCATION/TRAINING PROGRAM

## 2023-05-25 PROCEDURE — 120N000001 HC R&B MED SURG/OB

## 2023-05-25 PROCEDURE — 370N000017 HC ANESTHESIA TECHNICAL FEE, PER MIN: Performed by: STUDENT IN AN ORGANIZED HEALTH CARE EDUCATION/TRAINING PROGRAM

## 2023-05-25 PROCEDURE — 250N000025 HC SEVOFLURANE, PER MIN: Performed by: STUDENT IN AN ORGANIZED HEALTH CARE EDUCATION/TRAINING PROGRAM

## 2023-05-25 PROCEDURE — 360N000084 HC SURGERY LEVEL 4 W/ FLUORO, PER MIN: Performed by: STUDENT IN AN ORGANIZED HEALTH CARE EDUCATION/TRAINING PROGRAM

## 2023-05-25 PROCEDURE — 250N000013 HC RX MED GY IP 250 OP 250 PS 637: Performed by: INTERNAL MEDICINE

## 2023-05-25 PROCEDURE — 250N000013 HC RX MED GY IP 250 OP 250 PS 637: Performed by: STUDENT IN AN ORGANIZED HEALTH CARE EDUCATION/TRAINING PROGRAM

## 2023-05-25 PROCEDURE — 272N000001 HC OR GENERAL SUPPLY STERILE: Performed by: STUDENT IN AN ORGANIZED HEALTH CARE EDUCATION/TRAINING PROGRAM

## 2023-05-25 PROCEDURE — 999N000063 XR ANKLE PORT RIGHT G/E 3 VIEWS: Mod: RT

## 2023-05-25 PROCEDURE — 258N000003 HC RX IP 258 OP 636: Performed by: ANESTHESIOLOGY

## 2023-05-25 PROCEDURE — 271N000001 HC OR GENERAL SUPPLY NON-STERILE: Performed by: STUDENT IN AN ORGANIZED HEALTH CARE EDUCATION/TRAINING PROGRAM

## 2023-05-25 PROCEDURE — P9045 ALBUMIN (HUMAN), 5%, 250 ML: HCPCS | Performed by: NURSE ANESTHETIST, CERTIFIED REGISTERED

## 2023-05-25 PROCEDURE — C1769 GUIDE WIRE: HCPCS | Performed by: STUDENT IN AN ORGANIZED HEALTH CARE EDUCATION/TRAINING PROGRAM

## 2023-05-25 PROCEDURE — 0QSGXZZ REPOSITION RIGHT TIBIA, EXTERNAL APPROACH: ICD-10-PCS | Performed by: STUDENT IN AN ORGANIZED HEALTH CARE EDUCATION/TRAINING PROGRAM

## 2023-05-25 PROCEDURE — 250N000009 HC RX 250: Performed by: NURSE ANESTHETIST, CERTIFIED REGISTERED

## 2023-05-25 PROCEDURE — 250N000013 HC RX MED GY IP 250 OP 250 PS 637: Performed by: PHYSICIAN ASSISTANT

## 2023-05-25 PROCEDURE — 999N000179 XR SURGERY CARM FLUORO LESS THAN 5 MIN W STILLS

## 2023-05-25 PROCEDURE — 258N000003 HC RX IP 258 OP 636: Performed by: INTERNAL MEDICINE

## 2023-05-25 PROCEDURE — 99232 SBSQ HOSP IP/OBS MODERATE 35: CPT | Performed by: STUDENT IN AN ORGANIZED HEALTH CARE EDUCATION/TRAINING PROGRAM

## 2023-05-25 PROCEDURE — 250N000011 HC RX IP 250 OP 636: Performed by: PHYSICIAN ASSISTANT

## 2023-05-25 PROCEDURE — 258N000003 HC RX IP 258 OP 636: Performed by: NURSE ANESTHETIST, CERTIFIED REGISTERED

## 2023-05-25 PROCEDURE — 0QSJ04Z REPOSITION RIGHT FIBULA WITH INTERNAL FIXATION DEVICE, OPEN APPROACH: ICD-10-PCS | Performed by: STUDENT IN AN ORGANIZED HEALTH CARE EDUCATION/TRAINING PROGRAM

## 2023-05-25 PROCEDURE — 250N000009 HC RX 250: Performed by: ANESTHESIOLOGY

## 2023-05-25 PROCEDURE — 999N000141 HC STATISTIC PRE-PROCEDURE NURSING ASSESSMENT: Performed by: STUDENT IN AN ORGANIZED HEALTH CARE EDUCATION/TRAINING PROGRAM

## 2023-05-25 PROCEDURE — 250N000011 HC RX IP 250 OP 636: Performed by: ANESTHESIOLOGY

## 2023-05-25 DEVICE — SCREW BN 14MM 2.7MM SS CMPR NS KREULOCK LF: Type: IMPLANTABLE DEVICE | Site: ANKLE | Status: FUNCTIONAL

## 2023-05-25 DEVICE — IMPLANTABLE DEVICE: Type: IMPLANTABLE DEVICE | Site: ANKLE | Status: FUNCTIONAL

## 2023-05-25 DEVICE — IMP SCR ARTHREX LP CAN 4.0X46MM LONG THRD SS AR-8840CL-46: Type: IMPLANTABLE DEVICE | Site: ANKLE | Status: FUNCTIONAL

## 2023-05-25 DEVICE — IMP SCR ARTHREX NON-LOCKING CANC CORT 2.7X20MM AR-8827-20: Type: IMPLANTABLE DEVICE | Site: ANKLE | Status: FUNCTIONAL

## 2023-05-25 DEVICE — SCREW BN 18MM 2.7MM STAINOLESS STL CMPR NS KREULOCK LF: Type: IMPLANTABLE DEVICE | Site: ANKLE | Status: FUNCTIONAL

## 2023-05-25 DEVICE — SCREW BN 16MM 2.7MM SS CMPR NS KREULOCK LF: Type: IMPLANTABLE DEVICE | Site: ANKLE | Status: FUNCTIONAL

## 2023-05-25 RX ORDER — FENTANYL CITRATE 50 UG/ML
INJECTION, SOLUTION INTRAMUSCULAR; INTRAVENOUS PRN
Status: DISCONTINUED | OUTPATIENT
Start: 2023-05-25 | End: 2023-05-25

## 2023-05-25 RX ORDER — HYDROMORPHONE HCL IN WATER/PF 6 MG/30 ML
0.4 PATIENT CONTROLLED ANALGESIA SYRINGE INTRAVENOUS
Status: DISCONTINUED | OUTPATIENT
Start: 2023-05-25 | End: 2023-05-30 | Stop reason: HOSPADM

## 2023-05-25 RX ORDER — METHOCARBAMOL 500 MG/1
500 TABLET, FILM COATED ORAL EVERY 6 HOURS PRN
Status: DISCONTINUED | OUTPATIENT
Start: 2023-05-25 | End: 2023-05-30 | Stop reason: HOSPADM

## 2023-05-25 RX ORDER — LIDOCAINE 40 MG/G
CREAM TOPICAL
Status: DISCONTINUED | OUTPATIENT
Start: 2023-05-25 | End: 2023-05-30 | Stop reason: HOSPADM

## 2023-05-25 RX ORDER — HYDROMORPHONE HYDROCHLORIDE 2 MG/1
4 TABLET ORAL EVERY 4 HOURS PRN
Status: DISCONTINUED | OUTPATIENT
Start: 2023-05-25 | End: 2023-05-30 | Stop reason: HOSPADM

## 2023-05-25 RX ORDER — ONDANSETRON 2 MG/ML
4 INJECTION INTRAMUSCULAR; INTRAVENOUS EVERY 30 MIN PRN
Status: DISCONTINUED | OUTPATIENT
Start: 2023-05-25 | End: 2023-05-25 | Stop reason: HOSPADM

## 2023-05-25 RX ORDER — MAGNESIUM HYDROXIDE 1200 MG/15ML
LIQUID ORAL PRN
Status: DISCONTINUED | OUTPATIENT
Start: 2023-05-25 | End: 2023-05-25 | Stop reason: HOSPADM

## 2023-05-25 RX ORDER — ACETAMINOPHEN 325 MG/1
975 TABLET ORAL EVERY 8 HOURS
Status: COMPLETED | OUTPATIENT
Start: 2023-05-25 | End: 2023-05-28

## 2023-05-25 RX ORDER — CEFAZOLIN SODIUM 2 G/100ML
2 INJECTION, SOLUTION INTRAVENOUS EVERY 8 HOURS
Status: COMPLETED | OUTPATIENT
Start: 2023-05-26 | End: 2023-05-26

## 2023-05-25 RX ORDER — VASOPRESSIN IN 0.9 % NACL 2 UNIT/2ML
SYRINGE (ML) INTRAVENOUS PRN
Status: DISCONTINUED | OUTPATIENT
Start: 2023-05-25 | End: 2023-05-25

## 2023-05-25 RX ORDER — EPHEDRINE SULFATE 50 MG/ML
INJECTION, SOLUTION INTRAMUSCULAR; INTRAVENOUS; SUBCUTANEOUS PRN
Status: DISCONTINUED | OUTPATIENT
Start: 2023-05-25 | End: 2023-05-25

## 2023-05-25 RX ORDER — ASPIRIN 325 MG
325 TABLET, DELAYED RELEASE (ENTERIC COATED) ORAL DAILY
Status: DISCONTINUED | OUTPATIENT
Start: 2023-05-25 | End: 2023-05-26

## 2023-05-25 RX ORDER — AMOXICILLIN 250 MG
1 CAPSULE ORAL 2 TIMES DAILY
Status: DISCONTINUED | OUTPATIENT
Start: 2023-05-25 | End: 2023-05-30 | Stop reason: HOSPADM

## 2023-05-25 RX ORDER — ACETAMINOPHEN 325 MG/1
650 TABLET ORAL EVERY 4 HOURS PRN
Status: DISCONTINUED | OUTPATIENT
Start: 2023-05-28 | End: 2023-05-30 | Stop reason: HOSPADM

## 2023-05-25 RX ORDER — PROPOFOL 10 MG/ML
INJECTION, EMULSION INTRAVENOUS CONTINUOUS PRN
Status: DISCONTINUED | OUTPATIENT
Start: 2023-05-25 | End: 2023-05-25

## 2023-05-25 RX ORDER — HYDRALAZINE HYDROCHLORIDE 20 MG/ML
2.5-5 INJECTION INTRAMUSCULAR; INTRAVENOUS
Status: DISCONTINUED | OUTPATIENT
Start: 2023-05-25 | End: 2023-05-25 | Stop reason: HOSPADM

## 2023-05-25 RX ORDER — HYDROMORPHONE HCL IN WATER/PF 6 MG/30 ML
0.2 PATIENT CONTROLLED ANALGESIA SYRINGE INTRAVENOUS EVERY 5 MIN PRN
Status: DISCONTINUED | OUTPATIENT
Start: 2023-05-25 | End: 2023-05-25 | Stop reason: HOSPADM

## 2023-05-25 RX ORDER — HYDROMORPHONE HCL IN WATER/PF 6 MG/30 ML
0.2 PATIENT CONTROLLED ANALGESIA SYRINGE INTRAVENOUS
Status: DISCONTINUED | OUTPATIENT
Start: 2023-05-25 | End: 2023-05-30 | Stop reason: HOSPADM

## 2023-05-25 RX ORDER — LIDOCAINE HYDROCHLORIDE 20 MG/ML
INJECTION, SOLUTION INFILTRATION; PERINEURAL PRN
Status: DISCONTINUED | OUTPATIENT
Start: 2023-05-25 | End: 2023-05-25

## 2023-05-25 RX ORDER — CEFAZOLIN SODIUM 2 G/100ML
2 INJECTION, SOLUTION INTRAVENOUS SEE ADMIN INSTRUCTIONS
Status: DISCONTINUED | OUTPATIENT
Start: 2023-05-25 | End: 2023-05-25 | Stop reason: HOSPADM

## 2023-05-25 RX ORDER — FENTANYL CITRATE 0.05 MG/ML
25 INJECTION, SOLUTION INTRAMUSCULAR; INTRAVENOUS EVERY 5 MIN PRN
Status: DISCONTINUED | OUTPATIENT
Start: 2023-05-25 | End: 2023-05-25 | Stop reason: HOSPADM

## 2023-05-25 RX ORDER — SODIUM CHLORIDE, SODIUM LACTATE, POTASSIUM CHLORIDE, CALCIUM CHLORIDE 600; 310; 30; 20 MG/100ML; MG/100ML; MG/100ML; MG/100ML
INJECTION, SOLUTION INTRAVENOUS CONTINUOUS
Status: DISCONTINUED | OUTPATIENT
Start: 2023-05-25 | End: 2023-05-25 | Stop reason: HOSPADM

## 2023-05-25 RX ORDER — MEPERIDINE HYDROCHLORIDE 25 MG/ML
12.5 INJECTION INTRAMUSCULAR; INTRAVENOUS; SUBCUTANEOUS EVERY 5 MIN PRN
Status: DISCONTINUED | OUTPATIENT
Start: 2023-05-25 | End: 2023-05-25 | Stop reason: HOSPADM

## 2023-05-25 RX ORDER — HYDROMORPHONE HYDROCHLORIDE 2 MG/1
2 TABLET ORAL EVERY 4 HOURS PRN
Status: DISCONTINUED | OUTPATIENT
Start: 2023-05-25 | End: 2023-05-30 | Stop reason: HOSPADM

## 2023-05-25 RX ORDER — CEFAZOLIN SODIUM 2 G/100ML
2 INJECTION, SOLUTION INTRAVENOUS
Status: COMPLETED | OUTPATIENT
Start: 2023-05-25 | End: 2023-05-25

## 2023-05-25 RX ORDER — SODIUM CHLORIDE, SODIUM LACTATE, POTASSIUM CHLORIDE, CALCIUM CHLORIDE 600; 310; 30; 20 MG/100ML; MG/100ML; MG/100ML; MG/100ML
INJECTION, SOLUTION INTRAVENOUS CONTINUOUS
Status: DISCONTINUED | OUTPATIENT
Start: 2023-05-25 | End: 2023-05-30 | Stop reason: HOSPADM

## 2023-05-25 RX ORDER — DEXAMETHASONE SODIUM PHOSPHATE 4 MG/ML
INJECTION, SOLUTION INTRA-ARTICULAR; INTRALESIONAL; INTRAMUSCULAR; INTRAVENOUS; SOFT TISSUE PRN
Status: DISCONTINUED | OUTPATIENT
Start: 2023-05-25 | End: 2023-05-25

## 2023-05-25 RX ORDER — ONDANSETRON 4 MG/1
4 TABLET, ORALLY DISINTEGRATING ORAL EVERY 6 HOURS PRN
Status: DISCONTINUED | OUTPATIENT
Start: 2023-05-26 | End: 2023-05-30 | Stop reason: HOSPADM

## 2023-05-25 RX ORDER — FENTANYL CITRATE 0.05 MG/ML
50 INJECTION, SOLUTION INTRAMUSCULAR; INTRAVENOUS EVERY 5 MIN PRN
Status: DISCONTINUED | OUTPATIENT
Start: 2023-05-25 | End: 2023-05-25 | Stop reason: HOSPADM

## 2023-05-25 RX ORDER — PROCHLORPERAZINE MALEATE 5 MG
5 TABLET ORAL EVERY 6 HOURS PRN
Status: DISCONTINUED | OUTPATIENT
Start: 2023-05-25 | End: 2023-05-30 | Stop reason: HOSPADM

## 2023-05-25 RX ORDER — ONDANSETRON 2 MG/ML
INJECTION INTRAMUSCULAR; INTRAVENOUS PRN
Status: DISCONTINUED | OUTPATIENT
Start: 2023-05-25 | End: 2023-05-25

## 2023-05-25 RX ORDER — HYDROMORPHONE HCL IN WATER/PF 6 MG/30 ML
0.4 PATIENT CONTROLLED ANALGESIA SYRINGE INTRAVENOUS EVERY 5 MIN PRN
Status: DISCONTINUED | OUTPATIENT
Start: 2023-05-25 | End: 2023-05-25 | Stop reason: HOSPADM

## 2023-05-25 RX ORDER — PROPOFOL 10 MG/ML
INJECTION, EMULSION INTRAVENOUS PRN
Status: DISCONTINUED | OUTPATIENT
Start: 2023-05-25 | End: 2023-05-25

## 2023-05-25 RX ORDER — BISACODYL 10 MG
10 SUPPOSITORY, RECTAL RECTAL DAILY PRN
Status: DISCONTINUED | OUTPATIENT
Start: 2023-05-25 | End: 2023-05-30 | Stop reason: HOSPADM

## 2023-05-25 RX ORDER — ONDANSETRON 4 MG/1
4 TABLET, ORALLY DISINTEGRATING ORAL EVERY 30 MIN PRN
Status: DISCONTINUED | OUTPATIENT
Start: 2023-05-25 | End: 2023-05-25 | Stop reason: HOSPADM

## 2023-05-25 RX ORDER — POLYETHYLENE GLYCOL 3350 17 G/17G
17 POWDER, FOR SOLUTION ORAL DAILY
Status: DISCONTINUED | OUTPATIENT
Start: 2023-05-26 | End: 2023-05-30 | Stop reason: HOSPADM

## 2023-05-25 RX ORDER — ONDANSETRON 2 MG/ML
4 INJECTION INTRAMUSCULAR; INTRAVENOUS EVERY 6 HOURS PRN
Status: DISCONTINUED | OUTPATIENT
Start: 2023-05-26 | End: 2023-05-30 | Stop reason: HOSPADM

## 2023-05-25 RX ORDER — LABETALOL HYDROCHLORIDE 5 MG/ML
5 INJECTION, SOLUTION INTRAVENOUS
Status: DISCONTINUED | OUTPATIENT
Start: 2023-05-25 | End: 2023-05-25 | Stop reason: HOSPADM

## 2023-05-25 RX ADMIN — LEVOTHYROXINE SODIUM 88 MCG: 88 TABLET ORAL at 06:08

## 2023-05-25 RX ADMIN — Medication 1 UNITS: at 17:39

## 2023-05-25 RX ADMIN — DEXAMETHASONE SODIUM PHOSPHATE 8 MG: 4 INJECTION, SOLUTION INTRA-ARTICULAR; INTRALESIONAL; INTRAMUSCULAR; INTRAVENOUS; SOFT TISSUE at 17:09

## 2023-05-25 RX ADMIN — SODIUM CHLORIDE, POTASSIUM CHLORIDE, SODIUM LACTATE AND CALCIUM CHLORIDE: 600; 310; 30; 20 INJECTION, SOLUTION INTRAVENOUS at 16:32

## 2023-05-25 RX ADMIN — PHENYLEPHRINE HYDROCHLORIDE 200 MCG: 10 INJECTION INTRAVENOUS at 17:22

## 2023-05-25 RX ADMIN — PHENYLEPHRINE HYDROCHLORIDE 100 MCG: 10 INJECTION INTRAVENOUS at 17:12

## 2023-05-25 RX ADMIN — FENTANYL CITRATE 50 MCG: 50 INJECTION, SOLUTION INTRAMUSCULAR; INTRAVENOUS at 17:09

## 2023-05-25 RX ADMIN — Medication 5 MG: at 17:34

## 2023-05-25 RX ADMIN — PHENYLEPHRINE HYDROCHLORIDE 200 MCG: 10 INJECTION INTRAVENOUS at 17:32

## 2023-05-25 RX ADMIN — Medication 16 ML: at 16:29

## 2023-05-25 RX ADMIN — SENNOSIDES AND DOCUSATE SODIUM 1 TABLET: 50; 8.6 TABLET ORAL at 21:24

## 2023-05-25 RX ADMIN — Medication 10 MG: at 17:37

## 2023-05-25 RX ADMIN — ONDANSETRON 4 MG: 2 INJECTION INTRAMUSCULAR; INTRAVENOUS at 18:05

## 2023-05-25 RX ADMIN — DULOXETINE HYDROCHLORIDE 60 MG: 60 CAPSULE, DELAYED RELEASE ORAL at 08:08

## 2023-05-25 RX ADMIN — OXYCODONE HYDROCHLORIDE 5 MG: 5 TABLET ORAL at 06:08

## 2023-05-25 RX ADMIN — PHENYLEPHRINE HYDROCHLORIDE 200 MCG: 10 INJECTION INTRAVENOUS at 17:28

## 2023-05-25 RX ADMIN — AMLODIPINE BESYLATE 5 MG: 5 TABLET ORAL at 21:23

## 2023-05-25 RX ADMIN — SENNOSIDES AND DOCUSATE SODIUM 2 TABLET: 50; 8.6 TABLET ORAL at 00:33

## 2023-05-25 RX ADMIN — SODIUM CHLORIDE: 9 INJECTION, SOLUTION INTRAVENOUS at 07:05

## 2023-05-25 RX ADMIN — PROPOFOL 25 MCG/KG/MIN: 10 INJECTION, EMULSION INTRAVENOUS at 17:15

## 2023-05-25 RX ADMIN — POLYETHYLENE GLYCOL 3350 17 G: 17 POWDER, FOR SOLUTION ORAL at 00:37

## 2023-05-25 RX ADMIN — FENTANYL CITRATE 50 MCG: 50 INJECTION, SOLUTION INTRAMUSCULAR; INTRAVENOUS at 17:04

## 2023-05-25 RX ADMIN — PHENYLEPHRINE HYDROCHLORIDE 100 MCG: 10 INJECTION INTRAVENOUS at 17:37

## 2023-05-25 RX ADMIN — Medication 50 MCG: at 08:08

## 2023-05-25 RX ADMIN — ATORVASTATIN CALCIUM 40 MG: 40 TABLET, FILM COATED ORAL at 21:24

## 2023-05-25 RX ADMIN — LIDOCAINE HYDROCHLORIDE 40 MG: 20 INJECTION, SOLUTION INFILTRATION; PERINEURAL at 17:04

## 2023-05-25 RX ADMIN — PHENYLEPHRINE HYDROCHLORIDE 0.5 MCG/KG/MIN: 10 INJECTION INTRAVENOUS at 17:41

## 2023-05-25 RX ADMIN — ACETAMINOPHEN 975 MG: 325 TABLET ORAL at 21:23

## 2023-05-25 RX ADMIN — Medication 1 UNITS: at 17:58

## 2023-05-25 RX ADMIN — PROPOFOL 170 MG: 10 INJECTION, EMULSION INTRAVENOUS at 17:04

## 2023-05-25 RX ADMIN — METOPROLOL TARTRATE 25 MG: 25 TABLET, FILM COATED ORAL at 21:24

## 2023-05-25 RX ADMIN — MIDAZOLAM 1 MG: 1 INJECTION INTRAMUSCULAR; INTRAVENOUS at 16:54

## 2023-05-25 RX ADMIN — METOPROLOL TARTRATE 25 MG: 25 TABLET, FILM COATED ORAL at 08:08

## 2023-05-25 RX ADMIN — ASPIRIN 325 MG: 325 TABLET, COATED ORAL at 21:23

## 2023-05-25 RX ADMIN — ASPIRIN 81 MG: 81 TABLET, COATED ORAL at 08:08

## 2023-05-25 RX ADMIN — ALBUMIN HUMAN: 0.05 INJECTION, SOLUTION INTRAVENOUS at 17:32

## 2023-05-25 RX ADMIN — CEFAZOLIN SODIUM 2 G: 2 INJECTION, SOLUTION INTRAVENOUS at 16:58

## 2023-05-25 RX ADMIN — PHENYLEPHRINE HYDROCHLORIDE 100 MCG: 10 INJECTION INTRAVENOUS at 17:18

## 2023-05-25 RX ADMIN — SODIUM CHLORIDE, POTASSIUM CHLORIDE, SODIUM LACTATE AND CALCIUM CHLORIDE: 600; 310; 30; 20 INJECTION, SOLUTION INTRAVENOUS at 18:19

## 2023-05-25 RX ADMIN — PHENYLEPHRINE HYDROCHLORIDE 100 MCG: 10 INJECTION INTRAVENOUS at 17:15

## 2023-05-25 RX ADMIN — OXYCODONE HYDROCHLORIDE 5 MG: 5 TABLET ORAL at 00:33

## 2023-05-25 ASSESSMENT — ACTIVITIES OF DAILY LIVING (ADL)
ADLS_ACUITY_SCORE: 25
ADLS_ACUITY_SCORE: 28
ADLS_ACUITY_SCORE: 28
ADLS_ACUITY_SCORE: 25
ADLS_ACUITY_SCORE: 28
ADLS_ACUITY_SCORE: 28
ADLS_ACUITY_SCORE: 25
ADLS_ACUITY_SCORE: 28
ADLS_ACUITY_SCORE: 25
ADLS_ACUITY_SCORE: 25

## 2023-05-25 ASSESSMENT — COPD QUESTIONNAIRES: COPD: 0

## 2023-05-25 ASSESSMENT — ENCOUNTER SYMPTOMS
SEIZURES: 0
DYSRHYTHMIAS: 0

## 2023-05-25 ASSESSMENT — LIFESTYLE VARIABLES: TOBACCO_USE: 0

## 2023-05-25 NOTE — PROGRESS NOTES
Orientation/Cognitive: A&O X4  Observation Goals (Met/ Not Met): Inpatient  Mobility Level/Assist Equipment: bedrest, lift  Fall Risk (Y/N): Y  Behavior Concerns: green  Pain Management: Oxycodone X1  Tele/VS/O2: no tele, VSS, O2 RA  ABNL Lab/BG: Creatinine 0.97, Calcium 8.3,   Diet: Reg then NPO after midnight  Bowel/Bladder: incontinent   Skin Concerns: scattered brusing and edema dependent from falls.   Drains/Devices: Purewick, PIV infusing 100 mL/hr  Tests/Procedures for next shift: ORIF of R ankle tomorrow 5/24   Anticipated DC date & active delays: TBD

## 2023-05-25 NOTE — PROGRESS NOTES
[facility]    Medicine Progress Note - Hospitalist Service    Date of Admission:  5/23/2023    Assessment & Plan   Janie De Leon is a 73 year old female admitted on 5/23/2023. She presents after a fall.     R trimalleolar fracture  L ankle sprain (r/o talar fx)  *This afternoon pt walking out door of home when twisted both ankles. Fell, didn't hit head or have LOC. No other pain. Imaging w/ likely trimalleolar fracture. L anle with swelling, acute talar fx not excluded. Was able to walk on L ankle, d/c'ed home with crutches  *After return home again fell backwards onto bottom, no head trauma or new pain. Doesn't feel like she can manage at home. In ED VSS.   - routine labs  - NPO at MN  - prn analgesics  - orthopedic surgery consult appreciated  - plan OR this afternoon    - Patient is medically optimized for surgery. No anginal symptoms. Will get pre-operative EKG as not updated since 9/2022 when she had NSTEMI. RCRI 1. Will hold benicar pre-op.     CAD with NSTEMI 9/2022, medically managed  Hypertension   HLD  [amlodipine 5 mg at HS, ASA 81 mg daily, lipitor 40 mg daily, plavix 75 mg daily, metoprolol 25 mg BID, olmesartan 20 mg daily]  Hospitalized with chest pressure 9/2022. Found to have mild CAD with plaque rupture causing NSTEMI. No stenting/ intervention done, has been medically managed.   * EKG 5/24 without current of acute injury.  - resume ASA  - surgery would like to hold plavix for OR, this is reasonable with her being >6 months from NSTEMI without stent placement, recommend resumption as soon as able.   - resume metoprolol 25 mg BID, amlodipine, lipitor  - hold ACEi with OR plans     Prediabetes  Hgb A1C was 6.1% 8/2022  - defer to outpatient management     MDD  Anxiety  - prn clonazepam 0.25-0.5 mg at HS available  - resume duloxetine 60 mg daily     Hypothyroidism  - resume levothyroxine with rec       Diet: NPO per Anesthesia Guidelines for Procedure/Surgery Except for:  "Meds, Ice Chips    DVT Prophylaxis: Pneumatic Compression Devices  Bryant Catheter: Not present  Lines: None     Cardiac Monitoring: None  Code Status: Full Code      Clinically Significant Risk Factors                  # Hypertension: Noted on problem list        # Overweight: Estimated body mass index is 29.76 kg/m  as calculated from the following:    Height as of this encounter: 1.753 m (5' 9\").    Weight as of this encounter: 91.4 kg (201 lb 8 oz)., PRESENT ON ADMISSION          Disposition Plan      Expected Discharge Date: 05/26/2023        Discharge Comments: ortho consult  ORIF right foot today          Buzz Blake MD  Hospitalist Service  [facility]  Securely message with [employer] (more info)  Text page via [employer] Paging/Directory   ______________________________________________________________________    Interval History   Pain tolerable. Plan for OR today. She expects worsened pain post op.     Physical Exam   Vital Signs: Temp: 97.8  F (36.6  C) Temp src: Oral BP: 118/70 Pulse: 73   Resp: 18 SpO2: 91 % O2 Device: None (Room air)    Weight: 201 lbs 8.01 oz    Constitutional: Awake, alert, cooperative, no apparent distress  Respiratory: Clear to auscultation bilaterally, no crackles or wheezing  Cardiovascular: Regular rate and rhythm, normal S1 and S2, and no murmur noted  GI: Normal bowel sounds, soft, non-distended, non-tender  Skin/Integumen: No rashes, no cyanosis, no edema  Other: Right leg bandaged.       Medical Decision Making       35 MINUTES SPENT BY ME on the date of service doing chart review, history, exam, documentation & further activities per the note.      Data   ------------------------- PAST 24 HR DATA REVIEWED -----------------------------------------------        Imaging results reviewed over the past 24 hrs:   No results found for this or any previous visit (from the past 24 hour(s)).  "

## 2023-05-25 NOTE — PLAN OF CARE
Goal Outcome Evaluation:    Shift: 5/24/23 [id number]  Summary: Right ankle fx, soft tissue swelling of L ankle  Care Plan Summary Note: Pt fell at home, came to ED for ankle fx dx, was sent back home and fell getting out of car, unsteady and  at home has dementia and cannot help pt so she came back to ER  Orientation: A&O x4  Observation Goals (met & not met): not met  Activity Level: bedrest  Fall Risk: Y  Behavior & Aggression Tool Color: Green  Pain Management: PRN oxy x2 doses  ABNL VS/O2: VSS RA  ABNL Lab/BG: NA  Diet: NPO   Bowel/Bladder: Continent  Drains/Devices: Cast on RLE, splint to L ankle. L wrist IV infusing NS at 100 ml/hr. Purewick in place  Tests/Procedures for next shift: R ORIF today   Anticipated DC date: TBD  Other Important Info:

## 2023-05-25 NOTE — PLAN OF CARE
"[id number]  Orientation/Cognitive: A&Ox4  Observation Goals (Met/ Not Met): inpatient  Mobility Level/Assist Equipment: strict bedrest, pt able to turn in bed  Fall Risk (Y/N): yes  Behavior Concerns: none  Pain Management: 2/10 right ankle pain-relieved with ice  Tele/VS/O2: VSS on RA  ABNL Lab/BG: creat 0.97, calcium 8.3, glucose 136  Diet: NPO ex meds and ice chips   Bowel/Bladder: purewick due to bedrest, had large BM  Skin Concerns: bruising on left ankle  Drains/Devices: IV-SL  Tests/Procedures for next shift: patient currently down in surgery  Anticipated DC date & active delays: TBD  Patient Stated Goal for Today: \"be comfortable\"    "

## 2023-05-25 NOTE — BRIEF OP NOTE
[facility]    Brief Operative Note    Pre-operative diagnosis: Closed trimalleolar fracture of right ankle, initial encounter [T97.970G]  Post-operative diagnosis Same as pre-operative diagnosis    Procedure: Procedure(s):  OPEN REDUCTION INTERNAL FIXATION RIGHT ANKLE FRACTURE  Surgeon: Surgeon(s) and Role:     * Declan Casanova MD - Primary     * Makayla Tyler PA-C - Assisting  Anesthesia: General with Block   Estimated Blood Loss: 50 cc    Drains: None  Specimens: * No specimens in log *  Findings:   None.  Complications: None.  Implants:   Implant Name Type Inv. Item Serial No.  Lot No. LRB No. Used Action   SCREW BN 14MM 2.7MM SS CMPR NS KREULOCK LF - [id number] Metallic Hardware/[place] SCREW BN 14MM 2.7MM SS CMPR NS KREULOCK   ARTHREX [id number] Right 1 Implanted   SCREW BN 16MM 2.7MM SS CMPR NS KREULOCK  - [id number] Metallic Hardware/[place] SCREW BN 16MM 2.7MM SS CMPR NS KREULOCK   ARTHREX [id number] Right 2 Implanted   SCREW BN 18MM 2.7MM STAINOLESS STL CMPR NS KREULOCK LF - [id number] Metallic Hardware/[place] SCREW BN 18MM 2.7MM STAINOLESS STL CMPR NS KREULOCK   ARTHREX [id number] Right 1 Implanted   PLATE BN SS 4 HL LCK FIB RT DIST - [id number] Metallic Hardware/[place] PLATE BN SS 4 HL LCK FIB RT DIST  ARTHREX [id number] Right 1 Implanted   IMP SCR ARTHREX NON-LOCKING CANC ALINA 2.7X20MM AR-8827-20 - [id number] Metallic Hardware/[place] IMP SCR ARTHREX NON-LOCKING CANC ALINA 2.7X20MM AR-8827-20  ARTHREX [id number] Right 1 Implanted   IMP SCR ARTHREX LP ALINA TM 3.5X12MM SS AR-8835-12 - [id number] Metallic Hardware/[place] IMP SCR ARTHREX LP ALINA TM 3.5X12MM SS AR-8835-12  ARTHREX [id number] Right 2 Implanted   IMP SCR ARTHREX LP ALINA TM 3.5X14MM SS AR-8835-14 - [id number] Metallic Hardware/[place] IMP SCR ARTHREX LP ALINA TM 3.5X14MM SS AR-8835-14  [id number] Right 1 Implanted   IMP SCR ARTHREX LP CAN 4.0X46MM LONG THRD  [id number] -  [id number] Metallic Hardware/[place] IMP SCR ARTHREX LP CAN 4.0X46MM LONG THRD SS [id number]  ARTHREX [id number] Right 2 Implanted   ARTHREX 7 MM WASHER AR-8870W    ARTHREX [id number] Right 2 Implanted       PLAN:  DVT Prophylaxis: Aspirin 325 mg. Resume PTA plavix.  ABX: Ancef x 24 hours  PACU Xrays  Activity: Non-WB in splint until follow.   PT/OT.  Social work for discharge planning.         Please call as soon as possible to make an appointment to be seen in Dr. Declan Casanova's clinic in 2-3 weeks.     Dr. Casanova's care coordinator is Shanita Matos. Please contact her at [phone] to schedule an appointment.    Dr. Casanova sees patients at 2 clinic locations:    [facility]  o [address] [place] [address]    [facility]   o [address], [address], [place] [address]      Please call the on-call phone number [phone] during evenings, nights and weekends for any urgent needs. Prescription refills must be done during business hours by calling [phone]      Makayla Tyler PA-C  [facility]

## 2023-05-25 NOTE — OP NOTE
ORTHOPEDIC SURGERY  OPERATIVE NOTE    Janie Murphy Penelope  [id number]  1950    May 25, 2023      PREOPERATIVE DIAGNOSIS:    1. Right trimalleolar ankle fracture    POSTOPERATIVE DIAGNOSIS:   Same    PROCEDURE:    1. Open reduction and internal fixation of the right distal fibula   2. Open reduction and internal fixation of the right medial malleolus  3. Closed reduction and splinting of the right posterior malleolus    SURGEON:  Declan Casanova MD    ASSISTANT: Makayla Tyler PA-C; A skilled first assistant was necessary for this procedure for assistance with patient positioning, prepping, draping, surgical visualization, wound closure, and application of the dressing.     SPECIMENS:  None     COMPLICATIONS:  None    ESTIMATED BLOOD LOSS: 50cc    IMPLANTS:   Implant Name Type Inv. Item Serial No.  Lot No. LRB No. Used Action   SCREW BN 14MM 2.7MM SS CMPR NS KREULOCK LF - [id number] Metallic Hardware/[place] SCREW BN 14MM 2.7MM SS CMPR NS KREULOCK   ARTHREX [id number] Right 1 Implanted   SCREW BN 16MM 2.7MM SS CMPR NS KREULOCK LF - [id number] Metallic Hardware/[place] SCREW BN 16MM 2.7MM SS CMPR NS KREULOCK LF  ARTHREX [id number] Right 2 Implanted   SCREW BN 18MM 2.7MM STAINOLESS STL CMPR NS KREULOCK LF - [id number] Metallic Hardware/[place] SCREW BN 18MM 2.7MM STAINOLESS STL CMPR NS KREULOCK LF  ARTHREX [id number] Right 1 Implanted   PLATE BN SS 4 HL LCK FIB RT DIST - [id number] Metallic Hardware/[place] PLATE BN SS 4 HL LCK FIB RT DIST  ARTHREX [id number] Right 1 Implanted   IMP SCR ARTHREX NON-LOCKING CANC ALINA 2.7X20MM AR-8827-20 - [id number] Metallic Hardware/[place] IMP SCR ARTHREX NON-LOCKING CANC ALINA 2.7X20MM AR-8827-20  ARTHREX [id number] Right 1 Implanted   IMP SCR ARTHREX LP ALINA TM 3.5X12MM SS AR-8835-12 - [id number] Metallic Hardware/[place] IMP SCR ARTHREX LP ALINA TM 3.5X12MM SS AR-8835-12  ARTHREX [id number] Right 2 Implanted   IMP SCR ARTHREX LP ALINA TM 3.5X14MM SS AR-8835-14 -  [id number] Metallic Hardware/[place] IMP SCR ARTHREX LP ALINA TM 3.5X14MM SS AR-8835-14  ARTHREX [id number] Right 1 Implanted   IMP SCR ARTHREX LP CAN 4.0X46MM LONG THRD SS [id number] - [id number] Metallic Hardware/[place] IMP SCR ARTHREX LP CAN 4.0X46MM LONG THRD SS [id number]  ARTHREX [id number] Right 2 Implanted   ARTHREX 7 MM WASHER AR-8870W    ARTHREX [id number] Right 2 Implanted       TOURNIQUET TIME:  50min @ 250 mmHg    INDICATIONS:    Janie De Leon is a 73 year old female who presents with a right trimalleolar ankle fracture.  She reports she was walking when she missed a step and fell twisting both of her ankles.  She was unable to bear weight on the right.  She noted significant swelling bilaterally.  She was brought to [facility] emergency department where radiographs revealed a right trimalleolar ankle fracture and no obvious fracture to her left ankle    Radiographs were reviewed and revealed a right trimalleolar ankle fracture with displacement at the ankle joint and a small posterior malleolus fracture.  There was no obvious left ankle fracture.     I discussed these findings with the patient as well as her daughter.  We discussed potential treatment options including nonoperative and operative intervention along with risks and benefits and expected recovery.  I recommended surgery to restore the anatomy of her ankle joint and allow for early mobilization.  After thorough discussion, they were in agreement elected to proceed with surgery.      The risks of bleeding, infection, nerve damage, nonunion, malunion, hardware failure, arthritis, loss of motion, pain, further surgery, stiffness, and the medical risks of anesthesia were discussed. Benefits including improved chance of motion, earlier rehab and improved function were discussed.     Alternatives including nonoperative management were discussed, but not recommended.  The patient and her daughter were in agreement with the plan to  proceed.      The informed consent was signed and documented.  Met with the patient preoperatively to davy the operative extremity.    OPERATIVE COURSE:    The patient was brought into the operating room and placed on operating table.  The patient underwent general anesthesia and received a block in pre-op.  The patient was positioned in a supine position  with a hip bump and bone foam to elevate the operative leg.  A tourniquet was placed.   All bony prominences were well-padded.  The operative extremity was cleansed with Hibiclens. The operative extremity was then prepped with ChloraPrep.  The surgical team scrubbed in.  A WHO timeout was conducted to verify correct patient, correct extremity, presence of the surgeon's initials on the operative extremity, and administration of IV antibiotics, in this case Ancef.      Distal Fibula ORIF  First, we turned our attention to the distal fibula fracture.  A longitudinal incision was made over the distal fibula.  Careful dissection ensued through the soft tissues down to fascia and periosteum.  Soft tissues were elevated off the distal fibula.  The patient's oblique distal fibula fracture was visualized and debrided with rongeurs, curettes, and irrigation.  A sharp new knife was used to visualize the cortical edges.  2 point-to-point bone clamps were used to reduce the fracture with good cortical fit.  A 2.7 mm lag screw was then placed from anterior to posterior.  Fluoroscopy was utilized to confirm good placement of the leg screw as well as good reduction of the fracture.      A 4-hole locking Arthrex plate was then utilized due to the patient's age and poor bone quality. The plate was brought down to bone using a proximal cortical screw followed by distal locking screws.  A total of 4 distal locking screws were utilized and 3 proximal cortical screws.     Medial Malleolus ORIF  We then turned our attention to the medial malleolus fracture a longitudinal incision was  made over the medial malleolus.  Careful dissection was utilized down to fascia and periosteum.  An elevator was used to visualize the fracture.  A transverse fracture of the medial malleolus was visualized. The ankle joint was evaluated and there was no obvious chondral injury.  Irrigation and curettes were used to debride the fracture.     The fracture was then reduced with a mc bone clamp. Two K wires were then placed for the Arthrex 4-0 cannulated screws.  Fluoroscopy was utilized to confirm good placement of the K wires.  The distal aspect of the medial malleolus was then drilled for the cannulated screws.  Two 45 mm 4-0 partially threaded screws with washers were then placed from distal to proximal.  Good reduction of the medial malleolus was noted.  Again fluoroscopy confirmed good placement of the screws.  K wires were then removed.       Posterior Malleolus  We then turned our attention to the posterior malleolus.  Fluoroscopy confirmed good reduction of the posterior malleolus.  It was noted to be less than 20% of the distal tibia.  It was well reduced with no large articular piece.  The decision was made to close reduce the posterior malleolus and placed the patient in a splint postoperatively      Fluoroscopy was utilized for final x-rays to confirm good placement of all hardware and reduction of the patient's ankle.  The wounds were then copiously irrigated with sterile saline.  Deep fascia was closed with 0 Vicryl followed by 2-0 Monocryl in the subcutaneous layer and running 3-0 nylon on the skin.  The patient's wounds were then dressed with Xeroform, gauze, ABDs, and cast padding.  The patient was placed in a short leg splint.    All instruments were accounted for at the end of the case. The patient awoke from anesthesia and was transferred to the PACU in stable condition.      POST OP PLAN:    1.  Ancef x 24 hours.   2.  ASA 325mg daily for DVT prophylaxis.   3.  PACU x-rays.   4.   Non-weightbearing Right Lower Extremity   5.  Physical therapy/occupational therapy.   6.  Keep dressing on for 2 weeks.  OK to shower.  No submerging wound.  7.  Vitamin D 2000U  8.  Discharge:  Case management social work consult for placement        FOLLOWUP:     Please call as soon as possible to make an appointment to be seen in Dr. Declan Casanova's clinic in 2 weeks.     Dr. Casanova's care coordinator is Shanita Matos. Please contact her at [phone] to schedule an appointment.      Dr. Casanova sees patient's at 2 clinic locations:    [facility]  o [address] [place] [address]    [facility]   o [address], [address], [place] [address]      Please call the on-call phone number [phone] during evenings, nights and weekends for any urgent needs. Prescription refills must be done during business hours by calling [phone]      Declan Casanova MD  [facility]

## 2023-05-25 NOTE — ANESTHESIA PREPROCEDURE EVALUATION
Anesthesia Pre-Procedure Evaluation    Patient: Janie De Leon   MRN: [id number] : 1950        Procedure : Procedure(s):  OPEN REDUCTION INTERNAL FIXATION RIGHT ANKLE FRACTURE          Past Medical History:   Diagnosis Date     Adrenal nodule (H) 2022    CT scan     Anemia     mild gastropathy on EGD ; neg pill cam     Atypical ductal hyperplasia of both breasts     Has had biopsies and MRI     Fibroid uterus      High cholesterol      History of hematuria     Cystoscopy for recurrent UTIs; unremarkable renal US. Also recurrent UTIs as child and pyelonephritis.      History of hormone replacement therapy     after JASBIR with BSO     HTN (hypertension)      HTN, goal below 140/90      Hx of bone density study 10/16/2006    Normal     Hypothyroidism      Insomnia     periodic - prn clonazepam helpful a couple times per month     Lipid screening 2015    Tchol 128, , HDL 53, LDL 53 outside records --> based on our labs off of atorvastatin 10yr ASCVD risk 9.4% in Oct 2015     Major depressive disorder, single episode in full remission (H)      NSTEMI (non-ST elevated myocardial infarction) (H) 2022     Osteopenia     Mild left hip 2016     Prediabetes     Metformin in the past     Rotator cuff injury, left, sequela     MRI 2015 and had PT; High-grade complete or near complete tear of the supraspinatus tendon and anterior fibers of the infraspinatus tendon. 1/3 of the infraspinatus tendon appears involved.      TBI (traumatic brain injury) (H)     As a child     Tubular adenoma of colon  &       Past Surgical History:   Procedure Laterality Date     ANKLE SURGERY       BREAST BIOPSY, RT/LT      Many     C/SECTION, LOW TRANSVERSE       CAPSULE/PILL CAM ENDOSCOPY  2014    EGD prior; capsule was negative      COLONOSCOPY      , , , 2013     COLONOSCOPY N/A 2019    Procedure: COLONOSCOPY, WITH POLYPECTOMY AND BIOPSY;  Surgeon: Heather  MD Pepito;  Location:  GI     COLONOSCOPY N/A 2022    Procedure: COLONOSCOPY;  Surgeon: Rodrigo Dunbar MD;  Location:  GI     CV CORONARY ANGIOGRAM N/A 2022    Procedure: Coronary Angiogram;  Surgeon: Horacio Moran MD;  Location:  HEART CARDIAC CATH LAB     CV INSTANTANEOUS WAVE-FREE RATIO N/A 2022    Procedure: Instantaneous Wave-Free Ratio;  Surgeon: Horacio Moran MD;  Location:  HE[facility] LAB     CV OPTICAL COHERENCE TOMOGRAPHY N/A 2022    Procedure: Optical Coherence Tomography;  Surgeon: Horacio Moran MD;  Location:  HE[facility] LAB     HYSTERECTOMY, PAP NO LONGER INDICATED       JASBIR with BSO  2000    benign fibroids     TUBAL LIGATION        Allergies   Allergen Reactions     Ciprofloxacin GI Disturbance     Oxycodone Other (See Comments)     She prefers not to have Oxycodone or Oxycontin due to potential addictive properties     Simvastatin Other (See Comments)     Muscle aches       Social History     Tobacco Use     Smoking status: Former     Packs/day: 0.50     Years: 9.00     Pack years: 4.50     Types: Cigarettes     Start date: 1965     Quit date: 4/15/1975     Years since quittin.1     Smokeless tobacco: Never     Tobacco comments:     social smoker - only smoked when with another smoker   Vaping Use     Vaping status: Never Used   Substance Use Topics     Alcohol use: Yes     Comment: moderate 1 or 2 beers or wine 2x /wk      Wt Readings from Last 1 Encounters:   23 91.4 kg (201 lb 8 oz)        Anesthesia Evaluation            ROS/MED HX  ENT/Pulmonary:    (-) tobacco use, asthma, COPD and sleep apnea   Neurologic:    (-) no seizures and no CVA   Cardiovascular:     (+) Dyslipidemia hypertension--CAD -past MI --Previous cardiac testing   Echo: Date:  Results:  Left ventricular systolic function is normal.The visual ejection fraction is  55-60%.  The right ventricular systolic function is normal.  There is  mild (1+) tricuspid regurgitation.  The inferior vena cava was normal in size with preserved respiratory  variability.  Stress Test: Date: Results:    ECG Reviewed: Date: 5/23 Results:  NSR  Cath: Date: Results:   (-) CHF and arrhythmias   METS/Exercise Tolerance:     Hematologic:     (+) anemia,     Musculoskeletal:       GI/Hepatic:     (+) liver disease (Fatty liver),  (-) GERD   Renal/Genitourinary:     (+) renal disease, type: CRI,     Endo:     (+) thyroid problem, hypothyroidism,  (-) Type I DM and Type II DM   Psychiatric/Substance Use:     (+) psychiatric history depression and anxiety     Infectious Disease:       Malignancy:       Other:            Physical Exam    Airway        Mallampati: II   TM distance: > 3 FB   Neck ROM: full   Mouth opening: > 3 cm    Respiratory Devices and Support         Dental       (+) Modest Abnormalities - crowns, retainers, 1 or 2 missing teeth      Cardiovascular          Rhythm and rate: regular     Pulmonary           breath sounds clear to auscultation           OUTSIDE LABS:  CBC:   Lab Results   Component Value Date    WBC 9.7 05/24/2023    WBC 8.1 09/25/2022    HGB 11.8 05/24/2023    HGB 13.5 09/25/2022    HCT 36.2 05/24/2023    HCT 40.5 09/25/2022     05/24/2023     09/25/2022     BMP:   Lab Results   Component Value Date     05/24/2023     09/27/2022    POTASSIUM 4.6 05/24/2023    POTASSIUM 4.0 09/27/2022    CHLORIDE 106 05/24/2023    CHLORIDE 106 09/27/2022    CO2 23 05/24/2023    CO2 26 09/27/2022    BUN 19.6 05/24/2023    BUN 15 09/27/2022    CR 0.97 (H) 05/24/2023    CR 0.81 09/27/2022     (H) 05/24/2023     (H) 09/27/2022     COAGS: No results found for: PTT, INR, FIBR  POC: No results found for: BGM, HCG, HCGS  HEPATIC:   Lab Results   Component Value Date    ALBUMIN 4.5 12/05/2022    PROTTOTAL 7.0 12/05/2022    ALT 16 12/05/2022    AST 22 12/05/2022    ALKPHOS 122 (H) 12/05/2022    BILITOTAL 0.5 12/05/2022     OTHER:    Lab Results   Component Value Date    A1C 6.1 (H) 08/31/2022    CYNDY 8.3 (L) 05/24/2023    LIPASE 27 09/16/2022    TSH 0.80 06/27/2022    T4 1.27 06/27/2022       Anesthesia Plan    ASA Status:  3      Anesthesia Type: General.     - Airway: LMA   Induction: Intravenous, Propofol.   Maintenance: Balanced.        Consents    Anesthesia Plan(s) and associated risks, benefits, and realistic alternatives discussed. Questions answered and patient/representative(s) expressed understanding.    - Discussed:     - Discussed with:  Patient         Postoperative Care    Pain management: Multi-modal analgesia.   PONV prophylaxis: Ondansetron (or other 5HT-3), Dexamethasone or Solumedrol     Comments:                Epifanio Abraham MD

## 2023-05-25 NOTE — ANESTHESIA PROCEDURE NOTES
Airway       Patient location during procedure: OR  Staff -        CRNA: Zo Baker APRN CRNA       Performed By: CRNA  Consent for Airway        Urgency: elective  Indications and Patient Condition       Indications for airway management: kenrick-procedural       Induction type:intravenous       Mask difficulty assessment: 1 - vent by mask    Final Airway Details       Final airway type: supraglottic airway    Supraglottic Airway Details        Type: LMA       Brand: I-Gel       LMA size: 4    Post intubation assessment        Placement verified by: capnometry, equal breath sounds and chest rise        Number of attempts at approach: 1       Ease of procedure: easy       Dentition: Intact and Unchanged

## 2023-05-25 NOTE — ANESTHESIA PROCEDURE NOTES
Sciatic Procedure Note    Pre-Procedure   Staff -        Anesthesiologist:  Epifanio Abraham MD       Performed By: Anesthesiologist       Location: pre-op       Pre-Anesthestic Checklist: patient identified, IV checked, site marked, risks and benefits discussed, informed consent, monitors and equipment checked, pre-op evaluation, at physician/surgeon's request and post-op pain management  Timeout:       Correct Patient: Yes        Correct Procedure: Yes        Correct Site: Yes        Correct Position: Yes        Correct Laterality: Yes        Site Marked: Yes  Procedure Documentation  Procedure: Sciatic       Laterality: right       Patient Position: supine (Operative leg raised on cushion)       Patient Prep/Sterile Barriers: sterile gloves, mask       Skin prep: Chloraprep       Local skin infiltrated with mL of 1% lidocaine.  (popliteal approach).       Needle Type: insulated       Needle Gauge: 21.        Needle Length (Inches): 4        Ultrasound guided       1. Ultrasound was used to identify targeted nerve, plexus, vascular marker, or fascial plane and place a needle adjacent to it in real-time.       2. Ultrasound was used to visualize the spread of anesthetic in close proximity to the above referenced structure.       3. A permanent image is entered into the patient's record.       4. The visualized anatomic structures appeared normal.       5. There were no apparent abnormal pathologic findings.    Assessment/Narrative         The placement was negative for: blood aspirated, painful injection and site bleeding       Paresthesias: Resolved.       Bolus given via needle..        Secured via.        Insertion/Infusion Method: Single Shot    Medication(s) Administered   Bupivacaine 0.5% w/ 1:400K Epi (Injection) - Injection   24 mL - 5/25/2023 4:24:00 PM   Comments:  Nerve clearly identified via ultrasound imaging.  After appropriate timeout procedure, needle was advanced under direct ultrasound guidance.  " 24 ml of 0.5% bupivacaine with 1:400,000 epinephrine was incrementally injected with negative aspiration every 5 ml.  Patient tolerated procedure well.  Ultrasound Interpretation, peripheral nerve block    1.  As noted above, under ultrasound guidance, the needle was inserted and placed in close proximity to the sciatic nerve.  2. Ultrasound was also used to visualize the spread of the anesthetic in close proximity to the nerve being blocked.  3. The nerve appeared anatomically normal.  4. There were no apparent abnormal pathological findings.  5. A permanent ultrasound image was saved n the patient's record.    Epifanio Abraham MD   6:20 PM        FOR [facility] ([facility] ONLY:   Pain Team Contact information: please page the Pain Team Via [facility]. Search \"Pain\". During daytime hours, please page the attending first. At night please page the resident first.      "

## 2023-05-25 NOTE — ANESTHESIA PROCEDURE NOTES
Adductor canal Procedure Note    Pre-Procedure   Staff -        Anesthesiologist:  Epifanio Abraham MD       Performed By: Anesthesiologist       Location: pre-op       Pre-Anesthestic Checklist: patient identified, IV checked, site marked, risks and benefits discussed, informed consent, monitors and equipment checked, pre-op evaluation, at physician/surgeon's request and post-op pain management  Timeout:       Correct Patient: Yes        Correct Procedure: Yes        Correct Site: Yes        Correct Position: Yes        Correct Laterality: Yes        Site Marked: Yes  Procedure Documentation  Procedure: Adductor canal       Laterality: right       Patient Position: supine       Patient Prep/Sterile Barriers: sterile gloves, mask       Skin prep: Chloraprep       Local skin infiltrated with mL of 1% lidocaine.        Needle Type: insulated       Needle Gauge: 22.        Needle Length (Inches): 3.13        Ultrasound guided       1. Ultrasound was used to identify targeted nerve, plexus, vascular marker, or fascial plane and place a needle adjacent to it in real-time.       2. Ultrasound was used to visualize the spread of anesthetic in close proximity to the above referenced structure.       3. A permanent image is entered into the patient's record.       4. The visualized anatomic structures appeared normal.       5. There were no apparent abnormal pathologic findings.    Assessment/Narrative         The placement was negative for: blood aspirated, painful injection and site bleeding       Paresthesias: No.       Bolus given via needle..        Secured via.        Insertion/Infusion Method: Single Shot    Medication(s) Administered   Bupivacaine 0.5% w/ 1:400K Epi (Injection) - Injection   16 mL - 5/25/2023 4:29:00 PM   Comments:  Femoral artery clearly identified deep to the sartorius muscle via ultrasound imaging.  After appropriate timeout procedure, needle was advanced under direct ultrasound guidance.  16 ml  "of 0.5% bupivacaine with 1:400,000 epinephrine was incrementally injected with negative aspiration every 5 ml.  Patient tolerated procedure well.    Ultrasound Interpretation, peripheral nerve block    1.  As noted above, under ultrasound guidance, the needle was inserted and placed in close proximity to the saphenous nerve.  2. Ultrasound was also used to visualize the spread of the anesthetic in close proximity to the nerve being blocked.  3. The nerve appeared anatomically normal.  4. There were no apparent abnormal pathological findings.  5. A permanent ultrasound image was saved n the patient's record.    Epifanio Abraham MD   6:19 PM        FOR [facility] ([facility] [place]) ONLY:   Pain Team Contact information: please page the Pain Team Via [facility]. Search \"Pain\". During daytime hours, please page the attending first. At night please page the resident first.      "

## 2023-05-25 NOTE — ANESTHESIA CARE TRANSFER NOTE
Patient: Janie De Leon    Procedure: Procedure(s):  OPEN REDUCTION INTERNAL FIXATION RIGHT ANKLE FRACTURE       Diagnosis: Closed trimalleolar fracture of right ankle, initial encounter [S81.436E]  Diagnosis Additional Information: No value filed.    Anesthesia Type:   General     Note:    Oropharynx: oropharynx clear of all foreign objects and spontaneously breathing  Level of Consciousness: drowsy  Oxygen Supplementation: nasal cannula  Level of Supplemental Oxygen (L/min / FiO2): 4  Independent Airway: airway patency satisfactory and stable  Dentition: dentition unchanged  Vital Signs Stable: post-procedure vital signs reviewed and stable  Report to RN Given: handoff report given  Patient transferred to: PACU  Comments: At end of procedure, spontaneous respirations, adequate tidal volumes, followed commands to voice, LMA removed atraumatically, airway patent after LMA removal. Oxygen via nasal cannula at 4 liters per minute to PACU. .  Handoff Report: Identifed the Patient, Identified the Reponsible Provider, Reviewed the pertinent medical history, Discussed the surgical course, Reviewed Intra-OP anesthesia mangement and issues during anesthesia, Set expectations for post-procedure period and Allowed opportunity for questions and acknowledgement of understanding      Vitals:  Vitals Value Taken Time   /61 05/25/23 1830   Temp     Pulse 89 05/25/23 1832   Resp 26 05/25/23 1832   SpO2 97 % 05/25/23 1831   Vitals shown include unvalidated device data.    Electronically Signed By: ALEXYS Nunez CRNA  May 25, 2023  6:34 PM

## 2023-05-26 ENCOUNTER — APPOINTMENT (OUTPATIENT)
Dept: PHYSICAL THERAPY | Facility: CLINIC | Age: 73
DRG: 493 | End: 2023-05-26
Payer: MEDICARE

## 2023-05-26 LAB
ATRIAL RATE - MUSE: 76 BPM
DIASTOLIC BLOOD PRESSURE - MUSE: NORMAL MMHG
GLUCOSE SERPL-MCNC: 138 MG/DL (ref 70–99)
HGB BLD-MCNC: 11.3 G/DL (ref 11.7–15.7)
INTERPRETATION ECG - MUSE: NORMAL
P AXIS - MUSE: 60 DEGREES
PR INTERVAL - MUSE: 182 MS
QRS DURATION - MUSE: 78 MS
QT - MUSE: 382 MS
QTC - MUSE: 429 MS
R AXIS - MUSE: 62 DEGREES
SYSTOLIC BLOOD PRESSURE - MUSE: NORMAL MMHG
T AXIS - MUSE: 58 DEGREES
VENTRICULAR RATE- MUSE: 76 BPM

## 2023-05-26 PROCEDURE — 250N000011 HC RX IP 250 OP 636

## 2023-05-26 PROCEDURE — 97161 PT EVAL LOW COMPLEX 20 MIN: CPT | Mod: GP

## 2023-05-26 PROCEDURE — 99232 SBSQ HOSP IP/OBS MODERATE 35: CPT | Performed by: INTERNAL MEDICINE

## 2023-05-26 PROCEDURE — 120N000001 HC R&B MED SURG/OB

## 2023-05-26 PROCEDURE — 250N000013 HC RX MED GY IP 250 OP 250 PS 637: Performed by: STUDENT IN AN ORGANIZED HEALTH CARE EDUCATION/TRAINING PROGRAM

## 2023-05-26 PROCEDURE — 85018 HEMOGLOBIN: CPT

## 2023-05-26 PROCEDURE — 250N000013 HC RX MED GY IP 250 OP 250 PS 637: Performed by: PHYSICIAN ASSISTANT

## 2023-05-26 PROCEDURE — 250N000013 HC RX MED GY IP 250 OP 250 PS 637: Performed by: INTERNAL MEDICINE

## 2023-05-26 PROCEDURE — 97530 THERAPEUTIC ACTIVITIES: CPT | Mod: GP

## 2023-05-26 PROCEDURE — 36415 COLL VENOUS BLD VENIPUNCTURE: CPT | Performed by: STUDENT IN AN ORGANIZED HEALTH CARE EDUCATION/TRAINING PROGRAM

## 2023-05-26 PROCEDURE — 97110 THERAPEUTIC EXERCISES: CPT | Mod: GP

## 2023-05-26 PROCEDURE — 250N000013 HC RX MED GY IP 250 OP 250 PS 637

## 2023-05-26 PROCEDURE — 82947 ASSAY GLUCOSE BLOOD QUANT: CPT | Performed by: STUDENT IN AN ORGANIZED HEALTH CARE EDUCATION/TRAINING PROGRAM

## 2023-05-26 RX ORDER — AMOXICILLIN 250 MG
1 CAPSULE ORAL 2 TIMES DAILY PRN
Qty: 21 TABLET | Refills: 0 | DISCHARGE
Start: 2023-05-26

## 2023-05-26 RX ORDER — HYDROMORPHONE HYDROCHLORIDE 2 MG/1
2 TABLET ORAL EVERY 4 HOURS PRN
Qty: 25 TABLET | Refills: 0 | Status: SHIPPED | OUTPATIENT
Start: 2023-05-26 | End: 2023-06-12

## 2023-05-26 RX ORDER — ACETAMINOPHEN 325 MG/1
650 TABLET ORAL EVERY 6 HOURS PRN
Qty: 100 TABLET | Refills: 0 | DISCHARGE
Start: 2023-05-26 | End: 2023-05-31 | Stop reason: DRUGHIGH

## 2023-05-26 RX ORDER — ASPIRIN 81 MG/1
81 TABLET ORAL DAILY
Status: DISCONTINUED | OUTPATIENT
Start: 2023-05-27 | End: 2023-05-30 | Stop reason: HOSPADM

## 2023-05-26 RX ORDER — ASPIRIN 325 MG
325 TABLET ORAL DAILY
Status: DISCONTINUED | OUTPATIENT
Start: 2023-05-26 | End: 2023-05-26

## 2023-05-26 RX ADMIN — AMLODIPINE BESYLATE 5 MG: 5 TABLET ORAL at 21:06

## 2023-05-26 RX ADMIN — CEFAZOLIN SODIUM 2 G: 2 INJECTION, SOLUTION INTRAVENOUS at 01:28

## 2023-05-26 RX ADMIN — METOPROLOL TARTRATE 25 MG: 25 TABLET, FILM COATED ORAL at 09:24

## 2023-05-26 RX ADMIN — ACETAMINOPHEN 975 MG: 325 TABLET ORAL at 14:15

## 2023-05-26 RX ADMIN — SENNOSIDES AND DOCUSATE SODIUM 1 TABLET: 50; 8.6 TABLET ORAL at 09:24

## 2023-05-26 RX ADMIN — ACETAMINOPHEN 975 MG: 325 TABLET ORAL at 21:06

## 2023-05-26 RX ADMIN — ATORVASTATIN CALCIUM 40 MG: 40 TABLET, FILM COATED ORAL at 21:06

## 2023-05-26 RX ADMIN — Medication 50 MCG: at 09:24

## 2023-05-26 RX ADMIN — ASPIRIN 325 MG: 325 TABLET, COATED ORAL at 09:24

## 2023-05-26 RX ADMIN — ACETAMINOPHEN 975 MG: 325 TABLET ORAL at 05:34

## 2023-05-26 RX ADMIN — CEFAZOLIN SODIUM 2 G: 2 INJECTION, SOLUTION INTRAVENOUS at 09:15

## 2023-05-26 RX ADMIN — LEVOTHYROXINE SODIUM 88 MCG: 88 TABLET ORAL at 09:24

## 2023-05-26 RX ADMIN — POLYETHYLENE GLYCOL 3350 17 G: 17 POWDER, FOR SOLUTION ORAL at 11:10

## 2023-05-26 RX ADMIN — DULOXETINE HYDROCHLORIDE 60 MG: 60 CAPSULE, DELAYED RELEASE ORAL at 10:04

## 2023-05-26 RX ADMIN — SENNOSIDES AND DOCUSATE SODIUM 1 TABLET: 50; 8.6 TABLET ORAL at 21:13

## 2023-05-26 ASSESSMENT — ACTIVITIES OF DAILY LIVING (ADL)
ADLS_ACUITY_SCORE: 28
DEPENDENT_IADLS:: INDEPENDENT
ADLS_ACUITY_SCORE: 28

## 2023-05-26 NOTE — ANESTHESIA POSTPROCEDURE EVALUATION
Patient: Janie De Leon    Procedure: Procedure(s):  OPEN REDUCTION INTERNAL FIXATION RIGHT ANKLE FRACTURE       Anesthesia Type:  General    Note:  Disposition: Inpatient   Postop Pain Control: Uneventful            Sign Out: Well controlled pain   PONV: No   Neuro/Psych: Uneventful            Sign Out: Acceptable/Baseline neuro status   Airway/Respiratory: Uneventful            Sign Out: Acceptable/Baseline resp. status   CV/Hemodynamics: Uneventful            Sign Out: Acceptable CV status   Other NRE: NONE   DID A NON-ROUTINE EVENT OCCUR? No           Last vitals:  Vitals Value Taken Time   /59 05/25/23 1915   Temp 36.9  C (98.5  F) 05/25/23 1830   Pulse 92 05/25/23 1920   Resp 14 05/25/23 1920   SpO2 93 % 05/25/23 1920   Vitals shown include unvalidated device data.    Electronically Signed By: Storm Quevedo MD  May 25, 2023  7:21 PM

## 2023-05-26 NOTE — PLAN OF CARE
Goal Outcome Evaluation:  5/26/23  [id number]    A&Ox4.  POD #1 ORIF-Right Ankle.  CMS intact except still reports some numbness in toes of RLE from block which she states is improving.  She is able to wiggle all her toes in both feet.  Voiding via purwick and having good output.  Dressing to RLE-CDI.  Non-wt bearing to RLE.  Has Aircast on to LLE.  +2 edema to Left ankle and foot.   Tolerating regular diet.  Denies any pain/discomfort.  On scheduled PO Tylenol.  Assist of 2 with GB and walker; pivots with transfers.  PIV-SL.  Up in chair.

## 2023-05-26 NOTE — PROGRESS NOTES
Care Management Follow Up    Length of Stay (days): 2    Expected Discharge Date: 05/28/2023     Concerns to be Addressed:     (Spouse has dementia and patient is the primary caregiver.  Patient can ask daughter to help but requested resources for home care for cooking and cleaning for spouse. SW provided home care agency resources that patient could privately pay.)  Patient plan of care discussed at interdisciplinary rounds: Yes    Anticipated Discharge Disposition:       Anticipated Discharge Services:    Anticipated Discharge DME:      Patient/family educated on Medicare website which has current facility and service quality ratings:    Education Provided on the Discharge Plan:    Patient/Family in Agreement with the Plan:      Referrals Placed by CM/SW:    Private pay costs discussed: Not applicable    Additional Information:  CELI spoke with Barbie at [facility] who will accept patient on Tuesday. They wanted to know if patient wanted a private or shared room. Private room is an additional $45 per day, SW spoke to patient who said a shared room is fine. Patient understands that acceptance is not until Tuesday.  Patient requested we keep [facility] pending in case they can accept prior to Tuesday as it is much closer to her home. CELI called [facility] back to let them know patient wants a shared room but was only able to leave a VM as they were gone for the holiday weekend.           RENEE Dunham

## 2023-05-26 NOTE — PROGRESS NOTES
Orthopedic Surgery  Janie Murphy Carver  05/26/2023     Admit Date:  5/23/2023    POD: 1 Day Post-Op   Procedure(s):  OPEN REDUCTION INTERNAL FIXATION RIGHT ANKLE FRACTURE     Patient resting comfortably in chair. Family at bedside.  Pain controlled.  Tolerating oral intake.    Denies nausea or vomiting  Denies chest pain or shortness of breath      Temp:  [97.8  F (36.6  C)-98.8  F (37.1  C)] 98.4  F (36.9  C)  Pulse:  [] 83  Resp:  [10-23] 16  BP: (102-152)/(57-82) 127/74  SpO2:  [90 %-98 %] 95 %    Alert and oriented  Dressing is clean, dry, and intact.   Minimal erythema of the surrounding skin.   Left calf soft, non-tender.  Right lower extremity is NVI.  Sensation intact bilateral lower extremities  Brisk cap refill   Wiggles toes     Labs:  Recent Labs   Lab Test 05/26/23  0626 05/24/23  0619 09/25/22  2251 09/25/22  1544   WBC  --  9.7 8.1 10.3   HGB 11.3* 11.8 13.5 15.3   PLT  --  247 279 357     No lab results found.  No lab results found.      1. PLAN:   Continue  mg daily for DVT prophylaxis.     Mobilize with PT/OT    NWB RLE   Continue current pain regimen   Dressings: Keep intact.  Change if >60% saturated or peeling off.    Follow-up: 2 weeks post-op with Dr. Declan Casanova     2. Disposition   Anticipate d/c to TCU   when medically cleared and progressing in PT.    Saige Salcedo PA-C

## 2023-05-26 NOTE — PROGRESS NOTES
[facility]    Hospitalist Progress Note    Assessment & Plan   Janie De Leon is a 73 year old female with PMHx of CAD and prior NSTEMI in 9/2022 which was medically managed, hypertension, hyperlipidemia, prediabetes, hypothyroidism, depression and anxiety who was admitted on 5/23/2023 with a right ankle fracture after a fall.     R trimalleolar fracture, s/p ORIF of the distal fibula and medial malleolus and closed reduction/splinting of the R posterior malleolus on 5/25/23  L ankle sprain   * On day of admission, patient was walking out the door of the home when she twisted both ankles and fell. No head trauma or LOC. Was seen in ED. VSS. Imaging showed a R trimalleolar fracture. L ankle swelling, acute talar fracture was not excluded. Was ultimately able to walk on L ankle. Subsequently discharged home with crutches. Returned back to ED later that night after another fall where she landed on her buttocks. Felt like she would be unable to manage at home.   * Seen by ortho this stay. Ultimately taken to OR and underwent ORIF of the R distal fibula, ORIF of the R medial malleolus and closed reduction/splinting of the R posterior malleolus.   -- routine postop cares per ortho including pain control, DVT ppx and therapies  -- currently on full dose ASA for DVT ppx -- recommend consideration of resumption of Plavix and low dose ASA given hx of CAD as below  -- NWB to RLE  -- PT/OT, will need TCU stay    CAD with NSTEMI in 9/2022, medically managed  Hypertension   Hyperlipidemia  * Hospitalized in 9/2022 with an NSTEMI. Found to have mild prox LAD disease with mild plaque rupture. Ultimately did not require stenting. Medically managed. Recent echo in 1/2023 showed EF 55-60%.   * Home meds: ASA 81mg daily, Plavix, statin, amlodipine 5mg HS, metoprolol 25mg BID, olmesartan 20mg daily.   * No c/o recent chest pain or pressure. EKG on 5/24 showed NSR with no ischemic changes.   -- Plavix on hold  given recent surgery, resume as soon as okay per ortho (currently on full dose ASA for DVT ppx)  -- conts on statin, metoprolol and amlodipine  -- ARB held this stay given plans to go to OR, resume in 1-2d as BPs allow     Prediabetes  * A1C was 6.1 in 8/2022. No need for monitor BG this stay. Can follow up with PCP.      Depression  Anxiety  * Chronic and stable on Cymbalta, also uses Klonipin prn HS     Hypothyroidism  * Chronic and stable on levothyroxine     FEN: no IVFs, lytes stable, regular diet  DVT Prophylaxis: postop DVT ppx per ortho  Code Status: Full Code    Disposition: Anticipate discharge in 1-2d, pending postop course. Lives at home with  who has dementia, acknowledges that she cannot return home with recent injuries/surgery and will need TCU stay. SW consulted.     Jessenia Sanford, DO    Medical Decision Making       Please see A&P for additional details of medical decision making.       Interval History   Chart reviewed. Seen this morning. Feeling well. No cp/sob/cough, abd pain/n/v. Pain well managed. Taking po.    -Data reviewed today: I reviewed all new labs and imaging results over the last 24 hours. I personally reviewed no images or EKG's today.    Physical Exam   Temp: 98.6  F (37  C) Temp src: Oral BP: 129/82 Pulse: 80   Resp: 16 SpO2: 96 % O2 Device: Nasal cannula Oxygen Delivery: 1 LPM  Vitals:    05/23/23 2240 05/24/23 0106   Weight: 90.7 kg (200 lb) 91.4 kg (201 lb 8 oz)     Vital Signs with Ranges  Temp:  [97.8  F (36.6  C)-98.8  F (37.1  C)] 98.6  F (37  C)  Pulse:  [] 80  Resp:  [10-23] 16  BP: (102-152)/(57-82) 129/82  SpO2:  [90 %-98 %] 96 %  I/O last 3 completed shifts:  In: 1000 [I.V.:1000]  Out: 1000 [Urine:950; Blood:50]    Constitutional: Resting comfortably, alert and conversing appropriately, NAD  Respiratory: CTAB, no wheeze/rales/rhonchi, no increased work of breathing  Cardiovascular: HRRR, no MGR, no LE edema  GI: S, NT, ND, +BS  Skin/Integumen:  warm/dry  Other:      Medications     lactated ringers Stopped (05/26/23 0534)       acetaminophen  975 mg Oral Q8H     amLODIPine  5 mg Oral QPM     aspirin  325 mg Oral Daily     aspirin  81 mg Oral Daily     atorvastatin  40 mg Oral QPM     ceFAZolin  2 g Intravenous Q8H     [Held by provider] clopidogrel  75 mg Oral Daily     DULoxetine  60 mg Oral Daily     levothyroxine  88 mcg Oral Daily     metoprolol tartrate  25 mg Oral BID     polyethylene glycol  17 g Oral Daily     senna-docusate  1 tablet Oral BID     sodium chloride (PF)  3 mL Intracatheter Q8H     sodium chloride (PF)  3 mL Intracatheter Q8H     cholecalciferol  50 mcg Oral Daily       Data   Recent Labs   Lab 05/26/23  0626 05/24/23  0619   WBC  --  9.7   HGB 11.3* 11.8   MCV  --  91   PLT  --  247   NA  --  139   POTASSIUM  --  4.6   CHLORIDE  --  106   CO2  --  23   BUN  --  19.6   CR  --  0.97*   ANIONGAP  --  10   CYNDY  --  8.3*   * 136*       Recent Results (from the past 24 hour(s))   XR Surgery ESAU L/T 5 Min Fluoro w Stills    Narrative    EXAM: XR SURGERY ESAU FLUORO LESS THAN 5 MIN W STILLS  LOCATION: [facility]  DATE/TIME: 5/25/2023 7:12 PM CDT    INDICATION: Open reduction internal fixation of a right ankle fracture.  COMPARISON: 05/23/2023.    TECHNIQUE: Intraoperative fluoroscopy performed during the patient's procedure.    FLUOROSCOPIC TIME: 0.5 seconds.  NUMBER OF IMAGES: 4    FINDINGS: Images demonstrate interval reduction and internal fixation of a distal right fibular fracture with lateral plate and screw construct, as well as a single interfragmentary screw. A single interfragmentary screw also internally fixates a reduced   fracture of the medial malleolus.     Minimal osteoarthritic changes are noted. Ankle mortise and tibiofibular syndesmosis appear intact.   XR Ankle Port Right G/E 3 Views    Narrative    EXAM: XR ANKLE PORT RIGHT G/E 3 VIEWS  LOCATION: [facility]  HOSPITAL  DATE/TIME: 5/25/2023 7:13 PM CDT    INDICATION: Status post ORIF for trimal.  COMPARISON: None.      Impression    IMPRESSION: Postoperative changes plate and screw fixation of a fracture of the lateral malleolus. This is in excellent anatomic alignment. Additional screw fixation of the medial malleolus. Good restoration of the ankle mortise.

## 2023-05-26 NOTE — PLAN OF CARE
Goal Outcome Evaluation:         Patient vital signs are at baseline: Yes, numbness in toe of RLE from block. A/O x4  Patient able to ambulate as they were prior to admission or with assist devices provided by therapies during their stay:  No, not OOB yet since surgery, able to reposition self in bed.   Patient MUST void prior to discharge:  Yes, used the purwick over night  Patient able to tolerate oral intake:  Yes  Pain has adequate pain control using Oral analgesics:  Yes, denies pain, taking scheduled tylenol  Does patient have an identified :  Yes  Has goal D/C date and time been discussed with patient:  Yes, would like to talk to social work today about rehab options for discharge.

## 2023-05-26 NOTE — CONSULTS
Care Management Initial Consult    General Information  Assessment completed with: Patient,  (Janie)  Type of CM/SW Visit: Initial Assessment    Primary Care Provider verified and updated as needed: Yes   Readmission within the last 30 days: no previous admission in last 30 days      Reason for Consult: discharge planning  Advance Care Planning: Advance Care Planning Reviewed: present on chart          Communication Assessment  Patient's communication style: spoken language (English or Bilingual)    Hearing Difficulty or Deaf: no   Wear Glasses or Blind: yes    Cognitive  Cognitive/Neuro/Behavioral: WDL  Level of Consciousness: alert  Arousal Level: opens eyes spontaneously  Orientation: oriented x 4        Speech: clear, spontaneous    Living Environment:   People in home: spouse  Jose R  Current living Arrangements: house      Able to return to prior arrangements: no  Living Arrangement Comments: 4 steps in front - steps up and down in back    Family/Social Support:  Care provided by: self, spouse/significant other  Provides care for: spouse  Marital Status:   , Children, Neighbor  Jose Luis       Description of Support System: Supportive, Involved    Support Assessment: Adequate family and caregiver support    Current Resources:   Patient receiving home care services:       Community Resources:    Equipment currently used at home: none  Supplies currently used at home:      Employment/Financial:  Employment Status: retired        Financial Concerns: No concerns identified           Does the patient's insurance plan have a 3 day qualifying hospital stay waiver?  No    Lifestyle & Psychosocial Needs:  Social Determinants of Health     Tobacco Use: Medium Risk (5/16/2023)    Patient History      Smoking Tobacco Use: Former      Smokeless Tobacco Use: Never      Passive Exposure: Not on file   Alcohol Use: Not on file   Financial Resource Strain: Not on file   Food Insecurity: Not on file    Transportation Needs: Not on file   Physical Activity: Not on file   Stress: Not on file   Social Connections: Not on file   Intimate Partner Violence: Not on file   Depression: Not at risk (10/7/2022)    PHQ-2      PHQ-2 Score: 2   Recent Concern: Depression - At risk (8/26/2022)    PHQ-2      PHQ-2 Score: 3   Housing Stability: Not on file       Functional Status:  Prior to admission patient needed assistance:   Dependent ADLs:: Independent  Dependent IADLs:: Independent       Mental Health Status:  Mental Health Status: No Current Concerns (being treated for depression - managed)       Chemical Dependency Status:  Chemical Dependency Status: No Current Concerns             Values/Beliefs:  Spiritual, Cultural Beliefs, Buddhist Practices, Values that affect care:                 Additional Information:  Consult for discharge planning. Patient is a 73 year old female admitted on 05/23/23 for an ankle sprain after a fall and a tentative discharge date of 05/28/23  Writer reviewed chart and PT recommendations at discharge. Writer met with Patient at bedside and introduced self and role. Writer confirmed patient's primary doctor is Dr Davis. Patient was independent with all adl's prior to admit.   Patient in agreement for TCU at discharge and would like referrals sent to [facility] and [facility]. Referrals sent via [facility]. Writer left a list of agencies for additional referral options if necessary.   Writer discussed transportation options and possible out of pocket costs of transport with patient. Patient would like  [facility]  used at discharge.   Patients primary concern with her living situation is that she is the primary care giver for her spouse who has dementia. Patients daughter, friends and neighbors are resources to step in and assist, however, patient requested additional resources for home care.  Writer provided list of agencies that patient can call and use while she is in TCU  rehabilitating.     Rossana  RENEE Villeda

## 2023-05-26 NOTE — PROGRESS NOTES
05/26/23 0900   Appointment Info   Signing Clinician's Name / Credentials (PT) Tamar Fierro, PT, DPT   Rehab Comments (PT) RLE NWB, LLE WBAT in CAM boot   Living Environment   People in Home spouse   Current Living Arrangements house   Home Accessibility stairs to enter home   Number of Stairs, Main Entrance   (1 + 1 + 1)   Stair Railings, Main Entrance none   Transportation Anticipated car, drives self   Living Environment Comments Patient lives with her  in a house.  There are a total of 3 steps to enter home from the side door, steps are platform style with no rails.  Patient has a walk-in shower and comfort height toilet.  There is a bed rail on her bed.   Self-Care   Usual Activity Tolerance good   Current Activity Tolerance fair   Regular Exercise No   Equipment Currently Used at Home none   Fall history within last six months yes   Number of times patient has fallen within last six months 2   Activity/Exercise/Self-Care Comment Patient reports baseline independence with ADLs and mobility.  She has 2 falls in the same day, one after discharging from the ED.  Patient was issued a standard walker from the ED.  Patient is the primary caregiver for her , who has dementia.   General Information   Onset of Illness/Injury or Date of Surgery 05/23/23   Referring Physician Makayla Tyler PA-C   Patient/Family Therapy Goals Statement (PT) Patient would like to discharge to TCU.   Pertinent History of Current Problem (include personal factors and/or comorbidities that impact the POC) 73 year old female who presents with a right trimalleolar ankle fracture. now s/p ORIF.   Existing Precautions/Restrictions fall;weight bearing   Weight-Bearing Status - LLE weight-bearing as tolerated  (CAM boot)   Weight-Bearing Status - RLE nonweight-bearing   Cognition   Affect/Mental Status (Cognition) WFL   Orientation Status (Cognition) oriented x 4   Follows Commands (Cognition) WFL   Pain Assessment   Patient  Currently in Pain No   Integumentary/Edema   Integumentary/Edema Comments ORIF R ankle, swelling   Posture    Posture Forward head position   Range of Motion (ROM)   Range of Motion ROM deficits secondary to swelling;ROM deficits secondary to pain;ROM deficits secondary to surgical procedure   ROM Comment R ankle splint, L ankle aircast and CAM boot   Strength (Manual Muscle Testing)   Strength (Manual Muscle Testing) Able to perform R SLR;Able to perform L SLR;Deficits observed during functional mobility   Bed Mobility   Comment, (Bed Mobility) SBA for supine > sit, HOB elevated and UE support on bed rail   Transfers   Comment, (Transfers) Mod Ax1 for sit > stand from elevated bed height.   Gait/Stairs (Locomotion)   Comment, (Gait/Stairs) Stand-pivot from bed to recliner chair with minAx1 and walker, unsteady with increased risk of falls.   Balance   Balance Comments Impaired dynamic balance secondary to RLE NWB   Sensory Examination   Sensory Perception Comments RLE numbness   Clinical Impression   Criteria for Skilled Therapeutic Intervention Yes, treatment indicated   PT Diagnosis (PT) Impaired functional mobility   Influenced by the following impairments RLE NWB, LLE WBAT in CAM boot, pain, generalized weakness, decreased activity tolerance, impaired balance   Functional limitations due to impairments Impaired IND with bed mobility, transfers, gait, and stairs   Clinical Presentation (PT Evaluation Complexity) Stable/Uncomplicated   Clinical Presentation Rationale Clinical judgement, PMH, social support   Clinical Decision Making (Complexity) low complexity   Planned Therapy Interventions (PT) balance training;bed mobility training;cryotherapy;gait training;home exercise program;neuromuscular re-education;patient/family education;orthotic fitting/training;ROM (range of motion);postural re-education;stair training;strengthening;transfer training;wheelchair management/propulsion training;progressive  activity/exercise   Anticipated Equipment Needs at Discharge (PT) walker, rolling;shower chair;other (see comments)  (Knee scooter?)   Risk & Benefits of therapy have been explained evaluation/treatment results reviewed;care plan/treatment goals reviewed;risks/benefits reviewed;participants voiced agreement with care plan;participants included;patient   PT Total Evaluation Time   PT Eval, Low Complexity Minutes (93750) 14   Plan of Care Review   Plan of Care Reviewed With patient   Physical Therapy Goals   PT Frequency 5x/week   PT Predicted Duration/Target Date for Goal Attainment 05/31/23   PT Goals Bed Mobility;Transfers;Gait;Stairs   PT: Bed Mobility Independent;Supine to/from sit;Rolling;Within precautions   PT: Transfers Modified independent;Sit to/from stand;Bed to/from chair;Assistive device;Within precautions   PT: Gait Supervision/stand-by assist;10 feet;Rolling walker;Within precautions   PT: Stairs Minimal assist;1 stair;Assistive device;Within precautions   Interventions   Interventions Quick Adds Therapeutic Procedure;Therapeutic Activity   Therapeutic Procedure/Exercise   Ther. Procedure: strength, endurance, ROM, flexibillity Minutes (36659) 8   Symptoms Noted During/After Treatment none   Treatment Detail/Skilled Intervention Patient instructed to wiggle toes and perform quad sets, glute squeezes, SLRs, and heel slides to promote distal fluid return for circulation and edema management as well as strengthening.  Patient able to complete all exercises accurately following demonstration.  Intermittent cues for exercise speed to optimize motor control and time under tension.  Patient to continue LE exercises independently, verbalizing understanding.   Therapeutic Activity   Therapeutic Activities: dynamic activities to improve functional performance Minutes (94980) 17   Symptoms Noted During/After Treatment Fatigue   Treatment Detail/Skilled Intervention Patient greeted supine in bed.  Patient agreeble  to PT session, reports no pain at rest and feels that R LE is still numb.  Patient educated for RLE NWB and LLE WBAT.  Dependent to don CAM boot on L. RN administering medications and checking vitals during session.  Patient weaned to room air with SpO2 remaining in 90s.  Patient SBA for supine > sit transfer with HOB elevated and UE support on bed rail. Patient maintains static sitting balance at EOB with SBA.  Patient educated for safe hand placement pushing from sitting surface during sit <> stands.  From elevated bed height, patient completes sit > stand with mod Ax1.  Patient performs extended stand-pivot transfer with minAx1 and standard walker (issued from ED).  Patient needing cues for movement sequencing with walker, educated to offload with B UE support on walker to maintain RLE NWB.  Patient seated in recliner chair at end of session, left with call light in reach and chair alarm activated.  LEs elevated for swelling management.   PT Discharge Planning   PT Plan Progress IND with bed mobility and transfers, short distance ambulation with FWW, practice curb steps, trial knee scooter   PT Discharge Recommendation (DC Rec) Transitional Care Facility   PT Rationale for DC Rec Patient presents significantly below IND baseline, currently needing Ax1-2 for all mobility.  Mobility limited primarily by RLE NWB and LLE WBAT in CAM boot.  Patient is caregiver for her , who has dementia.  Patient lacks the mobility and assistance to safely discharge home.  Recommend TCU to progress safety and independence with functional mobility tasks.  Patient was issued at standard walker from ED, may benefit from front-wheeled walker and knee scooter.   PT Brief overview of current status SBA bed mob, mod A STS, Dusty stand-pivot with walker   Total Session Time   Timed Code Treatment Minutes 25   Total Session Time (sum of timed and untimed services) 39

## 2023-05-27 LAB — HGB BLD-MCNC: 12.8 G/DL (ref 11.7–15.7)

## 2023-05-27 PROCEDURE — 250N000013 HC RX MED GY IP 250 OP 250 PS 637: Performed by: PHYSICIAN ASSISTANT

## 2023-05-27 PROCEDURE — 250N000013 HC RX MED GY IP 250 OP 250 PS 637: Performed by: INTERNAL MEDICINE

## 2023-05-27 PROCEDURE — 85018 HEMOGLOBIN: CPT

## 2023-05-27 PROCEDURE — 36415 COLL VENOUS BLD VENIPUNCTURE: CPT

## 2023-05-27 PROCEDURE — 250N000013 HC RX MED GY IP 250 OP 250 PS 637

## 2023-05-27 PROCEDURE — 99232 SBSQ HOSP IP/OBS MODERATE 35: CPT | Performed by: INTERNAL MEDICINE

## 2023-05-27 PROCEDURE — 120N000001 HC R&B MED SURG/OB

## 2023-05-27 PROCEDURE — 250N000013 HC RX MED GY IP 250 OP 250 PS 637: Performed by: STUDENT IN AN ORGANIZED HEALTH CARE EDUCATION/TRAINING PROGRAM

## 2023-05-27 RX ORDER — LOSARTAN POTASSIUM 50 MG/1
50 TABLET ORAL DAILY
Status: DISCONTINUED | OUTPATIENT
Start: 2023-05-27 | End: 2023-05-30 | Stop reason: HOSPADM

## 2023-05-27 RX ADMIN — DULOXETINE HYDROCHLORIDE 60 MG: 60 CAPSULE, DELAYED RELEASE ORAL at 09:40

## 2023-05-27 RX ADMIN — ASPIRIN 81 MG: 81 TABLET, COATED ORAL at 09:40

## 2023-05-27 RX ADMIN — POLYETHYLENE GLYCOL 3350 17 G: 17 POWDER, FOR SOLUTION ORAL at 09:40

## 2023-05-27 RX ADMIN — CLONAZEPAM 0.5 MG: 0.5 TABLET ORAL at 02:02

## 2023-05-27 RX ADMIN — LEVOTHYROXINE SODIUM 88 MCG: 88 TABLET ORAL at 09:40

## 2023-05-27 RX ADMIN — METOPROLOL TARTRATE 25 MG: 25 TABLET, FILM COATED ORAL at 09:40

## 2023-05-27 RX ADMIN — CLONAZEPAM 0.5 MG: 0.5 TABLET ORAL at 21:37

## 2023-05-27 RX ADMIN — SENNOSIDES AND DOCUSATE SODIUM 1 TABLET: 50; 8.6 TABLET ORAL at 09:40

## 2023-05-27 RX ADMIN — LOSARTAN POTASSIUM 50 MG: 50 TABLET, FILM COATED ORAL at 12:40

## 2023-05-27 RX ADMIN — ATORVASTATIN CALCIUM 40 MG: 40 TABLET, FILM COATED ORAL at 21:35

## 2023-05-27 RX ADMIN — SENNOSIDES AND DOCUSATE SODIUM 1 TABLET: 50; 8.6 TABLET ORAL at 21:36

## 2023-05-27 RX ADMIN — CLOPIDOGREL BISULFATE 75 MG: 75 TABLET ORAL at 09:40

## 2023-05-27 RX ADMIN — AMLODIPINE BESYLATE 5 MG: 5 TABLET ORAL at 21:36

## 2023-05-27 RX ADMIN — ACETAMINOPHEN 975 MG: 325 TABLET ORAL at 05:54

## 2023-05-27 RX ADMIN — ACETAMINOPHEN 975 MG: 325 TABLET ORAL at 21:35

## 2023-05-27 RX ADMIN — METOPROLOL TARTRATE 25 MG: 25 TABLET, FILM COATED ORAL at 21:36

## 2023-05-27 RX ADMIN — ACETAMINOPHEN 975 MG: 325 TABLET ORAL at 15:07

## 2023-05-27 RX ADMIN — Medication 50 MCG: at 09:40

## 2023-05-27 ASSESSMENT — ACTIVITIES OF DAILY LIVING (ADL)
ADLS_ACUITY_SCORE: 28
ADLS_ACUITY_SCORE: 25
ADLS_ACUITY_SCORE: 28
ADLS_ACUITY_SCORE: 28
ADLS_ACUITY_SCORE: 25
ADLS_ACUITY_SCORE: 28
ADLS_ACUITY_SCORE: 25
ADLS_ACUITY_SCORE: 28
ADLS_ACUITY_SCORE: 28
ADLS_ACUITY_SCORE: 25
ADLS_ACUITY_SCORE: 28
ADLS_ACUITY_SCORE: 28

## 2023-05-27 NOTE — PLAN OF CARE
Goal Outcome Evaluation.       Patient is alert and oriented X4. VSS on RA with  good sat . Up with assist of 2 with walker and gait belt and pivot with transfer.  RLE NWB and LLE WBAT in CAM boot with.  On regular diet with good appetite. PIV saline locked. Pain managed well with scheduled acetaminophen  CMS intact exp   numbness in RLE per patient improved. Patient requests for  Clonazepam at bedtime and was given .   Continent  of B&B, purewick in place with minum output.  Awaiting TCU placement.

## 2023-05-27 NOTE — PROGRESS NOTES
[facility]    Hospitalist Progress Note    Assessment & Plan   Janie De Leon is a 73 year old female with PMHx of CAD and prior NSTEMI in 9/2022 which was medically managed, hypertension, hyperlipidemia, prediabetes, hypothyroidism, depression and anxiety who was admitted on 5/23/2023 with a right ankle fracture after a fall.     R trimalleolar fracture, s/p ORIF of the distal fibula and medial malleolus and closed reduction/splinting of the R posterior malleolus on 5/25/23  L ankle sprain   * On day of admission, patient was walking out the door of the home when she twisted both ankles and fell. No head trauma or LOC. Was seen in ED. VSS. Imaging showed a R trimalleolar fracture. L ankle swelling, acute talar fracture was not excluded. Was ultimately able to walk on L ankle. Subsequently discharged home with crutches. Returned back to ED later that night after another fall where she landed on her buttocks. Felt like she would be unable to manage at home.   * Seen by ortho this stay. Ultimately taken to OR and underwent ORIF of the R distal fibula, ORIF of the R medial malleolus and closed reduction/splinting of the R posterior malleolus.   * Had unremarkable postop course. Pain controlled with Tylenol and oral dilaudid.   * NWB to RLE. LLE in brace. Therapy recommended TCU stay, patient in agreement.   * Continued on DAPT with Plavix and low dose ASA for DVT ppx  * Follow up with [facility] in 2 wks.      CAD with NSTEMI in 9/2022, medically managed  Hypertension   Hyperlipidemia  * Hospitalized in 9/2022 with an NSTEMI. Found to have mild prox LAD disease with mild plaque rupture. Ultimately did not require stenting. Medically managed. Recent echo in 1/2023 showed EF 55-60%.   * Home meds: ASA 81mg daily, Plavix, statin, amlodipine 5mg HS, metoprolol 25mg BID, olmesartan 20mg daily.   * No c/o recent chest pain or pressure. EKG on 5/24 showed NSR with no ischemic changes.   * Continued on ASA.  Plavix held for surgery, resumed postop.   * Conts on amlodipine and metoprolol. ARB held for surgery, resumed this stay      Prediabetes  * A1C was 6.1 in 8/2022. No need for monitor BG this stay. Can follow up with PCP.      Depression  Anxiety  * Chronic and stable on Cymbalta, also uses Klonipin prn HS     Hypothyroidism  * Chronic and stable on levothyroxine     FEN: no IVFs, lytes stable, regular diet  DVT Prophylaxis: postop DVT ppx per ortho  Code Status: Full Code    Disposition: Medically stable for discharge. Lives at home with  who has dementia, acknowledges that she cannot return home with recent injuries/surgery and will need TCU stay. SW consulted. Accepted to [facility] TCU but given the weekend/holiday, won't be able to discharge there until 5/30.     Jessenia Sanford, DO    Medical Decision Making       Please see A&P for additional details of medical decision making.       Interval History   Chart reviewed. Seen this morning. Having a rough morning but no specific complaints, denies cp/sob/cough, abd pain/n/v.     -Data reviewed today: I reviewed all new labs and imaging results over the last 24 hours. I personally reviewed no images or EKG's today.    Physical Exam   Temp: 97  F (36.1  C) Temp src: Oral BP: 110/66 Pulse: 72   Resp: 16 SpO2: 98 % O2 Device: None (Room air)    Vitals:    05/23/23 2240 05/24/23 0106   Weight: 90.7 kg (200 lb) 91.4 kg (201 lb 8 oz)     Vital Signs with Ranges  Temp:  [97  F (36.1  C)-98.4  F (36.9  C)] 97  F (36.1  C)  Pulse:  [72-84] 72  Resp:  [16] 16  BP: (102-131)/(66-75) 110/66  SpO2:  [95 %-98 %] 98 %  I/O last 3 completed shifts:  In: 240 [P.O.:240]  Out: 1800 [Urine:1800]    Constitutional: Resting comfortably, alert and conversing appropriately, NAD  Respiratory: CTAB, no wheeze/rales/rhonchi, no increased work of breathing  Cardiovascular: HRRR, no MGR, no LE edema  GI: S, NT, ND, +BS  Skin/Integumen: warm/dry  Other:      Medications      lactated ringers Stopped (05/26/23 0534)       acetaminophen  975 mg Oral Q8H     amLODIPine  5 mg Oral QPM     aspirin  81 mg Oral Daily     atorvastatin  40 mg Oral QPM     clopidogrel  75 mg Oral Daily     DULoxetine  60 mg Oral Daily     levothyroxine  88 mcg Oral Daily     metoprolol tartrate  25 mg Oral BID     polyethylene glycol  17 g Oral Daily     senna-docusate  1 tablet Oral BID     sodium chloride (PF)  3 mL Intracatheter Q8H     sodium chloride (PF)  3 mL Intracatheter Q8H     cholecalciferol  50 mcg Oral Daily       Data   Recent Labs   Lab 05/26/23  0626 05/24/23  0619   WBC  --  9.7   HGB 11.3* 11.8   MCV  --  91   PLT  --  247   NA  --  139   POTASSIUM  --  4.6   CHLORIDE  --  106   CO2  --  23   BUN  --  19.6   CR  --  0.97*   ANIONGAP  --  10   CYNDY  --  8.3*   * 136*       No results found for this or any previous visit (from the past 24 hour(s)).

## 2023-05-27 NOTE — PLAN OF CARE
Goal Outcome Evaluation:  Summary:     Care Plan Summary Note: POD#2: OPEN REDUCTION INTERNAL FIXATION RIGHT ANKLE FRACTURE  Orientation: A&Ox4  Activity Level:A-2  Fall Risk: Yes  Behavior & Aggression Tool Color: Green  Pain Management: Minimal pain managed with schedule tylenol  ABNL VS/O2: VSS on RA- lungs sounds are clear  ABNL Lab/BG: Hemoglobin 12.8  Diet: Regular diet  Bowel/Bladder: BS audible, Continent of B&B, pure wick in place  Drains/Devices: PIV -SL, Purewick use with adequate output.  Tests/Procedures for next shift: None  Anticipated DC date:   Other Important Info: Up with assist of 2 with walker and gait belt, pivot transfer, not out of bed for this shift.  Patient wants to wait for PT. RLE NWB and LLE WBAT in CAM boot. Plan to discharge to TCU.

## 2023-05-27 NOTE — PROGRESS NOTES
Orthopedic Surgery  Janie Murphy [place]  05/27/2023     Admit Date:  5/23/2023    POD: 2 Days Post-Op   Procedure(s):  OPEN REDUCTION INTERNAL FIXATION RIGHT ANKLE FRACTURE     Patient resting comfortably in bed.   Pain controlled.  Tolerating oral intake.    Denies nausea or vomiting  Denies chest pain or shortness of breath  Plans on TCU at discharge      Temp:  [97  F (36.1  C)-98.5  F (36.9  C)] 98.5  F (36.9  C)  Pulse:  [72-88] 88  Resp:  [16] 16  BP: (102-144)/(66-84) 144/84  SpO2:  [94 %-98 %] 94 %    Alert and oriented  Dressing is clean, dry, and intact.   Minimal erythema of the surrounding skin.   Left calf soft, non-tender.  Right lower extremity is NVI.  Sensation intact bilateral lower extremities  Brisk cap refill   Wiggles toes     Labs:  Recent Labs   Lab Test 05/27/23  0738 05/26/23  0626 05/24/23  0619 09/25/22  2251 09/25/22  1544   WBC  --   --  9.7 8.1 10.3   HGB 12.8 11.3* 11.8 13.5 15.3   PLT  --   --  247 279 357     No lab results found.  No lab results found.      1. PLAN:   Continue  mg daily for DVT prophylaxis.     Mobilize with PT/OT    NWB RLE   WBAT in [facility] lle.   Continue current pain regimen   Dressings: Keep intact.  Change if >60% saturated or peeling off.    Follow-up: 2 weeks post-op with Dr. Declan Casanova     2. Disposition   Anticipate d/c to TCU when medically cleared and progressing in PT.    Kjerstin L Foss, PA-C

## 2023-05-28 LAB
GLUCOSE BLDC GLUCOMTR-MCNC: 120 MG/DL (ref 70–99)
GLUCOSE BLDC GLUCOMTR-MCNC: 131 MG/DL (ref 70–99)

## 2023-05-28 PROCEDURE — 250N000013 HC RX MED GY IP 250 OP 250 PS 637: Performed by: PHYSICIAN ASSISTANT

## 2023-05-28 PROCEDURE — 99232 SBSQ HOSP IP/OBS MODERATE 35: CPT | Performed by: INTERNAL MEDICINE

## 2023-05-28 PROCEDURE — 250N000013 HC RX MED GY IP 250 OP 250 PS 637: Performed by: INTERNAL MEDICINE

## 2023-05-28 PROCEDURE — 250N000013 HC RX MED GY IP 250 OP 250 PS 637

## 2023-05-28 PROCEDURE — 120N000001 HC R&B MED SURG/OB

## 2023-05-28 PROCEDURE — 250N000013 HC RX MED GY IP 250 OP 250 PS 637: Performed by: STUDENT IN AN ORGANIZED HEALTH CARE EDUCATION/TRAINING PROGRAM

## 2023-05-28 RX ADMIN — AMLODIPINE BESYLATE 5 MG: 5 TABLET ORAL at 19:23

## 2023-05-28 RX ADMIN — METOPROLOL TARTRATE 25 MG: 25 TABLET, FILM COATED ORAL at 08:30

## 2023-05-28 RX ADMIN — DULOXETINE HYDROCHLORIDE 60 MG: 60 CAPSULE, DELAYED RELEASE ORAL at 08:30

## 2023-05-28 RX ADMIN — METOPROLOL TARTRATE 25 MG: 25 TABLET, FILM COATED ORAL at 19:23

## 2023-05-28 RX ADMIN — SENNOSIDES AND DOCUSATE SODIUM 1 TABLET: 50; 8.6 TABLET ORAL at 21:31

## 2023-05-28 RX ADMIN — SENNOSIDES AND DOCUSATE SODIUM 1 TABLET: 50; 8.6 TABLET ORAL at 08:30

## 2023-05-28 RX ADMIN — Medication 50 MCG: at 08:30

## 2023-05-28 RX ADMIN — CLOPIDOGREL BISULFATE 75 MG: 75 TABLET ORAL at 08:30

## 2023-05-28 RX ADMIN — ASPIRIN 81 MG: 81 TABLET, COATED ORAL at 08:30

## 2023-05-28 RX ADMIN — HYDROMORPHONE HYDROCHLORIDE 4 MG: 2 TABLET ORAL at 19:22

## 2023-05-28 RX ADMIN — ACETAMINOPHEN 975 MG: 325 TABLET ORAL at 12:14

## 2023-05-28 RX ADMIN — ATORVASTATIN CALCIUM 40 MG: 40 TABLET, FILM COATED ORAL at 19:23

## 2023-05-28 RX ADMIN — CLONAZEPAM 0.5 MG: 0.5 TABLET ORAL at 21:31

## 2023-05-28 RX ADMIN — LEVOTHYROXINE SODIUM 88 MCG: 88 TABLET ORAL at 08:30

## 2023-05-28 RX ADMIN — ACETAMINOPHEN 975 MG: 325 TABLET ORAL at 06:32

## 2023-05-28 RX ADMIN — LOSARTAN POTASSIUM 50 MG: 50 TABLET, FILM COATED ORAL at 08:30

## 2023-05-28 RX ADMIN — POLYETHYLENE GLYCOL 3350 17 G: 17 POWDER, FOR SOLUTION ORAL at 08:31

## 2023-05-28 ASSESSMENT — ACTIVITIES OF DAILY LIVING (ADL)
ADLS_ACUITY_SCORE: 24
ADLS_ACUITY_SCORE: 25
ADLS_ACUITY_SCORE: 24

## 2023-05-28 NOTE — PLAN OF CARE
Goal Outcome Evaluation:      Plan of Care Reviewed With: patient      [id number]: Patient alert and oriented, very pleasant. VSS on room air. Pain managed with scheduled tylenol.  Pt up with ax1, GB, walker. CAM boot in place on LLE WBAT, RLE with ace wrap in place, NWB. Up in chair this shift, tolerating regular diet. Voiding adequately, BM this shift. Plan for discharge to TCU pending placement.

## 2023-05-28 NOTE — PROGRESS NOTES
Orthopedic Surgery  Janie Murphy [place]  05/28/2023     Admit Date:  5/23/2023    POD: 3 Days Post-Op   Procedure(s):  OPEN REDUCTION INTERNAL FIXATION RIGHT ANKLE FRACTURE     Patient resting comfortably in bed.   Pain controlled.  Tolerating oral intake.    Denies nausea or vomiting  Denies chest pain or shortness of breath  Plans on TCU at discharge      Temp:  [97.6  F (36.4  C)-98.5  F (36.9  C)] 97.8  F (36.6  C)  Pulse:  [72-90] 72  Resp:  [16] 16  BP: (128-144)/(70-87) 128/70  SpO2:  [94 %-97 %] 94 %    Alert and oriented  Dressing is clean, dry, and intact.   Minimal erythema of the surrounding skin.   Left calf soft, non-tender.  Right lower extremity is NVI.  Sensation intact bilateral lower extremities  Brisk cap refill   Wiggles toes     Labs:  Recent Labs   Lab Test 05/27/23  0738 05/26/23  0626 05/24/23  0619 09/25/22  2251 09/25/22  1544   WBC  --   --  9.7 8.1 10.3   HGB 12.8 11.3* 11.8 13.5 15.3   PLT  --   --  247 279 357     No lab results found.  No lab results found.      1. PLAN:   Continue  mg daily for DVT prophylaxis.     Mobilize with PT/OT    NWB RLE   WBAT in [facility] boot lle.   Continue current pain regimen   Dressings: Keep intact.  Change if >60% saturated or peeling off.    Follow-up: 2 weeks post-op with Dr. Declan Casanova     2. Disposition   Anticipate d/c to TCU when medically cleared and progressing in PT.    Kjerstin L Foss, PA-C

## 2023-05-28 NOTE — PLAN OF CARE
Goal Outcome Evaluation:  Patient is alert and oriented X4. VSS on RA with  good sat . Up with assist of 2 with walker and gait belt and pivot with transfer. regular diet  PIV saline locked. Patient denies pain. CMS intact,   purewick in place with minum output.  Awaiting TCU placement.

## 2023-05-28 NOTE — PROGRESS NOTES
[facility]    Hospitalist Progress Note    Assessment & Plan   Janie De Leon is a 73 year old female with PMHx of CAD and prior NSTEMI in 9/2022 which was medically managed, hypertension, hyperlipidemia, prediabetes, hypothyroidism, depression and anxiety who was admitted on 5/23/2023 with a right ankle fracture after a fall.     R trimalleolar fracture, s/p ORIF of the distal fibula and medial malleolus and closed reduction/splinting of the R posterior malleolus on 5/25/23  L ankle sprain   * On day of admission, patient was walking out the door of the home when she twisted both ankles and fell. No head trauma or LOC. Was seen in ED. VSS. Imaging showed a R trimalleolar fracture. L ankle swelling, acute talar fracture was not excluded. Was ultimately able to walk on L ankle. Subsequently discharged home with crutches. Returned back to ED later that night after another fall where she landed on her buttocks. Felt like she would be unable to manage at home.   * Seen by ortho this stay. Ultimately taken to OR and underwent ORIF of the R distal fibula, ORIF of the R medial malleolus and closed reduction/splinting of the R posterior malleolus.   * Had unremarkable postop course. Pain controlled with Tylenol and oral dilaudid.   * NWB to RLE. LLE in brace. Therapy recommended TCU stay, patient in agreement.   * Continued on DAPT with Plavix and low dose ASA for DVT ppx (plan okay per ortho).  * Follow up with [facility] in 2 wks.      CAD with NSTEMI in 9/2022, medically managed  Hypertension   Hyperlipidemia  * Hospitalized in 9/2022 with an NSTEMI. Found to have mild prox LAD disease with mild plaque rupture. Ultimately did not require stenting. Medically managed. Recent echo in 1/2023 showed EF 55-60%.   * Home meds: ASA 81mg daily, Plavix, statin, amlodipine 5mg HS, metoprolol 25mg BID, olmesartan 20mg daily.   * No c/o recent chest pain or pressure. EKG on 5/24 showed NSR with no ischemic changes.    * Continued on ASA. Plavix held for surgery, resumed postop.   * Conts on amlodipine and metoprolol. ARB held for surgery, resumed this stay      Prediabetes  * A1C was 6.1 in 8/2022. No need for monitor BG this stay. Can follow up with PCP.      Depression  Anxiety  * Chronic and stable on Cymbalta, also uses Klonipin prn HS     Hypothyroidism  * Chronic and stable on levothyroxine     FEN: no IVFs, lytes stable, regular diet  DVT Prophylaxis: DAPT as above  Code Status: Full Code    Disposition: Medically stable for discharge. Lives at home with  who has dementia, acknowledges that she cannot return home with recent injuries/surgery and will need TCU stay. SW consulted. Accepted to [facility] TCU but given the weekend/holiday, won't be able to discharge there until 5/30.     Jessenia Sanford, DO    Medical Decision Making       Please see A&P for additional details of medical decision making.       Interval History   Chart reviewed. Seen this morning. In better spirits today. No specific complaints. Denies cp/sob/cough, abd pain/n/v.      -Data reviewed today: I reviewed all new labs and imaging results over the last 24 hours. I personally reviewed no images or EKG's today.    Physical Exam   Temp: 98.3  F (36.8  C) Temp src: Oral BP: 130/77 Pulse: 80   Resp: 16 SpO2: 97 % O2 Device: None (Room air)    Vitals:    05/23/23 2240 05/24/23 0106   Weight: 90.7 kg (200 lb) 91.4 kg (201 lb 8 oz)     Vital Signs with Ranges  Temp:  [97.6  F (36.4  C)-98.5  F (36.9  C)] 98.3  F (36.8  C)  Pulse:  [80-90] 80  Resp:  [16] 16  BP: (130-144)/(77-87) 130/77  SpO2:  [94 %-97 %] 97 %  I/O last 3 completed shifts:  In: -   Out: 1600 [Urine:1600]    Constitutional: Resting comfortably, alert and conversing appropriately, NAD  Respiratory: CTAB, no wheeze/rales/rhonchi, no increased work of breathing  Cardiovascular: HRRR, no MGR, no LE edema  GI: S, NT, ND, +BS  Skin/Integumen: warm/dry  Other:      Medications      lactated ringers Stopped (05/26/23 0534)       acetaminophen  975 mg Oral Q8H     amLODIPine  5 mg Oral QPM     aspirin  81 mg Oral Daily     atorvastatin  40 mg Oral QPM     clopidogrel  75 mg Oral Daily     DULoxetine  60 mg Oral Daily     levothyroxine  88 mcg Oral Daily     losartan  50 mg Oral Daily     metoprolol tartrate  25 mg Oral BID     polyethylene glycol  17 g Oral Daily     senna-docusate  1 tablet Oral BID     sodium chloride (PF)  3 mL Intracatheter Q8H     sodium chloride (PF)  3 mL Intracatheter Q8H     cholecalciferol  50 mcg Oral Daily       Data   Recent Labs   Lab 05/27/23  0738 05/26/23  0626 05/24/23  0619   WBC  --   --  9.7   HGB 12.8 11.3* 11.8   MCV  --   --  91   PLT  --   --  247   NA  --   --  139   POTASSIUM  --   --  4.6   CHLORIDE  --   --  106   CO2  --   --  23   BUN  --   --  19.6   CR  --   --  0.97*   ANIONGAP  --   --  10   CYNDY  --   --  8.3*   GLC  --  138* 136*       No results found for this or any previous visit (from the past 24 hour(s)).

## 2023-05-29 PROCEDURE — 120N000001 HC R&B MED SURG/OB

## 2023-05-29 PROCEDURE — 250N000013 HC RX MED GY IP 250 OP 250 PS 637: Performed by: STUDENT IN AN ORGANIZED HEALTH CARE EDUCATION/TRAINING PROGRAM

## 2023-05-29 PROCEDURE — 250N000013 HC RX MED GY IP 250 OP 250 PS 637: Performed by: PHYSICIAN ASSISTANT

## 2023-05-29 PROCEDURE — 99231 SBSQ HOSP IP/OBS SF/LOW 25: CPT | Performed by: INTERNAL MEDICINE

## 2023-05-29 PROCEDURE — 250N000013 HC RX MED GY IP 250 OP 250 PS 637: Performed by: INTERNAL MEDICINE

## 2023-05-29 PROCEDURE — 250N000013 HC RX MED GY IP 250 OP 250 PS 637

## 2023-05-29 RX ORDER — CLONAZEPAM 0.5 MG/1
.25-.5 TABLET ORAL
Qty: 10 TABLET | Refills: 0 | Status: SHIPPED | OUTPATIENT
Start: 2023-05-29 | End: 2023-09-22

## 2023-05-29 RX ADMIN — ATORVASTATIN CALCIUM 40 MG: 40 TABLET, FILM COATED ORAL at 21:12

## 2023-05-29 RX ADMIN — SENNOSIDES AND DOCUSATE SODIUM 1 TABLET: 50; 8.6 TABLET ORAL at 21:13

## 2023-05-29 RX ADMIN — Medication 50 MCG: at 08:21

## 2023-05-29 RX ADMIN — LOSARTAN POTASSIUM 50 MG: 50 TABLET, FILM COATED ORAL at 08:21

## 2023-05-29 RX ADMIN — METOPROLOL TARTRATE 25 MG: 25 TABLET, FILM COATED ORAL at 08:19

## 2023-05-29 RX ADMIN — METOPROLOL TARTRATE 25 MG: 25 TABLET, FILM COATED ORAL at 21:12

## 2023-05-29 RX ADMIN — CLOPIDOGREL BISULFATE 75 MG: 75 TABLET ORAL at 08:21

## 2023-05-29 RX ADMIN — ASPIRIN 81 MG: 81 TABLET, COATED ORAL at 08:21

## 2023-05-29 RX ADMIN — ACETAMINOPHEN 650 MG: 325 TABLET ORAL at 15:54

## 2023-05-29 RX ADMIN — SENNOSIDES AND DOCUSATE SODIUM 1 TABLET: 50; 8.6 TABLET ORAL at 08:20

## 2023-05-29 RX ADMIN — POLYETHYLENE GLYCOL 3350 17 G: 17 POWDER, FOR SOLUTION ORAL at 08:18

## 2023-05-29 RX ADMIN — CLONAZEPAM 0.5 MG: 0.5 TABLET ORAL at 22:07

## 2023-05-29 RX ADMIN — DULOXETINE HYDROCHLORIDE 60 MG: 60 CAPSULE, DELAYED RELEASE ORAL at 08:21

## 2023-05-29 RX ADMIN — LEVOTHYROXINE SODIUM 88 MCG: 88 TABLET ORAL at 08:18

## 2023-05-29 RX ADMIN — AMLODIPINE BESYLATE 5 MG: 5 TABLET ORAL at 21:12

## 2023-05-29 ASSESSMENT — ACTIVITIES OF DAILY LIVING (ADL)
ADLS_ACUITY_SCORE: 24

## 2023-05-29 NOTE — PLAN OF CARE
Goal Outcome Evaluation:    Admitting Diagnosis: Closed trimalleolar fracture of right ankle, initial encounter.     Patient A&Ox4. VSS on RA.  Up with 1-2 GB/W, pivot to bed side commond, RLE NWB and LLE WBAT CAM boot when OOb, with ace wrap in place. Pain managed with scheduled Tylenol. PIV SL. Plan discharge to [facility] 05/30/2023 @ [id number] am.

## 2023-05-29 NOTE — PLAN OF CARE
Goal Outcome Evaluation:       Patient is alert and oriented X4. VSS on RA with  good sat . Up with assist of 2 with walker and gait belt and pivot with transfer.  RLE NWB and LLE WBAT in CAM boot with.  On regular diet with good appetite. PIV saline locked. Patient reports pain, prn oral dilaudid was given with effectives results. Patient requests for  Clonazepam at bedtime and was given .   Continent  of B&B, purewick in place with minum output.  Patient had soft BM this shift.  Awaiting TCU placement.

## 2023-05-29 NOTE — PROGRESS NOTES
Care Management Follow Up    Length of Stay (days): 5    Expected Discharge Date: 05/30/2023     Concerns to be Addressed:     (Spouse has dementia and patient is the primary caregiver.  Patient can ask daughter to help but requested resources for home care for cooking and cleaning for spouse. SW provided home care agency resources that patient could privately pay.)  Patient plan of care discussed at interdisciplinary rounds: Yes    Anticipated Discharge Disposition:       Anticipated Discharge Services:Transportation    Anticipated Discharge DME:      Patient/family educated on Medicare website which has current facility and service quality ratings:    Education Provided on the Discharge Plan:    Patient/Family in Agreement with the Plan:  yes    Referrals Placed by CM/SW:    Private pay costs discussed: Not applicable    Additional Information:  Call to [facility], no admissions today. SW declined [place] referral and accepted [facility]. Previous SW left message for [facility]CU to request shared room. Patient had previously requested [facility] wheelchair ride. Scheduled [facility] 5/30 to [facility] at [phone]. Updated patient, agreeable to discharge to [facility] tomorrow, informed of transport time/cost associated but let pt know the time may need to be adjusted if [facility] can't take at that time. Will continue to follow for discharge planning tomorrow.     RENEE Cruz  Social Work  [facility]

## 2023-05-29 NOTE — PLAN OF CARE
Goal Outcome Evaluation:      A&Ox4, VSs on room air, up with assist of 1 with gait belt and walker pivot to bedside commode. voiding adequately, LLE with CAM boot. Pain managed with scheduled Tylenol.

## 2023-05-29 NOTE — PROGRESS NOTES
[facility]    Medicine Progress Note - Hospitalist Service       Date of Admission:  5/23/2023    Assessment & Plan   Janie De Leon is a 73 year old female with hx of with PMHx of CAD and prior NSTEMI in 9/2022 which was medically managed, hypertension, hyperlipidemia, prediabetes, hypothyroidism, depression and anxiety who was admitted on 5/23/2023 with a right ankle fracture after a fall. She is s/p ORIF on 5/25      R trimalleolar fracture, s/p ORIF of the distal fibula and medial malleolus and closed reduction/splinting of the R posterior malleolus on 5/25/23  L ankle sprain   * On day of admission, patient was walking out the door of the home when she twisted both ankles and fell. No head trauma or LOC. Was seen in ED. VSS. Imaging showed a R trimalleolar fracture. L ankle swelling, acute talar fracture was not excluded. Was ultimately able to walk on L ankle. Subsequently discharged home with crutches. Returned back to ED later that night after another fall where she landed on her buttocks. Felt like she would be unable to manage at home.   * Seen by ortho this stay. Ultimately taken to OR and underwent ORIF of the R distal fibula, ORIF of the R medial malleolus and closed reduction/splinting of the R posterior malleolus.   * Had unremarkable postop course. Pain controlled with Tylenol and oral dilaudid.   * NWB to RLE. LLE in brace. Therapy recommended TCU stay, patient in agreement.   * Continued on DAPT with Plavix and low dose ASA for DVT ppx (plan okay per ortho).  * Follow up with [facility] in 2 wks.      CAD with NSTEMI in 9/2022, medically managed  Hypertension   Hyperlipidemia  * Hospitalized in 9/2022 with an NSTEMI. Found to have mild prox LAD disease with mild plaque rupture. Ultimately did not require stenting. Medically managed. Recent echo in 1/2023 showed EF 55-60%.   * Home meds: ASA 81mg daily, Plavix, statin, amlodipine 5mg HS, metoprolol 25mg BID, olmesartan 20mg daily.    * No c/o recent chest pain or pressure. EKG on 5/24 showed NSR with no ischemic changes.   * Continued on ASA. Plavix held for surgery, resumed postop.   * Conts on amlodipine and metoprolol. ARB held for surgery, resumed postop     Prediabetes  * A1C was 6.1 in 8/2022. No need for monitor BG this stay. Can follow up with PCP.      Depression  Anxiety  * Chronic and stable on Cymbalta, also uses Klonipin prn HS     Hypothyroidism  * Chronic and stable on levothyroxine      Diet: Advance Diet as Tolerated: Regular Diet Adult  Diet    DVT Prophylaxis: Pneumatic Compression Devices  Bryant Catheter: Not present  Code Status: Full Code         Disposition: Expected discharge to TCU tomorrow    The patient's care was discussed with the Patient.    Janette Salcedo MD  Hospitalist Service  [facility]  Contact information available via [facility] Paging/Directory    ______________________________________________________________________    Interval History   No acute events overnight. Pain adequately controlled. Awaiting TCU    Data reviewed today: I reviewed all medications, new labs and imaging results over the last 24 hours. I personally reviewed no images or EKG's today.    Physical Exam   Vital Signs: Temp: 97.9  F (36.6  C) Temp src: Oral BP: 123/74 Pulse: 73   Resp: 16 SpO2: 94 % O2 Device: None (Room air)    Weight: 201 lbs 8.01 oz  Constitutional: Resting comfortably, NAD  HEENT: Sclera white, MMM  Respiratory: Breathing non-labored.  Skin/Integument: No rash  Musculoskeletal: Normal muscle bulk and tone  Neuro: Alert and appropriate, SAGASTUME  Psych: Calm and cooperative    Data   Recent Labs   Lab 05/28/23  1118 05/28/23  0747 05/27/23  0738 05/26/23  0626 05/24/23  0619   WBC  --   --   --   --  9.7   HGB  --   --  12.8 11.3* 11.8   MCV  --   --   --   --  91   PLT  --   --   --   --  247   NA  --   --   --   --  139   POTASSIUM  --   --   --   --  4.6   CHLORIDE  --   --   --   --  106    CO2  --   --   --   --  23   BUN  --   --   --   --  19.6   CR  --   --   --   --  0.97*   ANIONGAP  --   --   --   --  10   CYNDY  --   --   --   --  8.3*   * 131*  --  138* 136*         No results found for this or any previous visit (from the past 24 hour(s)).    Medications     lactated ringers Stopped (05/26/23 0534)       amLODIPine  5 mg Oral QPM     aspirin  81 mg Oral Daily     atorvastatin  40 mg Oral QPM     clopidogrel  75 mg Oral Daily     DULoxetine  60 mg Oral Daily     levothyroxine  88 mcg Oral Daily     losartan  50 mg Oral Daily     metoprolol tartrate  25 mg Oral BID     polyethylene glycol  17 g Oral Daily     senna-docusate  1 tablet Oral BID     sodium chloride (PF)  3 mL Intracatheter Q8H     sodium chloride (PF)  3 mL Intracatheter Q8H     cholecalciferol  50 mcg Oral Daily

## 2023-05-29 NOTE — PROGRESS NOTES
Orthopedic Surgery  Janie Murphy [place]  05/29/2023     Admit Date:  5/23/2023    POD: 4 Days Post-Op   Procedure(s):  OPEN REDUCTION INTERNAL FIXATION RIGHT ANKLE FRACTURE     Patient resting comfortably in bed, about to use bedside commode   Pain controlled.  Tolerating oral intake.    Denies nausea or vomiting  Denies chest pain or shortness of breath  Plans on TCU at discharge, hopeful for tomorrow      Temp:  [97.9  F (36.6  C)-98.7  F (37.1  C)] 98.4  F (36.9  C)  Pulse:  [73-93] 93  Resp:  [15-16] 16  BP: (117-133)/(60-83) 133/83  SpO2:  [94 %-99 %] 99 %    Alert and oriented  Dressing is clean, dry, and intact.   Minimal erythema of the surrounding skin.   Left calf soft, non-tender.  Right lower extremity is NVI.  Sensation intact bilateral lower extremities  Brisk cap refill   Wiggles toes     Labs:  Recent Labs   Lab Test 05/27/23  0738 05/26/23  0626 05/24/23  0619 09/25/22  2251 09/25/22  1544   WBC  --   --  9.7 8.1 10.3   HGB 12.8 11.3* 11.8 13.5 15.3   PLT  --   --  247 279 357     No lab results found.  No lab results found.      1. PLAN:   Continue  mg daily for DVT prophylaxis.     Mobilize with PT/OT    NWB RLE   WBAT in [facility] boot lle.   Continue current pain regimen   Dressings: Keep intact.  Change if >60% saturated or peeling off.    Follow-up: 2 weeks post-op with Dr. Declan Casanova     2. Disposition   Anticipate d/c to TCU when medically cleared and progressing in PT.    Kjerstin L Foss, PA-C

## 2023-05-30 VITALS
RESPIRATION RATE: 16 BRPM | HEIGHT: 69 IN | SYSTOLIC BLOOD PRESSURE: 133 MMHG | OXYGEN SATURATION: 92 % | TEMPERATURE: 98.4 F | DIASTOLIC BLOOD PRESSURE: 66 MMHG | HEART RATE: 76 BPM | WEIGHT: 201.5 LBS | BODY MASS INDEX: 29.84 KG/M2

## 2023-05-30 PROCEDURE — 250N000013 HC RX MED GY IP 250 OP 250 PS 637: Performed by: INTERNAL MEDICINE

## 2023-05-30 PROCEDURE — 250N000013 HC RX MED GY IP 250 OP 250 PS 637: Performed by: HOSPITALIST

## 2023-05-30 PROCEDURE — 250N000013 HC RX MED GY IP 250 OP 250 PS 637: Performed by: STUDENT IN AN ORGANIZED HEALTH CARE EDUCATION/TRAINING PROGRAM

## 2023-05-30 PROCEDURE — 250N000013 HC RX MED GY IP 250 OP 250 PS 637

## 2023-05-30 PROCEDURE — 99239 HOSP IP/OBS DSCHRG MGMT >30: CPT | Performed by: INTERNAL MEDICINE

## 2023-05-30 PROCEDURE — 250N000013 HC RX MED GY IP 250 OP 250 PS 637: Performed by: PHYSICIAN ASSISTANT

## 2023-05-30 RX ORDER — CALCIUM CARBONATE 500 MG/1
2 TABLET, CHEWABLE ORAL DAILY PRN
DISCHARGE
Start: 2023-05-30

## 2023-05-30 RX ORDER — CALCIUM CARBONATE 500 MG/1
500 TABLET, CHEWABLE ORAL DAILY PRN
Status: DISCONTINUED | OUTPATIENT
Start: 2023-05-30 | End: 2023-05-30 | Stop reason: HOSPADM

## 2023-05-30 RX ADMIN — ACETAMINOPHEN 650 MG: 325 TABLET ORAL at 08:58

## 2023-05-30 RX ADMIN — POLYETHYLENE GLYCOL 3350 17 G: 17 POWDER, FOR SOLUTION ORAL at 08:15

## 2023-05-30 RX ADMIN — METOPROLOL TARTRATE 25 MG: 25 TABLET, FILM COATED ORAL at 08:16

## 2023-05-30 RX ADMIN — ASPIRIN 81 MG: 81 TABLET, COATED ORAL at 08:16

## 2023-05-30 RX ADMIN — LOSARTAN POTASSIUM 50 MG: 50 TABLET, FILM COATED ORAL at 08:16

## 2023-05-30 RX ADMIN — SENNOSIDES AND DOCUSATE SODIUM 1 TABLET: 50; 8.6 TABLET ORAL at 08:16

## 2023-05-30 RX ADMIN — DULOXETINE HYDROCHLORIDE 60 MG: 60 CAPSULE, DELAYED RELEASE ORAL at 08:16

## 2023-05-30 RX ADMIN — LEVOTHYROXINE SODIUM 88 MCG: 88 TABLET ORAL at 08:16

## 2023-05-30 RX ADMIN — CLOPIDOGREL BISULFATE 75 MG: 75 TABLET ORAL at 08:16

## 2023-05-30 RX ADMIN — CALCIUM CARBONATE (ANTACID) CHEW TAB 500 MG 500 MG: 500 CHEW TAB at 01:37

## 2023-05-30 ASSESSMENT — ACTIVITIES OF DAILY LIVING (ADL)
ADLS_ACUITY_SCORE: 24

## 2023-05-30 NOTE — PROGRESS NOTES
Care Management Discharge Note    Discharge Date: 05/30/2023       Discharge Disposition: Skilled Nursing Facility    Discharge Services:      Discharge DME:      Discharge Transportation: agency    Private pay costs discussed: Not applicable    Does the patient's insurance plan have a 3 day qualifying hospital stay waiver?  No    PAS Confirmation Code: [id number]  Patient/family educated on Medicare website which has current facility and service quality ratings: yes    Education Provided on the Discharge Plan:    Persons Notified of Discharge Plans: [place], nurse, patient, facility   Patient/Family in Agreement with the Plan: yes    Handoff Referral Completed: No    Additional Information:  Completed PAS, Called facility, Spoke to facility, followed safe discharge planning.     PAS-RR    D: Per DHS regulation, SW completed and submitted PAS-RR to [place] [employer] Direct Connect via the Senior [employer] Line.  PAS-RR confirmation # is : [id number]    I: SW spoke with Pt and they are aware a PAS-RR has been submitted.  SW reviewed with Pt that they may be contacted for a follow up appointment within 10 days of hospital discharge if their SNF stay is < 30 days.  Contact information for [employer] Line was also provided.    A: Pt  verbalized understanding.    P: Further questions may be directed to [employer] Line at #[phone], option #4 for PAS-RR staff.          RENEE Dunham

## 2023-05-30 NOTE — PLAN OF CARE
Goal Outcome Evaluation:    A&Ox4, VSS on room air. Up with assist of 1 with gait belt and walker pivot to bedside commode. NWB to RLE, LLE with brace. Pain managed with scheduled and PRN meds. Pt requested to have TUMS for indigestion; hospitalist paged and order obtained, given with relief.  Voiding adequately.

## 2023-05-30 NOTE — PLAN OF CARE
Goal Outcome Evaluation:  Pt is discharging to TCU this morning by [facility]. Discharge packet sent with patient. IV removed and patient belongings accounted for.

## 2023-05-30 NOTE — PLAN OF CARE
Physical Therapy Discharge Summary     Reason for therapy discharge:    Discharged to transitional care facility.     Progress towards therapy goal(s). See goals on Care Plan in [facility] electronic health record for goal details.  Goals not met.  Barriers to achieving goals:   discharge from facility.     Therapy recommendation(s):    Continued therapy is recommended.  Rationale/Recommendations:  Patient would benefit from PT eval at TCU in order to increase strength, activity tolerance, balance and independence with mobility.    **Pt not seen by discharging therapist on this date, note written based on previous treating therapist's notes and recommendations

## 2023-05-30 NOTE — DISCHARGE SUMMARY
[facility]  Hospitalist Discharge Summary      Date of Admission:  5/23/2023  Date of Discharge:  5/30/2023  Discharging Provider: Janette Salcedo MD    Discharge Diagnoses   1. R trimalleolar fracture, s/p ORIF of the distal fibula and medial malleolus and closed reduction/splinting of the R posterior malleolus on 5/25/23  2. L ankle sprain  3. CAD with NSTEMI in 9/2022, medically managed  4. Hypertension   5. Hyperlipidemia  6. Prediabetes  7. Major recurrent depressive disorder with anxiety  8. Hypothyroidism    Follow-ups Needed After Discharge   Follow-up Appointments     Follow Up and recommended labs and tests      Please call as soon as possible to make an appointment to be seen in Dr. Declan Casanova's clinic at 2 weeks postop for a recheck of your surgical   site, possible repeat x-rays, and wound care. If you are at a TCU at this   time and they have x-ray capabilities, you may complete your wound care   and x-rays at your TCU and have them send your images to Dr. Casanova's   office.     Dr. Casanova's care coordinator is Shanita Matos. Please contact her at   [phone] to schedule an appointment.       Dr. Casanova sees patients at 2 clinic locations:  [facility]  [address] [place] [address] [id number] [place] [address] Orthopedics [facility]   [address] , [address], [place] [address]        Please call the on-call phone number [phone] during evenings, nights   and weekends for any urgent needs. Prescription refills must be done   during business hours by calling [phone].           Discharge Disposition   Discharged to short-term care facility  Condition at discharge: Stable    Hospital Course   Janie De Leon is a 73 year old female with hx of with PMHx of CAD and prior NSTEMI in 9/2022 which was medically managed, hypertension, hyperlipidemia, prediabetes, hypothyroidism, depression and anxiety who was admitted on 5/23/2023 with a  right ankle fracture after a fall. She is s/p ORIF on 5/25      R trimalleolar fracture, s/p ORIF of the distal fibula and medial malleolus and closed reduction/splinting of the R posterior malleolus on 5/25/23  L ankle sprain   * On day of admission, patient was walking out the door of the home when she twisted both ankles and fell. No head trauma or LOC. Was seen in ED. VSS. Imaging showed a R trimalleolar fracture. L ankle swelling, acute talar fracture was not excluded. Was ultimately able to walk on L ankle. Subsequently discharged home with crutches. Returned back to ED later that night after another fall where she landed on her buttocks. Felt like she would be unable to manage at home.   * Seen by ortho this stay. Ultimately taken to OR and underwent ORIF of the R distal fibula, ORIF of the R medial malleolus and closed reduction/splinting of the R posterior malleolus.   * Had unremarkable postop course. Pain controlled with Tylenol and oral dilaudid.   * NWB to RLE. LLE in brace. Therapy recommended TCU stay, patient in agreement.   * Continued on DAPT with Plavix and low dose ASA for DVT ppx (plan okay per ortho).  * Follow up with [facility] in 2 wks.      CAD with NSTEMI in 9/2022, medically managed  Hypertension   Hyperlipidemia  * Hospitalized in 9/2022 with an NSTEMI. Found to have mild prox LAD disease with mild plaque rupture. Ultimately did not require stenting. Medically managed. Recent echo in 1/2023 showed EF 55-60%.   * Home meds: ASA 81mg daily, Plavix, statin, amlodipine 5mg HS, metoprolol 25mg BID, olmesartan 20mg daily.   * No c/o recent chest pain or pressure. EKG on 5/24 showed NSR with no ischemic changes.   * Continued on ASA. Plavix held for surgery, resumed postop.   * Conts on amlodipine and metoprolol. ARB held for surgery, resumed postop     Prediabetes  * A1C was 6.1 in 8/2022. No need for monitor BG this stay. Can follow up with PCP.      Major recurrent depressive disorder with  anxiety  * Chronic and stable on citalopram; also takes clonazepam prn HS     Hypothyroidism  * Chronic and stable on levothyroxine     Consultations This Hospital Stay   ORTHOPEDIC SURGERY IP CONSULT  ORTHOSIS EXTREMITY LOWER REFERRAL IP CONSULT  CARE MANAGEMENT / SOCIAL WORK IP CONSULT  PHYSICAL THERAPY ADULT IP CONSULT  OCCUPATIONAL THERAPY ADULT IP CONSULT    Code Status   Full Code    Time Spent on this Encounter   I, Janette Salcedo MD, personally saw the patient today and spent 40 minutes discharging this patient.       Janette Salcedo MD  [facility] ORTHOPED[facility] SPINE  [address] [address]  Phone: [phone]  Fax: [phone]  ______________________________________________________________________    Physical Exam   Vital Signs: Temp: 98.4  F (36.9  C) Temp src: Oral BP: 133/66 Pulse: 76   Resp: 16 SpO2: 92 % O2 Device: None (Room air)    Weight: 201 lbs 8.01 oz    Constitutional: Resting comfortably, NAD  HEENT: Sclera white, conjunctiva clear, EOMI, MMM  Respiratory: Breathing non-labored. Lungs CTAB - no wheezes/crackles/rhonchi  Cardiovascular: Heart RRR, no m/r/g. No pedal edema.   GI: +BS. Abd soft/NT  Skin: Warm and dry. No rash.  Musculoskeletal: Normal muscle bulk and tone  Neurologic: Alert and appropriate. SAGASTUME  Psychiatric: Calm and cooperative    Primary Care Physician   Jade Davis    Discharge Orders      General info for SNF    Length of Stay Estimate: Short Term Care: Estimated # of Days <30  Condition at Discharge: Stable  Level of care:skilled   Rehabilitation Potential: Fair  Admission H&P remains valid and up-to-date: Yes  Recent Chemotherapy: N/A  Use Nursing Home Standing Orders: Yes     Mantoux instructions    Give two-step Mantoux (PPD) Per Facility Policy Yes     Follow Up and recommended labs and tests    Please call as soon as possible to make an appointment to be seen in Dr. Declan Casanova's clinic at 2 weeks postop for a  recheck of your surgical site, possible repeat x-rays, and wound care. If you are at a TCU at this time and they have x-ray capabilities, you may complete your wound care and x-rays at your TCU and have them send your images to Dr. Casanova's office.     Dr. Casanova's care coordinator is Shanita Matos. Please contact her at [phone] to schedule an appointment.       Dr. Casanova sees patients at 2 clinic locations:  [facility]  [address] [place] [address]  [facility]   [address] , [address], [place] [address]        Please call the on-call phone number [phone] during evenings, nights and weekends for any urgent needs. Prescription refills must be done during business hours by calling [phone].     Reason for your hospital stay    S/p right ankle fracture ORIF (DOS: 5/25/23)     Wound care    Right LE: Short leg splint to remain clean, dry, and intact until 2 week postop visit.    Left LE: CAM boot in place with weight bearing. Okay to remove at rest.     Additional Discharge Instructions    Pain after surgery is normal and expected.  You will have some amount of pain for several weeks after surgery.  Your pain will improve with time.  There are several things you can do to help reduce your pain including: rest, ice, elevation, and using pain medications as needed. Contact your Surgeon Team if you have pain that persists or worsens after surgery despite rest, ice, elevation, and taking your medication(s) as prescribed. Contact your Surgeon Team if you have new numbness, tingling, or weakness in your operative extremity.    Swelling and/or bruising of the surgical extremity is common and may persist for several months after surgery. In addition to frequent icing and elevation, gentle compressive support with an ACE wrap or tubigrip may help with swelling. Apply compression regularly, removing at least twice daily to perform skin checks. Contact  your Surgeon Team if your swelling increases and is NOT associated with an increase in your activity level, or if your swelling increases and is associated with redness and pain.    Ice can be used to control swelling and discomfort after surgery. Place a thin towel over your operative site and apply the ice pack overtop. Leave ice pack in place for 20 minutes, then remove for 20 minutes. Repeat this 20 minutes on/20 minutes off routine as often as tolerated.    Please contact your surgical team with any concerns for infection including increasing redness around your surgical site, pus-like drainage, fever, chills, or flu-like symptoms.     Activity - Up with assistive device     Activity - Up with nursing assistance     Weight bearing status    NWB RLE with walker    WBAT LLE with walker     Physical Therapy Adult Consult    Evaluate and treat as clinically indicated.    Reason: S/p right ankle fracture ORIF (DOS: 5/25/23)     Occupational Therapy Adult Consult    Evaluate and treat as clinically indicated.    Reason: S/p right ankle fracture ORIF (DOS: 5/25/23)     Fall precautions     Rolling Knee Walker DME    DME Documentation: Describe the reason for need to support medical necessity: Impaired gait due to Foot/Ankle surgery. Anticipated length of need: 3 months     Walker DME    DME Documentation: Describe the reason for need to support medical necessity: Impaired gait due to Foot/Ankle surgery. Anticipated length of need: 3 months     Diet    Follow this diet upon discharge: Regular       Significant Results and Procedures   Most Recent 3 CBC's:Recent Labs   Lab Test 05/27/23  0738 05/26/23  0626 05/24/23  0619 09/25/22  2251 09/25/22  1544   WBC  --   --  9.7 8.1 10.3   HGB 12.8 11.3* 11.8 13.5 15.3   MCV  --   --  91 87 91   PLT  --   --  247 279 357     Most Recent 3 BMP's:Recent Labs   Lab Test 05/28/23  1118 05/28/23  0747 05/26/23  0626 05/24/23  0619 05/24/23  0619 09/27/22  1132 09/25/22  1544   NA   --   --   --   --  139 139 138   POTASSIUM  --   --   --   --  4.6 4.0 4.3   CHLORIDE  --   --   --   --  106 106 105   CO2  --   --   --   --  23 26 24   BUN  --   --   --   --  19.6 15 20   CR  --   --   --   --  0.97* 0.81 1.01   ANIONGAP  --   --   --   --  10 7 9   CYNDY  --   --   --   --  8.3* 9.1 8.6   * 131* 138*   < > 136* 102* 125*    < > = values in this interval not displayed.   ,   Results for orders placed or performed during the hospital encounter of 05/23/23   XR Ankle Port Right G/E 3 Views    Narrative    EXAM: XR ANKLE PORT RIGHT G/E 3 VIEWS  LOCATION: [facility]  DATE/TIME: 5/25/2023 7:13 PM CDT    INDICATION: Status post ORIF for trimal.  COMPARISON: None.      Impression    IMPRESSION: Postoperative changes plate and screw fixation of a fracture of the lateral malleolus. This is in excellent anatomic alignment. Additional screw fixation of the medial malleolus. Good restoration of the ankle mortise.   XR Surgery ESAU L/T 5 Min Fluoro w Stills    Narrative    EXAM: XR SURGERY ESAU FLUORO LESS THAN 5 MIN W STILLS  LOCATION: [facility]  DATE/TIME: 5/25/2023 7:12 PM CDT    INDICATION: Open reduction internal fixation of a right ankle fracture.  COMPARISON: 05/23/2023.    TECHNIQUE: Intraoperative fluoroscopy performed during the patient's procedure.    FLUOROSCOPIC TIME: 0.5 seconds.  NUMBER OF IMAGES: 4    FINDINGS: Images demonstrate interval reduction and internal fixation of a distal right fibular fracture with lateral plate and screw construct, as well as a single interfragmentary screw. A single interfragmentary screw also internally fixates a reduced   fracture of the medial malleolus.     Minimal osteoarthritic changes are noted. Ankle mortise and tibiofibular syndesmosis appear intact.       Discharge Medications   Discharge Medication List as of 5/30/2023 10:51 AM      START taking these medications    Details   acetaminophen  (TYLENOL) 325 MG tablet Take 2 tablets (650 mg) by mouth every 6 hours as needed for other (For optimal non-opioid multimodal pain management to improve pain control.), Disp-100 tablet, R-0, Transitional      calcium carbonate (TUMS) 500 MG chewable tablet Take 2 tablets (1,000 mg) by mouth daily as needed for heartburn, Transitional      HYDROmorphone (DILAUDID) 2 MG tablet Take 1 tablet (2 mg) by mouth every 4 hours as needed for severe pain, Disp-25 tablet, R-0, Local Print      senna-docusate (SENOKOT-S/PERICOLACE) 8.6-50 MG tablet Take 1 tablet by mouth 2 times daily as needed for constipation, Disp-21 tablet, R-0, Transitional         CONTINUE these medications which have CHANGED    Details   aspirin (ASA) 81 MG EC tablet Take 1 tablet (81 mg) by mouth daily, R-0, Transitional      clonazePAM (KLONOPIN) 0.5 MG tablet Take 0.5-1 tablets (0.25-0.5 mg) by mouth nightly as needed for anxiety, Disp-10 tablet, R-0, Local Print         CONTINUE these medications which have NOT CHANGED    Details   amLODIPine (NORVASC) 5 MG tablet Take 1 tablet (5 mg) by mouth daily, Disp-90 tablet, R-3, E-Prescribe      atorvastatin (LIPITOR) 40 MG tablet Take 1 tablet (40 mg) by mouth every evening, Disp-90 tablet, R-3, E-Prescribe      Cholecalciferol (VITAMIN D3 PO) Take 1,000 Units by mouth daily, Historical      clopidogrel (PLAVIX) 75 MG tablet Take 1 tablet (75 mg) by mouth daily, Disp-90 tablet, R-3, E-Prescribe      cyanocobalamin (VITAMIN B-12) 100 MCG tablet Take 100 mcg by mouth daily, Historical      DULoxetine (CYMBALTA) 60 MG capsule Take 1 capsule (60 mg) by mouth daily, Disp-90 capsule, R-3, E-Prescribe      levothyroxine (SYNTHROID/LEVOTHROID) 88 MCG tablet Take 1 tablet (88 mcg) by mouth daily - Overdue for endocrinology appointment, Disp-90 tablet, R-3, E-Prescribe      metoprolol tartrate (LOPRESSOR) 25 MG tablet Take 1 tablet (25 mg) by mouth 2 times daily, Disp-180 tablet, R-3, E-Prescribe      nitroGLYcerin  (NITROSTAT) 0.4 MG sublingual tablet For chest pain place 1 tablet under the tongue every 5 minutes for 3 doses. If symptoms persist 5 minutes after 1st dose call 911., Disp-30 tablet, R-0, E-PrescribeRefills per PCP      olmesartan (BENICAR) 20 MG tablet Take 1 tablet (20 mg) by mouth daily, Disp-90 tablet, R-3, E-Prescribe      polyethylene glycol (MIRALAX) 17 GM/Dose powder Take 17 g by mouth daily as needed for constipation, Historical         STOP taking these medications       oxyCODONE-acetaminophen (PERCOCET) 5-325 MG tablet Comments:   Reason for Stopping:             Allergies   Allergies   Allergen Reactions     Ciprofloxacin GI Disturbance     Oxycodone Other (See Comments)     She prefers not to have Oxycodone or Oxycontin due to potential addictive properties     Simvastatin Other (See Comments)     Muscle aches

## 2023-05-31 ENCOUNTER — TRANSITIONAL CARE UNIT VISIT (OUTPATIENT)
Dept: GERIATRICS | Facility: CLINIC | Age: 73
End: 2023-05-31
Payer: MEDICARE

## 2023-05-31 ENCOUNTER — PATIENT OUTREACH (OUTPATIENT)
Dept: CARE COORDINATION | Facility: CLINIC | Age: 73
End: 2023-05-31

## 2023-05-31 VITALS
WEIGHT: 207 LBS | HEIGHT: 69 IN | TEMPERATURE: 98.2 F | RESPIRATION RATE: 18 BRPM | DIASTOLIC BLOOD PRESSURE: 60 MMHG | HEART RATE: 75 BPM | OXYGEN SATURATION: 93 % | SYSTOLIC BLOOD PRESSURE: 100 MMHG | BODY MASS INDEX: 30.66 KG/M2

## 2023-05-31 DIAGNOSIS — I10 ESSENTIAL HYPERTENSION, BENIGN: Chronic | ICD-10-CM

## 2023-05-31 DIAGNOSIS — K59.03 DRUG-INDUCED CONSTIPATION: ICD-10-CM

## 2023-05-31 DIAGNOSIS — S93.402D SPRAIN OF LEFT ANKLE, UNSPECIFIED LIGAMENT, SUBSEQUENT ENCOUNTER: ICD-10-CM

## 2023-05-31 DIAGNOSIS — R53.81 PHYSICAL DECONDITIONING: ICD-10-CM

## 2023-05-31 DIAGNOSIS — N18.30 STAGE 3 CHRONIC KIDNEY DISEASE, UNSPECIFIED WHETHER STAGE 3A OR 3B CKD (H): Chronic | ICD-10-CM

## 2023-05-31 DIAGNOSIS — S82.851D CLOSED TRIMALLEOLAR FRACTURE OF RIGHT ANKLE WITH ROUTINE HEALING, SUBSEQUENT ENCOUNTER: Primary | ICD-10-CM

## 2023-05-31 DIAGNOSIS — I25.10 CORONARY ARTERY DISEASE INVOLVING NATIVE CORONARY ARTERY OF NATIVE HEART WITHOUT ANGINA PECTORIS: Chronic | ICD-10-CM

## 2023-05-31 DIAGNOSIS — F41.8 DEPRESSION WITH ANXIETY: ICD-10-CM

## 2023-05-31 PROCEDURE — 99310 SBSQ NF CARE HIGH MDM 45: CPT | Performed by: NURSE PRACTITIONER

## 2023-05-31 RX ORDER — ACETAMINOPHEN 500 MG
1000 TABLET ORAL EVERY 6 HOURS PRN
Start: 2023-05-31

## 2023-05-31 NOTE — PROGRESS NOTES
[facility] GERIATRICS    PRIMARY CARE PROVIDER AND CLINIC:  Jade Davis DO, [address] / [place] [address]  Chief Complaint   Patient presents with     Hospital F/U      [place] Medical Record Number:  [id number]  Place of Service where encounter took place:  [facility] ([facility]) [25]    Janie De Leon  is a 73 year old  (1950), admitted to the above facility from  [facility]. Hospital stay 5/23/23 through 5/30/23..   HPI:    PMH of CAD with NSTEMI 9/2022, HTN, HLD, Hypothyroidism, depression and anxiety who presented after a fall.   Right trimalleolar fracture s/p ORIF distal fibula and medial malleolus s/p ORIF Dr. Casanova 5/25/23  Left ankle sprain: LLE in brace  Chronic conditions stable post op  On exam today: on exam today patient is sitting up in w/c, denies pain or discomfort to right or left lower leg, states she had a little pain last night but tylenol was effective for pain management, denies fever, chills, cough, congestion, SOB, N/V/D states bowels are working and stool softeners are helpful      CODE STATUS/ADVANCE DIRECTIVES DISCUSSION:  Prior  CPR/Full code   ALLERGIES:   Allergies   Allergen Reactions     Ciprofloxacin GI Disturbance     Oxycodone Other (See Comments)     She prefers not to have Oxycodone or Oxycontin due to potential addictive properties     Simvastatin Other (See Comments)     Muscle aches       PAST MEDICAL HISTORY:   Past Medical History:   Diagnosis Date     Adrenal nodule (H) 09/2022    CT scan     Anemia     mild gastropathy on EGD 2008; neg pill cam     Atypical ductal hyperplasia of both breasts     Has had biopsies and MRI     Fibroid uterus      High cholesterol      History of hematuria 2008    Cystoscopy for recurrent UTIs; unremarkable renal US. Also recurrent UTIs as child and pyelonephritis.      History of hormone replacement therapy     after JASBIR with BSO     HTN (hypertension)      HTN, goal below 140/90      Hx of  bone density study 10/16/2006    Normal     Hypothyroidism      Insomnia     periodic - prn clonazepam helpful a couple times per month     Lipid screening 07/21/2015    Tchol 128, , HDL 53, LDL 53 outside records --> based on our labs off of atorvastatin 10yr ASCVD risk 9.4% in Oct 2015     Major depressive disorder, single episode in full remission (H) 2007     NSTEMI (non-ST elevated myocardial infarction) (H) 9/26/2022     Osteopenia     Mild left hip July 2016     Prediabetes     Metformin in the past     Rotator cuff injury, left, sequela     MRI Jan 2015 and had PT; High-grade complete or near complete tear of the supraspinatus tendon and anterior fibers of the infraspinatus tendon. 1/3 of the infraspinatus tendon appears involved.      TBI (traumatic brain injury) (H)     As a child     Tubular adenoma of colon 2013 & 2016      PAST SURGICAL HISTORY:   has a past surgical history that includes c/section, low transverse (1968); tubal ligation (1978); Ankle surgery (1976); JASBIR with BSO (5/4/2000); hysterectomy, pap no longer indicated; breast biopsy, rt/lt; colonoscopy; Capsule/pill cam endoscopy (2/18/2014); Colonoscopy (N/A, 6/20/2019); Colonoscopy (N/A, 6/16/2022); Coronary Angiogram (N/A, 9/26/2022); Instantaneous Wave-Free Ratio (N/A, 9/26/2022); Optical Coherence Tomography (N/A, 9/26/2022); and Open reduction internal fixation ankle (Right, 5/25/2023).  FAMILY HISTORY: family history includes Anxiety Disorder in her brother; Breast Cancer in an other family member; Cerebrovascular Disease in her mother; Colon Cancer in an other family member; Colon Cancer (age of onset: 70) in her mother; Colon Polyps in her daughter; Deep Vein Thrombosis in her brother; Depression in her brother; Diabetes in her mother; Glaucoma in her mother; Heart Failure in her mother; Hyperlipidemia in her brother, brother, and mother; Hypertension in her brother, brother, and mother; Lung Cancer in her brother; Macular  Degeneration in her mother; Mental Illness in her brother; Osteoporosis in her father; Other - See Comments (age of onset: 87) in her father; Other Cancer in her brother, paternal grandmother, and other family members.  SOCIAL HISTORY:   reports that she quit smoking about 48 years ago. Her smoking use included cigarettes. She started smoking about 57 years ago. She has a 4.50 pack-year smoking history. She has never used smokeless tobacco. She reports current alcohol use. She reports that she does not use drugs.  Patient's living condition: lives with spouse    Post Discharge Medication Reconciliation Status:   MED REC REQUIRED  Post Medication Reconciliation Status: discharge medications reconciled and changed, per note/orders       Current Outpatient Medications   Medication Sig     acetaminophen (TYLENOL) 325 MG tablet Take 2 tablets (650 mg) by mouth every 6 hours as needed for other (For optimal non-opioid multimodal pain management to improve pain control.)     amLODIPine (NORVASC) 5 MG tablet Take 1 tablet (5 mg) by mouth daily (Patient taking differently: Take 5 mg by mouth every evening)     aspirin (ASA) 81 MG EC tablet Take 1 tablet (81 mg) by mouth daily     atorvastatin (LIPITOR) 40 MG tablet Take 1 tablet (40 mg) by mouth every evening     calcium carbonate (TUMS) 500 MG chewable tablet Take 2 tablets (1,000 mg) by mouth daily as needed for heartburn     Cholecalciferol (VITAMIN D3 PO) Take 1,000 Units by mouth daily     clonazePAM (KLONOPIN) 0.5 MG tablet Take 0.5-1 tablets (0.25-0.5 mg) by mouth nightly as needed for anxiety     clopidogrel (PLAVIX) 75 MG tablet Take 1 tablet (75 mg) by mouth daily     cyanocobalamin (VITAMIN B-12) 100 MCG tablet Take 100 mcg by mouth daily     DULoxetine (CYMBALTA) 60 MG capsule Take 1 capsule (60 mg) by mouth daily     HYDROmorphone (DILAUDID) 2 MG tablet Take 1 tablet (2 mg) by mouth every 4 hours as needed for severe pain     levothyroxine (SYNTHROID/LEVOTHROID)  "88 MCG tablet Take 1 tablet (88 mcg) by mouth daily - Overdue for endocrinology appointment     metoprolol tartrate (LOPRESSOR) 25 MG tablet Take 1 tablet (25 mg) by mouth 2 times daily     nitroGLYcerin (NITROSTAT) 0.4 MG sublingual tablet For chest pain place 1 tablet under the tongue every 5 minutes for 3 doses. If symptoms persist 5 minutes after 1st dose call 911.     olmesartan (BENICAR) 20 MG tablet Take 1 tablet (20 mg) by mouth daily     polyethylene glycol (MIRALAX) 17 GM/Dose powder Take 17 g by mouth daily as needed for constipation     senna-docusate (SENOKOT-S/PERICOLACE) 8.6-50 MG tablet Take 1 tablet by mouth 2 times daily as needed for constipation     No current facility-administered medications for this visit.       ROS:  10 point ROS of systems including Constitutional, Eyes, Respiratory, Cardiovascular, Gastroenterology, Genitourinary, Integumentary, Musculoskeletal, Psychiatric were all negative except for pertinent positives noted in my HPI.    Vitals:  /60   Pulse 75   Temp 98.2  F (36.8  C)   Resp 18   Ht 1.753 m (5' 9\")   Wt 93.9 kg (207 lb)   LMP  (LMP Unknown)   SpO2 93%   BMI 30.57 kg/m    Exam:  GENERAL APPEARANCE:  Alert, in no distress  ENT:  Mouth and posterior oropharynx normal, moist mucous membranes, normal hearing acuity  EYES:  EOM, conjunctivae, lids, pupils and irises normal, PERRL  RESP:  respiratory effort and palpation of chest normal, lungs clear to auscultation , no respiratory distress  CV:  Palpation and auscultation of heart done , regular rate and rhythm, no murmur, rub, or gallop  ABDOMEN:  normal bowel sounds, soft, nontender, no hepatosplenomegaly or other masses  SKIN:  did not visualize surgical incision  NEURO:   speech wnl  PSYCH:  affect and mood normal    Lab/Diagnostic data:  Recent labs in [facility] reviewed by me today.  and   Most Recent 3 CBC's:  Recent Labs   Lab Test 05/27/23  0738 05/26/23  0626 05/24/23  0619 09/25/22  2251 09/25/22  1544 "   WBC  --   --  9.7 8.1 10.3   HGB 12.8 11.3* 11.8 13.5 15.3   MCV  --   --  91 87 91   PLT  --   --  247 279 357     Most Recent 3 BMP's:  Recent Labs   Lab Test 05/28/23  1118 05/28/23  0747 05/26/23  0626 05/24/23  0619 05/24/23  0619 09/27/22  1132 09/25/22  1544   NA  --   --   --   --  139 139 138   POTASSIUM  --   --   --   --  4.6 4.0 4.3   CHLORIDE  --   --   --   --  106 106 105   CO2  --   --   --   --  23 26 24   BUN  --   --   --   --  19.6 15 20   CR  --   --   --   --  0.97* 0.81 1.01   ANIONGAP  --   --   --   --  10 7 9   CYNDY  --   --   --   --  8.3* 9.1 8.6   * 131* 138*   < > 136* 102* 125*    < > = values in this interval not displayed.       ASSESSMENT/PLAN:    (U69.256A) Closed trimalleolar fracture of right ankle with routine healing, subsequent encounter  (primary encounter diagnosis)  Comment: acute s/p ORIF 5/25/23 Dr. Casanova  Plan: NWB RLE  (Y75.187J) Sprain of left ankle, unspecified ligament, subsequent encounter  (R53.81) Physical deconditioning  Comment: acute/ongoing  Plan: PT and OT, change tylenol to 1000mg q 6 hours prn, continue dilaudid 2mg q 4 hours prn  F/u with Dr. Casanova in 2 weeks      (I25.10) Coronary artery disease involving native coronary artery of native heart without angina pectoris  (I10) Essential hypertension, benign - goal < 140/90  (N18.30) Stage 3 chronic kidney disease, unspecified whether stage 3a or 3b CKD (H)  Comment: acute/ongoing  Plan: BMP follow, continue olmesartan 20mg QD, metoprolol 25mg BID, norvasc 5mg QD  plavix 75mg QD and ASA 81mg QD    (F41.8) Depression with anxiety  Comment: ongoing  Plan: OK for ACP, continue cymbalta 60mg QD, klonopin 0.25mg qhs prn    (K59.03) Drug-induced constipation  Comment: acute/ongoing  Plan: senna s 1 PO BID prn, miralax 17gm QD prn    Orders:  BMP and Hgb on Thursday  Change tylenol to 1000mg q 6 hours prn    Total time spent with patient visit at the skilled nursing facility was 45 min including patient  visit and review of past records. Greater than 50% of total time spent with counseling and coordinating care due to reviewed internal medicine and orthopedic surgery notes, reviewed labs and imaging results, discussed pain management, medications and lab monitoring with patient at bedside.     Electronically signed by:  Tonya Lynn Haase, APRN CNP

## 2023-05-31 NOTE — PROGRESS NOTES
[facility] Care [facility] [place]    Background: Transitional Care Management program identified per system criteria and reviewed by [facility] Resource [place] team for possible outreach.    Assessment: Upon chart review, CCR Team member will not proceed with patient outreach related to this episode of Transitional Care Management program due to reason below:    [facility] TCU: Patient discharged to TCU/ARU/LTACH and is established within [facility] Primary Care. Referral created for Primary Care-Care Coordination program.    Plan: Transitional Care Management episode addressed appropriately per reason noted above.      Amber Cisse RN  [facility], [facility]    *Connected Care Resource Team does NOT follow patient ongoing. Referrals are identified based on internal discharge reports and the outreach is to ensure patient has an understanding of their discharge instructions.

## 2023-05-31 NOTE — LETTER
5/31/2023        RE: Janie De Leon  [address]  [place] [address]        [facility] GERIATRICS    PRIMARY CARE PROVIDER AND CLINIC:  Jade Davis DO, [address] / [place] [address]  Chief Complaint   Patient presents with     Hospital F/U      [place] Medical Record Number:  [id number]  Place of Service where encounter took place:  [facility]) [25]    Janie De Leon  is a 73 year old  (1950), admitted to the above facility from  [facility]. Hospital stay 5/23/23 through 5/30/23..   HPI:    PMH of CAD with NSTEMI 9/2022, HTN, HLD, Hypothyroidism, depression and anxiety who presented after a fall.   Right trimalleolar fracture s/p ORIF distal fibula and medial malleolus s/p ORIF Dr. Casanova 5/25/23  Left ankle sprain: LLE in brace  Chronic conditions stable post op  On exam today: on exam today patient is sitting up in w/c, denies pain or discomfort to right or left lower leg, states she had a little pain last night but tylenol was effective for pain management, denies fever, chills, cough, congestion, SOB, N/V/D states bowels are working and stool softeners are helpful      CODE STATUS/ADVANCE DIRECTIVES DISCUSSION:  Prior  CPR/Full code   ALLERGIES:   Allergies   Allergen Reactions     Ciprofloxacin GI Disturbance     Oxycodone Other (See Comments)     She prefers not to have Oxycodone or Oxycontin due to potential addictive properties     Simvastatin Other (See Comments)     Muscle aches       PAST MEDICAL HISTORY:   Past Medical History:   Diagnosis Date     Adrenal nodule (H) 09/2022    CT scan     Anemia     mild gastropathy on EGD 2008; neg pill cam     Atypical ductal hyperplasia of both breasts     Has had biopsies and MRI     Fibroid uterus      High cholesterol      History of hematuria 2008    Cystoscopy for recurrent UTIs; unremarkable renal US. Also recurrent UTIs as child and pyelonephritis.      History of hormone replacement  therapy     after JASBIR with BSO     HTN (hypertension)      HTN, goal below 140/90      Hx of bone density study 10/16/2006    Normal     Hypothyroidism      Insomnia     periodic - prn clonazepam helpful a couple times per month     Lipid screening 07/21/2015    Tchol 128, , HDL 53, LDL 53 outside records --> based on our labs off of atorvastatin 10yr ASCVD risk 9.4% in Oct 2015     Major depressive disorder, single episode in full remission (H) 2007     NSTEMI (non-ST elevated myocardial infarction) (H) 9/26/2022     Osteopenia     Mild left hip July 2016     Prediabetes     Metformin in the past     Rotator cuff injury, left, sequela     MRI Jan 2015 and had PT; High-grade complete or near complete tear of the supraspinatus tendon and anterior fibers of the infraspinatus tendon. 1/3 of the infraspinatus tendon appears involved.      TBI (traumatic brain injury) (H)     As a child     Tubular adenoma of colon 2013 & 2016      PAST SURGICAL HISTORY:   has a past surgical history that includes c/section, low transverse (1968); tubal ligation (1978); Ankle surgery (1976); JASBIR with BSO (5/4/2000); hysterectomy, pap no longer indicated; breast biopsy, rt/lt; colonoscopy; Capsule/pill cam endoscopy (2/18/2014); Colonoscopy (N/A, 6/20/2019); Colonoscopy (N/A, 6/16/2022); Coronary Angiogram (N/A, 9/26/2022); Instantaneous Wave-Free Ratio (N/A, 9/26/2022); Optical Coherence Tomography (N/A, 9/26/2022); and Open reduction internal fixation ankle (Right, 5/25/2023).  FAMILY HISTORY: family history includes Anxiety Disorder in her brother; Breast Cancer in an other family member; Cerebrovascular Disease in her mother; Colon Cancer in an other family member; Colon Cancer (age of onset: 70) in her mother; Colon Polyps in her daughter; Deep Vein Thrombosis in her brother; Depression in her brother; Diabetes in her mother; Glaucoma in her mother; Heart Failure in her mother; Hyperlipidemia in her brother, brother, and  mother; Hypertension in her brother, brother, and mother; Lung Cancer in her brother; Macular Degeneration in her mother; Mental Illness in her brother; Osteoporosis in her father; Other - See Comments (age of onset: 87) in her father; Other Cancer in her brother, paternal grandmother, and other family members.  SOCIAL HISTORY:   reports that she quit smoking about 48 years ago. Her smoking use included cigarettes. She started smoking about 57 years ago. She has a 4.50 pack-year smoking history. She has never used smokeless tobacco. She reports current alcohol use. She reports that she does not use drugs.  Patient's living condition: lives with spouse    Post Discharge Medication Reconciliation Status:   MED REC REQUIRED  Post Medication Reconciliation Status: discharge medications reconciled and changed, per note/orders       Current Outpatient Medications   Medication Sig     acetaminophen (TYLENOL) 325 MG tablet Take 2 tablets (650 mg) by mouth every 6 hours as needed for other (For optimal non-opioid multimodal pain management to improve pain control.)     amLODIPine (NORVASC) 5 MG tablet Take 1 tablet (5 mg) by mouth daily (Patient taking differently: Take 5 mg by mouth every evening)     aspirin (ASA) 81 MG EC tablet Take 1 tablet (81 mg) by mouth daily     atorvastatin (LIPITOR) 40 MG tablet Take 1 tablet (40 mg) by mouth every evening     calcium carbonate (TUMS) 500 MG chewable tablet Take 2 tablets (1,000 mg) by mouth daily as needed for heartburn     Cholecalciferol (VITAMIN D3 PO) Take 1,000 Units by mouth daily     clonazePAM (KLONOPIN) 0.5 MG tablet Take 0.5-1 tablets (0.25-0.5 mg) by mouth nightly as needed for anxiety     clopidogrel (PLAVIX) 75 MG tablet Take 1 tablet (75 mg) by mouth daily     cyanocobalamin (VITAMIN B-12) 100 MCG tablet Take 100 mcg by mouth daily     DULoxetine (CYMBALTA) 60 MG capsule Take 1 capsule (60 mg) by mouth daily     HYDROmorphone (DILAUDID) 2 MG tablet Take 1 tablet (2  "mg) by mouth every 4 hours as needed for severe pain     levothyroxine (SYNTHROID/LEVOTHROID) 88 MCG tablet Take 1 tablet (88 mcg) by mouth daily - Overdue for endocrinology appointment     metoprolol tartrate (LOPRESSOR) 25 MG tablet Take 1 tablet (25 mg) by mouth 2 times daily     nitroGLYcerin (NITROSTAT) 0.4 MG sublingual tablet For chest pain place 1 tablet under the tongue every 5 minutes for 3 doses. If symptoms persist 5 minutes after 1st dose call 911.     olmesartan (BENICAR) 20 MG tablet Take 1 tablet (20 mg) by mouth daily     polyethylene glycol (MIRALAX) 17 GM/Dose powder Take 17 g by mouth daily as needed for constipation     senna-docusate (SENOKOT-S/PERICOLACE) 8.6-50 MG tablet Take 1 tablet by mouth 2 times daily as needed for constipation     No current facility-administered medications for this visit.       ROS:  10 point ROS of systems including Constitutional, Eyes, Respiratory, Cardiovascular, Gastroenterology, Genitourinary, Integumentary, Musculoskeletal, Psychiatric were all negative except for pertinent positives noted in my HPI.    Vitals:  /60   Pulse 75   Temp 98.2  F (36.8  C)   Resp 18   Ht 1.753 m (5' 9\")   Wt 93.9 kg (207 lb)   LMP  (LMP Unknown)   SpO2 93%   BMI 30.57 kg/m    Exam:  GENERAL APPEARANCE:  Alert, in no distress  ENT:  Mouth and posterior oropharynx normal, moist mucous membranes, normal hearing acuity  EYES:  EOM, conjunctivae, lids, pupils and irises normal, PERRL  RESP:  respiratory effort and palpation of chest normal, lungs clear to auscultation , no respiratory distress  CV:  Palpation and auscultation of heart done , regular rate and rhythm, no murmur, rub, or gallop  ABDOMEN:  normal bowel sounds, soft, nontender, no hepatosplenomegaly or other masses  SKIN:  did not visualize surgical incision  NEURO:   speech wnl  PSYCH:  affect and mood normal    Lab/Diagnostic data:  Recent labs in [facility] reviewed by me today.  and   Most Recent 3 CBC's:  Recent " Labs   Lab Test 05/27/23  0738 05/26/23  0626 05/24/23  0619 09/25/22  2251 09/25/22  1544   WBC  --   --  9.7 8.1 10.3   HGB 12.8 11.3* 11.8 13.5 15.3   MCV  --   --  91 87 91   PLT  --   --  247 279 357     Most Recent 3 BMP's:  Recent Labs   Lab Test 05/28/23  1118 05/28/23  0747 05/26/23  0626 05/24/23  0619 05/24/23  0619 09/27/22  1132 09/25/22  1544   NA  --   --   --   --  139 139 138   POTASSIUM  --   --   --   --  4.6 4.0 4.3   CHLORIDE  --   --   --   --  106 106 105   CO2  --   --   --   --  23 26 24   BUN  --   --   --   --  19.6 15 20   CR  --   --   --   --  0.97* 0.81 1.01   ANIONGAP  --   --   --   --  10 7 9   CYNDY  --   --   --   --  8.3* 9.1 8.6   * 131* 138*   < > 136* 102* 125*    < > = values in this interval not displayed.       ASSESSMENT/PLAN:    (F51.273X) Closed trimalleolar fracture of right ankle with routine healing, subsequent encounter  (primary encounter diagnosis)  Comment: acute s/p ORIF 5/25/23 Dr. Casanova  Plan: NWB RLE  (M79.544J) Sprain of left ankle, unspecified ligament, subsequent encounter  (R53.81) Physical deconditioning  Comment: acute/ongoing  Plan: PT and OT, change tylenol to 1000mg q 6 hours prn, continue dilaudid 2mg q 4 hours prn  F/u with Dr. Casanova in 2 weeks      (I25.10) Coronary artery disease involving native coronary artery of native heart without angina pectoris  (I10) Essential hypertension, benign - goal < 140/90  (N18.30) Stage 3 chronic kidney disease, unspecified whether stage 3a or 3b CKD (H)  Comment: acute/ongoing  Plan: BMP follow, continue olmesartan 20mg QD, metoprolol 25mg BID, norvasc 5mg QD  plavix 75mg QD and ASA 81mg QD    (F41.8) Depression with anxiety  Comment: ongoing  Plan: OK for ACP, continue cymbalta 60mg QD, klonopin 0.25mg qhs prn    (K59.03) Drug-induced constipation  Comment: acute/ongoing  Plan: senna s 1 PO BID prn, miralax 17gm QD prn    Orders:  BMP and Hgb on Thursday  Change tylenol to 1000mg q 6 hours prn    Total  time spent with patient visit at the [facility] nursing [facility] was 45 min including patient visit and review of past records. Greater than 50% of total time spent with counseling and coordinating care due to reviewed internal medicine and orthopedic surgery notes, reviewed labs and imaging results, discussed pain management, medications and lab monitoring with patient at bedside.     Electronically signed by:  Tonya Lynn Haase, APRN CNP                       Sincerely,        Tonya Lynn Haase, APRN CNP

## 2023-05-31 NOTE — PROGRESS NOTES
Clinic Care Coordination Contact  Care Coordination Transition Communication         1. Clinical Data: Patient was hospitalized at [facility] from 5/23/23 to 5/30/23 with diagnosis of R trimalleolar fracture, s/p ORIF of the distal fibula and medial malleolus and closed reduction/splinting of the R posterior malleolus on 5/25/23         L ankle sprain.   Transition to Facility:              Facility Name: [facility]               Contact name and phone number/fax: [phone]    Plan: RN/SW Care Coordinator will await notification from facility staff informing RN/SW Care Coordinator of patient's discharge plans/needs. RN/SW Care Coordinator will review chart and outreach to facility staff every 4 weeks and as needed.     Aliza Fuentes,  [facility]  Clinic Care Coordinator  [facility]  [phone]  [email or web address]

## 2023-06-01 ENCOUNTER — TELEPHONE (OUTPATIENT)
Dept: GERIATRICS | Facility: CLINIC | Age: 73
End: 2023-06-01
Payer: MEDICARE

## 2023-06-01 NOTE — TELEPHONE ENCOUNTER
[facility] Geriatric[facility] Lab Note     Provider: Tonya Haase, APRN CNP  Facility: [facility] Facility Type:  TCU    Allergies   Allergen Reactions     Ciprofloxacin GI Disturbance     Oxycodone Other (See Comments)     She prefers not to have Oxycodone or Oxycontin due to potential addictive properties     Simvastatin Other (See Comments)     Muscle aches        Labs Reviewed by provider: Hgb 11.7 - BMP still in process     Verbal Order/Direction given by Provider: NNO    Provider giving Order:  Tonya Haase, APRN CNP    Verbal Order given to: Isabel Holland RN

## 2023-06-02 ENCOUNTER — TRANSITIONAL CARE UNIT VISIT (OUTPATIENT)
Dept: GERIATRICS | Facility: CLINIC | Age: 73
End: 2023-06-02
Payer: MEDICARE

## 2023-06-02 VITALS
TEMPERATURE: 98.8 F | HEIGHT: 69 IN | BODY MASS INDEX: 30.53 KG/M2 | OXYGEN SATURATION: 91 % | DIASTOLIC BLOOD PRESSURE: 64 MMHG | HEART RATE: 74 BPM | SYSTOLIC BLOOD PRESSURE: 121 MMHG | RESPIRATION RATE: 16 BRPM | WEIGHT: 206.1 LBS

## 2023-06-02 DIAGNOSIS — N18.30 STAGE 3 CHRONIC KIDNEY DISEASE, UNSPECIFIED WHETHER STAGE 3A OR 3B CKD (H): ICD-10-CM

## 2023-06-02 DIAGNOSIS — I10 ESSENTIAL HYPERTENSION, BENIGN: ICD-10-CM

## 2023-06-02 DIAGNOSIS — S82.851D CLOSED TRIMALLEOLAR FRACTURE OF RIGHT ANKLE WITH ROUTINE HEALING, SUBSEQUENT ENCOUNTER: Primary | ICD-10-CM

## 2023-06-02 DIAGNOSIS — I25.10 CORONARY ARTERY DISEASE INVOLVING NATIVE CORONARY ARTERY OF NATIVE HEART WITHOUT ANGINA PECTORIS: ICD-10-CM

## 2023-06-02 PROCEDURE — 99305 1ST NF CARE MODERATE MDM 35: CPT | Performed by: INTERNAL MEDICINE

## 2023-06-02 NOTE — LETTER
6/2/2023        RE: Janie De Leon  [address]  [facility] [address]        [facility] GERIATRICS    PRIMARY CARE PROVIDER AND CLINIC:  Jade Davis DO, [address] / [place] [address]  Chief Complaint   Patient presents with     Hospital F/U      [facility] Record Number:  [id number]  Place of Service where encounter took place:  [facility] ([facility])     Janie De Leon  is a 73 year old  (1950), admitted to the above facility from  [facility]. Hospital stay 5/23/23 through 5/30/23..         Hospital course was reviewed by me, is as per the hospital discharge summary and nurse practitioner note    Patient has a past medical history of coronary artery status, hypertension, hypothyroidism, depression with anxiety, who was hospitalized for the management of a right trimalleolar fracture and left ankle sprain following a fall.  She underwent an ORIF of the distal fibula and medial malleolus on 5/25/2023.  Her postoperative course was medically uncomplicated.    Patient notes minimal discomfort in either leg.  She is nonweightbearing on the right.  She denies cough, chest pain, shortness of breath, nausea, vomiting, constipation, dysuria    CODE STATUS/ADVANCE DIRECTIVES DISCUSSION:  Prior  CPR/Full code   ALLERGIES:   Allergies   Allergen Reactions     Ciprofloxacin GI Disturbance     Oxycodone Other (See Comments)     She prefers not to have Oxycodone or Oxycontin due to potential addictive properties     Simvastatin Other (See Comments)     Muscle aches       PAST MEDICAL HISTORY:   Past Medical History:   Diagnosis Date     Adrenal nodule (H) 09/2022    CT scan     Anemia     mild gastropathy on EGD 2008; neg pill cam     Atypical ductal hyperplasia of both breasts     Has had biopsies and MRI     Fibroid uterus      High cholesterol      History of hematuria 2008    Cystoscopy for recurrent UTIs; unremarkable renal US. Also recurrent UTIs as child  and pyelonephritis.      History of hormone replacement therapy     after JASBIR with BSO     HTN (hypertension)      HTN, goal below 140/90      Hx of bone density study 10/16/2006    Normal     Hypothyroidism      Insomnia     periodic - prn clonazepam helpful a couple times per month     Lipid screening 07/21/2015    Tchol 128, , HDL 53, LDL 53 outside records --> based on our labs off of atorvastatin 10yr ASCVD risk 9.4% in Oct 2015     Major depressive disorder, single episode in full remission (H) 2007     NSTEMI (non-ST elevated myocardial infarction) (H) 9/26/2022     Osteopenia     Mild left hip July 2016     Prediabetes     Metformin in the past     Rotator cuff injury, left, sequela     MRI Jan 2015 and had PT; High-grade complete or near complete tear of the supraspinatus tendon and anterior fibers of the infraspinatus tendon. 1/3 of the infraspinatus tendon appears involved.      TBI (traumatic brain injury) (H)     As a child     Tubular adenoma of colon 2013 & 2016      PAST SURGICAL HISTORY:   has a past surgical history that includes c/section, low transverse (1968); tubal ligation (1978); Ankle surgery (1976); JASBIR with BSO (5/4/2000); hysterectomy, pap no longer indicated; breast biopsy, rt/lt; colonoscopy; Capsule/pill cam endoscopy (2/18/2014); Colonoscopy (N/A, 6/20/2019); Colonoscopy (N/A, 6/16/2022); Coronary Angiogram (N/A, 9/26/2022); Instantaneous Wave-Free Ratio (N/A, 9/26/2022); Optical Coherence Tomography (N/A, 9/26/2022); and Open reduction internal fixation ankle (Right, 5/25/2023).  FAMILY HISTORY: family history includes Anxiety Disorder in her brother; Breast Cancer in an other family member; Cerebrovascular Disease in her mother; Colon Cancer in an other family member; Colon Cancer (age of onset: 70) in her mother; Colon Polyps in her daughter; Deep Vein Thrombosis in her brother; Depression in her brother; Diabetes in her mother; Glaucoma in her mother; Heart Failure in her  mother; Hyperlipidemia in her brother, brother, and mother; Hypertension in her brother, brother, and mother; Lung Cancer in her brother; Macular Degeneration in her mother; Mental Illness in her brother; Osteoporosis in her father; Other - See Comments (age of onset: 87) in her father; Other Cancer in her brother, paternal grandmother, and other family members.  SOCIAL HISTORY:   reports that she quit smoking about 48 years ago. Her smoking use included cigarettes. She started smoking about 57 years ago. She has a 4.50 pack-year smoking history. She has never used smokeless tobacco. She reports current alcohol use. She reports that she does not use drugs.  Patient's living condition: lives with spouse    Current medications were reviewed by me today    Current Outpatient Medications   Medication Sig     acetaminophen (TYLENOL) 500 MG tablet Take 2 tablets (1,000 mg) by mouth every 6 hours as needed for mild pain     amLODIPine (NORVASC) 5 MG tablet Take 1 tablet (5 mg) by mouth daily (Patient taking differently: Take 5 mg by mouth every evening)     aspirin (ASA) 81 MG EC tablet Take 1 tablet (81 mg) by mouth daily     atorvastatin (LIPITOR) 40 MG tablet Take 1 tablet (40 mg) by mouth every evening     calcium carbonate (TUMS) 500 MG chewable tablet Take 2 tablets (1,000 mg) by mouth daily as needed for heartburn     Cholecalciferol (VITAMIN D3 PO) Take 1,000 Units by mouth daily     clonazePAM (KLONOPIN) 0.5 MG tablet Take 0.5-1 tablets (0.25-0.5 mg) by mouth nightly as needed for anxiety     clopidogrel (PLAVIX) 75 MG tablet Take 1 tablet (75 mg) by mouth daily     cyanocobalamin (VITAMIN B-12) 100 MCG tablet Take 100 mcg by mouth daily     DULoxetine (CYMBALTA) 60 MG capsule Take 1 capsule (60 mg) by mouth daily     HYDROmorphone (DILAUDID) 2 MG tablet Take 1 tablet (2 mg) by mouth every 4 hours as needed for severe pain     levothyroxine (SYNTHROID/LEVOTHROID) 88 MCG tablet Take 1 tablet (88 mcg) by mouth daily  "- Overdue for endocrinology appointment     metoprolol tartrate (LOPRESSOR) 25 MG tablet Take 1 tablet (25 mg) by mouth 2 times daily     nitroGLYcerin (NITROSTAT) 0.4 MG sublingual tablet For chest pain place 1 tablet under the tongue every 5 minutes for 3 doses. If symptoms persist 5 minutes after 1st dose call 911.     olmesartan (BENICAR) 20 MG tablet Take 1 tablet (20 mg) by mouth daily     polyethylene glycol (MIRALAX) 17 GM/Dose powder Take 17 g by mouth daily as needed for constipation     senna-docusate (SENOKOT-S/PERICOLACE) 8.6-50 MG tablet Take 1 tablet by mouth 2 times daily as needed for constipation     No current facility-administered medications for this visit.       ROS:  10 point ROS of systems including Constitutional, Eyes, Respiratory, Cardiovascular, Gastroenterology, Genitourinary, Integumentary, Musculoskeletal, Psychiatric were all negative except for pertinent positives noted in my HPI.    Vitals:  /64   Pulse 74   Temp 98.8  F (37.1  C)   Resp 16   Ht 1.753 m (5' 9\")   Wt 93.5 kg (206 lb 1.6 oz)   LMP  (LMP Unknown)   SpO2 91%   BMI 30.44 kg/m    Exam:  Very pleasant woman, sitting in a chair at her bedside.  She appears comfortable  HEENT unremarkable  Lungs clear  CV RRR  Abdomen soft  Extremities: Left lower extremity in brace.  Right lower extremity cast  Neuro: Alert, fully oriented.  Cranial nerves intact.    Lab/Diagnostic data:    Most Recent 3 CBC's:Recent Labs   Lab Test 05/27/23  0738 05/26/23  0626 05/24/23  0619 09/25/22  2251 09/25/22  1544   WBC  --   --  9.7 8.1 10.3   HGB 12.8 11.3* 11.8 13.5 15.3   MCV  --   --  91 87 91   PLT  --   --  247 279 357     Most Recent 3 BMP's:Recent Labs   Lab Test 05/28/23  1118 05/28/23  0747 05/26/23  0626 05/24/23  0619 05/24/23  0619 09/27/22  1132 09/25/22  1544   NA  --   --   --   --  139 139 138   POTASSIUM  --   --   --   --  4.6 4.0 4.3   CHLORIDE  --   --   --   --  106 106 105   CO2  --   --   --   --  23 26 24 "   BUN  --   --   --   --  19.6 15 20   CR  --   --   --   --  0.97* 0.81 1.01   ANIONGAP  --   --   --   --  10 7 9   CYNDY  --   --   --   --  8.3* 9.1 8.6   * 131* 138*   < > 136* 102* 125*    < > = values in this interval not displayed.     Most Recent 2 LFT's:Recent Labs   Lab Test 12/05/22  1407 09/16/22  0801   AST 22 13   ALT 16 17   ALKPHOS 122* 109   BILITOTAL 0.5 0.4       ASSESSMENT/PLAN:    (P81.473F) Closed trimalleolar fracture of right ankle with routine healing, subsequent encounter  (primary encounter diagnosis)  Comment: acute s/p ORIF 5/25/23 Dr. Casanova  Plan: NWB RLE  (D78.203O) Sprain of left ankle, unspecified ligament, subsequent encounter  (R53.81) Physical deconditioning  Comment: acute/ongoing  Plan: PT and OT,   F/u with Dr. Casanova in 2 weeks        (I25.10) Coronary artery disease involving native coronary artery of native heart without angina pectoris  History of MI.  Did not require stenting  (I10) Essential hypertension, benign - goal < 140/90  (N18.30) chronic kidney disease stage III  Comment:  CV status has been stable  Plan:, continue olmesartan 20mg QD, metoprolol 25mg BID, norvasc 5mg QD  plavix 75mg QD and ASA 81mg every day  Monitor BMP     (F41.8) Depression with anxiety  Comment:  Appears stable  Plan: OK for ACP, continue cymbalta 60mg QD, klonopin 0.25mg qhs prn        Celso Sherwood MD            Sincerely,        Celso Sherwood MD

## 2023-06-02 NOTE — PROGRESS NOTES
[facility] GERIATRICS    PRIMARY CARE PROVIDER AND CLINIC:  Jade Davis DO, [address] / [place] [address]  Chief Complaint   Patient presents with     Hospital F/U      [facility] Medical Record Number:  [id number]  Place of Service where encounter took place:  [facility] ([facility])     Janie De Leon  is a 73 year old  (1950), admitted to the above facility from  [facility]. Hospital stay 5/23/23 through 5/30/23..         Hospital course was reviewed by me, is as per the hospital discharge summary and nurse practitioner note    Patient has a past medical history of coronary artery status, hypertension, hypothyroidism, depression with anxiety, who was hospitalized for the management of a right trimalleolar fracture and left ankle sprain following a fall.  She underwent an ORIF of the distal fibula and medial malleolus on 5/25/2023.  Her postoperative course was medically uncomplicated.    Patient notes minimal discomfort in either leg.  She is nonweightbearing on the right.  She denies cough, chest pain, shortness of breath, nausea, vomiting, constipation, dysuria    CODE STATUS/ADVANCE DIRECTIVES DISCUSSION:  Prior  CPR/Full code   ALLERGIES:   Allergies   Allergen Reactions     Ciprofloxacin GI Disturbance     Oxycodone Other (See Comments)     She prefers not to have Oxycodone or Oxycontin due to potential addictive properties     Simvastatin Other (See Comments)     Muscle aches       PAST MEDICAL HISTORY:   Past Medical History:   Diagnosis Date     Adrenal nodule (H) 09/2022    CT scan     Anemia     mild gastropathy on EGD 2008; neg pill cam     Atypical ductal hyperplasia of both breasts     Has had biopsies and MRI     Fibroid uterus      High cholesterol      History of hematuria 2008    Cystoscopy for recurrent UTIs; unremarkable renal US. Also recurrent UTIs as child and pyelonephritis.      History of hormone replacement therapy     after JASBIR with BSO     HTN  (hypertension)      HTN, goal below 140/90      Hx of bone density study 10/16/2006    Normal     Hypothyroidism      Insomnia     periodic - prn clonazepam helpful a couple times per month     Lipid screening 07/21/2015    Tchol 128, , HDL 53, LDL 53 outside records --> based on our labs off of atorvastatin 10yr ASCVD risk 9.4% in Oct 2015     Major depressive disorder, single episode in full remission (H) 2007     NSTEMI (non-ST elevated myocardial infarction) (H) 9/26/2022     Osteopenia     Mild left hip July 2016     Prediabetes     Metformin in the past     Rotator cuff injury, left, sequela     MRI Jan 2015 and had PT; High-grade complete or near complete tear of the supraspinatus tendon and anterior fibers of the infraspinatus tendon. 1/3 of the infraspinatus tendon appears involved.      TBI (traumatic brain injury) (H)     As a child     Tubular adenoma of colon 2013 & 2016      PAST SURGICAL HISTORY:   has a past surgical history that includes c/section, low transverse (1968); tubal ligation (1978); Ankle surgery (1976); JASBIR with BSO (5/4/2000); hysterectomy, pap no longer indicated; breast biopsy, rt/lt; colonoscopy; Capsule/pill cam endoscopy (2/18/2014); Colonoscopy (N/A, 6/20/2019); Colonoscopy (N/A, 6/16/2022); Coronary Angiogram (N/A, 9/26/2022); Instantaneous Wave-Free Ratio (N/A, 9/26/2022); Optical Coherence Tomography (N/A, 9/26/2022); and Open reduction internal fixation ankle (Right, 5/25/2023).  FAMILY HISTORY: family history includes Anxiety Disorder in her brother; Breast Cancer in an other family member; Cerebrovascular Disease in her mother; Colon Cancer in an other family member; Colon Cancer (age of onset: 70) in her mother; Colon Polyps in her daughter; Deep Vein Thrombosis in her brother; Depression in her brother; Diabetes in her mother; Glaucoma in her mother; Heart Failure in her mother; Hyperlipidemia in her brother, brother, and mother; Hypertension in her brother, brother,  and mother; Lung Cancer in her brother; Macular Degeneration in her mother; Mental Illness in her brother; Osteoporosis in her father; Other - See Comments (age of onset: 87) in her father; Other Cancer in her brother, paternal grandmother, and other family members.  SOCIAL HISTORY:   reports that she quit smoking about 48 years ago. Her smoking use included cigarettes. She started smoking about 57 years ago. She has a 4.50 pack-year smoking history. She has never used smokeless tobacco. She reports current alcohol use. She reports that she does not use drugs.  Patient's living condition: lives with spouse    Current medications were reviewed by me today    Current Outpatient Medications   Medication Sig     acetaminophen (TYLENOL) 500 MG tablet Take 2 tablets (1,000 mg) by mouth every 6 hours as needed for mild pain     amLODIPine (NORVASC) 5 MG tablet Take 1 tablet (5 mg) by mouth daily (Patient taking differently: Take 5 mg by mouth every evening)     aspirin (ASA) 81 MG EC tablet Take 1 tablet (81 mg) by mouth daily     atorvastatin (LIPITOR) 40 MG tablet Take 1 tablet (40 mg) by mouth every evening     calcium carbonate (TUMS) 500 MG chewable tablet Take 2 tablets (1,000 mg) by mouth daily as needed for heartburn     Cholecalciferol (VITAMIN D3 PO) Take 1,000 Units by mouth daily     clonazePAM (KLONOPIN) 0.5 MG tablet Take 0.5-1 tablets (0.25-0.5 mg) by mouth nightly as needed for anxiety     clopidogrel (PLAVIX) 75 MG tablet Take 1 tablet (75 mg) by mouth daily     cyanocobalamin (VITAMIN B-12) 100 MCG tablet Take 100 mcg by mouth daily     DULoxetine (CYMBALTA) 60 MG capsule Take 1 capsule (60 mg) by mouth daily     HYDROmorphone (DILAUDID) 2 MG tablet Take 1 tablet (2 mg) by mouth every 4 hours as needed for severe pain     levothyroxine (SYNTHROID/LEVOTHROID) 88 MCG tablet Take 1 tablet (88 mcg) by mouth daily - Overdue for endocrinology appointment     metoprolol tartrate (LOPRESSOR) 25 MG tablet Take 1  "tablet (25 mg) by mouth 2 times daily     nitroGLYcerin (NITROSTAT) 0.4 MG sublingual tablet For chest pain place 1 tablet under the tongue every 5 minutes for 3 doses. If symptoms persist 5 minutes after 1st dose call 911.     olmesartan (BENICAR) 20 MG tablet Take 1 tablet (20 mg) by mouth daily     polyethylene glycol (MIRALAX) 17 GM/Dose powder Take 17 g by mouth daily as needed for constipation     senna-docusate (SENOKOT-S/PERICOLACE) 8.6-50 MG tablet Take 1 tablet by mouth 2 times daily as needed for constipation     No current facility-administered medications for this visit.       ROS:  10 point ROS of systems including Constitutional, Eyes, Respiratory, Cardiovascular, Gastroenterology, Genitourinary, Integumentary, Musculoskeletal, Psychiatric were all negative except for pertinent positives noted in my HPI.    Vitals:  /64   Pulse 74   Temp 98.8  F (37.1  C)   Resp 16   Ht 1.753 m (5' 9\")   Wt 93.5 kg (206 lb 1.6 oz)   LMP  (LMP Unknown)   SpO2 91%   BMI 30.44 kg/m    Exam:  Very pleasant woman, sitting in a chair at her bedside.  She appears comfortable  HEENT unremarkable  Lungs clear  CV RRR  Abdomen soft  Extremities: Left lower extremity in brace.  Right lower extremity cast  Neuro: Alert, fully oriented.  Cranial nerves intact.    Lab/Diagnostic data:    Most Recent 3 CBC's:Recent Labs   Lab Test 05/27/23  0738 05/26/23  0626 05/24/23  0619 09/25/22  2251 09/25/22  1544   WBC  --   --  9.7 8.1 10.3   HGB 12.8 11.3* 11.8 13.5 15.3   MCV  --   --  91 87 91   PLT  --   --  247 279 357     Most Recent 3 BMP's:Recent Labs   Lab Test 05/28/23  1118 05/28/23  0747 05/26/23  0626 05/24/23  0619 05/24/23  0619 09/27/22  1132 09/25/22  1544   NA  --   --   --   --  139 139 138   POTASSIUM  --   --   --   --  4.6 4.0 4.3   CHLORIDE  --   --   --   --  106 106 105   CO2  --   --   --   --  23 26 24   BUN  --   --   --   --  19.6 15 20   CR  --   --   --   --  0.97* 0.81 1.01   ANIONGAP  --   --   " --   --  10 7 9   CYNDY  --   --   --   --  8.3* 9.1 8.6   * 131* 138*   < > 136* 102* 125*    < > = values in this interval not displayed.     Most Recent 2 LFT's:Recent Labs   Lab Test 12/05/22  1407 09/16/22  0801   AST 22 13   ALT 16 17   ALKPHOS 122* 109   BILITOTAL 0.5 0.4       ASSESSMENT/PLAN:    (S83.649M) Closed trimalleolar fracture of right ankle with routine healing, subsequent encounter  (primary encounter diagnosis)  Comment: acute s/p ORIF 5/25/23 Dr. Casanova  Plan: NWB RLE  (R89.052B) Sprain of left ankle, unspecified ligament, subsequent encounter  (R53.81) Physical deconditioning  Comment: acute/ongoing  Plan: PT and OT,   F/u with Dr. Casanova in 2 weeks        (I25.10) Coronary artery disease involving native coronary artery of native heart without angina pectoris  History of MI.  Did not require stenting  (I10) Essential hypertension, benign - goal < 140/90  (N18.30) chronic kidney disease stage III  Comment:  CV status has been stable  Plan:, continue olmesartan 20mg QD, metoprolol 25mg BID, norvasc 5mg QD  plavix 75mg QD and ASA 81mg every day  Monitor BMP     (F41.8) Depression with anxiety  Comment:  Appears stable  Plan: OK for ACP, continue cymbalta 60mg QD, klonopin 0.25mg qhs prn        Celso Sherwood MD

## 2023-06-05 ENCOUNTER — TRANSITIONAL CARE UNIT VISIT (OUTPATIENT)
Dept: GERIATRICS | Facility: CLINIC | Age: 73
End: 2023-06-05
Payer: MEDICARE

## 2023-06-05 VITALS
HEART RATE: 81 BPM | HEIGHT: 69 IN | BODY MASS INDEX: 30.39 KG/M2 | TEMPERATURE: 98.1 F | SYSTOLIC BLOOD PRESSURE: 102 MMHG | WEIGHT: 205.2 LBS | RESPIRATION RATE: 18 BRPM | DIASTOLIC BLOOD PRESSURE: 65 MMHG | OXYGEN SATURATION: 92 %

## 2023-06-05 DIAGNOSIS — I10 ESSENTIAL HYPERTENSION, BENIGN: ICD-10-CM

## 2023-06-05 DIAGNOSIS — S93.402D SPRAIN OF LEFT ANKLE, UNSPECIFIED LIGAMENT, SUBSEQUENT ENCOUNTER: ICD-10-CM

## 2023-06-05 DIAGNOSIS — R53.81 PHYSICAL DECONDITIONING: ICD-10-CM

## 2023-06-05 DIAGNOSIS — K59.03 DRUG-INDUCED CONSTIPATION: ICD-10-CM

## 2023-06-05 DIAGNOSIS — S82.851D CLOSED TRIMALLEOLAR FRACTURE OF RIGHT ANKLE WITH ROUTINE HEALING, SUBSEQUENT ENCOUNTER: Primary | ICD-10-CM

## 2023-06-05 DIAGNOSIS — N18.30 STAGE 3 CHRONIC KIDNEY DISEASE, UNSPECIFIED WHETHER STAGE 3A OR 3B CKD (H): ICD-10-CM

## 2023-06-05 DIAGNOSIS — F41.8 DEPRESSION WITH ANXIETY: ICD-10-CM

## 2023-06-05 DIAGNOSIS — I25.10 CORONARY ARTERY DISEASE INVOLVING NATIVE CORONARY ARTERY OF NATIVE HEART WITHOUT ANGINA PECTORIS: ICD-10-CM

## 2023-06-05 PROCEDURE — 99309 SBSQ NF CARE MODERATE MDM 30: CPT | Performed by: NURSE PRACTITIONER

## 2023-06-05 NOTE — LETTER
"    6/5/2023        RE: Janie De Leon  [address] [facility] [address]        [facility]    Chief Complaint   Patient presents with     RECHECK     HPI:  Janie De Leon is a 73 year old  (1950), who is being seen today for an episodic care visit at: [facility]) [25]. Today's concern is:   Trimalleolar Fx right ankle: continues NWB, denies pain or discomfort to right ankle, states she had a little pain during the night but tylenol was effective for pain control  Left ankle sprain: denies pain or discomfort  In therapy patient is walking up to 10 feet using a RW able to maintain NWB to RLE   CAD/HTN/CKD: /65, 125/67, 106/68 with HR 80-90 range, denies CP, palpitations, SOB  Depression/anxiety: no concerns, patient requested referral to [facility]  Constipation: patient states bowels are working, states she takes miralax daily at home, requesting to have it scheduled      Allergies, and PMH/PSH reviewed in [facility] today.  REVIEW OF SYSTEMS:  10 point ROS of systems including Constitutional, Eyes, Respiratory, Cardiovascular, Gastroenterology, Genitourinary, Integumentary, Musculoskeletal, Psychiatric were all negative except for pertinent positives noted in my HPI.    Objective:   /65   Pulse 81   Temp 98.1  F (36.7  C)   Resp 18   Ht 1.753 m (5' 9.02\")   Wt 93.1 kg (205 lb 3.2 oz)   LMP  (LMP Unknown)   SpO2 92%   BMI 30.29 kg/m    GENERAL APPEARANCE:  Alert, in no distress  ENT:  Mouth and posterior oropharynx normal, moist mucous membranes, normal hearing acuity  EYES:  EOM, conjunctivae, lids, pupils and irises normal, PERRL  RESP:  respiratory effort and palpation of chest normal, lungs clear to auscultation , no respiratory distress  CV:  Palpation and auscultation of heart done , regular rate and rhythm, no murmur, rub, or gallop  ABDOMEN:  normal bowel sounds, soft, nontender, no hepatosplenomegaly or other masses  M/S:   patient sitting up in " w/c  SKIN:  Inspection of skin and subcutaneous tissue baseline, did not visualize surgical incision  NEURO:   speech wnl  PSYCH:  affect and mood normal    Recent labs in [facility] reviewed by me today.  and   Most Recent 3 CBC's:Recent Labs   Lab Test 05/27/23  0738 05/26/23  0626 05/24/23  0619 09/25/22  2251 09/25/22  1544   WBC  --   --  9.7 8.1 10.3   HGB 12.8 11.3* 11.8 13.5 15.3   MCV  --   --  91 87 91   PLT  --   --  247 279 357     Most Recent 3 BMP's:Recent Labs   Lab Test 05/28/23  1118 05/28/23  0747 05/26/23  0626 05/24/23  0619 05/24/23  0619 09/27/22  1132 09/25/22  1544   NA  --   --   --   --  139 139 138   POTASSIUM  --   --   --   --  4.6 4.0 4.3   CHLORIDE  --   --   --   --  106 106 105   CO2  --   --   --   --  23 26 24   BUN  --   --   --   --  19.6 15 20   CR  --   --   --   --  0.97* 0.81 1.01   ANIONGAP  --   --   --   --  10 7 9   CYNDY  --   --   --   --  8.3* 9.1 8.6   * 131* 138*   < > 136* 102* 125*    < > = values in this interval not displayed.       Assessment/Plan:  (X58.108U) Closed trimalleolar fracture of right ankle with routine healing, subsequent encounter  (primary encounter diagnosis)  Comment: acute s/p ORIF 5/25/23 Dr. Casanova  Plan: NWKAI RLE  (S44.747M) Sprain of left ankle, unspecified ligament, subsequent encounter  (R53.81) Physical deconditioning  Comment: acute/ongoing, no change  Plan: PT and OT, change tylenol to 1000mg q 6 hours prn, continue dilaudid 2mg q 4 hours prn  F/u with Dr. Casanova in 2 weeks        (I25.10) Coronary artery disease involving native coronary artery of native heart without angina pectoris  (I10) Essential hypertension, benign - goal < 140/90  (N18.30) Stage 3 chronic kidney disease, unspecified whether stage 3a or 3b CKD (H)  Comment: acute/ongoing, no change  Plan: BMP follow, continue olmesartan 20mg QD, metoprolol 25mg BID, norvasc 5mg QD  plavix 75mg QD and ASA 81mg QD     (F41.8) Depression with anxiety  Comment: ongoing, no  change  Plan: OK for ACP, continue cymbalta 60mg QD, klonopin 0.25mg qhs prn     (K59.03) Drug-induced constipation  Comment: acute/ongoing  Plan: senna s 1 PO BID prn, schedule miralax 17gm QD     MED REC REQUIRED  Post Medication Reconciliation Status: medication reconcilation previously completed during another office visit      Orders:  Schedule miralax 17gm QD      Electronically signed by: Tonya Lynn Haase, APRN CNP             Sincerely,        Tonya Lynn Haase, APRN CNP

## 2023-06-05 NOTE — PROGRESS NOTES
"[facility] GERIATRICS    Chief Complaint   Patient presents with     RECHECK     HPI:  Janie De Leon is a 73 year old  (1950), who is being seen today for an episodic care visit at: [facility]) [25]. Today's concern is:   Trimalleolar Fx right ankle: continues NWB, denies pain or discomfort to right ankle, states she had a little pain during the night but tylenol was effective for pain control  Left ankle sprain: denies pain or discomfort  In therapy patient is walking up to 10 feet using a RW able to maintain NWB to RLE   CAD/HTN/CKD: /65, 125/67, 106/68 with HR 80-90 range, denies CP, palpitations, SOB  Depression/anxiety: no concerns, patient requested referral to [facility]  Constipation: patient states bowels are working, states she takes miralax daily at home, requesting to have it scheduled      Allergies, and PMH/PSH reviewed in [facility] today.  REVIEW OF SYSTEMS:  10 point ROS of systems including Constitutional, Eyes, Respiratory, Cardiovascular, Gastroenterology, Genitourinary, Integumentary, Musculoskeletal, Psychiatric were all negative except for pertinent positives noted in my HPI.    Objective:   /65   Pulse 81   Temp 98.1  F (36.7  C)   Resp 18   Ht 1.753 m (5' 9.02\")   Wt 93.1 kg (205 lb 3.2 oz)   LMP  (LMP Unknown)   SpO2 92%   BMI 30.29 kg/m    GENERAL APPEARANCE:  Alert, in no distress  ENT:  Mouth and posterior oropharynx normal, moist mucous membranes, normal hearing acuity  EYES:  EOM, conjunctivae, lids, pupils and irises normal, PERRL  RESP:  respiratory effort and palpation of chest normal, lungs clear to auscultation , no respiratory distress  CV:  Palpation and auscultation of heart done , regular rate and rhythm, no murmur, rub, or gallop  ABDOMEN:  normal bowel sounds, soft, nontender, no hepatosplenomegaly or other masses  M/S:   patient sitting up in w/c  SKIN:  Inspection of skin and subcutaneous tissue baseline, did not visualize surgical " incision  NEURO:   speech wnl  PSYCH:  affect and mood normal    Recent labs in [facility] reviewed by me today.  and   Most Recent 3 CBC's:Recent Labs   Lab Test 05/27/23  0738 05/26/23  0626 05/24/23  0619 09/25/22  2251 09/25/22  1544   WBC  --   --  9.7 8.1 10.3   HGB 12.8 11.3* 11.8 13.5 15.3   MCV  --   --  91 87 91   PLT  --   --  247 279 357     Most Recent 3 BMP's:Recent Labs   Lab Test 05/28/23  1118 05/28/23  0747 05/26/23  0626 05/24/23  0619 05/24/23  0619 09/27/22  1132 09/25/22  1544   NA  --   --   --   --  139 139 138   POTASSIUM  --   --   --   --  4.6 4.0 4.3   CHLORIDE  --   --   --   --  106 106 105   CO2  --   --   --   --  23 26 24   BUN  --   --   --   --  19.6 15 20   CR  --   --   --   --  0.97* 0.81 1.01   ANIONGAP  --   --   --   --  10 7 9   CYNDY  --   --   --   --  8.3* 9.1 8.6   * 131* 138*   < > 136* 102* 125*    < > = values in this interval not displayed.       Assessment/Plan:  (A59.663H) Closed trimalleolar fracture of right ankle with routine healing, subsequent encounter  (primary encounter diagnosis)  Comment: acute s/p ORIF 5/25/23 Dr. Casanova  Plan: NWB RLE  (R01.860C) Sprain of left ankle, unspecified ligament, subsequent encounter  (R50.81) Physical deconditioning  Comment: acute/ongoing, no change  Plan: PT and OT, change tylenol to 1000mg q 6 hours prn, continue dilaudid 2mg q 4 hours prn  F/u with Dr. Casanova in 2 weeks        (I25.10) Coronary artery disease involving native coronary artery of native heart without angina pectoris  (I10) Essential hypertension, benign - goal < 140/90  (N18.30) Stage 3 chronic kidney disease, unspecified whether stage 3a or 3b CKD (H)  Comment: acute/ongoing, no change  Plan: BMP follow, continue olmesartan 20mg QD, metoprolol 25mg BID, norvasc 5mg QD  plavix 75mg QD and ASA 81mg QD     (F41.8) Depression with anxiety  Comment: ongoing, no change  Plan: OK for ACP, continue cymbalta 60mg QD, klonopin 0.25mg qhs prn     (K59.03)  Drug-induced constipation  Comment: acute/ongoing  Plan: senna s 1 PO BID prn, schedule miralax 17gm QD     MED REC REQUIRED  Post Medication Reconciliation Status: medication reconcilation previously completed during another office visit      Orders:  Schedule miralax 17gm QD      Electronically signed by: Tonya Lynn Haase, APRN CNP

## 2023-06-06 ENCOUNTER — TRANSFERRED RECORDS (OUTPATIENT)
Dept: HEALTH INFORMATION MANAGEMENT | Facility: CLINIC | Age: 73
End: 2023-06-06
Payer: MEDICARE

## 2023-06-07 ENCOUNTER — TRANSITIONAL CARE UNIT VISIT (OUTPATIENT)
Dept: GERIATRICS | Facility: CLINIC | Age: 73
End: 2023-06-07
Payer: MEDICARE

## 2023-06-07 VITALS
RESPIRATION RATE: 16 BRPM | OXYGEN SATURATION: 97 % | SYSTOLIC BLOOD PRESSURE: 103 MMHG | HEIGHT: 69 IN | TEMPERATURE: 98.1 F | BODY MASS INDEX: 30.21 KG/M2 | HEART RATE: 78 BPM | WEIGHT: 204 LBS | DIASTOLIC BLOOD PRESSURE: 66 MMHG

## 2023-06-07 DIAGNOSIS — F41.8 DEPRESSION WITH ANXIETY: ICD-10-CM

## 2023-06-07 DIAGNOSIS — I25.10 CORONARY ARTERY DISEASE INVOLVING NATIVE CORONARY ARTERY OF NATIVE HEART WITHOUT ANGINA PECTORIS: ICD-10-CM

## 2023-06-07 DIAGNOSIS — N18.30 STAGE 3 CHRONIC KIDNEY DISEASE, UNSPECIFIED WHETHER STAGE 3A OR 3B CKD (H): ICD-10-CM

## 2023-06-07 DIAGNOSIS — I10 ESSENTIAL HYPERTENSION, BENIGN: ICD-10-CM

## 2023-06-07 DIAGNOSIS — S82.851D CLOSED TRIMALLEOLAR FRACTURE OF RIGHT ANKLE WITH ROUTINE HEALING, SUBSEQUENT ENCOUNTER: Primary | ICD-10-CM

## 2023-06-07 DIAGNOSIS — S93.402D SPRAIN OF LEFT ANKLE, UNSPECIFIED LIGAMENT, SUBSEQUENT ENCOUNTER: ICD-10-CM

## 2023-06-07 DIAGNOSIS — K59.03 DRUG-INDUCED CONSTIPATION: ICD-10-CM

## 2023-06-07 DIAGNOSIS — R53.81 PHYSICAL DECONDITIONING: ICD-10-CM

## 2023-06-07 PROCEDURE — 99309 SBSQ NF CARE MODERATE MDM 30: CPT | Performed by: NURSE PRACTITIONER

## 2023-06-07 NOTE — LETTER
"    6/7/2023        RE: Janie De Leon  [address] [facility] [address]        [facility]    Chief Complaint   Patient presents with     RECHECK     HPI:  Janie De Leon is a 73 year old  (1950), who is being seen today for an episodic care visit at: [facility]) [25]. Today's concern is:   Trimalleolar Fx right ankle: continues NWB, denies pain or discomfort to right ankle at time of exam  Left ankle sprain: denies pain or discomfort  In therapy patient is walking up to 12 feet using a RW with SBA able to maintain NWB to RLE   CAD/HTN/CKD: /66, 129/81, 119/73 with HR 70-80 range, denies CP, palpitations, SOB  Depression/anxiety: no concerns  Constipation: patient states bowels are working, no concerns    Allergies, and PMH/PSH reviewed in [facility] today.  REVIEW OF SYSTEMS:  10 point ROS of systems including Constitutional, Eyes, Respiratory, Cardiovascular, Gastroenterology, Genitourinary, Integumentary, Musculoskeletal, Psychiatric were all negative except for pertinent positives noted in my HPI.    Objective:   /66   Pulse 78   Temp 98.1  F (36.7  C)   Resp 16   Ht 1.753 m (5' 9.02\")   Wt 92.5 kg (204 lb)   LMP  (LMP Unknown)   SpO2 97%   BMI 30.11 kg/m    GENERAL APPEARANCE:  Alert, in no distress  ENT:  Mouth and posterior oropharynx normal, moist mucous membranes, normal hearing acuity  EYES:  EOM, conjunctivae, lids, pupils and irises normal, PERRL  RESP:  respiratory effort and palpation of chest normal, lungs clear to auscultation , no respiratory distress  CV:  Palpation and auscultation of heart done , regular rate and rhythm, no murmur, rub, or gallop, no edema  ABDOMEN:  normal bowel sounds, soft, nontender, no hepatosplenomegaly or other masses  M/S:   patient sitting up on edge of bed  SKIN:  Inspection of skin and subcutaneous tissue baseline  NEURO:   speech wnl  PSYCH:  affect and mood normal    Recent labs in [facility] reviewed by me " today.  and   Most Recent 3 CBC's:Recent Labs   Lab Test 05/27/23  0738 05/26/23  0626 05/24/23  0619 09/25/22  2251 09/25/22  1544   WBC  --   --  9.7 8.1 10.3   HGB 12.8 11.3* 11.8 13.5 15.3   MCV  --   --  91 87 91   PLT  --   --  247 279 357     Most Recent 3 BMP's:Recent Labs   Lab Test 05/28/23  1118 05/28/23  0747 05/26/23  0626 05/24/23  0619 05/24/23  0619 09/27/22  1132 09/25/22  1544   NA  --   --   --   --  139 139 138   POTASSIUM  --   --   --   --  4.6 4.0 4.3   CHLORIDE  --   --   --   --  106 106 105   CO2  --   --   --   --  23 26 24   BUN  --   --   --   --  19.6 15 20   CR  --   --   --   --  0.97* 0.81 1.01   ANIONGAP  --   --   --   --  10 7 9   CYNDY  --   --   --   --  8.3* 9.1 8.6   * 131* 138*   < > 136* 102* 125*    < > = values in this interval not displayed.       Assessment/Plan:  (D40.417K) Closed trimalleolar fracture of right ankle with routine healing, subsequent encounter  (primary encounter diagnosis)  Comment: acute s/p ORIF 5/25/23 Dr. Casanova  Plan: NWB RLE until 6 weeks post op  (G04.815A) Sprain of left ankle, unspecified ligament, subsequent encounter  (R53.81) Physical deconditioning  Comment: acute/ongoing  Plan: PT and OT,  change tylenol to 1000mg q 6 hours prn, continue dilaudid 2mg q 4 hours prn  F/u with Dr. Casanova 6/6/23 OK to removed boot for gentle ROM, NWB until 6 weeks post op, OK to use knee scooter for mobility        (I25.10) Coronary artery disease involving native coronary artery of native heart without angina pectoris  (I10) Essential hypertension, benign - goal < 140/90  (N18.30) Stage 3 chronic kidney disease, unspecified whether stage 3a or 3b CKD (H)  Comment: acute/ongoing, no change  Plan: BMP follow, continue olmesartan 20mg QD, metoprolol 25mg BID, norvasc 5mg QD  plavix 75mg QD and ASA 81mg QD     (F41.8) Depression with anxiety  Comment: ongoing, no change  Plan: OK for ACP, continue cymbalta 60mg QD, klonopin 0.25mg qhs prn     (K59.03)  Drug-induced constipation  Comment: acute/ongoing, no change  Plan: senna s 1 PO BID prn,  miralax 17gm QD        MED REC REQUIRED  Post Medication Reconciliation Status: medication reconcilation previously completed during another office visit      Orders:  No new orders      Wheelchair Documentation  Size: 18 x 18  Corresponding cushion: Yes: comfort curve  Standard foot rests: Yes  Elevating leg rests: No  Arm rests: Yes: full length  Lap tray: No  Dose the patient use oxygen? No   Is the patient able to propel wheelchair? Yes If no why not? n/a And is there someone who can?yes  1. The patient has mobility limitations that impairs their ability to participate in one or more mobility related activities: Toileting, Feeding, Grooming and Bathing.  The wheelchair is suitable and necessary for use in the patient's home.  2. The patient's mobility limitations cannot be safely resolved by using a cane/walker:Yes    Reason why a cane or walker will not meet the patient's needs. (ie: balance, tolerance, level of assistance) NWB to RLE  3. The patients home has adequate access to use a manual wheelchair:Yes  4. The use of a manual wheelchair on a regular basis will improve the patients ability to participate in mobility related ADL's at home:Yes  5. The patient is willing to use a manual wheelchair at home:Yes  6. The patient has adequate upper body strength and the mental capability to safely use a manual wheelchair and/or has a caregiver that is able to assist: Yes  7. Does the patient have a lower extremity injury or edema?Yes  Reason for Type of Wheelchair  Patient weight: 0 lbs 0 oz      Electronically signed by: Tonya Lynn Haase, APRN CNP             Sincerely,        Tonya Lynn Haase, APRN CNP

## 2023-06-07 NOTE — PROGRESS NOTES
"[facility] GERIATRICS    Chief Complaint   Patient presents with     RECHECK     HPI:  Janie De Leon is a 73 year old  (1950), who is being seen today for an episodic care visit at: [facility]) [25]. Today's concern is:   Trimalleolar Fx right ankle: continues NWB, denies pain or discomfort to right ankle at time of exam  Left ankle sprain: denies pain or discomfort  In therapy patient is walking up to 12 feet using a RW with SBA able to maintain NWB to RLE   CAD/HTN/CKD: /66, 129/81, 119/73 with HR 70-80 range, denies CP, palpitations, SOB  Depression/anxiety: no concerns  Constipation: patient states bowels are working, no concerns    Allergies, and PMH/PSH reviewed in [facility] today.  REVIEW OF SYSTEMS:  10 point ROS of systems including Constitutional, Eyes, Respiratory, Cardiovascular, Gastroenterology, Genitourinary, Integumentary, Musculoskeletal, Psychiatric were all negative except for pertinent positives noted in my HPI.    Objective:   /66   Pulse 78   Temp 98.1  F (36.7  C)   Resp 16   Ht 1.753 m (5' 9.02\")   Wt 92.5 kg (204 lb)   LMP  (LMP Unknown)   SpO2 97%   BMI 30.11 kg/m    GENERAL APPEARANCE:  Alert, in no distress  ENT:  Mouth and posterior oropharynx normal, moist mucous membranes, normal hearing acuity  EYES:  EOM, conjunctivae, lids, pupils and irises normal, PERRL  RESP:  respiratory effort and palpation of chest normal, lungs clear to auscultation , no respiratory distress  CV:  Palpation and auscultation of heart done , regular rate and rhythm, no murmur, rub, or gallop, no edema  ABDOMEN:  normal bowel sounds, soft, nontender, no hepatosplenomegaly or other masses  M/S:   patient sitting up on edge of bed  SKIN:  Inspection of skin and subcutaneous tissue baseline  NEURO:   speech wnl  PSYCH:  affect and mood normal    Recent labs in [facility] reviewed by me today.  and   Most Recent 3 CBC's:Recent Labs   Lab Test 05/27/23  0738 05/26/23  0626 " 05/24/23  0619 09/25/22  2251 09/25/22  1544   WBC  --   --  9.7 8.1 10.3   HGB 12.8 11.3* 11.8 13.5 15.3   MCV  --   --  91 87 91   PLT  --   --  247 279 357     Most Recent 3 BMP's:Recent Labs   Lab Test 05/28/23  1118 05/28/23  0747 05/26/23  0626 05/24/23  0619 05/24/23  0619 09/27/22  1132 09/25/22  1544   NA  --   --   --   --  139 139 138   POTASSIUM  --   --   --   --  4.6 4.0 4.3   CHLORIDE  --   --   --   --  106 106 105   CO2  --   --   --   --  23 26 24   BUN  --   --   --   --  19.6 15 20   CR  --   --   --   --  0.97* 0.81 1.01   ANIONGAP  --   --   --   --  10 7 9   CYNDY  --   --   --   --  8.3* 9.1 8.6   * 131* 138*   < > 136* 102* 125*    < > = values in this interval not displayed.       Assessment/Plan:  (G85.109R) Closed trimalleolar fracture of right ankle with routine healing, subsequent encounter  (primary encounter diagnosis)  Comment: acute s/p ORIF 5/25/23 Dr. Casanova  Plan: NWB RLE until 6 weeks post op  (S95.413L) Sprain of left ankle, unspecified ligament, subsequent encounter  (R53.81) Physical deconditioning  Comment: acute/ongoing  Plan: PT and OT,  change tylenol to 1000mg q 6 hours prn, continue dilaudid 2mg q 4 hours prn  F/u with Dr. Casanova 6/6/23 OK to removed boot for gentle ROM, NWB until 6 weeks post op, OK to use knee scooter for mobility        (I25.10) Coronary artery disease involving native coronary artery of native heart without angina pectoris  (I10) Essential hypertension, benign - goal < 140/90  (N18.30) Stage 3 chronic kidney disease, unspecified whether stage 3a or 3b CKD (H)  Comment: acute/ongoing, no change  Plan: BMP follow, continue olmesartan 20mg QD, metoprolol 25mg BID, norvasc 5mg QD  plavix 75mg QD and ASA 81mg QD     (F41.8) Depression with anxiety  Comment: ongoing, no change  Plan: OK for ACP, continue cymbalta 60mg QD, klonopin 0.25mg qhs prn     (K59.03) Drug-induced constipation  Comment: acute/ongoing, no change  Plan: senna s 1 PO BID prn,   miralax 17gm QD        MED REC REQUIRED  Post Medication Reconciliation Status: medication reconcilation previously completed during another office visit      Orders:  No new orders      Wheelchair Documentation  Size: 18 x 18  Corresponding cushion: Yes: comfort curve  Standard foot rests: Yes  Elevating leg rests: No  Arm rests: Yes: full length  Lap tray: No  Dose the patient use oxygen? No   Is the patient able to propel wheelchair? Yes If no why not? n/a And is there someone who can?yes  1. The patient has mobility limitations that impairs their ability to participate in one or more mobility related activities: Toileting, Feeding, Grooming and Bathing.  The wheelchair is suitable and necessary for use in the patient's home.  2. The patient's mobility limitations cannot be safely resolved by using a cane/walker:Yes    Reason why a cane or walker will not meet the patient's needs. (ie: balance, tolerance, level of assistance) NWB to RLE  3. The patients home has adequate access to use a manual wheelchair:Yes  4. The use of a manual wheelchair on a regular basis will improve the patients ability to participate in mobility related ADL's at home:Yes  5. The patient is willing to use a manual wheelchair at home:Yes  6. The patient has adequate upper body strength and the mental capability to safely use a manual wheelchair and/or has a caregiver that is able to assist: Yes  7. Does the patient have a lower extremity injury or edema?Yes  Reason for Type of Wheelchair  Patient weight: 0 lbs 0 oz      Electronically signed by: Tonya Lynn Haase, APRN CNP

## 2023-06-12 ENCOUNTER — DISCHARGE SUMMARY NURSING HOME (OUTPATIENT)
Dept: GERIATRICS | Facility: CLINIC | Age: 73
End: 2023-06-12
Payer: MEDICARE

## 2023-06-12 VITALS
HEART RATE: 92 BPM | SYSTOLIC BLOOD PRESSURE: 134 MMHG | WEIGHT: 204.1 LBS | HEIGHT: 69 IN | TEMPERATURE: 98.2 F | RESPIRATION RATE: 16 BRPM | DIASTOLIC BLOOD PRESSURE: 74 MMHG | BODY MASS INDEX: 30.23 KG/M2 | OXYGEN SATURATION: 96 %

## 2023-06-12 DIAGNOSIS — I25.10 CORONARY ARTERY DISEASE INVOLVING NATIVE CORONARY ARTERY OF NATIVE HEART WITHOUT ANGINA PECTORIS: ICD-10-CM

## 2023-06-12 DIAGNOSIS — N18.30 STAGE 3 CHRONIC KIDNEY DISEASE, UNSPECIFIED WHETHER STAGE 3A OR 3B CKD (H): ICD-10-CM

## 2023-06-12 DIAGNOSIS — F41.8 DEPRESSION WITH ANXIETY: ICD-10-CM

## 2023-06-12 DIAGNOSIS — R53.81 PHYSICAL DECONDITIONING: ICD-10-CM

## 2023-06-12 DIAGNOSIS — S93.402D SPRAIN OF LEFT ANKLE, UNSPECIFIED LIGAMENT, SUBSEQUENT ENCOUNTER: ICD-10-CM

## 2023-06-12 DIAGNOSIS — I10 ESSENTIAL HYPERTENSION, BENIGN: ICD-10-CM

## 2023-06-12 DIAGNOSIS — S82.851D CLOSED TRIMALLEOLAR FRACTURE OF RIGHT ANKLE WITH ROUTINE HEALING, SUBSEQUENT ENCOUNTER: Primary | ICD-10-CM

## 2023-06-12 DIAGNOSIS — K59.03 DRUG-INDUCED CONSTIPATION: ICD-10-CM

## 2023-06-12 PROCEDURE — 99316 NF DSCHRG MGMT 30 MIN+: CPT | Performed by: NURSE PRACTITIONER

## 2023-06-12 NOTE — LETTER
6/12/2023        RE: Janie Murphy Moises  [address]  [facility] [address]        [facility] GERIATRICS DISCHARGE SUMMARY  PATIENT'S NAME: Janie De Leon  YOB: 1950  MEDICAL RECORD NUMBER:  [id number]  Place of Service where encounter took place:  [facility]) [25]    PRIMARY CARE PROVIDER AND CLINIC RESPONSIBLE AFTER TRANSFER:   Jade Davis DO, [address] / [place] [address]    [facility] Provider     Transferring providers: Tonya Lynn Haase, APRN CNP, Dr. Celso Sherwood MD  Recent Hospitalization/ED:  [facility] Hospital stay 5/23/23 to 5/30/23.  Date of SNF Admission: May 30, 2023  Date of SNF (anticipated) Discharge: June 14, 2023  Discharged to: previous independent home  Cognitive Scores: BIMS: 15/15  Physical Function: Ambulating 12 ft with RW  DME: Wheelchair and Walker    CODE STATUS/ADVANCE DIRECTIVES DISCUSSION:  Prior   ALLERGIES: Ciprofloxacin, Oxycodone, and Simvastatin    NURSING FACILITY COURSE   Medication Changes/Rationale:     See assessment and plan    Summary of nursing facility stay:   Patient progressed to ambulating up to 12 feet using a RW independently and able to maintain NWB to right LE, patient is independent with ADL's, tranfers and mobility from a w/c level. Will discharge to a handicap accessible hotel while she waits for an upgrade in weight bearing to RLE.     Discharge Medications:  MED REC REQUIRED  Post Medication Reconciliation Status: discharge medications reconciled and changed, per note/orders       Current Outpatient Medications   Medication Sig Dispense Refill     acetaminophen (TYLENOL) 500 MG tablet Take 2 tablets (1,000 mg) by mouth every 6 hours as needed for mild pain       amLODIPine (NORVASC) 5 MG tablet Take 1 tablet (5 mg) by mouth daily (Patient taking differently: Take 5 mg by mouth every evening) 90 tablet 3     aspirin (ASA) 81 MG EC tablet Take 1 tablet (81 mg) by mouth daily  0      atorvastatin (LIPITOR) 40 MG tablet Take 1 tablet (40 mg) by mouth every evening 90 tablet 3     calcium carbonate (TUMS) 500 MG chewable tablet Take 2 tablets (1,000 mg) by mouth daily as needed for heartburn       Cholecalciferol (VITAMIN D3 PO) Take 1,000 Units by mouth daily       clonazePAM (KLONOPIN) 0.5 MG tablet Take 0.5-1 tablets (0.25-0.5 mg) by mouth nightly as needed for anxiety 10 tablet 0     clopidogrel (PLAVIX) 75 MG tablet Take 1 tablet (75 mg) by mouth daily 90 tablet 3     cyanocobalamin (VITAMIN B-12) 100 MCG tablet Take 100 mcg by mouth daily       DULoxetine (CYMBALTA) 60 MG capsule Take 1 capsule (60 mg) by mouth daily 90 capsule 3     levothyroxine (SYNTHROID/LEVOTHROID) 88 MCG tablet Take 1 tablet (88 mcg) by mouth daily - Overdue for endocrinology appointment 90 tablet 3     metoprolol tartrate (LOPRESSOR) 25 MG tablet Take 1 tablet (25 mg) by mouth 2 times daily 180 tablet 3     nitroGLYcerin (NITROSTAT) 0.4 MG sublingual tablet For chest pain place 1 tablet under the tongue every 5 minutes for 3 doses. If symptoms persist 5 minutes after 1st dose call 911. 30 tablet 0     olmesartan (BENICAR) 20 MG tablet Take 1 tablet (20 mg) by mouth daily 90 tablet 3     polyethylene glycol (MIRALAX) 17 GM/Dose powder Take 17 g by mouth daily as needed for constipation       senna-docusate (SENOKOT-S/PERICOLACE) 8.6-50 MG tablet Take 1 tablet by mouth 2 times daily as needed for constipation 21 tablet 0      \  MED REC REQUIRED  Post Medication Reconciliation Status: discharge medications reconciled and changed, per note/orders     Controlled medications:   not applicable/none     Past Medical History:   Past Medical History:   Diagnosis Date     Adrenal nodule (H) 09/2022    CT scan     Anemia     mild gastropathy on EGD 2008; neg pill cam     Atypical ductal hyperplasia of both breasts     Has had biopsies and MRI     Fibroid uterus      High cholesterol      History of hematuria 2008    Cystoscopy  "for recurrent UTIs; unremarkable renal US. Also recurrent UTIs as child and pyelonephritis.      History of hormone replacement therapy     after JASBIR with BSO     HTN (hypertension)      HTN, goal below 140/90      Hx of bone density study 10/16/2006    Normal     Hypothyroidism      Insomnia     periodic - prn clonazepam helpful a couple times per month     Lipid screening 07/21/2015    Tchol 128, , HDL 53, LDL 53 outside records --> based on our labs off of atorvastatin 10yr ASCVD risk 9.4% in Oct 2015     Major depressive disorder, single episode in full remission (H) 2007     NSTEMI (non-ST elevated myocardial infarction) (H) 9/26/2022     Osteopenia     Mild left hip July 2016     Prediabetes     Metformin in the past     Rotator cuff injury, left, sequela     MRI Jan 2015 and had PT; High-grade complete or near complete tear of the supraspinatus tendon and anterior fibers of the infraspinatus tendon. 1/3 of the infraspinatus tendon appears involved.      TBI (traumatic brain injury) (H)     As a child     Tubular adenoma of colon 2013 & 2016     Physical Exam:   Vitals: /74   Pulse 92   Temp 98.2  F (36.8  C)   Resp 16   Ht 1.753 m (5' 9\")   Wt 92.6 kg (204 lb 1.6 oz)   LMP  (LMP Unknown)   SpO2 96%   BMI 30.14 kg/m    BMI: Body mass index is 30.14 kg/m .  GENERAL APPEARANCE:  Alert, in no distress  ENT:  Mouth and posterior oropharynx normal, moist mucous membranes, normal hearing acuity  EYES:  EOM, conjunctivae, lids, pupils and irises normal, PERRL  RESP:  respiratory effort and palpation of chest normal, lungs clear to auscultation , no respiratory distress  CV:  Palpation and auscultation of heart done , regular rate and rhythm, no murmur, rub, or gallop  ABDOMEN:  normal bowel sounds, soft, nontender, no hepatosplenomegaly or other masses  M/S:   patient sitting up in w/c, cam boot to RLE  SKIN:  Inspection of skin and subcutaneous tissue baseline  NEURO:   speech wnl  PSYCH:  affect " and mood normal     SNF labs: Recent labs in [facility] reviewed by me today.  and   Most Recent 3 CBC's:  Recent Labs   Lab Test 05/27/23  0738 05/26/23  0626 05/24/23  0619 09/25/22  2251 09/25/22  1544   WBC  --   --  9.7 8.1 10.3   HGB 12.8 11.3* 11.8 13.5 15.3   MCV  --   --  91 87 91   PLT  --   --  247 279 357     Most Recent 3 BMP's:  Recent Labs   Lab Test 05/28/23  1118 05/28/23  0747 05/26/23  0626 05/24/23  0619 05/24/23  0619 09/27/22  1132 09/25/22  1544   NA  --   --   --   --  139 139 138   POTASSIUM  --   --   --   --  4.6 4.0 4.3   CHLORIDE  --   --   --   --  106 106 105   CO2  --   --   --   --  23 26 24   BUN  --   --   --   --  19.6 15 20   CR  --   --   --   --  0.97* 0.81 1.01   ANIONGAP  --   --   --   --  10 7 9   CYNDY  --   --   --   --  8.3* 9.1 8.6   * 131* 138*   < > 136* 102* 125*    < > = values in this interval not displayed.       Assessment/Plan:  (B50.526D) Closed trimalleolar fracture of right ankle with routine healing, subsequent encounter  (primary encounter diagnosis)  Comment: acute s/p ORIF 5/25/23 Dr. Casanova  Plan: NWB RLE until 6 weeks post op  (T87.919C) Sprain of left ankle, unspecified ligament, subsequent encounter  (R53.81) Physical deconditioning  Comment: stable  Plan: DC patient to handicap accessible hotel,  continue tylenol to 1000mg q 6 hours prn  DC dilaudid at discharge  F/u with Dr. Casanova 6/6/23 OK to removed boot for gentle ROM, NWB until 6 weeks post op        (I25.10) Coronary artery disease involving native coronary artery of native heart without angina pectoris  (I10) Essential hypertension, benign - goal < 140/90  (N18.30) Stage 3 chronic kidney disease, unspecified whether stage 3a or 3b CKD (H)  Comment: ongoing  Plan: continue olmesartan 20mg QD, metoprolol 25mg BID, norvasc 5mg QD  plavix 75mg QD and ASA 81mg QD     (F41.8) Depression with anxiety  Comment: ongoing  Plan: continue cymbalta 60mg QD, klonopin 0.25mg qhs prn     (K59.03)  Drug-induced constipation  Comment: ongoing  Plan: senna s 1 PO BID prn,  miralax 17gm QD        DISCHARGE PLAN:    Follow up labs: No labs orders/due    Medical Follow Up:      Follow up with primary care provider in 1 weeks    [facility] scheduled appointments:     Future Appointments   Date Time Provider Department Center   9/1/2023 11:00 AM Jade Davis DO CSFPIM    10/4/2023  9:00 AM Horace Cutler MD CSENCRI    10/13/2023  9:00 AM CS LAB CSLABR    10/16/2023 10:30 AM Margo Barbosa APRN CNP [facility] PSA CLIN         Discharge Services: No therapy or home care recommended.     Discharge Instructions Verbalized to Patient at Discharge:     Weight bearing restrictions:  Non-weight bearing.     TOTAL DISCHARGE TIME:   Greater than 30 minutes  Electronically signed by:  Tonya Lynn Haase, APRN CNP                       Sincerely,        Tonya Lynn Haase, APRN CNP

## 2023-06-12 NOTE — PROGRESS NOTES
[facility] GERIATRICS DISCHARGE SUMMARY  PATIENT'S NAME: Janie De Leon  YOB: 1950  MEDICAL RECORD NUMBER:  [id number]  Place of Service where encounter took place:  [facility]) [25]    PRIMARY CARE PROVIDER AND CLINIC RESPONSIBLE AFTER TRANSFER:   Jade Davis DO, [address] / [place] [address]    [facility] Provider     Transferring providers: Tonya Lynn Haase, APRN CNP, Dr. Celso Sherwood MD  Recent Hospitalization/ED:  [facility] Hospital stay 5/23/23 to 5/30/23.  Date of SNF Admission: May 30, 2023  Date of SNF (anticipated) Discharge: June 14, 2023  Discharged to: previous independent home  Cognitive Scores: BIMS: 15/15  Physical Function: Ambulating 12 ft with RW  DME: Wheelchair and Walker    CODE STATUS/ADVANCE DIRECTIVES DISCUSSION:  Prior   ALLERGIES: Ciprofloxacin, Oxycodone, and Simvastatin    NURSING FACILITY COURSE   Medication Changes/Rationale:     See assessment and plan    Summary of nursing facility stay:   Patient progressed to ambulating up to 12 feet using a RW independently and able to maintain NWB to right LE, patient is independent with ADL's, tranfers and mobility from a w/c level. Will discharge to a handicap accessible hotel while she waits for an upgrade in weight bearing to RLE.     Discharge Medications:  MED REC REQUIRED  Post Medication Reconciliation Status: discharge medications reconciled and changed, per note/orders       Current Outpatient Medications   Medication Sig Dispense Refill     acetaminophen (TYLENOL) 500 MG tablet Take 2 tablets (1,000 mg) by mouth every 6 hours as needed for mild pain       amLODIPine (NORVASC) 5 MG tablet Take 1 tablet (5 mg) by mouth daily (Patient taking differently: Take 5 mg by mouth every evening) 90 tablet 3     aspirin (ASA) 81 MG EC tablet Take 1 tablet (81 mg) by mouth daily  0     atorvastatin (LIPITOR) 40 MG tablet Take 1 tablet (40 mg) by mouth every evening 90 tablet 3      calcium carbonate (TUMS) 500 MG chewable tablet Take 2 tablets (1,000 mg) by mouth daily as needed for heartburn       Cholecalciferol (VITAMIN D3 PO) Take 1,000 Units by mouth daily       clonazePAM (KLONOPIN) 0.5 MG tablet Take 0.5-1 tablets (0.25-0.5 mg) by mouth nightly as needed for anxiety 10 tablet 0     clopidogrel (PLAVIX) 75 MG tablet Take 1 tablet (75 mg) by mouth daily 90 tablet 3     cyanocobalamin (VITAMIN B-12) 100 MCG tablet Take 100 mcg by mouth daily       DULoxetine (CYMBALTA) 60 MG capsule Take 1 capsule (60 mg) by mouth daily 90 capsule 3     levothyroxine (SYNTHROID/LEVOTHROID) 88 MCG tablet Take 1 tablet (88 mcg) by mouth daily - Overdue for endocrinology appointment 90 tablet 3     metoprolol tartrate (LOPRESSOR) 25 MG tablet Take 1 tablet (25 mg) by mouth 2 times daily 180 tablet 3     nitroGLYcerin (NITROSTAT) 0.4 MG sublingual tablet For chest pain place 1 tablet under the tongue every 5 minutes for 3 doses. If symptoms persist 5 minutes after 1st dose call 911. 30 tablet 0     olmesartan (BENICAR) 20 MG tablet Take 1 tablet (20 mg) by mouth daily 90 tablet 3     polyethylene glycol (MIRALAX) 17 GM/Dose powder Take 17 g by mouth daily as needed for constipation       senna-docusate (SENOKOT-S/PERICOLACE) 8.6-50 MG tablet Take 1 tablet by mouth 2 times daily as needed for constipation 21 tablet 0      \  MED REC REQUIRED  Post Medication Reconciliation Status: discharge medications reconciled and changed, per note/orders     Controlled medications:   not applicable/none     Past Medical History:   Past Medical History:   Diagnosis Date     Adrenal nodule (H) 09/2022    CT scan     Anemia     mild gastropathy on EGD 2008; neg pill cam     Atypical ductal hyperplasia of both breasts     Has had biopsies and MRI     Fibroid uterus      High cholesterol      History of hematuria 2008    Cystoscopy for recurrent UTIs; unremarkable renal US. Also recurrent UTIs as child and pyelonephritis.       "History of hormone replacement therapy     after JASBIR with BSO     HTN (hypertension)      HTN, goal below 140/90      Hx of bone density study 10/16/2006    Normal     Hypothyroidism      Insomnia     periodic - prn clonazepam helpful a couple times per month     Lipid screening 07/21/2015    Tchol 128, , HDL 53, LDL 53 outside records --> based on our labs off of atorvastatin 10yr ASCVD risk 9.4% in Oct 2015     Major depressive disorder, single episode in full remission (H) 2007     NSTEMI (non-ST elevated myocardial infarction) (H) 9/26/2022     Osteopenia     Mild left hip July 2016     Prediabetes     Metformin in the past     Rotator cuff injury, left, sequela     MRI Jan 2015 and had PT; High-grade complete or near complete tear of the supraspinatus tendon and anterior fibers of the infraspinatus tendon. 1/3 of the infraspinatus tendon appears involved.      TBI (traumatic brain injury) (H)     As a child     Tubular adenoma of colon 2013 & 2016     Physical Exam:   Vitals: /74   Pulse 92   Temp 98.2  F (36.8  C)   Resp 16   Ht 1.753 m (5' 9\")   Wt 92.6 kg (204 lb 1.6 oz)   LMP  (LMP Unknown)   SpO2 96%   BMI 30.14 kg/m    BMI: Body mass index is 30.14 kg/m .  GENERAL APPEARANCE:  Alert, in no distress  ENT:  Mouth and posterior oropharynx normal, moist mucous membranes, normal hearing acuity  EYES:  EOM, conjunctivae, lids, pupils and irises normal, PERRL  RESP:  respiratory effort and palpation of chest normal, lungs clear to auscultation , no respiratory distress  CV:  Palpation and auscultation of heart done , regular rate and rhythm, no murmur, rub, or gallop  ABDOMEN:  normal bowel sounds, soft, nontender, no hepatosplenomegaly or other masses  M/S:   patient sitting up in w/c, cam boot to RLE  SKIN:  Inspection of skin and subcutaneous tissue baseline  NEURO:   speech wnl  PSYCH:  affect and mood normal     SNF labs: Recent labs in [facility] reviewed by me today.  and   Most Recent 3 " CBC's:  Recent Labs   Lab Test 05/27/23  0738 05/26/23  0626 05/24/23  0619 09/25/22  2251 09/25/22  1544   WBC  --   --  9.7 8.1 10.3   HGB 12.8 11.3* 11.8 13.5 15.3   MCV  --   --  91 87 91   PLT  --   --  247 279 357     Most Recent 3 BMP's:  Recent Labs   Lab Test 05/28/23  1118 05/28/23  0747 05/26/23  0626 05/24/23  0619 05/24/23  0619 09/27/22  1132 09/25/22  1544   NA  --   --   --   --  139 139 138   POTASSIUM  --   --   --   --  4.6 4.0 4.3   CHLORIDE  --   --   --   --  106 106 105   CO2  --   --   --   --  23 26 24   BUN  --   --   --   --  19.6 15 20   CR  --   --   --   --  0.97* 0.81 1.01   ANIONGAP  --   --   --   --  10 7 9   CYNDY  --   --   --   --  8.3* 9.1 8.6   * 131* 138*   < > 136* 102* 125*    < > = values in this interval not displayed.       Assessment/Plan:  (D85.214J) Closed trimalleolar fracture of right ankle with routine healing, subsequent encounter  (primary encounter diagnosis)  Comment: acute s/p ORIF 5/25/23 Dr. Casanova  Plan: NWB RLE until 6 weeks post op  (O52.454K) Sprain of left ankle, unspecified ligament, subsequent encounter  (R53.81) Physical deconditioning  Comment: stable  Plan: DC patient to handicap accessible hotel,  continue tylenol to 1000mg q 6 hours prn  DC dilaudid at discharge  F/u with Dr. Casanova 6/6/23 OK to removed boot for gentle ROM, NWB until 6 weeks post op        (I25.10) Coronary artery disease involving native coronary artery of native heart without angina pectoris  (I10) Essential hypertension, benign - goal < 140/90  (N18.30) Stage 3 chronic kidney disease, unspecified whether stage 3a or 3b CKD (H)  Comment: ongoing  Plan: continue olmesartan 20mg QD, metoprolol 25mg BID, norvasc 5mg QD  plavix 75mg QD and ASA 81mg QD     (F41.8) Depression with anxiety  Comment: ongoing  Plan: continue cymbalta 60mg QD, klonopin 0.25mg qhs prn     (K59.03) Drug-induced constipation  Comment: ongoing  Plan: senna s 1 PO BID prn,  miralax 17gm  QD        DISCHARGE PLAN:    Follow up labs: No labs orders/due    Medical Follow Up:      Follow up with primary care provider in 1 weeks    [facility] scheduled appointments:     Future Appointments   Date Time Provider Department [place]   9/1/2023 11:00 AM Jade Davis DO CSFPIM    10/4/2023  9:00 AM Horace Cutler MD CSENCRI    10/13/2023  9:00 AM CS LAB CSLABR    10/16/2023 10:30 AM [facility]         Discharge Services: No therapy or home care recommended.     Discharge Instructions Verbalized to Patient at Discharge:     Weight bearing restrictions:  Non-weight bearing.     TOTAL DISCHARGE TIME:   Greater than 30 minutes  Electronically signed by:  Tonya Lynn Haase, APRN CNP

## 2023-06-16 ENCOUNTER — PATIENT OUTREACH (OUTPATIENT)
Dept: CARE COORDINATION | Facility: CLINIC | Age: 73
End: 2023-06-16
Payer: MEDICARE

## 2023-06-16 NOTE — PROGRESS NOTES
Clinic Care Coordination Contact  [facility]/Voicemail       Clinical Data: Care Coordinator Outreach  Outreach attempted x 1. Someone answered phone but kept disconnecting before speaking.  Unable to leave voicemail.    Plan: Care Coordinator will try to reach patient again in 1-2 business days.  Pt was tentatively going to discharge from [facility] on 6/15/23, but unsure whether she has discharged from TCU or not.      Aliza Fuentes,  [facility]  Clinic Care Coordinator  [facility]  [phone]  [email or web address]

## 2023-06-16 NOTE — LETTER
[facility] CARE COORDINATION  [address]  [facility] [address]     June 23, 2023    Janie Murphy Rockledge  [address]  [facility] [address]      Dear Janie,        I am a  clinic care coordinator who works with Jade Davis DO with the [facility]. I wanted to introduce myself and provide you with my contact information for you to be able to call me with any questions or concerns. Below is a description of clinic care coordination and how I can further assist you.       The clinic care coordination team is made up of a registered nurse, , financial resource worker and community health worker who understand the health care system. The goal of clinic care coordination is to help you manage your health and improve access to the health care system. Our team works alongside your provider to assist you in determining your health and social needs. We can help you obtain health care and community resources, providing you with necessary information and education. We can work with you through any barriers and develop a care plan that helps coordinate and strengthen the communication between you and your care team.  Our services are voluntary and are offered without charge to you personally.    Please feel free to contact me with any questions or concerns regarding care coordination and what we can offer.      We are focused on providing you with the highest-quality healthcare experience possible.    Sincerely,     Aliza Fuentes, [facility]  Clinic Care Coordinator  [facility]  [phone]     Enclosed:

## 2023-06-23 NOTE — PROGRESS NOTES
Clinic Care Coordination Contact  [facility]/Voicemail       Clinical Data: Care Coordinator Outreach  Outreach attempted x 3.  Left message on patient's voicemail with call back information and requested return call.  Plan: Care Coordinator will send unable to contact letter with care coordinator contact information via [employer]. Care Coordinator will do no further outreaches at this time.    Aliza Fuentes  [facility]  Clinic Care Coordinator  [facility]  [phone]  [email or web address]

## 2023-06-23 NOTE — PROGRESS NOTES
Clinic Care Coordination Contact  [facility]/Voicemail       Clinical Data: Care Coordinator Outreach  Outreach attempted x 2.  Left message on patient's voicemail with call back information and requested return call.  Plan: Care Coordinator sent care coordination introduction letter on 6/23/23 via [employer]. Care Coordinator will try to reach patient again in 1-2 business days.    Aliza Fuentes  [facility]  Clinic Care Coordinator  [facility]  [phone]  [email or web address]

## 2023-06-26 NOTE — PROGRESS NOTES
Clinic Care Coordination Contact  [facility]/Voicemail       Clinical Data: Care Coordinator Outreach  Outreach attempted x 3.  Left message on patient's voicemail with call back information and requested return call.  Plan: Care Coordinator will close this episode if pt does not return call by 6/27/23.  Aliza Fuentes  [facility]  Clinic Care Coordinator  [facility]  [phone]  [email or web address]

## 2023-06-26 NOTE — PROGRESS NOTES
Clinic Care Coordination Contact    SW received a voicemail from pt that she has discharged from [facility] and things went very well there.  Pt states she cannot get in her bathroom at home due to living in a bungalow with narrow doorways, so she and her spouse are staying in a local hotel with a handicap accessible bathroom.  Pt states this is going well, and she anticipates that she will begin weight bearing exercises next week and hopes to be home once she is comfortable with that.  Pt states that she has no CCC needs, as everything has been addressed and she has people helping her.  Pt states she appreciates the call and will reach out in the future if she has any needs or questions.       Aliza Fuentes,  [facility]  Clinic Care Coordinator  [facility]  [phone]  [email or web address]

## 2023-07-06 ENCOUNTER — TRANSFERRED RECORDS (OUTPATIENT)
Dept: HEALTH INFORMATION MANAGEMENT | Facility: CLINIC | Age: 73
End: 2023-07-06
Payer: MEDICARE

## 2023-08-14 ENCOUNTER — TRANSFERRED RECORDS (OUTPATIENT)
Dept: HEALTH INFORMATION MANAGEMENT | Facility: CLINIC | Age: 73
End: 2023-08-14
Payer: MEDICARE

## 2023-08-31 ASSESSMENT — PATIENT HEALTH QUESTIONNAIRE - PHQ9: SUM OF ALL RESPONSES TO PHQ QUESTIONS 1-9: 5

## 2023-09-01 ENCOUNTER — HOSPITAL ENCOUNTER (OUTPATIENT)
Dept: MAMMOGRAPHY | Facility: CLINIC | Age: 73
Discharge: HOME | End: 2023-09-01
Attending: INTERNAL MEDICINE | Admitting: INTERNAL MEDICINE
Payer: MEDICARE

## 2023-09-01 DIAGNOSIS — Z12.31 VISIT FOR SCREENING MAMMOGRAM: ICD-10-CM

## 2023-09-01 PROCEDURE — 77067 SCR MAMMO BI INCL CAD: CPT

## 2023-09-02 ASSESSMENT — ENCOUNTER SYMPTOMS
DIARRHEA: 0
DIZZINESS: 1
DYSURIA: 0
EYE PAIN: 0
COUGH: 0
JOINT SWELLING: 0
SORE THROAT: 0
PALPITATIONS: 0
HEMATOCHEZIA: 0
ARTHRALGIAS: 0
CONSTIPATION: 1
HEARTBURN: 1
WEAKNESS: 0
ABDOMINAL PAIN: 0
NAUSEA: 0
NERVOUS/ANXIOUS: 1
FEVER: 0
SHORTNESS OF BREATH: 0
MYALGIAS: 0
FREQUENCY: 1
PARESTHESIAS: 0
BREAST MASS: 0
HEMATURIA: 0
HEADACHES: 0
CHILLS: 0

## 2023-09-02 ASSESSMENT — ACTIVITIES OF DAILY LIVING (ADL): CURRENT_FUNCTION: NO ASSISTANCE NEEDED

## 2023-09-08 ENCOUNTER — OFFICE VISIT (OUTPATIENT)
Dept: FAMILY MEDICINE | Facility: CLINIC | Age: 73
End: 2023-09-08
Payer: MEDICARE

## 2023-09-08 VITALS
HEIGHT: 69 IN | RESPIRATION RATE: 17 BRPM | SYSTOLIC BLOOD PRESSURE: 125 MMHG | DIASTOLIC BLOOD PRESSURE: 85 MMHG | TEMPERATURE: 97 F | HEART RATE: 70 BPM | OXYGEN SATURATION: 96 % | WEIGHT: 200.2 LBS | BODY MASS INDEX: 29.65 KG/M2

## 2023-09-08 DIAGNOSIS — M85.80 OSTEOPENIA, UNSPECIFIED LOCATION: Chronic | ICD-10-CM

## 2023-09-08 DIAGNOSIS — E78.2 MIXED HYPERLIPIDEMIA: Chronic | ICD-10-CM

## 2023-09-08 DIAGNOSIS — F32.5 MAJOR DEPRESSIVE DISORDER, SINGLE EPISODE IN FULL REMISSION (H): Chronic | ICD-10-CM

## 2023-09-08 DIAGNOSIS — I67.1 BRAIN ANEURYSM: ICD-10-CM

## 2023-09-08 DIAGNOSIS — R74.8 ELEVATED ALKALINE PHOSPHATASE LEVEL: ICD-10-CM

## 2023-09-08 DIAGNOSIS — I21.4 NSTEMI (NON-ST ELEVATED MYOCARDIAL INFARCTION) (H): Chronic | ICD-10-CM

## 2023-09-08 DIAGNOSIS — R93.0 ABNORMAL MRI OF HEAD: Chronic | ICD-10-CM

## 2023-09-08 DIAGNOSIS — G47.00 INSOMNIA, UNSPECIFIED TYPE: Chronic | ICD-10-CM

## 2023-09-08 DIAGNOSIS — E27.9 ADRENAL NODULE (H): ICD-10-CM

## 2023-09-08 DIAGNOSIS — D32.9 MENINGIOMA (H): ICD-10-CM

## 2023-09-08 DIAGNOSIS — R73.03 PREDIABETES: ICD-10-CM

## 2023-09-08 DIAGNOSIS — I25.10 CORONARY ARTERY DISEASE INVOLVING NATIVE CORONARY ARTERY OF NATIVE HEART WITHOUT ANGINA PECTORIS: Chronic | ICD-10-CM

## 2023-09-08 DIAGNOSIS — Z00.00 ROUTINE HISTORY AND PHYSICAL EXAMINATION OF ADULT: Primary | ICD-10-CM

## 2023-09-08 DIAGNOSIS — I10 ESSENTIAL HYPERTENSION, BENIGN: Chronic | ICD-10-CM

## 2023-09-08 DIAGNOSIS — E03.9 HYPOTHYROIDISM, UNSPECIFIED TYPE: ICD-10-CM

## 2023-09-08 LAB
ALP SERPL-CCNC: 141 U/L (ref 35–104)
HBA1C MFR BLD: 6.2 % (ref 0–5.6)
TSH SERPL DL<=0.005 MIU/L-ACNC: 2.21 UIU/ML (ref 0.3–4.2)

## 2023-09-08 PROCEDURE — 90662 IIV NO PRSV INCREASED AG IM: CPT | Performed by: INTERNAL MEDICINE

## 2023-09-08 PROCEDURE — 99000 SPECIMEN HANDLING OFFICE-LAB: CPT | Performed by: INTERNAL MEDICINE

## 2023-09-08 PROCEDURE — 84075 ASSAY ALKALINE PHOSPHATASE: CPT | Mod: 90 | Performed by: INTERNAL MEDICINE

## 2023-09-08 PROCEDURE — 84443 ASSAY THYROID STIM HORMONE: CPT | Performed by: INTERNAL MEDICINE

## 2023-09-08 PROCEDURE — 36415 COLL VENOUS BLD VENIPUNCTURE: CPT | Performed by: INTERNAL MEDICINE

## 2023-09-08 PROCEDURE — G0439 PPPS, SUBSEQ VISIT: HCPCS | Performed by: INTERNAL MEDICINE

## 2023-09-08 PROCEDURE — 84080 ASSAY ALKALINE PHOSPHATASES: CPT | Mod: 90 | Performed by: INTERNAL MEDICINE

## 2023-09-08 PROCEDURE — G0008 ADMIN INFLUENZA VIRUS VAC: HCPCS | Performed by: INTERNAL MEDICINE

## 2023-09-08 PROCEDURE — 83036 HEMOGLOBIN GLYCOSYLATED A1C: CPT | Performed by: INTERNAL MEDICINE

## 2023-09-08 RX ORDER — DULOXETIN HYDROCHLORIDE 60 MG/1
60 CAPSULE, DELAYED RELEASE ORAL DAILY
Qty: 90 CAPSULE | Refills: 3 | Status: SHIPPED | OUTPATIENT
Start: 2023-09-08 | End: 2024-08-29

## 2023-09-08 RX ORDER — METOPROLOL TARTRATE 25 MG/1
25 TABLET, FILM COATED ORAL 2 TIMES DAILY
Qty: 180 TABLET | Refills: 3 | Status: SHIPPED | OUTPATIENT
Start: 2023-09-08

## 2023-09-08 RX ORDER — LEVOTHYROXINE SODIUM 88 UG/1
88 TABLET ORAL DAILY
Qty: 90 TABLET | Refills: 0 | Status: CANCELLED | OUTPATIENT
Start: 2023-09-08

## 2023-09-08 RX ORDER — OLMESARTAN MEDOXOMIL 20 MG/1
20 TABLET ORAL DAILY
Qty: 90 TABLET | Refills: 3 | Status: SHIPPED | OUTPATIENT
Start: 2023-09-08 | End: 2024-09-10

## 2023-09-08 RX ORDER — AMLODIPINE BESYLATE 5 MG/1
5 TABLET ORAL EVERY EVENING
Qty: 90 TABLET | Refills: 3 | Status: SHIPPED | OUTPATIENT
Start: 2023-09-08 | End: 2024-08-29

## 2023-09-08 ASSESSMENT — PATIENT HEALTH QUESTIONNAIRE - PHQ9
10. IF YOU CHECKED OFF ANY PROBLEMS, HOW DIFFICULT HAVE THESE PROBLEMS MADE IT FOR YOU TO DO YOUR WORK, TAKE CARE OF THINGS AT HOME, OR GET ALONG WITH OTHER PEOPLE: SOMEWHAT DIFFICULT
SUM OF ALL RESPONSES TO PHQ QUESTIONS 1-9: 6
SUM OF ALL RESPONSES TO PHQ QUESTIONS 1-9: 6

## 2023-09-08 ASSESSMENT — ANXIETY QUESTIONNAIRES
7. FEELING AFRAID AS IF SOMETHING AWFUL MIGHT HAPPEN: NOT AT ALL
6. BECOMING EASILY ANNOYED OR IRRITABLE: MORE THAN HALF THE DAYS
GAD7 TOTAL SCORE: 6
1. FEELING NERVOUS, ANXIOUS, OR ON EDGE: NOT AT ALL
5. BEING SO RESTLESS THAT IT IS HARD TO SIT STILL: NOT AT ALL
3. WORRYING TOO MUCH ABOUT DIFFERENT THINGS: MORE THAN HALF THE DAYS
IF YOU CHECKED OFF ANY PROBLEMS ON THIS QUESTIONNAIRE, HOW DIFFICULT HAVE THESE PROBLEMS MADE IT FOR YOU TO DO YOUR WORK, TAKE CARE OF THINGS AT HOME, OR GET ALONG WITH OTHER PEOPLE: SOMEWHAT DIFFICULT
GAD7 TOTAL SCORE: 6
4. TROUBLE RELAXING: SEVERAL DAYS
2. NOT BEING ABLE TO STOP OR CONTROL WORRYING: SEVERAL DAYS

## 2023-09-08 ASSESSMENT — PAIN SCALES - GENERAL: PAINLEVEL: NO PAIN (0)

## 2023-09-08 ASSESSMENT — ACTIVITIES OF DAILY LIVING (ADL): CURRENT_FUNCTION: NO ASSISTANCE NEEDED

## 2023-09-08 NOTE — PROGRESS NOTES
"SUBJECTIVE:   Janie is a 73 year old who presents for Preventive Visit.      Are you in the first 12 months of your Medicare coverage?  No    Healthy Habits:     In general, how would you rate your overall health?  Good    Frequency of exercise:  4-5 days/week    Duration of exercise:  15-30 minutes    Do you usually eat at least 4 servings of fruit and vegetables a day, include whole grains    & fiber and avoid regularly eating high fat or \"junk\" foods?  No    Taking medications regularly:  Yes    Medication side effects:  None    Ability to successfully perform activities of daily living:  No assistance needed    Home Safety:  Throw rugs in the hallway and lack of grab bars in the bathroom    Hearing Impairment:  Difficulty following a conversation in a noisy restaurant or crowded room, feel that people are mumbling or not speaking clearly and difficulty understanding soft or whispered speech    In the past 6 months, have you been bothered by leaking of urine?  No    In general, how would you rate your overall mental or emotional health?  Poor        Have you ever done Advance Care Planning? (For example, a Health Directive, POLST, or a discussion with a medical provider or your loved ones about your wishes): No, advance care planning information given to patient to review.  Patient plans to discuss their wishes with loved ones or provider.         Fall risk  Fallen 2 or more times in the past year?: No  Any fall with injury in the past year?: Yes    Cognitive Screening   1) Repeat 3 items (Leader, Season, Table)    2) Clock draw: NORMAL  3) 3 item recall: Recalls 3 objects  Results: 3 items recalled: COGNITIVE IMPAIRMENT LESS LIKELY    Mini-CogTM Copyright KELL Ruff. Licensed by the author for use in [facility]; reprinted with permission (bianca@.[facility]). All rights reserved.      Do you have sleep apnea, excessive snoring or daytime drowsiness? : yes    Reviewed and updated as needed this visit by " clinical staff   Tobacco  Allergies  Meds              Reviewed and updated as needed this visit by Provider                 Social History     Tobacco Use    Smoking status: Former     Packs/day: 0.50     Years: 9.00     Pack years: 4.50     Types: Cigarettes     Start date: 1965     Quit date: 4/15/1975     Years since quittin.4    Smokeless tobacco: Never    Tobacco comments:     social smoker - only smoked when with another smoker   Substance Use Topics    Alcohol use: Yes     Comment: moderate 1 or 2 beers or wine 2x /wk           2023     8:25 AM   Alcohol Use   Prescreen: >3 drinks/day or >7 drinks/week? No     Do you have a current opioid prescription? No  Do you use any other controlled substances or medications that are not prescribed by a provider? None              Current providers sharing in care for this patient include:   Patient Care Team:  Jade Davis DO as PCP - General (Internal Medicine)  Jade Davis DO as Assigned PCP  Horace Cutler MD as Assigned Endocrinology Provider  Rommel Cole EP as Cardiac Rehabilitation Therapist  Kristian Johnston MD as Assigned Heart and Vascular Provider  Jean Hernandez DO as Physician (Neurology)    The following health maintenance items are reviewed in Epic and correct as of today:  Health Maintenance   Topic Date Due    COVID-19 Vaccine (6 - Moderna series) 03/10/2023    ANNUAL REVIEW OF HM ORDERS  2023    LIPID  2023    MICROALBUMIN  2023    PHQ-9  2024    BMP  2024    HEMOGLOBIN  2024    MEDICARE ANNUAL WELLNESS VISIT  2024    A1C  2024    TSH W/FREE T4 REFLEX  2024    QUEENIE ASSESSMENT  2024    FALL RISK ASSESSMENT  2024    MAMMO SCREENING  2025    DEXA  2027    COLORECTAL CANCER SCREENING  2027    ADVANCE CARE PLANNING  2028    DTAP/TDAP/TD IMMUNIZATION (4 - Td or Tdap) 2029    HEPATITIS C SCREENING  Completed     "DEPRESSION ACTION PLAN  Completed    INFLUENZA VACCINE  Completed    Pneumococcal Vaccine: 65+ Years  Completed    URINALYSIS  Completed    ZOSTER IMMUNIZATION  Completed    IPV IMMUNIZATION  Aged Out    HPV IMMUNIZATION  Aged Out    MENINGITIS IMMUNIZATION  Aged Out         10 point ROS of systems including Constitutional, Eyes, Respiratory, Cardiovascular, Gastroenterology, Genitourinary, Integumentary, Muscularskeletal, Psychiatric were all negative except for pertinent positives noted in my HPI.      OBJECTIVE:   /85 (BP Location: Right arm, Patient Position: Sitting, Cuff Size: Adult Large)   Pulse 70   Temp 97  F (36.1  C) (Temporal)   Resp 17   Ht 1.745 m (5' 8.7\")   Wt 90.8 kg (200 lb 3.2 oz)   LMP  (LMP Unknown)   SpO2 96%   BMI 29.82 kg/m   Estimated body mass index is 29.82 kg/m  as calculated from the following:    Height as of this encounter: 1.745 m (5' 8.7\").    Weight as of this encounter: 90.8 kg (200 lb 3.2 oz).  Physical Exam    GENERAL APPEARANCE: AAOx3, no distress. Well developed.    RESP: Lungs CTA bilaterally. No w/r/r. No distress     CV: RRR, S1/S2 present. No m/r/c.     ABDOMEN:  soft, nontender, no distention. No rebound or guarding.     EXT: No c/c/e in lower extremities b/l. No rashes or deformities noted.    PSYCH: appropriate mood and affect.         ASSESSMENT / PLAN:   Janie was seen today for physical.    Diagnoses and all orders for this visit:    Routine history and physical examination of adult   Senior flu vaccine administered today   She will consider getting RSV vaccine and updated COVID booster this month through pharmacy    Hypothyroidism, unspecified type  Has follow-up with endo next month  -     TSH with free T4 reflex; Future    Adrenal nodule (H)   Has follow-up with endo next month    Abnormal MRI of head  Brain aneurysm  Meningioma (H)  Discussed. She is due for surveillance and opts to consult with a neurologist on this. Referral placed. BP " satisfactory. No new neurological symptoms such as headaches, blurry vision, dizziness.  -     Adult Neurology  Referral; Future    Prediabetes  -     HEMOGLOBIN A1C; Future    Essential hypertension, benign - goal < 140/90  Well controlled  -     olmesartan (BENICAR) 20 MG tablet; Take 1 tablet (20 mg) by mouth daily  -     amLODIPine (NORVASC) 5 MG tablet; Take 1 tablet (5 mg) by mouth every evening    Osteopenia, unspecified location   DXA UTD    Major depressive disorder, single episode in full remission (H)  controlled  -     DULoxetine (CYMBALTA) 60 MG capsule; Take 1 capsule (60 mg) by mouth daily    Insomnia, unspecified type   Uses benzo sparingly    Coronary artery disease involving native coronary artery of native heart without angina pectoris  Mixed hyperlipidemia  NSTEMI (non-ST elevated myocardial infarction) (H)  Following cardio, has upcoming follow-up appt  -     metoprolol tartrate (LOPRESSOR) 25 MG tablet; Take 1 tablet (25 mg) by mouth 2 times daily    Other orders  -     INFLUENZA VACCINE 65+ (FLUZONE HD)        DO SHENA Amado [facility]

## 2023-09-09 ASSESSMENT — PATIENT HEALTH QUESTIONNAIRE - PHQ9: SUM OF ALL RESPONSES TO PHQ QUESTIONS 1-9: 6

## 2023-09-11 LAB
ALP BONE SERPL-CCNC: 60 U/L
ALP LIVER SERPL-CCNC: 84 U/L
ALP OTHER SERPL-CCNC: 0 U/L
ALP SERPL-CCNC: 144 U/L

## 2023-09-12 PROBLEM — R74.8 ELEVATED ALKALINE PHOSPHATASE LEVEL: Status: ACTIVE | Noted: 2023-09-12

## 2023-09-12 PROBLEM — R74.8 ELEVATED ALKALINE PHOSPHATASE LEVEL: Chronic | Status: ACTIVE | Noted: 2023-09-12

## 2023-09-16 NOTE — TELEPHONE ENCOUNTER
RECORDS RECEIVED FROM:    REASON FOR VISIT: Abnormal MRI    Date of Appt: 10/24/2023   NOTES (FOR ALL VISITS) STATUS DETAILS   OFFICE NOTE from referring provider Joreg Luis Davis-9/8/2023   OFFICE NOTE from other specialist     DISCHARGE SUMMARY from hospital     DISCHARGE REPORT from the ER     OPERATIVE REPORT     [facility] (MS ONLY)     EMG     EEG     MEDICATION LIST     IMAGING  (FOR ALL VISITS)     LUMBAR PUNCTURE     BRISEIDA SCAN (MOVEMENT)     ULTRASOUND (CAROTID BILAT) *VASCULAR*     MRI (HEAD, NECK, SPINE) PACS  MR Brain-9/27/2022    MRA Neck/Brain-9/27/2022   CT (HEAD, NECK, SPINE)

## 2023-09-21 NOTE — PROGRESS NOTES
[address], [address]  [facility] [address]  Phone: [phone]  Primary Provider: Brent Ye  Pre-op Performing Provider: BRENT YE    PREOPERATIVE EVALUATION:  Today's date: 9/22/2023    Janie is a 73 year old female who presents for a preoperative evaluation.      9/22/2023     9:12 AM   Additional Questions   Roomed by Dominique Caceres       Surgical Information:  Surgery/Procedure: Cataracts  Surgery Location: [facility]   Surgeon: Dr. Obrien  Surgery Date: 10/18/23 Left eye 10/25/23 Right eye  Time of Surgery: TBD  Where patient plans to recover: At home with family  Fax number for surgical facility: [phone]    Assessment & Plan     The proposed surgical procedure is considered LOW risk.    Pre-op exam  Stage 3a chronic kidney disease (H)  Essential hypertension, benign - goal < 140/90  Mixed hyperlipidemia  Hypothyroidism, unspecified type  Coronary artery disease involving native coronary artery of native heart without angina pectoris  NSTEMI (non-ST elevated myocardial infarction) (H)  Meningioma (H)  Brain aneurysm  Insomnia, unspecified type  Major depressive disorder, single episode in full remission (H)  Osteopenia, unspecified location  Prediabetes  Chronic conditions remain stable  Per last cardio note, patient will be done with plavix the end of September and only continue bASA going forward. She will confirm this with her cardiologist. She should continue bASA and her other routine medications perioperatively including day of procedure. Risks/benefits reviewed.       RECOMMENDATION:  APPROVAL GIVEN to proceed with proposed procedure, without further diagnostic evaluation.      Subjective       HPI related to upcoming procedure:       No cp/sob with rest or exertion  No fevers/chills/cough  No hx of VTE  Able to walk up at least two flights of stairs without any issues        9/15/2023    11:30 AM   Preop Questions   1. Have you ever had a  heart attack or stroke? YES - 9/2022-NSTEMI   2. Have you ever had surgery on your heart or blood vessels, such as a stent placement, a coronary artery bypass, or surgery on an artery in your head, neck, heart, or legs? No   3. Do you have chest pain with activity? No   4. Do you have a history of  heart failure? No   5. Do you currently have a cold, bronchitis or symptoms of other infection? No   6. Do you have a cough, shortness of breath, or wheezing? No   7. Do you or anyone in your family have previous history of blood clots? YES - brother   8. Do you or does anyone in your family have a serious bleeding problem such as prolonged bleeding following surgeries or cuts? No   9. Have you ever had problems with anemia or been told to take iron pills? YES -lab hb 11.7   10. Have you had any abnormal blood loss such as black, tarry or bloody stools, or abnormal vaginal bleeding? No   11. Have you ever had a blood transfusion? No   12. Are you willing to have a blood transfusion if it is medically needed before, during, or after your surgery? Yes   13. Have you or any of your relatives ever had problems with anesthesia? YES - self, nausea   14. Do you have sleep apnea, excessive snoring or daytime drowsiness? No   15. Do you have any artifical heart valves or other implanted medical devices like a pacemaker, defibrillator, or continuous glucose monitor? No   16. Do you have artificial joints? No   17. Are you allergic to latex? No           Review of Systems  Constitutional, neuro, ENT, endocrine, pulmonary, cardiac, gastrointestinal, genitourinary, musculoskeletal, integument and psychiatric systems are negative, except as otherwise noted.    Patient Active Problem List    Diagnosis Date Noted    Elevated alkaline phosphatase level 09/12/2023     Priority: Medium    Brain aneurysm 09/08/2023     Priority: Medium    Meningioma (H) 09/08/2023     Priority: Medium    Closed trimalleolar fracture of right ankle, initial  encounter 05/23/2023     Priority: Medium    Fall, initial encounter 05/23/2023     Priority: Medium    Coronary artery disease involving native coronary artery of native heart without angina pectoris 10/07/2022     Priority: Medium    Abnormal MRI of head 10/07/2022     Priority: Medium     Small aneursym and suspected meningioma.       Chest pain, unspecified type 09/26/2022     Priority: Medium    NSTEMI (non-ST elevated myocardial infarction) (H) 09/26/2022     Priority: Medium    Adrenal nodule (H) 09/16/2022     Priority: Medium     Seen on 2022 CT abd, benign per MRI      Fatty liver 09/16/2022     Priority: Medium    CKD (chronic kidney disease) stage 3, GFR 30-59 ml/min (H) 07/29/2019     Priority: Medium    Chronic constipation 08/26/2017     Priority: Medium    Osteopenia      Priority: Medium     Mild left hip July 2016      Rotator cuff injury, left, sequela      Priority: Medium     MRI Jan 2015 and had PT; High-grade complete or near complete tear of the supraspinatus tendon and anterior fibers of the infraspinatus tendon. 1/3 of the infraspinatus tendon appears involved.       Hypothyroidism      Priority: Medium    Adjustment disorder with anxiety 11/20/2015     Priority: Medium     Patient is followed by MALA MOTT for ongoing prescription of benzodiazepines.  All refills should be approved by this provider, or covering partner.    Medication(s): Clonazepam 0.5 mg tablet  Maximum quantity: 45 tablets will last her many months - usually only needs refill twice per year at max  Clinic visit frequency required: Annually     Controlled substance agreement on file: Yes       Date(s): 1/23/17; 7/30/18  Benzodiazepine use reviewed by psychiatry:  Yes       Date(s): Unsure dates - has seen counselor and neuropsych    Last [facility] website verification:  done on 2/2/17   [email or web address]          Advanced directives, counseling/discussion 09/30/2015     Priority: Medium     Advance Care  Planning 9/30/2015: ACP Review and Resources Provided:  Reviewed chart for advance care plan.  Janie De Leon has no plan or code status on file. Discussed available resources and provided with information. Confirmed code status reflects current choices pending further ACP discussions.  Confirmed/documented legally designated decision maker(s). Added by Anaid Lawson            Essential hypertension, benign - goal < 140/90 09/28/2015     Priority: Medium    Prediabetes 09/28/2015     Priority: Medium    Hyperlipidemia 09/28/2015     Priority: Medium     Tchol 128, , HDL 53, LDL 53 outside records --> based on our labs off of atorvastatin 10yr ASCVD risk 9.4% in Oct 2015      Major depressive disorder, single episode in full remission (H)      Priority: Medium    Insomnia      Priority: Medium     Clonazepam sparingly, 45 tabs lasts 6 months to a year  No alcohol with clonazepam  Mainly for sleepless nights        Past Medical History:   Diagnosis Date    Adrenal nodule (H) 09/2022    CT scan    Anemia     mild gastropathy on EGD 2008; neg pill cam    Arthritis     Lower back    Atypical ductal hyperplasia of both breasts     Has had biopsies and MRI    Diabetes (H) 2009    Prediabetes    Fibroid uterus     High cholesterol     History of hematuria 2008    Cystoscopy for recurrent UTIs; unremarkable renal US. Also recurrent UTIs as child and pyelonephritis.     History of hormone replacement therapy     after JASBIR with BSO    HTN (hypertension)     HTN, goal below 140/90     Hx of bone density study 10/16/2006    Normal    Hypothyroidism     Insomnia     periodic - prn clonazepam helpful a couple times per month    Lipid screening 07/21/2015    Tchol 128, , HDL 53, LDL 53 outside records --> based on our labs off of atorvastatin 10yr ASCVD risk 9.4% in Oct 2015    Major depressive disorder, single episode in full remission (H) 2007    NSTEMI (non-ST elevated myocardial infarction) (H) 09/26/2022     Osteopenia     Mild left hip July 2016    Prediabetes     Metformin in the past    Rotator cuff injury, left, sequela     MRI Jan 2015 and had PT; High-grade complete or near complete tear of the supraspinatus tendon and anterior fibers of the infraspinatus tendon. 1/3 of the infraspinatus tendon appears involved.     TBI (traumatic brain injury) (H)     As a child    Tubular adenoma of colon 2013 & 2016     Past Surgical History:   Procedure Laterality Date    ANKLE SURGERY  1976    BREAST BIOPSY, RT/LT      Many    BREAST SURGERY      Benign lumpectomy    C/SECTION, LOW TRANSVERSE  1968    CAPSULE/PILL CAM ENDOSCOPY  02/18/2014    EGD prior; capsule was negative     COLONOSCOPY      1999, 2003, 2008, 6/6/2013    COLONOSCOPY N/A 06/20/2019    Procedure: COLONOSCOPY, WITH POLYPECTOMY AND BIOPSY;  Surgeon: Pepito Romero MD;  Location:  GI    COLONOSCOPY N/A 06/16/2022    Procedure: COLONOSCOPY;  Surgeon: Rodrigo Dunbar MD;  Location:  GI    CV CORONARY ANGIOGRAM N/A 09/26/2022    Procedure: Coronary Angiogram;  Surgeon: Horacio Moran MD;  Location:  HEART CARDIAC CATH LAB    CV INSTANTANEOUS WAVE-FREE RATIO N/A 09/26/2022    Procedure: Instantaneous Wave-Free Ratio;  Surgeon: Horacio Moran MD;  Location:  HEART CARDIAC CATH LAB    CV OPTICAL COHERENCE TOMOGRAPHY N/A 09/26/2022    Procedure: Optical Coherence Tomography;  Surgeon: Horacio Moran MD;  Location:  HEART CARDIAC CATH LAB    HYSTERECTOMY, PAP NO LONGER INDICATED      OPEN REDUCTION INTERNAL FIXATION ANKLE Right 05/25/2023    Procedure: OPEN REDUCTION INTERNAL FIXATION RIGHT ANKLE FRACTURE;  Surgeon: Declan Casanova MD;  Location:  OR    [facility] with BSO  05/04/2000    benign fibroids    TUBAL LIGATION  1978     Current Outpatient Medications   Medication Sig Dispense Refill    acetaminophen (TYLENOL) 500 MG tablet Take 2 tablets (1,000 mg) by mouth every 6 hours as needed for mild pain      amLODIPine (NORVASC) 5 MG  tablet Take 1 tablet (5 mg) by mouth every evening 90 tablet 3    aspirin (ASA) 81 MG EC tablet Take 1 tablet (81 mg) by mouth daily  0    atorvastatin (LIPITOR) 40 MG tablet Take 1 tablet (40 mg) by mouth every evening 90 tablet 3    calcium carbonate (TUMS) 500 MG chewable tablet Take 2 tablets (1,000 mg) by mouth daily as needed for heartburn      Cholecalciferol (VITAMIN D3 PO) Take 1,000 Units by mouth daily      clonazePAM (KLONOPIN) 0.5 MG tablet Take 0.5-1 tablets (0.25-0.5 mg) by mouth nightly as needed for anxiety 20 tablet 0    clopidogrel (PLAVIX) 75 MG tablet Take 1 tablet (75 mg) by mouth daily 90 tablet 3    cyanocobalamin (VITAMIN B-12) 100 MCG tablet Take 100 mcg by mouth daily      DULoxetine (CYMBALTA) 60 MG capsule Take 1 capsule (60 mg) by mouth daily 90 capsule 3    levothyroxine (SYNTHROID/LEVOTHROID) 88 MCG tablet Take 1 tablet (88 mcg) by mouth daily - Overdue for endocrinology appointment 90 tablet 3    metoprolol tartrate (LOPRESSOR) 25 MG tablet Take 1 tablet (25 mg) by mouth 2 times daily 180 tablet 3    nitroGLYcerin (NITROSTAT) 0.4 MG sublingual tablet For chest pain place 1 tablet under the tongue every 5 minutes for 3 doses. If symptoms persist 5 minutes after 1st dose call 911. 30 tablet 0    olmesartan (BENICAR) 20 MG tablet Take 1 tablet (20 mg) by mouth daily 90 tablet 3    polyethylene glycol (MIRALAX) 17 GM/Dose powder Take 17 g by mouth daily as needed for constipation      senna-docusate (SENOKOT-S/PERICOLACE) 8.6-50 MG tablet Take 1 tablet by mouth 2 times daily as needed for constipation 21 tablet 0       Allergies   Allergen Reactions    Ciprofloxacin GI Disturbance    Oxycodone Other (See Comments)     She prefers not to have Oxycodone or Oxycontin due to potential addictive properties    Simvastatin Other (See Comments)     Muscle aches         Social History     Tobacco Use    Smoking status: Former     Packs/day: 0.50     Years: 9.00     Pack years: 4.50     Types:  "Cigarettes     Start date: 1965     Quit date: 4/15/1975     Years since quittin.4    Smokeless tobacco: Never    Tobacco comments:     social smoker - only smoked when with another smoker   Substance Use Topics    Alcohol use: Yes     Comment: Occasionally 1 beer every few weeks       History   Drug Use No         Objective     /77 (BP Location: Left arm, Patient Position: Sitting, Cuff Size: Adult Regular)   Pulse 62   Temp 98  F (36.7  C) (Oral)   Resp 16   Ht 1.745 m (5' 8.7\")   Wt 90.8 kg (200 lb 3.2 oz)   LMP  (LMP Unknown)   SpO2 97%   BMI 29.82 kg/m      Physical Exam    GENERAL APPEARANCE: AAOx3, no distress. Well developed.    RESP: Lungs CTA bilaterally. No w/r/r. No distress     CV: RRR, S1/S2 present. No m/r/c.     ABDOMEN:  soft, nontender, no distention. No rebound or guarding.     EXT: No c/c/e in lower extremities b/l. No rashes or deformities noted.    PSYCH: appropriate mood and affect.         Recent Labs   Lab Test 23  0929 23  0738 23  0626 23  0619 22  1132 22  2251 22  0748 22  1113   HGB  --  12.8 11.3* 11.8  --  13.5   < > 14.9   PLT  --   --   --  247  --  279   < > 268   NA  --   --   --  139 139  --    < > 139   POTASSIUM  --   --   --  4.6 4.0  --    < > 4.0   CR  --   --   --  0.97* 0.81  --    < > 0.82   A1C 6.2*  --   --   --   --   --   --  6.1*    < > = values in this interval not displayed.        Diagnostics:  No labs were ordered during this visit.   No EKG required for low risk surgery (cataract, skin procedure, breast biopsy, etc).    Revised Cardiac Risk Index (RCRI):  The patient has the following serious cardiovascular risks for perioperative complications:   - Coronary Artery Disease (MI, positive stress test, angina, Qs on EKG) = 1 point     RCRI Interpretation: 1 point: Class II (low risk - 0.9% complication rate)         Signed Electronically by: Jade Davis DO  Copy of this evaluation report is " provided to requesting physician.

## 2023-09-22 ENCOUNTER — OFFICE VISIT (OUTPATIENT)
Dept: FAMILY MEDICINE | Facility: CLINIC | Age: 73
End: 2023-09-22
Payer: MEDICARE

## 2023-09-22 ENCOUNTER — MYC MEDICAL ADVICE (OUTPATIENT)
Dept: CARDIOLOGY | Facility: CLINIC | Age: 73
End: 2023-09-22

## 2023-09-22 VITALS
BODY MASS INDEX: 29.65 KG/M2 | HEIGHT: 69 IN | RESPIRATION RATE: 16 BRPM | TEMPERATURE: 98 F | HEART RATE: 62 BPM | SYSTOLIC BLOOD PRESSURE: 127 MMHG | WEIGHT: 200.2 LBS | DIASTOLIC BLOOD PRESSURE: 77 MMHG | OXYGEN SATURATION: 97 %

## 2023-09-22 DIAGNOSIS — Z01.818 PRE-OP EXAM: Primary | ICD-10-CM

## 2023-09-22 DIAGNOSIS — I10 ESSENTIAL HYPERTENSION, BENIGN: Chronic | ICD-10-CM

## 2023-09-22 DIAGNOSIS — D32.9 MENINGIOMA (H): Chronic | ICD-10-CM

## 2023-09-22 DIAGNOSIS — I25.10 CORONARY ARTERY DISEASE INVOLVING NATIVE CORONARY ARTERY OF NATIVE HEART WITHOUT ANGINA PECTORIS: Chronic | ICD-10-CM

## 2023-09-22 DIAGNOSIS — E03.9 HYPOTHYROIDISM, UNSPECIFIED TYPE: Chronic | ICD-10-CM

## 2023-09-22 DIAGNOSIS — E78.2 MIXED HYPERLIPIDEMIA: Chronic | ICD-10-CM

## 2023-09-22 DIAGNOSIS — I21.4 NSTEMI (NON-ST ELEVATED MYOCARDIAL INFARCTION) (H): Chronic | ICD-10-CM

## 2023-09-22 DIAGNOSIS — N18.31 STAGE 3A CHRONIC KIDNEY DISEASE (H): Chronic | ICD-10-CM

## 2023-09-22 DIAGNOSIS — I67.1 BRAIN ANEURYSM: Chronic | ICD-10-CM

## 2023-09-22 DIAGNOSIS — M85.80 OSTEOPENIA, UNSPECIFIED LOCATION: Chronic | ICD-10-CM

## 2023-09-22 DIAGNOSIS — R73.03 PREDIABETES: Chronic | ICD-10-CM

## 2023-09-22 DIAGNOSIS — F32.5 MAJOR DEPRESSIVE DISORDER, SINGLE EPISODE IN FULL REMISSION (H): Chronic | ICD-10-CM

## 2023-09-22 DIAGNOSIS — G47.00 INSOMNIA, UNSPECIFIED TYPE: Chronic | ICD-10-CM

## 2023-09-22 PROCEDURE — 99214 OFFICE O/P EST MOD 30 MIN: CPT | Performed by: INTERNAL MEDICINE

## 2023-09-22 RX ORDER — CLONAZEPAM 0.5 MG/1
.25-.5 TABLET ORAL
Qty: 20 TABLET | Refills: 0 | Status: SHIPPED | OUTPATIENT
Start: 2023-09-22 | End: 2024-01-15

## 2023-09-22 ASSESSMENT — ANXIETY QUESTIONNAIRES
6. BECOMING EASILY ANNOYED OR IRRITABLE: MORE THAN HALF THE DAYS
7. FEELING AFRAID AS IF SOMETHING AWFUL MIGHT HAPPEN: NOT AT ALL
5. BEING SO RESTLESS THAT IT IS HARD TO SIT STILL: NOT AT ALL
GAD7 TOTAL SCORE: 6
IF YOU CHECKED OFF ANY PROBLEMS ON THIS QUESTIONNAIRE, HOW DIFFICULT HAVE THESE PROBLEMS MADE IT FOR YOU TO DO YOUR WORK, TAKE CARE OF THINGS AT HOME, OR GET ALONG WITH OTHER PEOPLE: SOMEWHAT DIFFICULT
3. WORRYING TOO MUCH ABOUT DIFFERENT THINGS: SEVERAL DAYS
GAD7 TOTAL SCORE: 6
2. NOT BEING ABLE TO STOP OR CONTROL WORRYING: SEVERAL DAYS
1. FEELING NERVOUS, ANXIOUS, OR ON EDGE: SEVERAL DAYS
4. TROUBLE RELAXING: SEVERAL DAYS

## 2023-09-22 ASSESSMENT — PAIN SCALES - GENERAL: PAINLEVEL: MILD PAIN (2)

## 2023-09-22 NOTE — TELEPHONE ENCOUNTER
Per last OV note with Dr. Johnston 5/16/23:    Plan  At this time, overall cardiac status is stable.  I will continue Plavix till end of September and then in October should discontinue it continue aspirin long-term.  Continue other medications as previously recommended    Mychart message returned to patient  Paula Jose RN on 9/22/2023 at 11:15 AM

## 2023-10-04 ENCOUNTER — OFFICE VISIT (OUTPATIENT)
Dept: ENDOCRINOLOGY | Facility: CLINIC | Age: 73
End: 2023-10-04
Payer: MEDICARE

## 2023-10-04 VITALS
WEIGHT: 204.1 LBS | HEART RATE: 73 BPM | DIASTOLIC BLOOD PRESSURE: 71 MMHG | SYSTOLIC BLOOD PRESSURE: 108 MMHG | BODY MASS INDEX: 30.4 KG/M2

## 2023-10-04 DIAGNOSIS — E27.9 ADRENAL NODULE (H): Primary | ICD-10-CM

## 2023-10-04 DIAGNOSIS — E03.9 HYPOTHYROIDISM, UNSPECIFIED TYPE: ICD-10-CM

## 2023-10-04 PROCEDURE — 99214 OFFICE O/P EST MOD 30 MIN: CPT | Performed by: INTERNAL MEDICINE

## 2023-10-04 RX ORDER — LEVOTHYROXINE SODIUM 88 UG/1
88 TABLET ORAL DAILY
Qty: 90 TABLET | Refills: 3 | Status: SHIPPED | OUTPATIENT
Start: 2023-10-04 | End: 2024-10-01

## 2023-10-04 NOTE — NURSING NOTE
Chief Complaint   Patient presents with    RECHECK     Adrenal nodule (H) +1 more       Vitals:    10/04/23 0905   BP: 108/71   BP Location: Left arm   Patient Position: Sitting   Cuff Size: Adult Regular   Pulse: 73   Weight: 92.6 kg (204 lb 1.6 oz)       Body mass index is 30.4 kg/m .    Randy Valencia MA

## 2023-10-04 NOTE — PROGRESS NOTES
Recent issues:  Adrenal and thyroid follow-up evaluation  Right ankle injury and fracture in 5/2023, ORIF surgery with plate & screws, recovering pretty well now  Reviewed medical history from patient and Epic chart record        Adrenal:  9/2022. Acute pain at right anterolateral abdomen   Severity 8 of 10   Some constipation and flatulence, but no nausea, vomiting, diarrhea  She took medication for constipation  9/16/22. Medical evaluation with Dr. FRANSISCA Davis/[facility]  9/16/22 CT Abdomen-pelvis:   Small hiatal hernia.    Hepatic steatosis. Focal fatty infiltration along the falciform ligament.    Normal pancreas, spleen   Simple cysts in kidney   Evidence of hysterectomy   Indeterminate 1.6 cm left adrenal nodule      Additional health history:   Known adrenal disease: none  Steroid med use:  none  Hypertension:   Amlodipine 5 mg daily      Olmesartan 40 mg daily  Anticoagulation:  none    Previous  adrenal labs:   Latest Reference Range & Units 09/23/22 14:16   Aldosterone 0.0 - 31.0 ng/dL 11.6      Latest Reference Range & Units 09/23/22 14:16   Renin Activity ng/mL/hr 3.3      Latest Reference Range & Units 09/23/22 14:16   Cortisol Serum ug/dL 10.3     9/29/22 24-hr urine cortisol 25 micrograms (nl 3.5-45), metanephrines 74 mcg (nl )    9/30/22 MRI- adrenals:  Stable 1.6 cm left adrenal nodule,   Nodule mildly hypointense on T1 and T2-weighted images, and demonstrates homogeneous loss of signal on the opposed phase images  consistent with a benign adenoma.          Thyroid:  2015. Initial diagnosis of hypothyroidism  Had seen Dr. SUDHA Fierro, then Dr. SUDHA La/[facility] clinic  Began treatment with levothyroxine medication  2020. Recalls having sweating spells while on thyroid medication,    Taking levothyroxine 0.1 mg dose, then decided to cut tablets in half for ~4 months   Noticed less sweating symptom  12/7/20. Subsequent medical evaluation with Dr. FRANSISCA Davis  Lab tests showed mildly  elevated TSH level.  Dose increase back to full levothyroxine 0.1 mg tablet daily, dosing before breakfast meal    Previous [facility] thyroid tests include:     Lab Test 01/22/21  1005 12/07/20  1029 08/27/20  0943 07/30/19  0848 07/30/18  1053 04/18/18  0800   TSH 4.81* 4.87* 7.17* 4.92* 2.97 9.41*   T4 0.97 0.88 0.99 0.97  --  0.86        Latest Reference Range & Units 05/28/21 09:48   Thyroid Peroxidase Antibody <35 IU/mL 182 (H)     Additional health history:  Previous thyroid nodules: none  Neck radiation treatments: none  Fam Hx thyroid disease:  none      3/29/21. Initial thyroid evaluation with me at [facility]  Reviewed health history and thyroid issues  Continued levothyroxine medication treatment plan  Recent [facility] labs include:  Lab Results   Component Value Date    TSH 2.21 09/08/2023    T4 1.27 06/27/2022     (H) 05/28/2021     Current dose:  Levothyroxine 0.088 mg daily        Lives in [place], [place]  Sees Dr. Jade Davis/[facility] clinic for general medicine evaluations.    PMH/PSH:  Past Medical History:   Diagnosis Date    Adrenal nodule (H24) 09/2022    CT scan    Anemia     mild gastropathy on EGD 2008; neg pill cam    Arthritis     Lower back    Atypical ductal hyperplasia of both breasts     Has had biopsies and MRI    Diabetes (H) 2009    Prediabetes    Fibroid uterus     High cholesterol     History of hematuria 2008    Cystoscopy for recurrent UTIs; unremarkable renal US. Also recurrent UTIs as child and pyelonephritis.     History of hormone replacement therapy     after JASBIR with BSO    HTN (hypertension)     HTN, goal below 140/90     Hx of bone density study 10/16/2006    Normal    Hypothyroidism     Insomnia     periodic - prn clonazepam helpful a couple times per month    Lipid screening 07/21/2015    Tchol 128, , HDL 53, LDL 53 outside records --> based on our labs off of atorvastatin 10yr ASCVD risk 9.4% in Oct 2015    Major depressive disorder, single episode in full  remission (H24)     NSTEMI (non-ST elevated myocardial infarction) (H) 2022    Osteopenia     Mild left hip 2016    Prediabetes     Metformin in the past    Rotator cuff injury, left, sequela     MRI 2015 and had PT; High-grade complete or near complete tear of the supraspinatus tendon and anterior fibers of the infraspinatus tendon. 1/3 of the infraspinatus tendon appears involved.     TBI (traumatic brain injury) (H)     As a child    Tubular adenoma of colon  &      Past Surgical History:   Procedure Laterality Date    ANKLE SURGERY      BREAST BIOPSY, RT/LT      Many    BREAST SURGERY      Benign lumpectomy    C/SECTION, LOW TRANSVERSE  1968    CAPSULE/PILL CAM ENDOSCOPY  2014    EGD prior; capsule was negative     COLONOSCOPY      , , , 2013    COLONOSCOPY N/A 2019    Procedure: COLONOSCOPY, WITH POLYPECTOMY AND BIOPSY;  Surgeon: Pepito Romero MD;  Location:  GI    COLONOSCOPY N/A 2022    Procedure: COLONOSCOPY;  Surgeon: Rodrigo Dunbar MD;  Location:  GI    CV CORONARY ANGIOGRAM N/A 2022    Procedure: Coronary Angiogram;  Surgeon: Horacio Moran MD;  Location:  HEART CARDIAC CATH LAB    CV INSTANTANEOUS WAVE-FREE RATIO N/A 2022    Procedure: Instantaneous Wave-Free Ratio;  Surgeon: Horacio Moran MD;  Location: [facility] CARDIAC CATH LAB    CV OPTICAL COHERENCE TOMOGRAPHY N/A 2022    Procedure: Optical Coherence Tomography;  Surgeon: Horacio Moran MD;  Location:  HEART CARDIAC CATH LAB    HYSTERECTOMY, PAP NO LONGER INDICATED      OPEN REDUCTION INTERNAL FIXATION ANKLE Right 2023    Procedure: OPEN REDUCTION INTERNAL FIXATION RIGHT ANKLE FRACTURE;  Surgeon: Declan Casanova MD;  Location:  OR    [facility] with BSO  2000    benign fibroids    TUBAL LIGATION         Family Hx:  Family History   Problem Relation Age of Onset    Colon Cancer Mother 70         age 81    Diabetes Mother          After age 75    Macular Degeneration Mother     Glaucoma Mother     Cerebrovascular Disease Mother     Heart Failure Mother     Hypertension Mother     Hyperlipidemia Mother     Other - See Comments Father 87        Frailty, pneumonia, fractures, failure to thrive    Osteoporosis Father         diagnosed in his 80s    Colon Polyps Daughter         Tubular adenoma    Deep Vein Thrombosis Brother     Hyperlipidemia Brother     Hypertension Brother     Other Cancer Brother         myeloma    Hypertension Brother         both brothers    Hyperlipidemia Brother         both brothers    Depression Brother     Anxiety Disorder Brother     Mental Illness Brother         on Haldol before death from lung cancer    Lung Cancer Brother         long time heavy smoker    Other Cancer Brother         Lung cancer    Breast Cancer Other         radical mastecomy in her 40s    Colon Cancer Other     Other Cancer Other         several aunts various kinds    Other Cancer Other         throat cancer    Other Cancer Paternal Grandmother         throat cancer         Social Hx:  Social History     Socioeconomic History    Marital status:      Spouse name: Not on file    Number of children: Not on file    Years of education: Not on file    Highest education level: Not on file   Occupational History    Not on file   Tobacco Use    Smoking status: Former     Packs/day: 0.50     Years: 9.00     Pack years: 4.50     Types: Cigarettes     Start date: 1965     Quit date: 4/15/1975     Years since quittin.5    Smokeless tobacco: Never    Tobacco comments:     social smoker - only smoked when with another smoker   Vaping Use    Vaping Use: Never used   Substance and Sexual Activity    Alcohol use: Yes     Comment: Occasionally 1 beer every few weeks    Drug use: No    Sexual activity: Not Currently     Partners: Male     Birth control/protection: None     Comment: post menopausal   Other Topics Concern    Parent/sibling w/  CABG, MI or angioplasty before 65F 55M? No   Social History Narrative    Not on file     Social Determinants of Health     Financial Resource Strain: Low Risk  (9/20/2023)    Financial Resource Strain     Within the past 12 months, have you or your family members you live with been unable to get utilities (heat, electricity) when it was really needed?: No   Food Insecurity: Low Risk  (9/20/2023)    Food Insecurity     Within the past 12 months, did you worry that your food would run out before you got money to buy more?: No     Within the past 12 months, did the food you bought just not last and you didn t have money to get more?: No   Transportation Needs: Low Risk  (9/20/2023)    Transportation Needs     Within the past 12 months, has lack of transportation kept you from medical appointments, getting your medicines, non-medical meetings or appointments, work, or from getting things that you need?: No   Physical Activity: Not on file   Stress: Not on file   Social Connections: Not on file   Interpersonal Safety: Low Risk  (9/22/2023)    Interpersonal Safety     Do you feel physically and emotionally safe where you currently live?: Yes     Within the past 12 months, have you been hit, slapped, kicked or otherwise physically hurt by someone?: No     Within the past 12 months, have you been humiliated or emotionally abused in other ways by your partner or ex-partner?: No   Housing Stability: Low Risk  (9/20/2023)    Housing Stability     Do you have housing? : Yes     Are you worried about losing your housing?: No          MEDICATIONS:  has a current medication list which includes the following prescription(s): acetaminophen, amlodipine, aspirin, atorvastatin, calcium carbonate, cholecalciferol, clonazepam, cyanocobalamin, duloxetine, levothyroxine, metoprolol tartrate, olmesartan, polyethylene glycol, senna-docusate, clopidogrel, nitroglycerin, and [DISCONTINUED] lovastatin.    ROS:     ROS: 10 point ROS neg other  than the symptoms noted above in the HPI.    GENERAL: mild fatigue, wt stable; denies fevers, chills, night sweats.   HEENT: no dysphagia, odonophagia, diplopia, neck pain  THYROID:  no apparent hyper or hypothyroid symptoms  CV: no chest pain, pressure, palpitations  LUNGS: no SOB, PIERSON, cough, wheezing   ABDOMEN: intermittent abdominal pain, constipation, some reflux; no diarrhea  EXTREMITIES: no rashes, ulcers, edema  NEUROLOGY: no headaches, denies changes in vision, tingling, extremitiy numbness   MSK: no muscle aches or pains, weakness  SKIN: no rashes or lesions  : no menses since hysterectomy age 51  PSYCH:  stable mood, no significant anxiety or depression  ENDOCRINE: sweating spells, as noted      Physical Exam   VS: /71 (BP Location: Left arm, Patient Position: Sitting, Cuff Size: Adult Regular)   Pulse 73   Wt 92.6 kg (204 lb 1.6 oz)   LMP  (LMP Unknown)   BMI 30.40 kg/m    GENERAL: AXOX3, NAD, well dressed, answering questions appropriately, appears stated age.  ENT: no nose swelling or nasal discharge, mouth redness or gum changes.  EYES: eyes grossly normal to inspection, conjunctivae and sclerae normal, no exophthalmos or proptosis  THYROID:  no apparent nodules or goiter  LUNGS: no audible wheeze, cough or visible cyanosis, or increased work of breathing  ABDOMEN: abdomen mildly obese, soft, mild tender right anterior midline   EXTREMITIES: no edema noted  NEUROLOGY: CN grossly intact, no tremors  MSK: grossly intact  SKIN:  no apparent skin lesions, rash, or edema with visualized skin appearance  PSYCH: mentation appears normal, affect normal/bright, judgement and insight intact,   normal speech and appearance well groomed      LABS:    All pertinent notes, labs, and images personally reviewed by me.     A/P:  Encounter Diagnoses   Name Primary?    Hypothyroidism, unspecified type     Adrenal nodule (H24) Yes         Comments:  Reviewed health history, adrenal and thyroid issues  Needs  screening lab tests for the adrenal nodule workup  Suspect non-functioning, benign adrenal nodule    Plan:  Discussed general issues with the hypothyroidism diagnosis and management  Discussed lab tests used to assess patient thyroid hormone levels  Reviewed treatment options including levothyroxine medication    Discussed general issues with adrenal gland diseases and management  Reviewed adrenal gland anatomy and hormone physiology  Discussed lab tests used to assess patient adrenal hormone levels  We discussed the recent adrenal CT-scan imaging procedure    Recommend:  Continue current levothyroxine 0.088 mg daily dose  Monitor for symptom changes  Continue current antihypertensive med treatment plan  No thyroid U/S imaging needed at this time  Repeat preappt labs in 9/2024   Lab orders placed  No additional adrenal testing or imaging needed at this time  Plan repeat non-contrast CT adrenals 9/2024    Addressed patient questions today    There are no Patient Instructions on file for this visit.    Future labs ordered today:   Orders Placed This Encounter   Procedures    TSH    T4 free    Cortisol     Radiology/Consults ordered today: None    Total time spent on day of encounter:  23 min    Follow-up:  10/2024, Return    NIMO Cutler MD, MS  Endocrinology  [facility]

## 2023-10-04 NOTE — Clinical Note
10/4/2023         RE: Janie Murphy Moises  [address]  [facility] [address]        Dear Colleague,    Thank you for referring your patient, Janie De Leon, to the [facility]. Please see a copy of my visit note below.      Recent issues:  Adrenal and thyroid follow-up evaluation  Right ankle injury and fracture in 5/2023, ORIF surgery with plate & screws, recovering pretty well now  Reviewed medical history from patient and Epic chart record        Adrenal:  9/2022. Acute pain at right anterolateral abdomen   Severity 8 of 10   Some constipation and flatulence, but no nausea, vomiting, diarrhea  She took medication for constipation  9/16/22. Medical evaluation with Dr. FRANS[facility]CA [facility]  9/16/22 CT Abdomen-pelvis:   Small hiatal hernia.    Hepatic steatosis. Focal fatty infiltration along the falciform ligament.    Normal pancreas, spleen   Simple cysts in kidney   Evidence of hysterectomy   Indeterminate 1.6 cm left adrenal nodule      Additional health history:   Known adrenal disease: none  Steroid med use:  none  Hypertension:   Amlodipine 5 mg daily      Olmesartan 40 mg daily  Anticoagulation:  none    Previous  adrenal labs:   Latest Reference Range & Units 09/23/22 14:16   Aldosterone 0.0 - 31.0 ng/dL 11.6      Latest Reference Range & Units 09/23/22 14:16   Renin Activity ng/mL/hr 3.3      Latest Reference Range & Units 09/23/22 14:16   Cortisol Serum ug/dL 10.3     9/29/22 24-hr urine cortisol 25 micrograms (nl 3.5-45), metanephrines 74 mcg (nl )    9/30/22 MRI- adrenals:  Stable 1.6 cm left adrenal nodule,   Nodule mildly hypointense on T1 and T2-weighted images, and demonstrates homogeneous loss of signal on the opposed phase images  consistent with a benign adenoma.          Thyroid:  2015. Initial diagnosis of hypothyroidism  Had seen Dr. SUDHA Fierro, then Dr. SUDHA La/[facility] clinic  Began treatment with levothyroxine medication  2020. Recalls  having sweating spells while on thyroid medication,    Taking levothyroxine 0.1 mg dose, then decided to cut tablets in half for ~4 months   Noticed less sweating symptom  12/7/20. Subsequent medical evaluation with Dr. FRANSISCA Davis  Lab tests showed mildly elevated TSH level.  Dose increase back to full levothyroxine 0.1 mg tablet daily, dosing before breakfast meal    Previous [facility] thyroid tests include:     Lab Test 01/22/21  1005 12/07/20  1029 08/27/20  0943 07/30/19  0848 07/30/18  1053 04/18/18  0800   TSH 4.81* 4.87* 7.17* 4.92* 2.97 9.41*   T4 0.97 0.88 0.99 0.97  --  0.86        Latest Reference Range & Units 05/28/21 09:48   Thyroid Peroxidase Antibody <35 IU/mL 182 (H)     Additional health history:  Previous thyroid nodules: none  Neck radiation treatments: none  Fam Hx thyroid disease:  none      3/29/21. Initial thyroid evaluation with me at [facility]  Reviewed health history and thyroid issues  Continued levothyroxine medication treatment plan  Recent [facility] labs include:  Lab Results   Component Value Date    TSH 2.21 09/08/2023    T4 1.27 06/27/2022     (H) 05/28/2021     Current dose:  Levothyroxine 0.088 mg daily        Lives in [place], [place]  Sees Dr. Jade Davis/[facility] clinic for general medicine evaluations.    PMH/PSH:  Past Medical History:   Diagnosis Date    Adrenal nodule (H) 09/2022    CT scan    Anemia     mild gastropathy on EGD 2008; neg pill cam    Arthritis     Lower back    Atypical ductal hyperplasia of both breasts     Has had biopsies and MRI    Diabetes (H) 2009    Prediabetes    Fibroid uterus     High cholesterol     History of hematuria 2008    Cystoscopy for recurrent UTIs; unremarkable renal US. Also recurrent UTIs as child and pyelonephritis.     History of hormone replacement therapy     after JASBIR with BSO    HTN (hypertension)     HTN, goal below 140/90     Hx of bone density study 10/16/2006    Normal    Hypothyroidism     Insomnia     periodic - prn  clonazepam helpful a couple times per month    Lipid screening 07/21/2015    Tchol 128, , HDL 53, LDL 53 outside records --> based on our labs off of atorvastatin 10yr ASCVD risk 9.4% in Oct 2015    Major depressive disorder, single episode in full remission (H) 2007    NSTEMI (non-ST elevated myocardial infarction) (H) 09/26/2022    Osteopenia     Mild left hip July 2016    Prediabetes     Metformin in the past    Rotator cuff injury, left, sequela     MRI Jan 2015 and had PT; High-grade complete or near complete tear of the supraspinatus tendon and anterior fibers of the infraspinatus tendon. 1/3 of the infraspinatus tendon appears involved.     TBI (traumatic brain injury) (H)     As a child    Tubular adenoma of colon 2013 & 2016     Past Surgical History:   Procedure Laterality Date    ANKLE SURGERY  1976    BREAST BIOPSY, RT/LT      Many    BREAST SURGERY      Benign lumpectomy    C/SECTION, LOW TRANSVERSE  1968    CAPSULE/PILL CAM ENDOSCOPY  02/18/2014    EGD prior; capsule was negative     COLONOSCOPY      1999, 2003, 2008, 6/6/2013    COLONOSCOPY N/A 06/20/2019    Procedure: COLONOSCOPY, WITH POLYPECTOMY AND BIOPSY;  Surgeon: Pepito Romero MD;  Location:  GI    COLONOSCOPY N/A 06/16/2022    Procedure: COLONOSCOPY;  Surgeon: Rodrigo Dunbar MD;  Location:  GI    CV CORONARY ANGIOGRAM N/A 09/26/2022    Procedure: Coronary Angiogram;  Surgeon: Horacio Moran MD;  Location: [facility] CARDIAC CATH LAB    CV INSTANTANEOUS WAVE-FREE RATIO N/A 09/26/2022    Procedure: Instantaneous Wave-Free Ratio;  Surgeon: Horacio Moran MD;  Location: [facility] CARDIAC CATH LAB    CV OPTICAL COHERENCE TOMOGRAPHY N/A 09/26/2022    Procedure: Optical Coherence Tomography;  Surgeon: Horacio Moran MD;  Location:  HEART CARDIAC CATH LAB    HYSTERECTOMY, PAP NO LONGER INDICATED      OPEN REDUCTION INTERNAL FIXATION ANKLE Right 05/25/2023    Procedure: OPEN REDUCTION INTERNAL FIXATION RIGHT ANKLE FRACTURE;   Surgeon: Declan Casanova MD;  Location:  OR    [facility] with BSO  2000    benign fibroids    TUBAL LIGATION         Family Hx:  Family History   Problem Relation Age of Onset    Colon Cancer Mother 70         age 81    Diabetes Mother         After age 75    Macular Degeneration Mother     Glaucoma Mother     Cerebrovascular Disease Mother     Heart Failure Mother     Hypertension Mother     Hyperlipidemia Mother     Other - See Comments Father 87        Frailty, pneumonia, fractures, failure to thrive    Osteoporosis Father         diagnosed in his 80s    Colon Polyps Daughter         Tubular adenoma    Deep Vein Thrombosis Brother     Hyperlipidemia Brother     Hypertension Brother     Other Cancer Brother         myeloma    Hypertension Brother         both brothers    Hyperlipidemia Brother         both brothers    Depression Brother     Anxiety Disorder Brother     Mental Illness Brother         on Haldol before death from lung cancer    Lung Cancer Brother         long time heavy smoker    Other Cancer Brother         Lung cancer    Breast Cancer Other         radical mastecomy in her 40s    Colon Cancer Other     Other Cancer Other         several aunts various kinds    Other Cancer Other         throat cancer    Other Cancer Paternal Grandmother         throat cancer         Social Hx:  Social History     Socioeconomic History    Marital status:      Spouse name: Not on file    Number of children: Not on file    Years of education: Not on file    Highest education level: Not on file   Occupational History    Not on file   Tobacco Use    Smoking status: Former     Packs/day: 0.50     Years: 9.00     Pack years: 4.50     Types: Cigarettes     Start date: 1965     Quit date: 4/15/1975     Years since quittin.5    Smokeless tobacco: Never    Tobacco comments:     social smoker - only smoked when with another smoker   Vaping Use    Vaping Use: Never used   Substance and Sexual  Activity    Alcohol use: Yes     Comment: Occasionally 1 beer every few weeks    Drug use: No    Sexual activity: Not Currently     Partners: Male     Birth control/protection: None     Comment: post menopausal   Other Topics Concern    Parent/sibling w/ CABG, MI or angioplasty before 65F 55M? No   Social History Narrative    Not on file     Social Determinants of Health     Financial Resource Strain: Low Risk  (9/20/2023)    Financial Resource Strain     Within the past 12 months, have you or your family members you live with been unable to get utilities (heat, electricity) when it was really needed?: No   Food Insecurity: Low Risk  (9/20/2023)    Food Insecurity     Within the past 12 months, did you worry that your food would run out before you got money to buy more?: No     Within the past 12 months, did the food you bought just not last and you didn t have money to get more?: No   Transportation Needs: Low Risk  (9/20/2023)    Transportation Needs     Within the past 12 months, has lack of transportation kept you from medical appointments, getting your medicines, non-medical meetings or appointments, work, or from getting things that you need?: No   Physical Activity: Not on file   Stress: Not on file   Social Connections: Not on file   Interpersonal Safety: Low Risk  (9/22/2023)    Interpersonal Safety     Do you feel physically and emotionally safe where you currently live?: Yes     Within the past 12 months, have you been hit, slapped, kicked or otherwise physically hurt by someone?: No     Within the past 12 months, have you been humiliated or emotionally abused in other ways by your partner or ex-partner?: No   Housing Stability: Low Risk  (9/20/2023)    Housing Stability     Do you have housing? : Yes     Are you worried about losing your housing?: No          MEDICATIONS:  has a current medication list which includes the following prescription(s): acetaminophen, amlodipine, aspirin, atorvastatin, calcium  carbonate, cholecalciferol, clonazepam, clopidogrel, cyanocobalamin, duloxetine, levothyroxine, metoprolol tartrate, nitroglycerin, olmesartan, polyethylene glycol, senna-docusate, and [DISCONTINUED] lovastatin.    ROS:     ROS: 10 point ROS neg other than the symptoms noted above in the HPI.    GENERAL: mild fatigue, wt stable; denies fevers, chills, night sweats.   HEENT: no dysphagia, odonophagia, diplopia, neck pain  THYROID:  no apparent hyper or hypothyroid symptoms  CV: no chest pain, pressure, palpitations  LUNGS: no SOB, PIERSON, cough, wheezing   ABDOMEN: intermittent abdominal pain, constipation, some reflux; no diarrhea  EXTREMITIES: no rashes, ulcers, edema  NEUROLOGY: no headaches, denies changes in vision, tingling, extremitiy numbness   MSK: no muscle aches or pains, weakness  SKIN: no rashes or lesions  : no menses since hysterectomy age 51  PSYCH:  stable mood, no significant anxiety or depression  ENDOCRINE: sweating spells, as noted      Physical Exam   VS: LMP  (LMP Unknown)   GENERAL: AXOX3, NAD, well dressed, answering questions appropriately, appears stated age.  ENT: no nose swelling or nasal discharge, mouth redness or gum changes.  EYES: eyes grossly normal to inspection, conjunctivae and sclerae normal, no exophthalmos or proptosis  THYROID:  no apparent nodules or goiter  LUNGS: no audible wheeze, cough or visible cyanosis, or increased work of breathing  ABDOMEN: abdomen mildly obese, soft, mild tender right anterior midline   EXTREMITIES: no edema noted  NEUROLOGY: CN grossly intact, no tremors  MSK: grossly intact  SKIN:  no apparent skin lesions, rash, or edema with visualized skin appearance  PSYCH: mentation appears normal, affect normal/bright, judgement and insight intact,   normal speech and appearance well groomed      LABS:    All pertinent notes, labs, and images personally reviewed by me.     A/P:  No diagnosis found.      Comments:  Reviewed health history, adrenal and  thyroid issues  Needs screening lab tests for the adrenal nodule workup  Suspect non-functioning, benign adrenal nodule    Plan:  Discussed general issues with the hypothyroidism diagnosis and management  Discussed lab tests used to assess patient thyroid hormone levels  Reviewed treatment options including levothyroxine medication    Discussed general issues with adrenal gland diseases and management  Reviewed adrenal gland anatomy and hormone physiology  Discussed lab tests used to assess patient adrenal hormone levels  We discussed the recent adrenal CT-scan imaging procedure    Recommend:  Plan lab appointment soon for blood and urine testing   Check merry-renin ratio, 24-hr urine cortisol and total metanephrines   Tests already ordered by PCP  Repeat adrenal nodule imaging with MRI abdomen reasonable, ordered  Monitor for symptom changes  Continue current antihypertensive med treatment plan  General surgery consultation plan unclear... presumably for persistent abdominal pain evaluation  Continue the current levothyroxine 0.088 mg daily dose plan  No thyroid U/S imaging needed at this time     Addressed patient questions today    There are no Patient Instructions on file for this visit.    Future labs ordered today: No orders of the defined types were placed in this encounter.    Radiology/Consults ordered today: None    Total time spent on day of encounter:  ***    Follow-up:  ***    NIMO Cutler MD, MS  Endocrinology  [facility]    CC:  JUAN Davis                Recent issues:  Adrenal and thyroid follow-up evaluation  Right ankle injury and fracture in 5/2023, ORIF surgery with plate & screws, recovering pretty well now  Reviewed medical history from patient and Epic chart record        Adrenal:  9/2022. Acute pain at right anterolateral abdomen   Severity 8 of 10   Some constipation and flatulence, but no nausea, vomiting, diarrhea  She took medication for constipation  9/16/22. Medical evaluation with  Dr. FRANSISCA Davis/[facility]  9/16/22 CT Abdomen-pelvis:   Small hiatal hernia.    Hepatic steatosis. Focal fatty infiltration along the falciform ligament.    Normal pancreas, spleen   Simple cysts in kidney   Evidence of hysterectomy   Indeterminate 1.6 cm left adrenal nodule      Additional health history:   Known adrenal disease: none  Steroid med use:  none  Hypertension:   Amlodipine 5 mg daily      Olmesartan 40 mg daily  Anticoagulation:  none    Previous  adrenal labs:   Latest Reference Range & Units 09/23/22 14:16   Aldosterone 0.0 - 31.0 ng/dL 11.6      Latest Reference Range & Units 09/23/22 14:16   Renin Activity ng/mL/hr 3.3      Latest Reference Range & Units 09/23/22 14:16   Cortisol Serum ug/dL 10.3     9/29/22 24-hr urine cortisol 25 micrograms (nl 3.5-45), metanephrines 74 mcg (nl )    9/30/22 MRI- adrenals:  Stable 1.6 cm left adrenal nodule,   Nodule mildly hypointense on T1 and T2-weighted images, and demonstrates homogeneous loss of signal on the opposed phase images  consistent with a benign adenoma.          Thyroid:  2015. Initial diagnosis of hypothyroidism  Had seen Dr. SUDHA Fierro, then Dr. SUDHA La/[facility] clinic  Began treatment with levothyroxine medication  2020. Recalls having sweating spells while on thyroid medication,    Taking levothyroxine 0.1 mg dose, then decided to cut tablets in half for ~4 months   Noticed less sweating symptom  12/7/20. Subsequent medical evaluation with Dr. FRANSISCA Davis  Lab tests showed mildly elevated TSH level.  Dose increase back to full levothyroxine 0.1 mg tablet daily, dosing before breakfast meal    Previous  thyroid tests include:     Lab Test 01/22/21  1005 12/07/20  1029 08/27/20  0943 07/30/19  0848 07/30/18  1053 04/18/18  0800   TSH 4.81* 4.87* 7.17* 4.92* 2.97 9.41*   T4 0.97 0.88 0.99 0.97  --  0.86        Latest Reference Range & Units 05/28/21 09:48   Thyroid Peroxidase Antibody <35 IU/mL 182 (H)     Additional health  history:  Previous thyroid nodules: none  Neck radiation treatments: none  Fam Hx thyroid disease:  none      3/29/21. Initial thyroid evaluation with me at [facility]  Reviewed health history and thyroid issues  Continued levothyroxine medication treatment plan  Recent [facility] labs include:  Lab Results   Component Value Date    TSH 2.21 09/08/2023    T4 1.27 06/27/2022     (H) 05/28/2021     Current dose:  Levothyroxine 0.088 mg daily        Lives in [place], [place]  Sees Dr. Jade Davis/[facility] clinic for general medicine evaluations.    PMH/PSH:  Past Medical History:   Diagnosis Date     Adrenal nodule (H24) 09/2022    CT scan     Anemia     mild gastropathy on EGD 2008; neg pill cam     Arthritis     Lower back     Atypical ductal hyperplasia of both breasts     Has had biopsies and MRI     Diabetes (H) 2009    Prediabetes     Fibroid uterus      High cholesterol      History of hematuria 2008    Cystoscopy for recurrent UTIs; unremarkable renal US. Also recurrent UTIs as child and pyelonephritis.      History of hormone replacement therapy     after JASBIR with BSO     HTN (hypertension)      HTN, goal below 140/90      Hx of bone density study 10/16/2006    Normal     Hypothyroidism      Insomnia     periodic - prn clonazepam helpful a couple times per month     Lipid screening 07/21/2015    Tchol 128, , HDL 53, LDL 53 outside records --> based on our labs off of atorvastatin 10yr ASCVD risk 9.4% in Oct 2015     Major depressive disorder, single episode in full remission (H24) 2007     NSTEMI (non-ST elevated myocardial infarction) (H) 09/26/2022     Osteopenia     Mild left hip July 2016     Prediabetes     Metformin in the past     Rotator cuff injury, left, sequela     MRI Jan 2015 and had PT; High-grade complete or near complete tear of the supraspinatus tendon and anterior fibers of the infraspinatus tendon. 1/3 of the infraspinatus tendon appears involved.      TBI (traumatic brain  injury) (H)     As a child     Tubular adenoma of colon  &      Past Surgical History:   Procedure Laterality Date     ANKLE SURGERY       BREAST BIOPSY, RT/LT      Many     BREAST SURGERY      Benign lumpectomy     C/SECTION, LOW TRANSVERSE  1968     CAPSULE/PILL CAM ENDOSCOPY  2014    EGD prior; capsule was negative      COLONOSCOPY      , , , 2013     COLONOSCOPY N/A 2019    Procedure: COLONOSCOPY, WITH POLYPECTOMY AND BIOPSY;  Surgeon: Pepito Romero MD;  Location:  GI     COLONOSCOPY N/A 2022    Procedure: COLONOSCOPY;  Surgeon: Rodrigo Dunbar MD;  Location:  GI     CV CORONARY ANGIOGRAM N/A 2022    Procedure: Coronary Angiogram;  Surgeon: Horacio Moran MD;  Location:  HEART CARDIAC CATH LAB     CV INSTANTANEOUS WAVE-FREE RATIO N/A 2022    Procedure: Instantaneous Wave-Free Ratio;  Surgeon: Horacio Moran MD;  Location:  HEART CARDIAC CATH LAB     CV OPTICAL COHERENCE TOMOGRAPHY N/A 2022    Procedure: Optical Coherence Tomography;  Surgeon: Horacio Moran MD;  Location:  HEART CARDIAC CATH LAB     HYSTERECTOMY, PAP NO LONGER INDICATED       OPEN REDUCTION INTERNAL FIXATION ANKLE Right 2023    Procedure: OPEN REDUCTION INTERNAL FIXATION RIGHT ANKLE FRACTURE;  Surgeon: Declan Casanova MD;  Location:  OR     [facility] with BSO  2000    benign fibroids     TUBAL LIGATION         Family Hx:  Family History   Problem Relation Age of Onset     Colon Cancer Mother 70         age 81     Diabetes Mother         After age 75     Macular Degeneration Mother      Glaucoma Mother      Cerebrovascular Disease Mother      Heart Failure Mother      Hypertension Mother      Hyperlipidemia Mother      Other - See Comments Father 87        Frailty, pneumonia, fractures, failure to thrive     Osteoporosis Father         diagnosed in his 80s     Colon Polyps Daughter         Tubular adenoma     Deep Vein Thrombosis  Brother      Hyperlipidemia Brother      Hypertension Brother      Other Cancer Brother         myeloma     Hypertension Brother         both brothers     Hyperlipidemia Brother         both brothers     Depression Brother      Anxiety Disorder Brother      Mental Illness Brother         on Haldol before death from lung cancer     Lung Cancer Brother         long time heavy smoker     Other Cancer Brother         Lung cancer     Breast Cancer Other         radical mastecomy in her 40s     Colon Cancer Other      Other Cancer Other         several aunts various kinds     Other Cancer Other         throat cancer     Other Cancer Paternal Grandmother         throat cancer         Social Hx:  Social History     Socioeconomic History     Marital status:      Spouse name: Not on file     Number of children: Not on file     Years of education: Not on file     Highest education level: Not on file   Occupational History     Not on file   Tobacco Use     Smoking status: Former     Packs/day: 0.50     Years: 9.00     Pack years: 4.50     Types: Cigarettes     Start date: 1965     Quit date: 4/15/1975     Years since quittin.5     Smokeless tobacco: Never     Tobacco comments:     social smoker - only smoked when with another smoker   Vaping Use     Vaping Use: Never used   Substance and Sexual Activity     Alcohol use: Yes     Comment: Occasionally 1 beer every few weeks     Drug use: No     Sexual activity: Not Currently     Partners: Male     Birth control/protection: None     Comment: post menopausal   Other Topics Concern     Parent/sibling w/ CABG, MI or angioplasty before 65F 55M? No   Social History Narrative     Not on file     Social Determinants of Health     Financial Resource Strain: Low Risk  (2023)    Financial Resource Strain      Within the past 12 months, have you or your family members you live with been unable to get utilities (heat, electricity) when it was really needed?: No   Food  Insecurity: Low Risk  (9/20/2023)    Food Insecurity      Within the past 12 months, did you worry that your food would run out before you got money to buy more?: No      Within the past 12 months, did the food you bought just not last and you didn t have money to get more?: No   Transportation Needs: Low Risk  (9/20/2023)    Transportation Needs      Within the past 12 months, has lack of transportation kept you from medical appointments, getting your medicines, non-medical meetings or appointments, work, or from getting things that you need?: No   Physical Activity: Not on file   Stress: Not on file   Social Connections: Not on file   Interpersonal Safety: Low Risk  (9/22/2023)    Interpersonal Safety      Do you feel physically and emotionally safe where you currently live?: Yes      Within the past 12 months, have you been hit, slapped, kicked or otherwise physically hurt by someone?: No      Within the past 12 months, have you been humiliated or emotionally abused in other ways by your partner or ex-partner?: No   Housing Stability: Low Risk  (9/20/2023)    Housing Stability      Do you have housing? : Yes      Are you worried about losing your housing?: No          MEDICATIONS:  has a current medication list which includes the following prescription(s): acetaminophen, amlodipine, aspirin, atorvastatin, calcium carbonate, cholecalciferol, clonazepam, cyanocobalamin, duloxetine, levothyroxine, metoprolol tartrate, olmesartan, polyethylene glycol, senna-docusate, clopidogrel, nitroglycerin, and [DISCONTINUED] lovastatin.    ROS:     ROS: 10 point ROS neg other than the symptoms noted above in the HPI.    GENERAL: mild fatigue, wt stable; denies fevers, chills, night sweats.   HEENT: no dysphagia, odonophagia, diplopia, neck pain  THYROID:  no apparent hyper or hypothyroid symptoms  CV: no chest pain, pressure, palpitations  LUNGS: no SOB, PIERSON, cough, wheezing   ABDOMEN: intermittent abdominal pain, constipation,  some reflux; no diarrhea  EXTREMITIES: no rashes, ulcers, edema  NEUROLOGY: no headaches, denies changes in vision, tingling, extremitiy numbness   MSK: no muscle aches or pains, weakness  SKIN: no rashes or lesions  : no menses since hysterectomy age 51  PSYCH:  stable mood, no significant anxiety or depression  ENDOCRINE: sweating spells, as noted      Physical Exam   VS: /71 (BP Location: Left arm, Patient Position: Sitting, Cuff Size: Adult Regular)   Pulse 73   Wt 92.6 kg (204 lb 1.6 oz)   LMP  (LMP Unknown)   BMI 30.40 kg/m    GENERAL: AXOX3, NAD, well dressed, answering questions appropriately, appears stated age.  ENT: no nose swelling or nasal discharge, mouth redness or gum changes.  EYES: eyes grossly normal to inspection, conjunctivae and sclerae normal, no exophthalmos or proptosis  THYROID:  no apparent nodules or goiter  LUNGS: no audible wheeze, cough or visible cyanosis, or increased work of breathing  ABDOMEN: abdomen mildly obese, soft, mild tender right anterior midline   EXTREMITIES: no edema noted  NEUROLOGY: CN grossly intact, no tremors  MSK: grossly intact  SKIN:  no apparent skin lesions, rash, or edema with visualized skin appearance  PSYCH: mentation appears normal, affect normal/bright, judgement and insight intact,   normal speech and appearance well groomed      LABS:    All pertinent notes, labs, and images personally reviewed by me.     A/P:  Encounter Diagnoses   Name Primary?     Hypothyroidism, unspecified type      Adrenal nodule (H24) Yes         Comments:  Reviewed health history, adrenal and thyroid issues  Needs screening lab tests for the adrenal nodule workup  Suspect non-functioning, benign adrenal nodule    Plan:  Discussed general issues with the hypothyroidism diagnosis and management  Discussed lab tests used to assess patient thyroid hormone levels  Reviewed treatment options including levothyroxine medication    Discussed general issues with adrenal gland  diseases and management  Reviewed adrenal gland anatomy and hormone physiology  Discussed lab tests used to assess patient adrenal hormone levels  We discussed the recent adrenal CT-scan imaging procedure    Recommend:  Continue current levothyroxine 0.088 mg daily dose  Monitor for symptom changes  Continue current antihypertensive med treatment plan  No thyroid U/S imaging needed at this time  Repeat preappt labs in 9/2024   Lab orders placed  No additional adrenal testing or imaging needed at this time  Plan repeat non-contrast CT adrenals 9/2024    Addressed patient questions today    There are no Patient Instructions on file for this visit.    Future labs ordered today:   Orders Placed This Encounter   Procedures     TSH     T4 free     Cortisol     Radiology/Consults ordered today: None    Total time spent on day of encounter:  23 min    Follow-up:  10/2024, Return    NIMO Cutler MD, MS  Endocrinology  [facility]                    Again, thank you for allowing me to participate in the care of your patient.        Sincerely,        Horace Cutler MD

## 2023-10-06 ENCOUNTER — IMMUNIZATION (OUTPATIENT)
Dept: NURSING | Facility: CLINIC | Age: 73
End: 2023-10-06
Payer: MEDICARE

## 2023-10-06 PROCEDURE — 90480 ADMN SARSCOV2 VAC 1/ONLY CMP: CPT

## 2023-10-06 PROCEDURE — 91320 SARSCV2 VAC 30MCG TRS-SUC IM: CPT

## 2023-10-12 NOTE — TELEPHONE ENCOUNTER
Refill request received from [facility] for clopidogrel, pt was informed she could stop taking this end of September 2023 by Cardiology (per Monitor communication from 9/22/23).    Faxed back to pharmacy stating pt is no longer taking clopidogrel.

## 2023-10-13 ENCOUNTER — LAB (OUTPATIENT)
Dept: LAB | Facility: CLINIC | Age: 73
End: 2023-10-13
Payer: MEDICARE

## 2023-10-13 DIAGNOSIS — I25.10 CORONARY ATHEROSCLEROSIS OF NATIVE CORONARY ARTERY: Primary | ICD-10-CM

## 2023-10-13 DIAGNOSIS — E78.2 MIXED HYPERLIPIDEMIA: ICD-10-CM

## 2023-10-13 DIAGNOSIS — E27.9 ADRENAL NODULE (H): ICD-10-CM

## 2023-10-13 DIAGNOSIS — E03.9 HYPOTHYROIDISM, UNSPECIFIED TYPE: ICD-10-CM

## 2023-10-13 LAB
ALT SERPL W P-5'-P-CCNC: 13 U/L (ref 0–50)
CHOLEST SERPL-MCNC: 196 MG/DL
CORTIS SERPL-MCNC: 25.1 UG/DL
HDLC SERPL-MCNC: 39 MG/DL
LDLC SERPL CALC-MCNC: ABNORMAL MG/DL
LDLC SERPL DIRECT ASSAY-MCNC: 92 MG/DL
NONHDLC SERPL-MCNC: 157 MG/DL
T4 FREE SERPL-MCNC: 1.4 NG/DL (ref 0.9–1.7)
TRIGL SERPL-MCNC: 403 MG/DL
TSH SERPL DL<=0.005 MIU/L-ACNC: 2.69 UIU/ML (ref 0.3–4.2)

## 2023-10-13 PROCEDURE — 82533 TOTAL CORTISOL: CPT

## 2023-10-13 PROCEDURE — 83721 ASSAY OF BLOOD LIPOPROTEIN: CPT | Mod: 59

## 2023-10-13 PROCEDURE — 80061 LIPID PANEL: CPT

## 2023-10-13 PROCEDURE — 84439 ASSAY OF FREE THYROXINE: CPT

## 2023-10-13 PROCEDURE — 36415 COLL VENOUS BLD VENIPUNCTURE: CPT

## 2023-10-13 PROCEDURE — 84460 ALANINE AMINO (ALT) (SGPT): CPT

## 2023-10-13 PROCEDURE — 84443 ASSAY THYROID STIM HORMONE: CPT

## 2023-10-16 ENCOUNTER — OFFICE VISIT (OUTPATIENT)
Dept: CARDIOLOGY | Facility: CLINIC | Age: 73
End: 2023-10-16
Attending: INTERNAL MEDICINE
Payer: MEDICARE

## 2023-10-16 VITALS
HEIGHT: 69 IN | HEART RATE: 77 BPM | WEIGHT: 205.6 LBS | BODY MASS INDEX: 30.45 KG/M2 | OXYGEN SATURATION: 95 % | DIASTOLIC BLOOD PRESSURE: 64 MMHG | SYSTOLIC BLOOD PRESSURE: 104 MMHG

## 2023-10-16 DIAGNOSIS — R73.03 PREDIABETES: ICD-10-CM

## 2023-10-16 DIAGNOSIS — E78.2 MIXED HYPERLIPIDEMIA: ICD-10-CM

## 2023-10-16 DIAGNOSIS — I25.10 CORONARY ARTERY DISEASE INVOLVING NATIVE CORONARY ARTERY OF NATIVE HEART WITHOUT ANGINA PECTORIS: ICD-10-CM

## 2023-10-16 DIAGNOSIS — I21.4 NSTEMI (NON-ST ELEVATED MYOCARDIAL INFARCTION) (H): Primary | ICD-10-CM

## 2023-10-16 PROCEDURE — 99214 OFFICE O/P EST MOD 30 MIN: CPT | Performed by: NURSE PRACTITIONER

## 2023-10-16 NOTE — PATIENT INSTRUCTIONS
Thank you for your visit with the [facility] today.    Today's Summary     At this point, it is reasonable to stop Clopidogrel (Plavix) as it has been greater than 1-year since your angiogram.  Continue Aspirin 81 mg daily lifelong.  Based on your cholesterol levels, lets continue your medications as they are and work on improving diet and controlling weight to help improve those cholesterol numbers.  Follow up in 1-year with Dr. Johnston for annual visit.    If you have questions or concerns, please do not hesitate to call my nursing support team at [phone].    Scheduling phone number: [phone]  [facility] Clinic Number: [phone]    It was a pleasure seeing you today.     ALEXYS Yoon, CNP  Nurse Practitioner  [facility]  October 16, 2023  ________________________________________________________

## 2023-10-16 NOTE — LETTER
10/16/2023    Jade Davis MD  [address] [place] [address]    RE: Janie De Leon       Dear Colleague,     I had the pleasure of seeing Janie De Leon in the [facility].              ~Cardiology Clinic Visit~    Primary Cardiologist: Dr. Johnston  Reason for visit: 6-month follow up     History of Present Illness     Janie De Leon is a pleasant 72 year old female with a past medical history notable for hypertension, prediabetes, hyperlipidemia who was recently admitted to the ED for sudden onset retrosternal chest heaviness with radiation to the neck with associated SOB. 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     During her hospitalization in 09/2022 she underwent coronary angiography, which revealed mild proximal LAD disease with mild plaque rupture without flow limitation as evidenced by iFR 0.98.  She was recommended medical management and lifestyle modifications for cardiovascular risk factor optimization, with a low threshold to return for LAD stenting should her anginal symptoms persist.       1/13/23 echocardiogram showing EF 55-60%, with normal right ventricular systolic function.  There is mild (1+) tricuspid regurgitation.  On direct comparison to echo images dated 09/26/2022, wall motion abnormalities have resolved.    Diagnostics:  10/13/23 FLP + direct LDL showing , HDL 39, , LDL 92.  TSH normal.  9/8/23 Hgb A1c 6.2    Today, she is here for 6-month follow up.  She has had no recurrent symptoms, denies chest pain, SOB, PIERSON, LH, dizziness, syncope, palpitations.  Overall, from a cardiac standpoint she has been doing well.  On a personal note, she continues to be under a great deal of stress managing her  who has dementia.  They are also planning to move to the [facility] soon for single level living.  She is hoping that once they are settled into a new home, or her activity and nutrition should start to stabilize and improve.   __________________________________________________________________         Assessment and Impression:     Coronary artery disease involving native coronary artery of native heart  S/p NSTEMI without PCI, 09/2022  - Clinically doing well.  - Angiogram 09/26/2022 revealed Mild pLAD dz, mild plaque rupture per OCT and iFR=0.98.   - Tolerating medication regimen without side effects.  - Echocardiogram 01/13/23 with EF 55-60%, WMA has resolved on current imaging.  - On ASA, atorvastatin, clopidogrel, and lopressor.  - Counseled on further risk reduction strategies including healthy diet, adequate exercise, and other lifestyle modifications.     Hyperlipidemia  - On Atorvastatin 40 mg daily.  - Most recent lipid panel 12/5/2022: , , LDL 66, HDL 33.     Hypertension, Well controlled at last clinic visit this past week; on Amlodipine 5mg daily.  Pre-Diabetes, Hemoglobin A1c on 8/31/2022 was 6.1     Hypothyroidism, on levothyroxine.         Recommendations and Plan:     As of today, okay to stop clopidogrel.  Continue aspirin 81 mg daily lifelong.  Continue atorvastatin 40 mg daily.  Counseled on daily exercise and healthy nutrition to manage weight, improve lipid profile, and further reduce cardiovascular risk.  She would like to focus on this approach before making any medication changes.  Follow-up in 1 year with Dr. Johnston for annual review.  __________________________________________________________________    Thank you for the opportunity to participate in this pleasant patient's care.    We would be happy to see this patient sooner for any concerns in the meantime.    ALEXYS Yoon, CNP   Nurse Practitioner  [facility]    Today's clinic visit entailed:  Review of the result(s) of each unique test - cardiac testing and imaging, past labs, current labs, surgical notes  The following tests were independently interpreted by me as noted in my documentation: labs  Prescription drug  management    The level of medical decision making during this visit was of moderate complexity.    Orders this Visit:  Orders Placed This Encounter   Procedures    Follow-Up with Cardiology     No orders of the defined types were placed in this encounter.    Medications Discontinued During This Encounter   Medication Reason    clopidogrel (PLAVIX) 75 MG tablet      Encounter Diagnoses   Name Primary?    NSTEMI (non-ST elevated myocardial infarction) (H) Yes    Coronary artery disease involving native coronary artery of native heart without angina pectoris     Prediabetes     Mixed hyperlipidemia      CURRENT MEDICATIONS:  Current Outpatient Medications   Medication Sig Dispense Refill    acetaminophen (TYLENOL) 500 MG tablet Take 2 tablets (1,000 mg) by mouth every 6 hours as needed for mild pain      amLODIPine (NORVASC) 5 MG tablet Take 1 tablet (5 mg) by mouth every evening 90 tablet 3    aspirin (ASA) 81 MG EC tablet Take 1 tablet (81 mg) by mouth daily  0    atorvastatin (LIPITOR) 40 MG tablet Take 1 tablet (40 mg) by mouth every evening 90 tablet 3    calcium carbonate (TUMS) 500 MG chewable tablet Take 2 tablets (1,000 mg) by mouth daily as needed for heartburn      Cholecalciferol (VITAMIN D3 PO) Take 1,000 Units by mouth daily      clonazePAM (KLONOPIN) 0.5 MG tablet Take 0.5-1 tablets (0.25-0.5 mg) by mouth nightly as needed for anxiety 20 tablet 0    cyanocobalamin (VITAMIN B-12) 100 MCG tablet Take 100 mcg by mouth daily      DULoxetine (CYMBALTA) 60 MG capsule Take 1 capsule (60 mg) by mouth daily 90 capsule 3    levothyroxine (SYNTHROID/LEVOTHROID) 88 MCG tablet Take 1 tablet (88 mcg) by mouth daily 90 tablet 3    metoprolol tartrate (LOPRESSOR) 25 MG tablet Take 1 tablet (25 mg) by mouth 2 times daily 180 tablet 3    nitroGLYcerin (NITROSTAT) 0.4 MG sublingual tablet For chest pain place 1 tablet under the tongue every 5 minutes for 3 doses. If symptoms persist 5 minutes after 1st dose call [phone]. [phone]  "tablet 0    olmesartan (BENICAR) 20 MG tablet Take 1 tablet (20 mg) by mouth daily 90 tablet 3    polyethylene glycol (MIRALAX) 17 GM/Dose powder Take 17 g by mouth daily as needed for constipation      senna-docusate (SENOKOT-S/PERICOLACE) 8.6-50 MG tablet Take 1 tablet by mouth 2 times daily as needed for constipation 21 tablet 0     ALLERGIES     Allergies   Allergen Reactions    Ciprofloxacin GI Disturbance    Oxycodone Other (See Comments)     She prefers not to have Oxycodone or Oxycontin due to potential addictive properties    Simvastatin Other (See Comments)     Muscle aches      PAST MEDICAL, SURGICAL, FAMILY, SOCIAL HISTORY:  History was reviewed and updated as needed, see medical record.    Review of Systems:  A 10-point Review Of Systems is otherwise normal except for that which is noted in the HPI and interval summary.    Physical Exam:    Vitals: /64   Pulse 77   Ht 1.753 m (5' 9\")   Wt 93.3 kg (205 lb 9.6 oz)   LMP  (LMP Unknown)   SpO2 95%   BMI 30.36 kg/m    Constitutional: Appears stated age, well nourished, NAD.  Eyes: Pupils equal, round. Sclerae anicteric.   Neck: Supple. Carotid pulses full and equal.   Respiratory: Non-labored. Lungs CTAB.  Cardiovascular: RRR, normal S1 and S2. No M/G/R.  No edema.  GI: Soft, non-distended, non-tender.  Musculoskeletal/Extremities: Symmetrical movement to all extremities.  Neurologic: No gross focal deficits. Alert, awake, and oriented to all spheres.  Psychiatric: Affect appropriate. Mentation normal.    Recent Lab Results:  LIPID RESULTS:  Lab Results   Component Value Date    CHOL 196 10/13/2023    CHOL 237 (H) 08/27/2020    HDL 39 (L) 10/13/2023    HDL 46 (L) 08/27/2020    LDL  10/13/2023      Comment:      Cannot estimate LDL when triglyceride exceeds 400 mg/dL    LDL 92 10/13/2023     (H) 08/27/2020    TRIG 403 (H) 10/13/2023    TRIG 373 (H) 08/27/2020    CHOLHDLRATIO 5.3 (H) 10/28/2015     LIVER ENZYME RESULTS:  Lab Results " "  Component Value Date    AST 22 12/05/2022    AST 16 08/27/2020    ALT 13 10/13/2023    ALT 21 08/27/2020     CBC RESULTS:  Lab Results   Component Value Date    WBC 9.7 05/24/2023    WBC 5.9 08/27/2020    RBC 3.98 05/24/2023    RBC 5.11 08/27/2020    HGB 12.8 05/27/2023    HGB 15.5 08/27/2020    HCT 36.2 05/24/2023    HCT 46.5 08/27/2020    MCV 91 05/24/2023    MCV 91 08/27/2020    MCH 29.6 05/24/2023    MCH 30.3 08/27/2020    MCHC 32.6 05/24/2023    MCHC 33.3 08/27/2020    RDW 12.2 05/24/2023    RDW 12.6 08/27/2020     05/24/2023     08/27/2020     BMP RESULTS:  Lab Results   Component Value Date     05/24/2023     08/27/2020    POTASSIUM 4.6 05/24/2023    POTASSIUM 4.0 09/27/2022    POTASSIUM 4.2 08/27/2020    CHLORIDE 106 05/24/2023    CHLORIDE 106 09/27/2022    CHLORIDE 105 08/27/2020    CO2 23 05/24/2023    CO2 26 09/27/2022    CO2 26 08/27/2020    ANIONGAP 10 05/24/2023    ANIONGAP 7 09/27/2022    ANIONGAP 7 08/27/2020     (H) 05/28/2023     (H) 09/27/2022     (H) 08/27/2020    BUN 19.6 05/24/2023    BUN 15 09/27/2022    BUN 17 08/27/2020    CR 0.97 (H) 05/24/2023    CR 0.90 08/27/2020    GFRESTIMATED 61 05/24/2023    GFRESTIMATED 59 (L) 09/16/2022    GFRESTIMATED 64 08/27/2020    GFRESTBLACK 74 08/27/2020    CYNDY 8.3 (L) 05/24/2023    CYNDY 8.9 08/27/2020      A1C RESULTS:  Lab Results   Component Value Date    A1C 6.2 (H) 09/08/2023    A1C 5.8 (H) 08/27/2020     INR RESULTS:  No results found for: \"INR\"      Thank you for allowing me to participate in the care of your patient.      Sincerely,     ALEXYS Yoon [facility]  cc:   Kristian Johnston MD  [address]  [address]  [place] [id number]      "

## 2023-10-16 NOTE — PROGRESS NOTES
~Cardiology Clinic Visit~    Primary Cardiologist: Dr. Johnston  Reason for visit: 6-month follow up     History of Present Illness     Janie De Leon is a pleasant 72 year old female with a past medical history notable for hypertension, prediabetes, hyperlipidemia who was recently admitted to the ED for sudden onset retrosternal chest heaviness with radiation to the neck with associated SOB. 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     During her hospitalization in 09/2022 she underwent coronary angiography, which revealed mild proximal LAD disease with mild plaque rupture without flow limitation as evidenced by iFR 0.98.  She was recommended medical management and lifestyle modifications for cardiovascular risk factor optimization, with a low threshold to return for LAD stenting should her anginal symptoms persist.       1/13/23 echocardiogram showing EF 55-60%, with normal right ventricular systolic function.  There is mild (1+) tricuspid regurgitation.  On direct comparison to echo images dated 09/26/2022, wall motion abnormalities have resolved.    Diagnostics:  10/13/23 FLP + direct LDL showing , HDL 39, , LDL 92.  TSH normal.  9/8/23 Hgb A1c 6.2    Today, she is here for 6-month follow up.  She has had no recurrent symptoms, denies chest pain, SOB, PIERSON, LH, dizziness, syncope, palpitations.  Overall, from a cardiac standpoint she has been doing well.  On a personal note, she continues to be under a great deal of stress managing her  who has dementia.  They are also planning to move to the [facility] soon for single level living.  She is hoping that once they are settled into a new home, or her activity and nutrition should start to stabilize and improve.  __________________________________________________________________         Assessment and Impression:     Coronary artery disease involving native coronary artery of native heart  S/p NSTEMI without PCI,  09/2022  - Clinically doing well.  - Angiogram 09/26/2022 revealed Mild pLAD dz, mild plaque rupture per OCT and iFR=0.98.   - Tolerating medication regimen without side effects.  - Echocardiogram 01/13/23 with EF 55-60%, WMA has resolved on current imaging.  - On ASA, atorvastatin, clopidogrel, and lopressor.  - Counseled on further risk reduction strategies including healthy diet, adequate exercise, and other lifestyle modifications.     Hyperlipidemia  - On Atorvastatin 40 mg daily.  - 10/13/23 FLP + direct LDL showing , HDL 39, , LDL 92.     Hypertension, Well controlled at last clinic visit this past week; on Amlodipine 5mg daily.  Pre-Diabetes, Hemoglobin A1c on 8/31/2022 was 6.1     Hypothyroidism, on levothyroxine.         Recommendations and Plan:     As of today, okay to stop clopidogrel.  Continue aspirin 81 mg daily lifelong.  Continue atorvastatin 40 mg daily.  Counseled on daily exercise and healthy nutrition to manage weight, improve lipid profile, and further reduce cardiovascular risk.  She would like to focus on this approach before making any medication changes.  Follow-up in 1 year with Dr. Johnston for annual review.  __________________________________________________________________    Thank you for the opportunity to participate in this pleasant patient's care.    We would be happy to see this patient sooner for any concerns in the meantime.    ALEXYS Yoon, CNP   Nurse Practitioner  [facility]    Today's clinic visit entailed:  Review of the result(s) of each unique test - cardiac testing and imaging, past labs, current labs, surgical notes  The following tests were independently interpreted by me as noted in my documentation: labs  Prescription drug management    The level of medical decision making during this visit was of moderate complexity.    Orders this Visit:  Orders Placed This Encounter   Procedures    Follow-Up with Cardiology     No orders  of the defined types were placed in this encounter.    Medications Discontinued During This Encounter   Medication Reason    clopidogrel (PLAVIX) 75 MG tablet      Encounter Diagnoses   Name Primary?    NSTEMI (non-ST elevated myocardial infarction) (H) Yes    Coronary artery disease involving native coronary artery of native heart without angina pectoris     Prediabetes     Mixed hyperlipidemia      CURRENT MEDICATIONS:  Current Outpatient Medications   Medication Sig Dispense Refill    acetaminophen (TYLENOL) 500 MG tablet Take 2 tablets (1,000 mg) by mouth every 6 hours as needed for mild pain      amLODIPine (NORVASC) 5 MG tablet Take 1 tablet (5 mg) by mouth every evening 90 tablet 3    aspirin (ASA) 81 MG EC tablet Take 1 tablet (81 mg) by mouth daily  0    atorvastatin (LIPITOR) 40 MG tablet Take 1 tablet (40 mg) by mouth every evening 90 tablet 3    calcium carbonate (TUMS) 500 MG chewable tablet Take 2 tablets (1,000 mg) by mouth daily as needed for heartburn      Cholecalciferol (VITAMIN D3 PO) Take 1,000 Units by mouth daily      clonazePAM (KLONOPIN) 0.5 MG tablet Take 0.5-1 tablets (0.25-0.5 mg) by mouth nightly as needed for anxiety 20 tablet 0    cyanocobalamin (VITAMIN B-12) 100 MCG tablet Take 100 mcg by mouth daily      DULoxetine (CYMBALTA) 60 MG capsule Take 1 capsule (60 mg) by mouth daily 90 capsule 3    levothyroxine (SYNTHROID/LEVOTHROID) 88 MCG tablet Take 1 tablet (88 mcg) by mouth daily 90 tablet 3    metoprolol tartrate (LOPRESSOR) 25 MG tablet Take 1 tablet (25 mg) by mouth 2 times daily 180 tablet 3    nitroGLYcerin (NITROSTAT) 0.4 MG sublingual tablet For chest pain place 1 tablet under the tongue every 5 minutes for 3 doses. If symptoms persist 5 minutes after 1st dose call 911. 30 tablet 0    olmesartan (BENICAR) 20 MG tablet Take 1 tablet (20 mg) by mouth daily 90 tablet 3    polyethylene glycol (MIRALAX) 17 GM/Dose powder Take 17 g by mouth daily as needed for constipation       "senna-docusate (SENOKOT-S/PERICOLACE) 8.6-50 MG tablet Take 1 tablet by mouth 2 times daily as needed for constipation 21 tablet 0     ALLERGIES     Allergies   Allergen Reactions    Ciprofloxacin GI Disturbance    Oxycodone Other (See Comments)     She prefers not to have Oxycodone or Oxycontin due to potential addictive properties    Simvastatin Other (See Comments)     Muscle aches      PAST MEDICAL, SURGICAL, FAMILY, SOCIAL HISTORY:  History was reviewed and updated as needed, see medical record.    Review of Systems:  A 10-point Review Of Systems is otherwise normal except for that which is noted in the HPI and interval summary.    Physical Exam:    Vitals: /64   Pulse 77   Ht 1.753 m (5' 9\")   Wt 93.3 kg (205 lb 9.6 oz)   LMP  (LMP Unknown)   SpO2 95%   BMI 30.36 kg/m    Constitutional: Appears stated age, well nourished, NAD.  Eyes: Pupils equal, round. Sclerae anicteric.   Neck: Supple. Carotid pulses full and equal.   Respiratory: Non-labored. Lungs CTAB.  Cardiovascular: RRR, normal S1 and S2. No M/G/R.  No edema.  GI: Soft, non-distended, non-tender.  Musculoskeletal/Extremities: Symmetrical movement to all extremities.  Neurologic: No gross focal deficits. Alert, awake, and oriented to all spheres.  Psychiatric: Affect appropriate. Mentation normal.    Recent Lab Results:  LIPID RESULTS:  Lab Results   Component Value Date    CHOL 196 10/13/2023    CHOL 237 (H) 08/27/2020    HDL 39 (L) 10/13/2023    HDL 46 (L) 08/27/2020    LDL  10/13/2023      Comment:      Cannot estimate LDL when triglyceride exceeds 400 mg/dL    LDL 92 10/13/2023     (H) 08/27/2020    TRIG 403 (H) 10/13/2023    TRIG 373 (H) 08/27/2020    CHOLHDLRATIO 5.3 (H) 10/28/2015     LIVER ENZYME RESULTS:  Lab Results   Component Value Date    AST 22 12/05/2022    AST 16 08/27/2020    ALT 13 10/13/2023    ALT 21 08/27/2020     CBC RESULTS:  Lab Results   Component Value Date    WBC 9.7 05/24/2023    WBC 5.9 08/27/2020    RBC " "3.98 05/24/2023    RBC 5.11 08/27/2020    HGB 12.8 05/27/2023    HGB 15.5 08/27/2020    HCT 36.2 05/24/2023    HCT 46.5 08/27/2020    MCV 91 05/24/2023    MCV 91 08/27/2020    MCH 29.6 05/24/2023    MCH 30.3 08/27/2020    MCHC 32.6 05/24/2023    MCHC 33.3 08/27/2020    RDW 12.2 05/24/2023    RDW 12.6 08/27/2020     05/24/2023     08/27/2020     BMP RESULTS:  Lab Results   Component Value Date     05/24/2023     08/27/2020    POTASSIUM 4.6 05/24/2023    POTASSIUM 4.0 09/27/2022    POTASSIUM 4.2 08/27/2020    CHLORIDE 106 05/24/2023    CHLORIDE 106 09/27/2022    CHLORIDE 105 08/27/2020    CO2 23 05/24/2023    CO2 26 09/27/2022    CO2 26 08/27/2020    ANIONGAP 10 05/24/2023    ANIONGAP 7 09/27/2022    ANIONGAP 7 08/27/2020     (H) 05/28/2023     (H) 09/27/2022     (H) 08/27/2020    BUN 19.6 05/24/2023    BUN 15 09/27/2022    BUN 17 08/27/2020    CR 0.97 (H) 05/24/2023    CR 0.90 08/27/2020    GFRESTIMATED 61 05/24/2023    GFRESTIMATED 59 (L) 09/16/2022    GFRESTIMATED 64 08/27/2020    GFRESTBLACK 74 08/27/2020    CYNDY 8.3 (L) 05/24/2023    CYNDY 8.9 08/27/2020      A1C RESULTS:  Lab Results   Component Value Date    A1C 6.2 (H) 09/08/2023    A1C 5.8 (H) 08/27/2020     INR RESULTS:  No results found for: \"INR\"                  "

## 2023-10-19 DIAGNOSIS — I21.4 NSTEMI (NON-ST ELEVATED MYOCARDIAL INFARCTION) (H): Chronic | ICD-10-CM

## 2023-10-19 RX ORDER — ATORVASTATIN CALCIUM 40 MG/1
40 TABLET, FILM COATED ORAL EVERY EVENING
Qty: 90 TABLET | Refills: 3 | Status: SHIPPED | OUTPATIENT
Start: 2023-10-19

## 2023-10-24 ENCOUNTER — VIRTUAL VISIT (OUTPATIENT)
Dept: NEUROLOGY | Facility: CLINIC | Age: 73
End: 2023-10-24
Attending: INTERNAL MEDICINE
Payer: MEDICARE

## 2023-10-24 ENCOUNTER — PRE VISIT (OUTPATIENT)
Dept: NEUROLOGY | Facility: CLINIC | Age: 73
End: 2023-10-24

## 2023-10-24 VITALS — HEIGHT: 69 IN | WEIGHT: 204 LBS | BODY MASS INDEX: 30.21 KG/M2

## 2023-10-24 DIAGNOSIS — D32.9 MENINGIOMA (H): ICD-10-CM

## 2023-10-24 DIAGNOSIS — R93.0 ABNORMAL MRI OF HEAD: Chronic | ICD-10-CM

## 2023-10-24 DIAGNOSIS — I67.1 BRAIN ANEURYSM: ICD-10-CM

## 2023-10-24 PROCEDURE — 99204 OFFICE O/P NEW MOD 45 MIN: CPT | Mod: 95 | Performed by: PSYCHIATRY & NEUROLOGY

## 2023-10-24 ASSESSMENT — PAIN SCALES - GENERAL: PAINLEVEL: NO PAIN (0)

## 2023-10-24 ASSESSMENT — PATIENT HEALTH QUESTIONNAIRE - PHQ9: SUM OF ALL RESPONSES TO PHQ QUESTIONS 1-9: 8

## 2023-10-24 NOTE — PROGRESS NOTES
Virtual Visit Details    Type of service:  Video Visit   Video Start Time:  0855  Video End Time:9:22 AM    Originating Location (pt. Location): Home    Distant Location (provider location):  On-site  Platform used for Video Visit: [email or web address]

## 2023-10-24 NOTE — PROGRESS NOTES
[facility] Neurology Consultation    Janie De Leon MRN# [id number]   Age: 73 year old YOB: 1950     Requesting physician: Sirena Kelly     Reason for Consultation: MRI brain abnormality      History of Presenting Symptoms:   Janie De Leon is a 73 year old female who presents today for evaluation of MRI brain abnormality.  The patient has a pertinent medical history of prediabetes, HLD, Hypothyroidism, fatty liver, HTN, NSTEMI, CAD, and a TBI as a child.      Neurologically, she recently had an MRI brain 9/27/2022 during w/up for suspected stroke and it was noted she had a meningioma (left falx 1.2 x 0.5 x 0.8cm), and MRA head and neck on the same date revealed 3x4 mm outpouching of the left cavernous internal carotid artery (aneurysm versus infundibulum).  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    Today, the patient reports that when she came out of her recent heart attack (following an angiogram) she had difficulty coming off of anesthesia.  This led to imaging in 2022 that showed the above mentioned meningioma and vascular abnormality.    Regarding symptoms, she didn't really report neurological symptoms during her admission.  Since that time, she has not had visual field deficits, or obscurations occurring.  There was one event where she saw double while reading, lasting 3-4 seconds. There were no other associated symptoms and it hasn't happened again.  There are no ongoing reoccurring headaches or positional headaches.     Social History:    No major alcohol abuse history or smoking history to speak of.     Medications:     Current Outpatient Medications   Medication    acetaminophen (TYLENOL) 500 MG tablet    amLODIPine (NORVASC) 5 MG tablet    aspirin (ASA) 81 MG EC tablet    atorvastatin (LIPITOR) 40 MG tablet    calcium carbonate (TUMS) 500 MG chewable  tablet    Cholecalciferol (VITAMIN D3 PO)    clonazePAM (KLONOPIN) 0.5 MG tablet    cyanocobalamin (VITAMIN B-12) 100 MCG tablet    DULoxetine (CYMBALTA) 60 MG capsule    levothyroxine (SYNTHROID/LEVOTHROID) 88 MCG tablet    metoprolol tartrate (LOPRESSOR) 25 MG tablet    nitroGLYcerin (NITROSTAT) 0.4 MG sublingual tablet    olmesartan (BENICAR) 20 MG tablet    polyethylene glycol (MIRALAX) 17 GM/Dose powder    senna-docusate (SENOKOT-S/PERICOLACE) 8.6-50 MG tablet      Physical Exam:   General: Seated comfortably in no acute distress.  HEENT: Neck supple with normal range of motion.   Skin: No rashes  Neurologic:     Mental Status: Fully alert, attentive and oriented. Speech clear and fluent, no paraphasic errors.     Cranial Nerves: EOMI with normal smooth pursuit. Facial movements symmetric. Hearing not formally tested but intact to conversation.  No dysarthria.     Motor: No tremors or other abnormal movements observed.      Sensory:Negative Romberg.      Coordination: Finger-nose-finger without dysmetria.     Gait: Normal, steady casual gait.         Data: Pertinent prior to visit   Imaging:  As above         Assessment and Plan:   Assessment:  Left falx meningioma (2022: 1.2 x 0.5 x 0.8cm)  Left Cavernous segment of ICA aneurysm (2022: 3x4 mm)    The patient has no ongoing concerning symptoms relate to growth of either an aneurysm or meningioma, and both of these findings may be incidental.  At this point, monitoring would be needed annually for around 1-2 more years, and then can be expanded if imaging and symptoms are stable.  We spent time going over possibly symptoms of either issue and what to do if these presented themselves, but otherwise plan to repeat imaging this year and then follow up yearly afterwards.       Plan:  MRI brain w/wout contrast for meningioma monitoring (can repeat annually for next 2 years)  MRA head to monitor for aneurysm (can repeat annually for next 2 years)    Follow up in  Neurology clinic in one year, or should new concerns arise.    CLARA Hernandez D.O.   of Neurology    Total time today (55 min) in this patient encounter was spent on pre-charting, counseling and/or coordination of care.  The patient is in agreement with this plan and has no further questions.

## 2023-10-24 NOTE — NURSING NOTE
Is the patient currently in the state of [place]? YES    Visit mode:VIDEO    If the visit is dropped, the patient can be reconnected by: VIDEO VISIT: Text to cell phone:   Telephone Information:   Mobile [phone]       Will anyone else be joining the visit? NO  (If patient encounters technical issues they should call [phone]    How would you like to obtain your AVS? MyChart    Are changes needed to the allergy or medication list? 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     Pt has mental health provider that she sees.

## 2023-10-24 NOTE — LETTER
10/24/2023       RE: Janie De Leon  [address]  [facility] [address]     Dear Colleague,    Thank you for referring your patient, Janie De Leon, to the [facility] NEUROLOGY [facility] [place] [facility]. Please see a copy of my visit note below.    [facility] Neurology Consultation    Janie De Leon MRN# [id number]   Age: 73 year old YOB: 1950     Requesting physician: Sirena Kelly     Reason for Consultation: MRI brain abnormality      History of Presenting Symptoms:   Janie De Leon is a 73 year old female who presents today for evaluation of MRI brain abnormality.  The patient has a pertinent medical history of prediabetes, HLD, Hypothyroidism, fatty liver, HTN, NSTEMI, CAD, and a TBI as a child.      Neurologically, she recently had an MRI brain 9/27/2022 during w/up for suspected stroke and it was noted she had a meningioma (left falx 1.2 x 0.5 x 0.8cm), and MRA head and neck on the same date revealed 3x4 mm outpouching of the left cavernous internal carotid artery (aneurysm versus infundibulum).  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    Today, the patient reports that when she came out of her recent heart attack (following an angiogram) she had difficulty coming off of anesthesia.  This led to imaging in 2022 that showed the above mentioned meningioma and vascular abnormality.    Regarding symptoms, she didn't really report neurological symptoms during her admission.  Since that time, she has not had visual field deficits, or obscurations occurring.  There was one event where she saw double while reading, lasting 3-4 seconds. There were no other associated symptoms and it hasn't happened again.  There are no ongoing reoccurring headaches or positional headaches.     Social History:    No  major alcohol abuse history or smoking history to speak of.     Medications:     Current Outpatient Medications   Medication    acetaminophen (TYLENOL) 500 MG tablet    amLODIPine (NORVASC) 5 MG tablet    aspirin (ASA) 81 MG EC tablet    atorvastatin (LIPITOR) 40 MG tablet    calcium carbonate (TUMS) 500 MG chewable tablet    Cholecalciferol (VITAMIN D3 PO)    clonazePAM (KLONOPIN) 0.5 MG tablet    cyanocobalamin (VITAMIN B-12) 100 MCG tablet    DULoxetine (CYMBALTA) 60 MG capsule    levothyroxine (SYNTHROID/LEVOTHROID) 88 MCG tablet    metoprolol tartrate (LOPRESSOR) 25 MG tablet    nitroGLYcerin (NITROSTAT) 0.4 MG sublingual tablet    olmesartan (BENICAR) 20 MG tablet    polyethylene glycol (MIRALAX) 17 GM/Dose powder    senna-docusate (SENOKOT-S/PERICOLACE) 8.6-50 MG tablet      Physical Exam:   General: Seated comfortably in no acute distress.  HEENT: Neck supple with normal range of motion.   Skin: No rashes  Neurologic:     Mental Status: Fully alert, attentive and oriented. Speech clear and fluent, no paraphasic errors.     Cranial Nerves: EOMI with normal smooth pursuit. Facial movements symmetric. Hearing not formally tested but intact to conversation.  No dysarthria.     Motor: No tremors or other abnormal movements observed.      Sensory:Negative Romberg.      Coordination: Finger-nose-finger without dysmetria.     Gait: Normal, steady casual gait.         Data: Pertinent prior to visit   Imaging:  As above         Assessment and Plan:   Assessment:  Left falx meningioma (2022: 1.2 x 0.5 x 0.8cm)  Left Cavernous segment of ICA aneurysm (2022: 3x4 mm)    The patient has no ongoing concerning symptoms relate to growth of either an aneurysm or meningioma, and both of these findings may be incidental.  At this point, monitoring would be needed annually for around 1-2 more years, and then can be expanded if imaging and symptoms are stable.  We spent time going over possibly symptoms of either issue and what to  do if these presented themselves, but otherwise plan to repeat imaging this year and then follow up yearly afterwards.       Plan:  MRI brain w/wout contrast for meningioma monitoring (can repeat annually for next 2 years)  MRA head to monitor for aneurysm (can repeat annually for next 2 years)    Follow up in Neurology clinic in one year, or should new concerns arise.      Total time today (55 min) in this patient encounter was spent on pre-charting, counseling and/or coordination of care.  The patient is in agreement with this plan and has no further questions.        Again, thank you for allowing me to participate in the care of your patient.      Sincerely,    Jean Hernandez, DO

## 2023-11-05 ENCOUNTER — MYC MEDICAL ADVICE (OUTPATIENT)
Dept: FAMILY MEDICINE | Facility: CLINIC | Age: 73
End: 2023-11-05
Payer: MEDICARE

## 2023-11-05 DIAGNOSIS — I21.4 NSTEMI (NON-ST ELEVATED MYOCARDIAL INFARCTION) (H): Chronic | ICD-10-CM

## 2023-11-06 ENCOUNTER — OFFICE VISIT (OUTPATIENT)
Dept: CARDIOLOGY | Facility: CLINIC | Age: 73
End: 2023-11-06
Payer: MEDICARE

## 2023-11-06 VITALS
BODY MASS INDEX: 30.32 KG/M2 | SYSTOLIC BLOOD PRESSURE: 108 MMHG | DIASTOLIC BLOOD PRESSURE: 70 MMHG | WEIGHT: 204.7 LBS | HEIGHT: 69 IN | HEART RATE: 71 BPM

## 2023-11-06 DIAGNOSIS — Z00.6 EXAMINATION OF PARTICIPANT IN CLINICAL TRIAL: Primary | ICD-10-CM

## 2023-11-06 PROCEDURE — 99207 PR NO CHARGE-RESEARCH SERVICE: CPT | Performed by: INTERNAL MEDICINE

## 2023-11-06 RX ORDER — METOPROLOL TARTRATE 25 MG/1
25 TABLET, FILM COATED ORAL 2 TIMES DAILY
Qty: 180 TABLET | Refills: 3 | Status: CANCELLED | OUTPATIENT
Start: 2023-11-06

## 2023-11-06 NOTE — PROGRESS NOTES
"  A Double-blind, Randomized, Placebo-controlled, Multicenter Study Assessing the impact of Olpasiran on Major Cardiovascular Events in Patients with Atherosclerotic cardiovascular Disease and Elevated Lipoprotein (a)    Janie De Leon was seen in clinic today for the screening visit.    Research nurse met with subject to discuss consent and participation in the above noted study.    The study discussion included the following:   Study purpose  Qualifications for participation  Length of study participation  Study procedures  Risks and side effects  Benefits (if any)  Voluntary nature of participation  Alternatives to participation  Confidentiality of records  Financial considerations     Subject asked questions and agreed that she received answers that satisfied her.    Consent form [Version 5 - [employer]varra version 31 May 2023] signed on 06 Nov 2023 . Patient verbalized understanding. A signed copy was given to the subject & forwarded to medical records. No study procedures were done prior to obtaining informed consent.        Did Subject meet all inclusion criteria? Yes pending central labs  Did Subject meet any Exclusion criteria? No pending central labs    Central Labs obtained at 09.39 am  Serum Pregnancy for WOCBP NA    /70 (BP Location: Right arm, Patient Position: Supine, Cuff Size: Adult Regular)   Pulse 71   Ht 1.753 m (5' 9\")   Wt 92.9 kg (204 lb 11.2 oz)   LMP  (LMP Unknown)   BMI 30.23 kg/m             Hips Circumference 44.5 inches  Waist Circumference 36 inches       Demographics     [x]Not  or   [] or    []Not Reported     [] Unknown         Race:   [] or    []   []Black or    [] or Other   [x]White   []Other, specify       Will contact Janie when central lab results are available.    Hilary Bingham RN    "

## 2023-11-11 ENCOUNTER — HOSPITAL ENCOUNTER (OUTPATIENT)
Dept: MRI IMAGING | Facility: CLINIC | Age: 73
Discharge: HOME | End: 2023-11-11
Attending: PSYCHIATRY & NEUROLOGY
Payer: MEDICARE

## 2023-11-11 DIAGNOSIS — I67.1 BRAIN ANEURYSM: ICD-10-CM

## 2023-11-11 DIAGNOSIS — R93.0 ABNORMAL MRI OF HEAD: Chronic | ICD-10-CM

## 2023-11-11 DIAGNOSIS — D32.9 MENINGIOMA (H): ICD-10-CM

## 2023-11-11 PROCEDURE — A9585 GADOBUTROL INJECTION: HCPCS | Performed by: PSYCHIATRY & NEUROLOGY

## 2023-11-11 PROCEDURE — G1010 CDSM STANSON: HCPCS

## 2023-11-11 PROCEDURE — 70553 MRI BRAIN STEM W/O & W/DYE: CPT | Mod: MG

## 2023-11-11 PROCEDURE — 255N000002 HC RX 255 OP 636: Performed by: PSYCHIATRY & NEUROLOGY

## 2023-11-11 RX ORDER — GADOBUTROL 604.72 MG/ML
8 INJECTION INTRAVENOUS ONCE
Status: COMPLETED | OUTPATIENT
Start: 2023-11-11 | End: 2023-11-11

## 2023-11-11 RX ADMIN — GADOBUTROL 8 ML: 604.72 INJECTION INTRAVENOUS at 11:35

## 2023-11-12 ENCOUNTER — TRANSFERRED RECORDS (OUTPATIENT)
Dept: HEALTH INFORMATION MANAGEMENT | Facility: CLINIC | Age: 73
End: 2023-11-12
Payer: MEDICARE

## 2023-11-13 ENCOUNTER — PATIENT OUTREACH (OUTPATIENT)
Dept: GASTROENTEROLOGY | Facility: CLINIC | Age: 73
End: 2023-11-13
Payer: MEDICARE

## 2023-11-23 ENCOUNTER — DOCUMENTATION ONLY (OUTPATIENT)
Dept: CARDIOLOGY | Facility: CLINIC | Age: 73
End: 2023-11-23
Payer: MEDICARE

## 2023-11-23 PROCEDURE — 99207 PR NO CHARGE-RESEARCH SERVICE: CPT

## 2023-11-23 NOTE — PROGRESS NOTES
A Double-blind, Randomized, Placebo-controlled, Multicenter Study Assessing the impact of Olpasiran on Major Cardiovascular Events in Patients with Atherosclerotic cardiovascular Disease and Elevated Lipoprotein (a)        Subject screening criteria 103 not met and thus subject is a screen failure.    Called and relayed this information to the subject today.    Subject verbalized understanding.      Hilary Bingham RN    Clinical Trials Nurse

## 2023-11-27 ENCOUNTER — TRANSFERRED RECORDS (OUTPATIENT)
Dept: CARDIOLOGY | Facility: CLINIC | Age: 73
End: 2023-11-27
Payer: MEDICARE

## 2023-11-27 NOTE — PROGRESS NOTES
Research laboratory data from November 6 reviewed and results not clinically significant including low total cholesterol, low LDL, increased triglycerides, mildly increased alkaline phosphatase and minimally increased albumin, mildly high glucose.  LF

## 2024-01-09 ENCOUNTER — TELEPHONE (OUTPATIENT)
Dept: CARDIOLOGY | Facility: CLINIC | Age: 74
End: 2024-01-09
Payer: MEDICARE

## 2024-01-09 DIAGNOSIS — I21.4 NSTEMI (NON-ST ELEVATED MYOCARDIAL INFARCTION) (H): Primary | Chronic | ICD-10-CM

## 2024-01-09 NOTE — TELEPHONE ENCOUNTER
[facility] Call Center    Phone Message    May a detailed message be left on voicemail: yes     Reason for Call: Other: Patient received a nCrypted Cloud message to set up an appointment with Margo.She was just seen 10/2023 and told to follow up in 1 year. Is there another appointment needed before this? Please review and contact patient to discuss.       Action Taken: cardiology    Travel Screening: Not Applicable

## 2024-01-14 ASSESSMENT — PATIENT HEALTH QUESTIONNAIRE - PHQ9
SUM OF ALL RESPONSES TO PHQ QUESTIONS 1-9: 6
10. IF YOU CHECKED OFF ANY PROBLEMS, HOW DIFFICULT HAVE THESE PROBLEMS MADE IT FOR YOU TO DO YOUR WORK, TAKE CARE OF THINGS AT HOME, OR GET ALONG WITH OTHER PEOPLE: SOMEWHAT DIFFICULT
SUM OF ALL RESPONSES TO PHQ QUESTIONS 1-9: 6

## 2024-01-15 ENCOUNTER — OFFICE VISIT (OUTPATIENT)
Dept: FAMILY MEDICINE | Facility: CLINIC | Age: 74
End: 2024-01-15
Payer: MEDICARE

## 2024-01-15 VITALS
WEIGHT: 205.6 LBS | SYSTOLIC BLOOD PRESSURE: 139 MMHG | TEMPERATURE: 98 F | BODY MASS INDEX: 30.45 KG/M2 | HEIGHT: 69 IN | HEART RATE: 69 BPM | RESPIRATION RATE: 18 BRPM | DIASTOLIC BLOOD PRESSURE: 78 MMHG | OXYGEN SATURATION: 97 %

## 2024-01-15 DIAGNOSIS — D32.9 MENINGIOMA (H): ICD-10-CM

## 2024-01-15 DIAGNOSIS — G47.00 INSOMNIA, UNSPECIFIED TYPE: Chronic | ICD-10-CM

## 2024-01-15 DIAGNOSIS — I10 ESSENTIAL HYPERTENSION, BENIGN: Chronic | ICD-10-CM

## 2024-01-15 DIAGNOSIS — K76.0 HEPATIC STEATOSIS: ICD-10-CM

## 2024-01-15 DIAGNOSIS — E27.9 ADRENAL NODULE (H): ICD-10-CM

## 2024-01-15 DIAGNOSIS — N18.30 STAGE 3 CHRONIC KIDNEY DISEASE, UNSPECIFIED WHETHER STAGE 3A OR 3B CKD (H): Primary | ICD-10-CM

## 2024-01-15 DIAGNOSIS — M85.80 OSTEOPENIA, UNSPECIFIED LOCATION: Chronic | ICD-10-CM

## 2024-01-15 DIAGNOSIS — I25.10 CORONARY ARTERY DISEASE INVOLVING NATIVE CORONARY ARTERY OF NATIVE HEART WITHOUT ANGINA PECTORIS: Chronic | ICD-10-CM

## 2024-01-15 DIAGNOSIS — F32.5 MAJOR DEPRESSIVE DISORDER, SINGLE EPISODE IN FULL REMISSION (H): ICD-10-CM

## 2024-01-15 DIAGNOSIS — E78.2 MIXED HYPERLIPIDEMIA: Chronic | ICD-10-CM

## 2024-01-15 PROCEDURE — 99214 OFFICE O/P EST MOD 30 MIN: CPT | Performed by: INTERNAL MEDICINE

## 2024-01-15 RX ORDER — DIPHENHYDRAMINE HCL 50 MG
50 CAPSULE ORAL EVERY 6 HOURS PRN
COMMUNITY
End: 2024-09-25

## 2024-01-15 RX ORDER — CLONAZEPAM 0.5 MG/1
.25-.5 TABLET ORAL
Qty: 20 TABLET | Refills: 0 | Status: SHIPPED | OUTPATIENT
Start: 2024-01-15 | End: 2024-03-26

## 2024-01-15 ASSESSMENT — PAIN SCALES - GENERAL: PAINLEVEL: NO PAIN (0)

## 2024-01-15 NOTE — LETTER
[facility]  01/15/24  Patient: Janie De Leon  YOB: 1950  Medical Record Number: [id number]                                                                                  Non-Opioid Controlled Substance Agreement    This is an agreement between you and your provider regarding safe and appropriate use of controlled substances prescribed by your care team. Controlled substances are?medicines that can cause physical and mental dependence (abuse).     There are strict laws about having and using these medicines. We here at [facility] are  committed to working with you in your efforts to get better. To support you in this work, we'll help you schedule regular office appointments for medicine refills. If we must cancel or change your appointment for any reason, we'll make sure you have enough medicine to last until your next appointment.     As a Provider, I will:   Listen carefully to your concerns while treating you with respect.   Recommend a treatment plan that I believe is in your best interest and may involve therapies other than medicine.    Talk with you often about the possible benefits and the risk of harm of any medicine that we prescribe for you.  Assess the safety of this medicine and check how well it works.    Provide a plan on how to taper (discontinue or go off) using this medicine if the decision is made to stop its use.      ::  As a Patient, I understand controlled substances:     Are prescribed by my care provider to help me function or work and manage my condition(s).?  Are strong medicines and can cause serious side effects.     Need to be taken exactly as prescribed.?Combining controlled substances with certain medicines or chemicals (such as illegal drugs, alcohol, sedatives, sleeping pills, and benzodiazepines) can be dangerous or even fatal.? If I stop taking my medicines suddenly, I may have severe withdrawal symptoms.     The risks, benefits, and side  effects of these medicine(s) were explained to me. I agree that:    I will take part in other treatments as advised by my care team. This may be psychiatry or counseling, physical therapy, behavioral therapy, group treatment or a referral to specialist.    I will keep all my appointments and understand this is part of the monitoring of controlled substances.?My care team may require an office visit for EVERY controlled substance refill. If I miss appointments or don t follow instructions, my care team may stop my medicine    I will take my medicines as prescribed. I will not change the dose or schedule unless my care team tells me to. There will be no refills if I run out early.      I may be asked to come to the clinic and complete a urine drug test or complete a pill count. If I don t give a urine sample or participate in a pill count, the care team may stop my medicine.    I will only receive controlled substance prescriptions from this clinic. If I am treated by another provider, I will tell them that I am taking controlled substances and that I have a treatment agreement with this provider. I will inform my [facility] care team within one business day if I am given a prescription for any controlled substance by another healthcare provider. My [facility] care team can contact other providers and pharmacists about my use of any medicines.    It is up to me to make sure that I don't run out of my medicines on weekends or holidays.?If my care team is willing to refill my prescription without a visit, I must request refills only during office hours. Refills may take up to 3 business days to process. I will use one pharmacy to fill all my controlled substance prescriptions. I will notify the clinic about any changes to my insurance or medicine availability.    I am responsible for my prescriptions. If the medicine/prescription is lost, stolen or destroyed, it will not be replaced.?I also agree not to  share controlled substance medicines with anyone.     I am aware I should not use any illegal or recreational drugs. I agree not to drink alcohol unless my care team says I can.     If I enroll in the [place] Medical Cannabis program, I will tell my care team before my next refill.    I will tell my care team right away if I become pregnant, have a new medical problem treated outside of my regular clinic, or have a change in my medicines.     I understand that this medicine can affect my thinking, judgment and reaction time.? Alcohol and drugs affect the brain and body, which can affect the safety of my driving. Being under the influence of alcohol or drugs can affect my decision-making, behaviors, personal safety and the safety of others. Driving while impaired (DWI) can occur if a person is driving, operating or in physical control of a car, motorcycle, boat, snowmobile, ATV, motorbike, off-road vehicle or any other motor vehicle ([place] Statute 169A.20). I understand the risk if I choose to drive or operate any vehicle or machinery.    I understand that if I do not follow any of the conditions above, my prescriptions or treatment may be stopped or changed.   I agree that my provider, clinic care team and pharmacy may work with any city, state or federal law enforcement agency that investigates the misuse, sale or other diversion of my controlled medicine. I will allow my provider to discuss my care with, or share a copy of, this agreement with any other treating provider, pharmacy or emergency room where I receive care.     I have read this agreement and have asked questions about anything I did not understand.    ________________________________________________________  Patient Signature - Janie De Leon     ___________________                   Date     ________________________________________________________  Provider Signature - Sirena Carter MD       ___________________                   Date      ________________________________________________________  Witness Signature (required if provider not present while patient signing)          ___________________                   Date

## 2024-01-15 NOTE — PROGRESS NOTES
"  Assessment & Plan     Stage 3 chronic kidney disease, unspecified whether stage 3a or 3b CKD (H)  - Albumin Random Urine Quantitative with Creat Ratio; Future    Major depressive disorder, single episode in full remission (H24)  Stable. Continue medication.    Adrenal nodule (H24)  Following with endocrine. Repeat CT in sept 2024.     Meningioma (H)  Seeing neurology. Imaging every 12 months.     Coronary artery disease involving native coronary artery of native heart without angina pectoris  NSTEMI without PCI in 2022.  Continue ASA and statin.    Essential hypertension, benign - goal < 140/90  Stable. Continue medication.    Osteopenia, unspecified location  Continue calcium and vitamin D supplement.    Hepatic steatosis  She has hx of heavy wine use in the past.     Mixed hyperlipidemia  - Lipid Profile; Future    Insomnia, unspecified type  Klonopin as needed.  - clonazePAM (KLONOPIN) 0.5 MG tablet; Take 0.5-1 tablets (0.25-0.5 mg) by mouth nightly as needed for anxiety     BMI:   Estimated body mass index is 30.36 kg/m  as calculated from the following:    Height as of this encounter: 1.753 m (5' 9\").    Weight as of this encounter: 93.3 kg (205 lb 9.6 oz).       See Patient Instructions    Sirena Carter MD  [facility] NELL Lima is a 73 year old, presenting for the following health issues:  Establish Care    Janie is here to establish care with me.   She has hx of meningioma, adrenal nodule, CAD, HTN, hepatic steatosis, CKD, depression, osteopenia, HLD.    Fam hx: mother had colon cancer, DM2, one maternal aunt with breast cancer, maternal uncle with pharyngeal cancer, another maternal aunt with colon cancer  Paternal grandmother with pharyngeal cancer. Brother with lung cancer, DM2 and DVT.   Former smoker: socially only from [age] years of age, a few cigarettes a month.   ETOH: excessive wine use in the past as his  used to make wine. Stopped this in 2014. One drink " "per week now.   UTD with colonoscopy in 2022 - recommended to follow up in 5 years  UTD with mammogram, every year.     History of Present Illness       Reason for visit:  First visit    She eats 0-1 servings of fruits and vegetables daily.She consumes 1 sweetened beverage(s) daily.She exercises with enough effort to increase her heart rate 9 or less minutes per day.  She exercises with enough effort to increase her heart rate 3 or less days per week. She is missing 1 dose(s) of medications per week.  She is not taking prescribed medications regularly due to remembering to take.     Review of Systems       Objective    /78 (BP Location: Left arm, Patient Position: Sitting, Cuff Size: Adult Regular)   Pulse 69   Temp 98  F (36.7  C) (Temporal)   Resp 18   Ht 1.753 m (5' 9\")   Wt 93.3 kg (205 lb 9.6 oz)   LMP  (LMP Unknown)   SpO2 97%   BMI 30.36 kg/m    Body mass index is 30.36 kg/m .  Physical Exam                 "

## 2024-01-19 ENCOUNTER — LAB (OUTPATIENT)
Dept: LAB | Facility: CLINIC | Age: 74
End: 2024-01-19
Payer: MEDICARE

## 2024-01-19 DIAGNOSIS — E78.2 MIXED HYPERLIPIDEMIA: Chronic | ICD-10-CM

## 2024-01-19 DIAGNOSIS — N18.30 STAGE 3 CHRONIC KIDNEY DISEASE, UNSPECIFIED WHETHER STAGE 3A OR 3B CKD (H): ICD-10-CM

## 2024-01-19 PROCEDURE — 80061 LIPID PANEL: CPT

## 2024-01-19 PROCEDURE — 82570 ASSAY OF URINE CREATININE: CPT

## 2024-01-19 PROCEDURE — 36415 COLL VENOUS BLD VENIPUNCTURE: CPT

## 2024-01-19 PROCEDURE — 82043 UR ALBUMIN QUANTITATIVE: CPT

## 2024-01-20 LAB
CHOLEST SERPL-MCNC: 163 MG/DL
CREAT UR-MCNC: 143 MG/DL
FASTING STATUS PATIENT QL REPORTED: YES
HDLC SERPL-MCNC: 38 MG/DL
LDLC SERPL CALC-MCNC: 69 MG/DL
MICROALBUMIN UR-MCNC: 20.2 MG/L
MICROALBUMIN/CREAT UR: 14.13 MG/G CR (ref 0–25)
NONHDLC SERPL-MCNC: 125 MG/DL
TRIGL SERPL-MCNC: 280 MG/DL

## 2024-03-26 ENCOUNTER — MYC REFILL (OUTPATIENT)
Dept: FAMILY MEDICINE | Facility: CLINIC | Age: 74
End: 2024-03-26
Payer: MEDICARE

## 2024-03-26 DIAGNOSIS — G47.00 INSOMNIA, UNSPECIFIED TYPE: Chronic | ICD-10-CM

## 2024-03-28 RX ORDER — CLONAZEPAM 0.5 MG/1
.25-.5 TABLET ORAL
Qty: 20 TABLET | Refills: 0 | Status: SHIPPED | OUTPATIENT
Start: 2024-03-28 | End: 2024-06-10

## 2024-06-10 ENCOUNTER — MYC REFILL (OUTPATIENT)
Dept: FAMILY MEDICINE | Facility: CLINIC | Age: 74
End: 2024-06-10
Payer: MEDICARE

## 2024-06-10 DIAGNOSIS — G47.00 INSOMNIA, UNSPECIFIED TYPE: Chronic | ICD-10-CM

## 2024-06-10 RX ORDER — CLONAZEPAM 0.5 MG/1
.25-.5 TABLET ORAL
Qty: 20 TABLET | Refills: 0 | Status: SHIPPED | OUTPATIENT
Start: 2024-06-10 | End: 2024-08-14

## 2024-06-13 ENCOUNTER — TRANSFERRED RECORDS (OUTPATIENT)
Dept: HEALTH INFORMATION MANAGEMENT | Facility: CLINIC | Age: 74
End: 2024-06-13
Payer: MEDICARE

## 2024-08-02 ENCOUNTER — PATIENT OUTREACH (OUTPATIENT)
Dept: CARE COORDINATION | Facility: CLINIC | Age: 74
End: 2024-08-02
Payer: MEDICARE

## 2024-08-14 ENCOUNTER — MYC REFILL (OUTPATIENT)
Dept: FAMILY MEDICINE | Facility: CLINIC | Age: 74
End: 2024-08-14
Payer: MEDICARE

## 2024-08-14 DIAGNOSIS — G47.00 INSOMNIA, UNSPECIFIED TYPE: Chronic | ICD-10-CM

## 2024-08-15 RX ORDER — CLONAZEPAM 0.5 MG/1
.25-.5 TABLET ORAL
Qty: 20 TABLET | Refills: 0 | Status: SHIPPED | OUTPATIENT
Start: 2024-08-15 | End: 2024-09-25

## 2024-08-29 DIAGNOSIS — I10 ESSENTIAL HYPERTENSION, BENIGN: Chronic | ICD-10-CM

## 2024-08-29 DIAGNOSIS — F32.5 MAJOR DEPRESSIVE DISORDER, SINGLE EPISODE IN FULL REMISSION (H): Chronic | ICD-10-CM

## 2024-09-02 RX ORDER — AMLODIPINE BESYLATE 5 MG/1
5 TABLET ORAL EVERY EVENING
Qty: 90 TABLET | Refills: 3 | Status: SHIPPED | OUTPATIENT
Start: 2024-09-02

## 2024-09-02 RX ORDER — DULOXETIN HYDROCHLORIDE 60 MG/1
60 CAPSULE, DELAYED RELEASE ORAL DAILY
Qty: 90 CAPSULE | Refills: 3 | Status: SHIPPED | OUTPATIENT
Start: 2024-09-02

## 2024-09-04 ENCOUNTER — MYC MEDICAL ADVICE (OUTPATIENT)
Dept: ENDOCRINOLOGY | Facility: CLINIC | Age: 74
End: 2024-09-04
Payer: MEDICARE

## 2024-09-04 DIAGNOSIS — I67.1 BRAIN ANEURYSM: Primary | ICD-10-CM

## 2024-09-04 DIAGNOSIS — D32.9 MENINGIOMA (H): ICD-10-CM

## 2024-09-04 NOTE — PROGRESS NOTES
Repeat imaging annually for monitoring of Aneurysm, and meningioma.    CLARA Hernandez D.O.  [facility] Neurology

## 2024-09-08 DIAGNOSIS — E03.9 HYPOTHYROIDISM, UNSPECIFIED TYPE: ICD-10-CM

## 2024-09-08 DIAGNOSIS — E27.9 ADRENAL NODULE (H): Primary | ICD-10-CM

## 2024-09-09 ENCOUNTER — LAB (OUTPATIENT)
Dept: LAB | Facility: CLINIC | Age: 74
End: 2024-09-09
Payer: MEDICARE

## 2024-09-09 DIAGNOSIS — I10 ESSENTIAL HYPERTENSION, BENIGN: Chronic | ICD-10-CM

## 2024-09-09 DIAGNOSIS — E03.9 HYPOTHYROIDISM, UNSPECIFIED TYPE: ICD-10-CM

## 2024-09-09 DIAGNOSIS — E27.9 ADRENAL NODULE (H): ICD-10-CM

## 2024-09-09 PROCEDURE — 84439 ASSAY OF FREE THYROXINE: CPT

## 2024-09-09 PROCEDURE — 84443 ASSAY THYROID STIM HORMONE: CPT

## 2024-09-09 PROCEDURE — 82533 TOTAL CORTISOL: CPT

## 2024-09-09 PROCEDURE — 36415 COLL VENOUS BLD VENIPUNCTURE: CPT

## 2024-09-10 ENCOUNTER — ANCILLARY PROCEDURE (OUTPATIENT)
Dept: MAMMOGRAPHY | Facility: CLINIC | Age: 74
End: 2024-09-10
Payer: MEDICARE

## 2024-09-10 DIAGNOSIS — Z12.31 ENCOUNTER FOR SCREENING MAMMOGRAM FOR BREAST CANCER: ICD-10-CM

## 2024-09-10 LAB
CORTIS SERPL-MCNC: 12.6 UG/DL
T4 FREE SERPL-MCNC: 1.37 NG/DL (ref 0.9–1.7)
TSH SERPL DL<=0.005 MIU/L-ACNC: 4.26 UIU/ML (ref 0.3–4.2)

## 2024-09-10 PROCEDURE — 77063 BREAST TOMOSYNTHESIS BI: CPT | Mod: TC | Performed by: RADIOLOGY

## 2024-09-10 PROCEDURE — 77067 SCR MAMMO BI INCL CAD: CPT | Mod: TC | Performed by: RADIOLOGY

## 2024-09-10 RX ORDER — OLMESARTAN MEDOXOMIL 20 MG/1
20 TABLET ORAL DAILY
Qty: 90 TABLET | Refills: 0 | Status: SHIPPED | OUTPATIENT
Start: 2024-09-10

## 2024-09-15 ENCOUNTER — HEALTH MAINTENANCE LETTER (OUTPATIENT)
Age: 74
End: 2024-09-15

## 2024-09-16 ENCOUNTER — ANCILLARY PROCEDURE (OUTPATIENT)
Dept: CT IMAGING | Facility: CLINIC | Age: 74
End: 2024-09-16
Attending: INTERNAL MEDICINE
Payer: MEDICARE

## 2024-09-16 DIAGNOSIS — E27.9 ADRENAL NODULE (H): ICD-10-CM

## 2024-09-16 PROCEDURE — 74150 CT ABDOMEN W/O CONTRAST: CPT | Mod: MG

## 2024-09-17 ENCOUNTER — MYC MEDICAL ADVICE (OUTPATIENT)
Dept: FAMILY MEDICINE | Facility: CLINIC | Age: 74
End: 2024-09-17
Payer: MEDICARE

## 2024-09-25 ENCOUNTER — OFFICE VISIT (OUTPATIENT)
Dept: FAMILY MEDICINE | Facility: CLINIC | Age: 74
End: 2024-09-25
Payer: MEDICARE

## 2024-09-25 VITALS
RESPIRATION RATE: 20 BRPM | TEMPERATURE: 97 F | DIASTOLIC BLOOD PRESSURE: 76 MMHG | SYSTOLIC BLOOD PRESSURE: 124 MMHG | WEIGHT: 214 LBS | HEIGHT: 69 IN | OXYGEN SATURATION: 96 % | BODY MASS INDEX: 31.7 KG/M2 | HEART RATE: 67 BPM

## 2024-09-25 DIAGNOSIS — Z78.0 ASYMPTOMATIC POSTMENOPAUSAL STATUS: ICD-10-CM

## 2024-09-25 DIAGNOSIS — G47.00 INSOMNIA, UNSPECIFIED TYPE: Chronic | ICD-10-CM

## 2024-09-25 DIAGNOSIS — N18.31 STAGE 3A CHRONIC KIDNEY DISEASE (H): Primary | ICD-10-CM

## 2024-09-25 DIAGNOSIS — R73.03 PREDIABETES: ICD-10-CM

## 2024-09-25 DIAGNOSIS — N28.1 CYST OF RIGHT KIDNEY: ICD-10-CM

## 2024-09-25 DIAGNOSIS — R06.02 SHORTNESS OF BREATH: ICD-10-CM

## 2024-09-25 LAB
ANION GAP SERPL CALCULATED.3IONS-SCNC: 12 MMOL/L (ref 7–15)
BUN SERPL-MCNC: 20.9 MG/DL (ref 8–23)
CALCIUM SERPL-MCNC: 9.7 MG/DL (ref 8.8–10.4)
CHLORIDE SERPL-SCNC: 102 MMOL/L (ref 98–107)
CREAT SERPL-MCNC: 1.06 MG/DL (ref 0.51–0.95)
EGFRCR SERPLBLD CKD-EPI 2021: 55 ML/MIN/1.73M2
EST. AVERAGE GLUCOSE BLD GHB EST-MCNC: 148 MG/DL
GLUCOSE SERPL-MCNC: 102 MG/DL (ref 70–99)
HBA1C MFR BLD: 6.8 % (ref 0–5.6)
HCO3 SERPL-SCNC: 25 MMOL/L (ref 22–29)
HGB BLD-MCNC: 15.3 G/DL (ref 11.7–15.7)
POTASSIUM SERPL-SCNC: 4.9 MMOL/L (ref 3.4–5.3)
SODIUM SERPL-SCNC: 139 MMOL/L (ref 135–145)

## 2024-09-25 PROCEDURE — 99214 OFFICE O/P EST MOD 30 MIN: CPT | Performed by: INTERNAL MEDICINE

## 2024-09-25 PROCEDURE — 80048 BASIC METABOLIC PNL TOTAL CA: CPT | Performed by: INTERNAL MEDICINE

## 2024-09-25 PROCEDURE — 36415 COLL VENOUS BLD VENIPUNCTURE: CPT | Performed by: INTERNAL MEDICINE

## 2024-09-25 PROCEDURE — 83036 HEMOGLOBIN GLYCOSYLATED A1C: CPT | Performed by: INTERNAL MEDICINE

## 2024-09-25 PROCEDURE — 85018 HEMOGLOBIN: CPT | Performed by: INTERNAL MEDICINE

## 2024-09-25 RX ORDER — CLONAZEPAM 0.5 MG/1
.25-.5 TABLET ORAL
Qty: 20 TABLET | Refills: 0 | Status: SHIPPED | OUTPATIENT
Start: 2024-09-25

## 2024-09-25 ASSESSMENT — ANXIETY QUESTIONNAIRES
GAD7 TOTAL SCORE: 4
8. IF YOU CHECKED OFF ANY PROBLEMS, HOW DIFFICULT HAVE THESE MADE IT FOR YOU TO DO YOUR WORK, TAKE CARE OF THINGS AT HOME, OR GET ALONG WITH OTHER PEOPLE?: NOT DIFFICULT AT ALL
7. FEELING AFRAID AS IF SOMETHING AWFUL MIGHT HAPPEN: NOT AT ALL

## 2024-09-25 ASSESSMENT — PAIN SCALES - GENERAL: PAINLEVEL: NO PAIN (0)

## 2024-09-25 NOTE — PATIENT INSTRUCTIONS
You are due for dexa scan.   Please call the following number to make appointment :  [phone]  It is located in [address]

## 2024-09-25 NOTE — PROGRESS NOTES
"  Assessment & Plan     Stage 3a chronic kidney disease (H)  - BASIC METABOLIC PANEL  - Hemoglobin    Prediabetes  - HEMOGLOBIN A1C    Cyst of right kidney  - US Kidney Right; Future    Shortness of breath  Rule out ischemia/heart failure  - Echocardiogram Exercise Stress; Future    Insomnia, unspecified type  Stable. Continue medication.  - clonazePAM (KLONOPIN) 0.5 MG tablet; Take 0.5-1 tablets (0.25-0.5 mg) by mouth nightly as needed for anxiety.    Asymptomatic postmenopausal status  - DX Bone Density; Future    BMI  Estimated body mass index is 31.6 kg/m  as calculated from the following:    Height as of this encounter: 1.753 m (5' 9\").    Weight as of this encounter: 97.1 kg (214 lb).   Weight management plan: Discussed healthy diet and exercise guidelines      See Patient Instructions    Subjective   Janie is a 74 year old, presenting for the following health issues:  Results (Patient is here to go over CT scan results.  )        9/25/2024     1:50 PM   Additional Questions   Roomed by Richmond DILLON MA   Accompanied by Self     History of Present Illness       Reason for visit:  Breathing problems and review of abdominsl CT scan results  Symptom onset:  More than a month  Symptoms include:  Shortmess if breath, cough, chest noise, also intermittent right kidney pain  Symptom intensity:  Moderate  Symptom progression:  Staying the same  Had these symptoms before:  No  What makes it worse:  No  What makes it better:  No   She is taking medications regularly.     Janie De Leon is here for follow up.  Sob: on exertion and laying down.   Hx of CAD - minimal blockage in arteries per cath in 2022  CT scan findings discussed - CT abd hows adrenal incidentaloma. This was ordered by her endocrinologist and patient is following up with them soon.   There is a cyst in right kidney which is not a simple cyst.           Objective    /76 (BP Location: Left arm, Patient Position: Sitting, Cuff Size: Adult Large)   Pulse " "67   Temp 97  F (36.1  C) (Temporal)   Resp 20   Ht 1.753 m (5' 9\")   Wt 97.1 kg (214 lb)   LMP  (LMP Unknown)   SpO2 96%   BMI 31.60 kg/m    Body mass index is 31.6 kg/m .  Physical Exam           Signed Electronically by: Sirena Carter MD    "

## 2024-09-26 ENCOUNTER — HOSPITAL ENCOUNTER (OUTPATIENT)
Dept: MRI IMAGING | Facility: CLINIC | Age: 74
Discharge: HOME | End: 2024-09-26
Attending: PSYCHIATRY & NEUROLOGY | Admitting: PSYCHIATRY & NEUROLOGY
Payer: MEDICARE

## 2024-09-26 DIAGNOSIS — D32.9 MENINGIOMA (H): ICD-10-CM

## 2024-09-26 DIAGNOSIS — I67.1 BRAIN ANEURYSM: ICD-10-CM

## 2024-09-26 PROCEDURE — A9585 GADOBUTROL INJECTION: HCPCS | Performed by: PSYCHIATRY & NEUROLOGY

## 2024-09-26 PROCEDURE — G1010 CDSM STANSON: HCPCS

## 2024-09-26 PROCEDURE — 255N000002 HC RX 255 OP 636: Performed by: PSYCHIATRY & NEUROLOGY

## 2024-09-26 PROCEDURE — 70553 MRI BRAIN STEM W/O & W/DYE: CPT | Mod: MG

## 2024-09-26 RX ORDER — GADOBUTROL 604.72 MG/ML
10 INJECTION INTRAVENOUS ONCE
Status: COMPLETED | OUTPATIENT
Start: 2024-09-26 | End: 2024-09-26

## 2024-09-26 RX ADMIN — GADOBUTROL 10 ML: 604.72 INJECTION INTRAVENOUS at 08:38

## 2024-09-27 PROBLEM — Z78.0 ASYMPTOMATIC POSTMENOPAUSAL STATUS: Status: ACTIVE | Noted: 2024-09-27

## 2024-10-01 ENCOUNTER — OFFICE VISIT (OUTPATIENT)
Dept: ENDOCRINOLOGY | Facility: CLINIC | Age: 74
End: 2024-10-01
Payer: MEDICARE

## 2024-10-01 VITALS
HEART RATE: 89 BPM | SYSTOLIC BLOOD PRESSURE: 94 MMHG | WEIGHT: 211.8 LBS | BODY MASS INDEX: 31.28 KG/M2 | DIASTOLIC BLOOD PRESSURE: 62 MMHG

## 2024-10-01 DIAGNOSIS — E27.9 ADRENAL NODULE (H): ICD-10-CM

## 2024-10-01 DIAGNOSIS — E06.3 HYPOTHYROIDISM DUE TO HASHIMOTO THYROIDITIS: Primary | ICD-10-CM

## 2024-10-01 PROCEDURE — 99214 OFFICE O/P EST MOD 30 MIN: CPT | Performed by: INTERNAL MEDICINE

## 2024-10-01 PROCEDURE — G2211 COMPLEX E/M VISIT ADD ON: HCPCS | Performed by: INTERNAL MEDICINE

## 2024-10-01 RX ORDER — LEVOTHYROXINE SODIUM 88 UG/1
88 TABLET ORAL DAILY
Qty: 90 TABLET | Refills: 0 | Status: SHIPPED | OUTPATIENT
Start: 2024-10-01

## 2024-10-01 NOTE — NURSING NOTE
Chief Complaint   Patient presents with    RECHECK     Adrenal nodule (H) +1 more       Vitals:    10/01/24 1002   BP: 94/62   BP Location: Left arm   Patient Position: Sitting   Cuff Size: Adult Large   Pulse: 89   Weight: 96.1 kg (211 lb 12.8 oz)       Body mass index is 31.28 kg/m .    Leanne Chaves, [facility]

## 2024-10-01 NOTE — PROGRESS NOTES
Recent issues:  Adrenal and thyroid follow-up evaluation  Right ankle injury and fracture in 5/2023, ORIF surgery with plate & screws, recovering pretty well now  Reviewed medical history from patient, preappt labs and CT scan, Epic chart record        Adrenal:  9/2022. Acute pain at right anterolateral abdomen   Severity 8 of 10   Some constipation and flatulence, but no nausea, vomiting, diarrhea  She took medication for constipation  9/16/22. Medical evaluation with Dr. FRANSISCA Davis/LYNDA Caldera  9/16/22 CT Abdomen-pelvis:   Small hiatal hernia.    Hepatic steatosis. focal fatty infiltration along the falciform ligament.    Normal pancreas, spleen   Simple cysts in kidney   Evidence of hysterectomy   Indeterminate 1.6 cm left adrenal nodule    Additional health history:   Known adrenal disease: none  Steroid med use:  none  Hypertension:   amlodipine 5 mg daily      olmesartan 40 mg daily  Anticoagulation:  none  Previous [facility] adrenal labs:   Latest Reference Range & Units 09/23/22 14:16   Aldosterone 0.0 - 31.0 ng/dL 11.6      Latest Reference Range & Units 09/23/22 14:16   Renin Activity ng/mL/hr 3.3      Latest Reference Range & Units 09/23/22 14:16   Cortisol Serum ug/dL 10.3     9/29/22 24-hr urine cortisol 25 micrograms (nl 3.5-45), metanephrines 74 mcg (nl )  9/30/22 MRI- adrenals:  Stable 1.6 cm left adrenal nodule,   Nodule mildly hypointense on T1 and T2-weighted images, and demonstrates homogeneous loss of signal on the opposed phase images  consistent with a benign adenoma.  9/6/24 CT Abdomen:  Left adrenal 1.7 x 1.6 cm nodule is present along the left adrenal gland with internal density of 0 Hounsfield units, suggestive of an adenoma.   2.8 cm exophytic cyst is seen along right kidney.    Scattered vascular calcifications are present within the abdominal aorta.    Multilevel degenerative changes are present in the spine.     Thyroid:  2015. Initial diagnosis of hypothyroidism  Had seen Dr. SUDHA Fierro,  then Dr. SUDHA La/[facility]  Began treatment with levothyroxine medication  2020. Recalls having sweating spells while on thyroid medication,    Taking levothyroxine 0.1 mg dose, then decided to cut tablets in half for ~4 months   Noticed less sweating symptom  12/7/20. Subsequent medical evaluation with Dr. FRANSISCA Davis  Lab tests showed mildly elevated TSH level.  Dose increase back to full levothyroxine 0.1 mg tablet daily, dosing before breakfast meal  Previous  thyroid tests include:   Lab Test 01/22/21  1005 12/07/20  1029 08/27/20  0943 07/30/19  0848 07/30/18  1053 04/18/18  0800   TSH 4.81* 4.87* 7.17* 4.92* 2.97 9.41*   T4 0.97 0.88 0.99 0.97  --  0.86        Latest Reference Range & Units 05/28/21 09:48   Thyroid Peroxidase Antibody <35 IU/mL 182 (H)     Additional health history:  Previous thyroid nodules: none  Neck radiation treatments: none  Fam Hx thyroid disease:  none      3/29/21. Initial thyroid evaluation with me at [place]  Reviewed health history and thyroid issues  Continued levothyroxine medication treatment plan  Recent  labs include:  Lab Results   Component Value Date    TSH 4.26 (H) 09/09/2024    T4 1.37 09/09/2024     (H) 05/28/2021     Current dose:  Levothyroxine 0.088 mg daily        Lives in [place], [place]  Sees Dr. Jade Davis/[facility] for general medicine evaluations.    PMH/PSH:  Past Medical History:   Diagnosis Date    Adrenal nodule (H) 09/2022    CT scan    Anemia     mild gastropathy on EGD 2008; neg pill cam    Arthritis     Lower back    Atypical ductal hyperplasia of both breasts     Has had biopsies and MRI    Diabetes (H) 2009    Prediabetes    Fibroid uterus     High cholesterol     History of hematuria 2008    Cystoscopy for recurrent UTIs; unremarkable renal US. Also recurrent UTIs as child and pyelonephritis.     History of hormone replacement therapy     after JASBIR with BSO    HTN (hypertension)     HTN, goal below 140/90      Hx of bone density study 10/16/2006    Normal    Hypothyroidism     Insomnia     periodic - prn clonazepam helpful a couple times per month    Lipid screening 07/21/2015    Tchol 128, , HDL 53, LDL 53 outside records --> based on our labs off of atorvastatin 10yr ASCVD risk 9.4% in Oct 2015    Major depressive disorder, single episode in full remission (H) 2007    NSTEMI (non-ST elevated myocardial infarction) (H) 09/26/2022    Osteopenia     Mild left hip July 2016    Prediabetes     Metformin in the past    Renal cyst, right     Rotator cuff injury, left, sequela     MRI Jan 2015 and had PT; High-grade complete or near complete tear of the supraspinatus tendon and anterior fibers of the infraspinatus tendon. 1/3 of the infraspinatus tendon appears involved.     TBI (traumatic brain injury) (H)     As a child    Tubular adenoma of colon 2013 & 2016     Past Surgical History:   Procedure Laterality Date    ANKLE SURGERY  1976    BREAST BIOPSY, RT/LT      Many    BREAST SURGERY      Benign lumpectomy    C/SECTION, LOW TRANSVERSE  1968    CAPSULE/PILL CAM ENDOSCOPY  02/18/2014    EGD prior; capsule was negative     COLONOSCOPY      1999, 2003, 2008, 6/6/2013    COLONOSCOPY N/A 06/20/2019    Procedure: COLONOSCOPY, WITH POLYPECTOMY AND BIOPSY;  Surgeon: Pepito Romero MD;  Location:  GI    COLONOSCOPY N/A 06/16/2022    Procedure: COLONOSCOPY;  Surgeon: Rodrigo Dunbar MD;  Location:  GI    CV CORONARY ANGIOGRAM N/A 09/26/2022    Procedure: Coronary Angiogram;  Surgeon: Horacio Moran MD;  Location:  HEART CARDIAC CATH LAB    CV INSTANTANEOUS WAVE-FREE RATIO N/A 09/26/2022    Procedure: Instantaneous Wave-Free Ratio;  Surgeon: Horacio Moran MD;  Location:  HEART CARDIAC CATH LAB    CV OPTICAL COHERENCE TOMOGRAPHY N/A 09/26/2022    Procedure: Optical Coherence Tomography;  Surgeon: Horacoi Moran MD;  Location:  HEART CARDIAC CATH LAB    HYSTERECTOMY, PAP NO LONGER INDICATED       OPEN REDUCTION INTERNAL FIXATION ANKLE Right 2023    Procedure: OPEN REDUCTION INTERNAL FIXATION RIGHT ANKLE FRACTURE;  Surgeon: Declan Casanova MD;  Location:  OR    [facility] with BSO  2000    benign fibroids    TUBAL LIGATION         Family Hx:  Family History   Problem Relation Age of Onset    Colon Cancer Mother 70         age 81    Diabetes Mother         After age 75    Macular Degeneration Mother     Glaucoma Mother     Cerebrovascular Disease Mother     Heart Failure Mother     Hypertension Mother     Hyperlipidemia Mother     Other - See Comments Father 87        Frailty, pneumonia, fractures, failure to thrive    Osteoporosis Father         diagnosed in his 80s    Colon Polyps Daughter         Tubular adenoma    Deep Vein Thrombosis Brother     Hyperlipidemia Brother     Hypertension Brother     Other Cancer Brother         myeloma    Hypertension Brother         both brothers    Hyperlipidemia Brother         both brothers    Depression Brother     Anxiety Disorder Brother     Mental Illness Brother         on Haldol before death from lung cancer    Lung Cancer Brother         long time heavy smoker    Other Cancer Brother         Lung cancer    Breast Cancer Other         radical mastecomy in her 40s    Colon Cancer Other     Other Cancer Other         several aunts various kinds    Other Cancer Other         throat cancer    Other Cancer Paternal Grandmother         throat cancer         Social Hx:  Social History     Socioeconomic History    Marital status:      Spouse name: Not on file    Number of children: Not on file    Years of education: Not on file    Highest education level: Not on file   Occupational History    Not on file   Tobacco Use    Smoking status: Former     Current packs/day: 0.00     Average packs/day: 0.5 packs/day for 9.6 years (4.8 ttl pk-yrs)     Types: Cigarettes     Start date: 1965     Quit date: 4/15/1975     Years since quittin.4     Smokeless tobacco: Never    Tobacco comments:     social smoker - only smoked when with another smoker   Vaping Use    Vaping status: Never Used   Substance and Sexual Activity    Alcohol use: Yes     Comment: Occasionally 1 beer every few weeks    Drug use: No    Sexual activity: Not Currently     Partners: Male     Birth control/protection: None     Comment: post menopausal   Other Topics Concern    Parent/sibling w/ CABG, MI or angioplasty before 65F 55M? No   Social History Narrative    Not on file     Social Determinants of Health     Financial Resource Strain: Low Risk  (1/8/2024)    Financial Resource Strain     Within the past 12 months, have you or your family members you live with been unable to get utilities (heat, electricity) when it was really needed?: No   Food Insecurity: Low Risk  (1/8/2024)    Food Insecurity     Within the past 12 months, did you worry that your food would run out before you got money to buy more?: No     Within the past 12 months, did the food you bought just not last and you didn t have money to get more?: No   Transportation Needs: Low Risk  (1/8/2024)    Transportation Needs     Within the past 12 months, has lack of transportation kept you from medical appointments, getting your medicines, non-medical meetings or appointments, work, or from getting things that you need?: No   Physical Activity: Not on file   Stress: Not on file   Social Connections: Not on file   Interpersonal Safety: Low Risk  (9/25/2024)    Interpersonal Safety     Do you feel physically and emotionally safe where you currently live?: Yes     Within the past 12 months, have you been hit, slapped, kicked or otherwise physically hurt by someone?: No     Within the past 12 months, have you been humiliated or emotionally abused in other ways by your partner or ex-partner?: No   Housing Stability: Low Risk  (1/8/2024)    Housing Stability     Do you have housing? : Yes     Are you worried about losing your  housing?: No          MEDICATIONS:  has a current medication list which includes the following prescription(s): amlodipine, aspirin, atorvastatin, cholecalciferol, cyanocobalamin, duloxetine, levothyroxine, metoprolol tartrate, olmesartan, acetaminophen, calcium carbonate, clonazepam, nitroglycerin, polyethylene glycol, senna-docusate, and [DISCONTINUED] lovastatin.    ROS:     ROS: 10 point ROS neg other than the symptoms noted above in the HPI.    GENERAL: mild fatigue, wt stable; denies fevers, chills, night sweats.   HEENT: no dysphagia, odonophagia, diplopia, neck pain  THYROID:  no apparent hyper or hypothyroid symptoms  CV: no chest pain, pressure, palpitations  LUNGS: no SOB, PIERSON, cough, wheezing   ABDOMEN: intermittent abdominal pain, constipation, some reflux; no diarrhea  EXTREMITIES: no rashes, ulcers, edema  NEUROLOGY: no headaches, denies changes in vision, tingling, extremitiy numbness   MSK: no muscle aches or pains, weakness  SKIN: no rashes or lesions  : no menses since hysterectomy age 51  PSYCH:  stable mood, no significant anxiety or depression  ENDOCRINE: sweating spells, as noted      Physical Exam   VS: BP 94/62 (BP Location: Left arm, Patient Position: Sitting, Cuff Size: Adult Large)   Pulse 89   Wt 96.1 kg (211 lb 12.8 oz)   LMP  (LMP Unknown)   BMI 31.28 kg/m    GENERAL: AXOX3, NAD, well dressed, answering questions appropriately, appears stated age.  ENT: no nose swelling or nasal discharge, mouth redness or gum changes.  EYES: eyes grossly normal to inspection, conjunctivae and sclerae normal, no exophthalmos or proptosis  THYROID:  no apparent nodules or goiter  LUNGS: no audible wheeze, cough or visible cyanosis, or increased work of breathing  ABDOMEN: abdomen mildly obese  EXTREMITIES: no edema noted  NEUROLOGY: CN grossly intact, no tremors  MSK: grossly intact  SKIN:  no apparent skin lesions, rash, or edema with visualized skin appearance  PSYCH: mentation appears normal,  affect normal/bright, judgement and insight intact,   normal speech and appearance well groomed      LABS:    All pertinent notes, labs, and images personally reviewed by me.     A/P:  Encounter Diagnoses   Name Primary?    Hypothyroidism due to Hashimoto thyroiditis Yes    Adrenal nodule (H)        Comments:  Reviewed health history, adrenal and thyroid issues  Patient has hypothyroidism, also non-functioning, benign adrenal nodule  Reviewed and interpreted tests that I previously ordered.   Ordered appropriate tests for the endocrinology disease management.    Management options discussed and implemented after shared medical decision making with the patient.  Thyroid and adrenal problems are chronic-stable     Plan:  Reviewed general issues with the hypothyroidism diagnosis and management  Discussed lab tests used to assess patient thyroid hormone levels  Reviewed treatment option with levothyroxine medication    Discussed general issues with adrenal gland diseases and management  Reviewed adrenal gland anatomy and hormone physiology  Discussed lab tests used to assess patient adrenal hormone levels  We discussed the recent adrenal CT-scan imaging procedure    Recommend:  Continue current levothyroxine 0.088 mg daily dose  Reviewed dosing options with the levothyroxine medication  Monitor for symptom changes  Continue current antihypertensive med treatment plan  No thyroid U/S imaging needed at this time  Repeat preappt labs in 12/2024   Testing at [facility]  Lab orders placed  No additional adrenal testing or imaging needed at this time  Patient to contact me if questions about management plan    Addressed patient questions today    The longitudinal plan of care for the endocrine problem(s) were addressed during this visit.  Due to added complexity of care,   we will continue to support the patient and the subsequent management of this condition with ongoing continuity of care.    There are no Patient  Instructions on file for this visit.    Future labs ordered today:   Orders Placed This Encounter   Procedures    TSH    T4 free     Radiology/Consults ordered today: None    Total time spent on day of encounter:  20 min    Follow-up:  10/2025, Return    NIMO Cutler MD, MS  Endocrinology  [facility]    CC:  TULIO Carter

## 2024-10-07 ENCOUNTER — HOSPITAL ENCOUNTER (OUTPATIENT)
Dept: ULTRASOUND IMAGING | Facility: CLINIC | Age: 74
Discharge: HOME | End: 2024-10-07
Attending: INTERNAL MEDICINE | Admitting: INTERNAL MEDICINE
Payer: MEDICARE

## 2024-10-07 DIAGNOSIS — N28.1 CYST OF RIGHT KIDNEY: ICD-10-CM

## 2024-10-07 PROCEDURE — 76775 US EXAM ABDO BACK WALL LIM: CPT

## 2024-10-08 ENCOUNTER — HOSPITAL ENCOUNTER (OUTPATIENT)
Dept: CARDIOLOGY | Facility: CLINIC | Age: 74
Discharge: HOME | End: 2024-10-08
Attending: INTERNAL MEDICINE | Admitting: INTERNAL MEDICINE
Payer: MEDICARE

## 2024-10-08 DIAGNOSIS — R06.02 SHORTNESS OF BREATH: ICD-10-CM

## 2024-10-08 PROCEDURE — 255N000002 HC RX 255 OP 636: Performed by: INTERNAL MEDICINE

## 2024-10-08 PROCEDURE — 93321 DOPPLER ECHO F-UP/LMTD STD: CPT | Mod: TC

## 2024-10-08 PROCEDURE — 93350 STRESS TTE ONLY: CPT | Mod: 26 | Performed by: INTERNAL MEDICINE

## 2024-10-08 PROCEDURE — 93016 CV STRESS TEST SUPVJ ONLY: CPT | Performed by: INTERNAL MEDICINE

## 2024-10-08 PROCEDURE — 93325 DOPPLER ECHO COLOR FLOW MAPG: CPT | Mod: 26 | Performed by: INTERNAL MEDICINE

## 2024-10-08 PROCEDURE — 93321 DOPPLER ECHO F-UP/LMTD STD: CPT | Mod: 26 | Performed by: INTERNAL MEDICINE

## 2024-10-08 PROCEDURE — 93325 DOPPLER ECHO COLOR FLOW MAPG: CPT | Mod: TC

## 2024-10-08 PROCEDURE — 93018 CV STRESS TEST I&R ONLY: CPT | Performed by: INTERNAL MEDICINE

## 2024-10-08 RX ADMIN — HUMAN ALBUMIN MICROSPHERES AND PERFLUTREN 3 ML: 10; .22 INJECTION, SOLUTION INTRAVENOUS at 13:16

## 2024-10-17 ENCOUNTER — VIRTUAL VISIT (OUTPATIENT)
Dept: FAMILY MEDICINE | Facility: CLINIC | Age: 74
End: 2024-10-17
Payer: MEDICARE

## 2024-10-17 DIAGNOSIS — I10 ESSENTIAL HYPERTENSION, BENIGN: Chronic | ICD-10-CM

## 2024-10-17 DIAGNOSIS — E03.9 HYPOTHYROIDISM, UNSPECIFIED TYPE: Chronic | ICD-10-CM

## 2024-10-17 DIAGNOSIS — E78.5 HYPERLIPIDEMIA, UNSPECIFIED HYPERLIPIDEMIA TYPE: Chronic | ICD-10-CM

## 2024-10-17 DIAGNOSIS — E11.9 TYPE 2 DIABETES MELLITUS WITHOUT COMPLICATION, WITHOUT LONG-TERM CURRENT USE OF INSULIN (H): Primary | ICD-10-CM

## 2024-10-17 DIAGNOSIS — I25.10 CORONARY ARTERY DISEASE INVOLVING NATIVE CORONARY ARTERY OF NATIVE HEART WITHOUT ANGINA PECTORIS: Chronic | ICD-10-CM

## 2024-10-17 PROCEDURE — 99214 OFFICE O/P EST MOD 30 MIN: CPT | Mod: 95 | Performed by: INTERNAL MEDICINE

## 2024-10-17 RX ORDER — METFORMIN HYDROCHLORIDE 500 MG/1
500 TABLET, EXTENDED RELEASE ORAL 2 TIMES DAILY WITH MEALS
Qty: 180 TABLET | Refills: 3 | Status: SHIPPED | OUTPATIENT
Start: 2024-10-17

## 2024-10-17 RX ORDER — LANCETS
EACH MISCELLANEOUS
Qty: 100 EACH | Refills: 6 | Status: SHIPPED | OUTPATIENT
Start: 2024-10-17

## 2024-10-17 NOTE — PROGRESS NOTES
Janie is a 74 year old who is being evaluated via a billable video visit.    How would you like to obtain your AVS? MyChart  If the video visit is dropped, the invitation should be resent by: Text to cell phone: [phone]  Will anyone else be joining your video visit? No      Assessment & Plan     Type 2 diabetes mellitus without complication, without long-term current use of insulin (H)  Starting with metformin 500 mg bid  Will check A1c in 3 months.   Blood sugar monitoring in fasting 3-4 times per week  Diabetic educator for diet   - metFORMIN (GLUCOPHAGE XR) 500 MG 24 hr tablet; Take 1 tablet (500 mg) by mouth 2 times daily (with meals).  - Adult Diabetes Education  Referral; Future  - blood glucose monitoring (NO BRAND SPECIFIED) meter device kit; Use to test blood sugar 3 times a week. Preferred blood glucose meter OR supplies to accompany: Blood Glucose Monitor Brands: per insurance.  - blood glucose (NO BRAND SPECIFIED) test strip; Use to test blood sugar 3 times a week. To accompany: Blood Glucose Monitor Brands: per insurance.  - thin (NO BRAND SPECIFIED) lancets; Use with lanceting device. To accompany: Blood Glucose Monitor Brands: per insurance.    Hypothyroidism, unspecified type  Stable. Continue medication    Essential hypertension, benign - goal < 140/90  Stable. Continue medication.    Coronary artery disease involving native coronary artery of native heart without angina pectoris  Stable. Continue asa and statin    Hyperlipidemia, unspecified hyperlipidemia type  LDL goals discussed      See Patient Instructions    Subjective   Janie is a 74 year old, presenting for the following health issues:  Diabetes (Follow up/)      Video Start Time: 10:13 AM    History of Present Illness       Reason for visit:  Diabetes follow up  Symptom onset:  1-2 weeks ago  Symptoms include:  High a1c no symptoms  Symptom intensity:  Mild  Symptom progression:  Staying the same  Had these symptoms before:   No She is missing 1 dose(s) of medications per week.  She is not taking prescribed medications regularly due to remembering to take.     New diagnosis of DM2        Objective           Vitals:  No vitals were obtained today due to virtual visit.    Physical Exam   GEN: No acute distress  RESP: No audible increased work of breathing. Patient speaking in full sentences without distress.  PSYCH: pleasant  Exam otherwise limited due to virtual platform        Video-Visit Details    Type of service:  Video Visit   Video End Time: 10: 40 am  Originating Location (pt. Location): Home    Distant Location (provider location):  Off-site  Platform used for Video Visit: [employer]  Signed Electronically by: Sirena Carter MD

## 2024-10-23 DIAGNOSIS — I21.4 NSTEMI (NON-ST ELEVATED MYOCARDIAL INFARCTION) (H): Chronic | ICD-10-CM

## 2024-10-23 RX ORDER — ATORVASTATIN CALCIUM 40 MG/1
40 TABLET, FILM COATED ORAL EVERY EVENING
Qty: 90 TABLET | Refills: 0 | Status: SHIPPED | OUTPATIENT
Start: 2024-10-23

## 2024-10-25 ENCOUNTER — VIRTUAL VISIT (OUTPATIENT)
Dept: NEUROLOGY | Facility: CLINIC | Age: 74
End: 2024-10-25
Payer: MEDICARE

## 2024-10-25 VITALS — BODY MASS INDEX: 31.25 KG/M2 | HEIGHT: 69 IN | WEIGHT: 211 LBS

## 2024-10-25 DIAGNOSIS — D32.9 MENINGIOMA (H): ICD-10-CM

## 2024-10-25 DIAGNOSIS — I67.1 BRAIN ANEURYSM: Primary | ICD-10-CM

## 2024-10-25 PROCEDURE — 99213 OFFICE O/P EST LOW 20 MIN: CPT | Mod: 95 | Performed by: PSYCHIATRY & NEUROLOGY

## 2024-10-25 ASSESSMENT — PAIN SCALES - GENERAL: PAINLEVEL_OUTOF10: NO PAIN (0)

## 2024-10-25 NOTE — PROGRESS NOTES
Virtual Visit Details    Type of service:  Video Visit   Video Start Time:  115  Video End Time:1:30 PM    Originating Location (pt. Location): Home    Distant Location (provider location):  On-site  Platform used for Video Visit: [employer]ida

## 2024-10-25 NOTE — NURSING NOTE
Current patient location: [address] [place] [address] [address] [address]    Is the patient currently in the state of [place]? YES    Visit mode:VIDEO    If the visit is dropped, the patient can be reconnected by: VIDEO VISIT: Send to e-mail at: [email or web address]    Will anyone else be joining the visit? NO  (If patient encounters technical issues they should call [phone]    Are changes needed to the allergy or medication list? No    Are refills needed on medications prescribed by this physician? NO    Rooming Documentation:  Questionnaire(s) not pre-assigned    Reason for visit: Follow Up    Lorena MENCHACA

## 2024-10-25 NOTE — LETTER
10/25/2024       RE: Janie Cutlerr  [address] [place] [address]     Dear Colleague,    Thank you for referring your patient, Janie De Leon, to the [facility] [place] at [facility]. Please see a copy of my visit note below.    [facility] Neurology Follow Up Visit    Janie De Leon MRN# [id number]   Age: 74 year old YOB: 1950     Brief history of symptoms: The patient was initially seen in neurologic consultation on 10/24/2023 for evaluation of abnormal MRI brain. Please see the comprehensive neurologic consultation notes from those dates in the Epic records for details.     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   She was recommended to repeat imaging yearly for 2 years.    Interval history:   - MRI and MRA brain 11/11/2023 showed stable sub-centimeter meningioma, and stable 3.5 mm laterally directed aneurysm of mid cavernous L-ICA.  - MRI and MRA brain 9/26/2024 showed unchanged meningioma, and unchanged left sided 3.5 mm laterally pointing aneurysm off cavernous ICA.    Today, the patient has no new symptoms of headache, migraine, one sided weakness, atypical movements, or periods of LOC/altered mental status. She is understanding of her aneurysm and location, as well as her meningoma and location.  We discussed today symptoms that could develop should these findings change or produce brain related irritation/changes.     Physical Exam:   General: Seated comfortably in no acute distress.  Neurologic:     Mental Status: Fully alert, attentive and oriented. Speech clear and fluent, no paraphasic errors.     Cranial Nerves: EOMI with normal smooth pursuit. Facial movements symmetric. Hearing not formally tested but intact to conversation.  No dysarthria.     Motor: No tremors or  other abnormal movements observed.          Assessment and Plan:   Assessment:  Stable meningioma (left falx, 1.2 x 0.5 x 0.8 cm) for 2 years of monitoring  Stable aneurysm (left ICA laterally direct 3.5 mm) for 2 years of monitoring    The patient has no ongoing symptoms related to her two intracranial findings, and is well aware that she should either present to an ED or at least message me through mSpoket should she develop neurological symptoms/changes.  Given the stability of both images after yearly monitoring now for two years, I think she can move to 2 years interval imaging.     Plan:  Follow up in Neurology clinic in 2 years then repeat imaging afterwards.    CLARA Hernandez D.O.   of Neurology    Total time today (25 min) in this patient encounter was spent on pre-charting, counseling and/or coordination of care.       Virtual Visit Details    Type of service:  Video Visit   Video Start Time:  115  Video End Time:1:30 PM    Originating Location (pt. Location): Home    Distant Location (provider location):  On-site  Platform used for Video Visit: AmWell      Again, thank you for allowing me to participate in the care of your patient.      Sincerely,    Jean Hernandez, DO

## 2024-10-25 NOTE — PROGRESS NOTES
[facility] Neurology Follow Up Visit    Janie De Leon MRN# [id number]   Age: 74 year old YOB: 1950     Brief history of symptoms: The patient was initially seen in neurologic consultation on 10/24/2023 for evaluation of abnormal MRI brain. Please see the comprehensive neurologic consultation notes from those dates in the Epic records for details.     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   She was recommended to repeat imaging yearly for 2 years.    Interval history:   - MRI and MRA brain 11/11/2023 showed stable sub-centimeter meningioma, and stable 3.5 mm laterally directed aneurysm of mid cavernous L-ICA.  - MRI and MRA brain 9/26/2024 showed unchanged meningioma, and unchanged left sided 3.5 mm laterally pointing aneurysm off cavernous ICA.    Today, the patient has no new symptoms of headache, migraine, one sided weakness, atypical movements, or periods of LOC/altered mental status. She is understanding of her aneurysm and location, as well as her meningoma and location.  We discussed today symptoms that could develop should these findings change or produce brain related irritation/changes.     Physical Exam:   General: Seated comfortably in no acute distress.  Neurologic:     Mental Status: Fully alert, attentive and oriented. Speech clear and fluent, no paraphasic errors.     Cranial Nerves: EOMI with normal smooth pursuit. Facial movements symmetric. Hearing not formally tested but intact to conversation.  No dysarthria.     Motor: No tremors or other abnormal movements observed.          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  Given the stability of both images after yearly monitoring now for two years, I think she can move to 2 years interval imaging.     Plan:  Follow up in Neurology clinic in 2 years then repeat imaging afterwards.    CLARA Hernandez D.O.   of Neurology    Total time today (25 min) in this patient encounter was spent on pre-charting, counseling and/or coordination of care.

## 2024-10-25 NOTE — PATIENT INSTRUCTIONS
Your imaging looks great, and stable over the last two years.  I think you can move to two year interval monitoring for both the aneurysm and meningioma.    Follow up with me in two years, and we will obtain imaging after that visit.

## 2024-10-29 ENCOUNTER — OFFICE VISIT (OUTPATIENT)
Dept: CARDIOLOGY | Facility: CLINIC | Age: 74
End: 2024-10-29
Attending: NURSE PRACTITIONER
Payer: MEDICARE

## 2024-10-29 VITALS
WEIGHT: 212.5 LBS | OXYGEN SATURATION: 94 % | DIASTOLIC BLOOD PRESSURE: 70 MMHG | BODY MASS INDEX: 31.47 KG/M2 | SYSTOLIC BLOOD PRESSURE: 107 MMHG | HEIGHT: 69 IN | HEART RATE: 64 BPM

## 2024-10-29 DIAGNOSIS — D32.9 MENINGIOMA (H): Chronic | ICD-10-CM

## 2024-10-29 DIAGNOSIS — N18.31 STAGE 3A CHRONIC KIDNEY DISEASE (H): Chronic | ICD-10-CM

## 2024-10-29 DIAGNOSIS — I21.4 NSTEMI (NON-ST ELEVATED MYOCARDIAL INFARCTION) (H): Chronic | ICD-10-CM

## 2024-10-29 DIAGNOSIS — E78.2 MIXED HYPERLIPIDEMIA: Chronic | ICD-10-CM

## 2024-10-29 DIAGNOSIS — I25.10 CORONARY ARTERY DISEASE INVOLVING NATIVE CORONARY ARTERY OF NATIVE HEART WITHOUT ANGINA PECTORIS: Chronic | ICD-10-CM

## 2024-10-29 DIAGNOSIS — I10 ESSENTIAL HYPERTENSION, BENIGN: Primary | Chronic | ICD-10-CM

## 2024-10-29 DIAGNOSIS — R06.02 SOB (SHORTNESS OF BREATH): ICD-10-CM

## 2024-10-29 PROCEDURE — 99214 OFFICE O/P EST MOD 30 MIN: CPT | Performed by: INTERNAL MEDICINE

## 2024-10-29 RX ORDER — NITROGLYCERIN 0.4 MG/1
TABLET SUBLINGUAL
Qty: 30 TABLET | Refills: 0 | Status: SHIPPED | OUTPATIENT
Start: 2024-10-29

## 2024-10-29 NOTE — PROGRESS NOTES
HPI and Plan:   Janie De Leon is a pleasant 72 year old female with a past medical history notable for hypertension, prediabetes, hyperlipidemia, non-ST elevation myocardial infarction in September of 2022 when she underwent  coronary angiography, which revealed mild proximal LAD disease with mild plaque rupture without flow limitation as evidenced by iFR 0.98.  This was managed medically but dual antiplatelet therapy was initiated.  This was then switched to baby aspirin after taking a year of Plavix.    More recently, she was seen by her primary care provider because she complained of exertional shortness of breath which is ongoing for last 1 and half to 2 years.  She gets short of breath walking couple blocks.  She can walk a flight of stairs.  There is no associated chest discomfort.  She was referred for a stress echocardiogram and I reviewed the results with her.  Revealed no evidence of ischemia or infarction at exercise capacity of 5 METS.  She states that this shortness of breath is somewhat improved and is not disabling.    She is also on atorvastatin 40 mg/day.  Her LDL has been at goal of 69.  HDL 38.  She has slightly elevated triglycerides although lower than previously when it was 403.  Now it is 280.    On exam, regular rate and rhythm without murmurs.  Chest was clear auscultation.  No carotid bruits.  No focal deficits.     Impression     1.  History of coronary disease and non-ST elevation myocardial infarction in September 2022, secondary to plaque rupture and erosion in the proximal LAD without obstructive disease, managed medically.  No recurrent chest pain.  2.  Exertional shortness of breath, stable, recent stress echocardiogram negative for ischemia  2.  Mixed hyperlipidemia, elevated triglycerides and low HDL, LDL at goal on atorvastatin  3.  Hypertension on Benicar and metoprolol, well controlled  4.  Hypothyroidism  5.  Chronic renal insufficiency, creatinine 1.07     Plan  At this time,  overall cardiac status is stable.  Will continue atorvastatin.  Have prescribed sublingual nitroglycerin for emergency use in case she needs it.  Suggested regular exercise to improve her conditioning.        She will return to see my midlevel provider in a year or earlier as needed.  I reviewed the stress echo images today as well as the labs.     Sincerely,     Kristian Johnston MD       Today's clinic visit entailed:    33 minutes spent by me on the date of the encounter doing chart review, review of test results, interpretation of tests, patient visit, and documentation   Provider  Link to MDM Help Grid           No orders of the defined types were placed in this encounter.      No orders of the defined types were placed in this encounter.      There are no discontinued medications.      Encounter Diagnosis   Name Primary?    NSTEMI (non-ST elevated myocardial infarction) (H)        CURRENT MEDICATIONS:  Current Outpatient Medications   Medication Sig Dispense Refill    acetaminophen (TYLENOL) 500 MG tablet Take 2 tablets (1,000 mg) by mouth every 6 hours as needed for mild pain      amLODIPine (NORVASC) 5 MG tablet Take 1 tablet (5 mg) by mouth every evening. 90 tablet 3    aspirin (ASA) 81 MG EC tablet Take 1 tablet (81 mg) by mouth daily  0    atorvastatin (LIPITOR) 40 MG tablet Take 1 tablet (40 mg) by mouth every evening. 90 tablet 0    blood glucose (NO BRAND SPECIFIED) test strip Use to test blood sugar 3 times a week. To accompany: Blood Glucose Monitor Brands: per insurance. 100 strip 6    blood glucose monitoring (NO BRAND SPECIFIED) meter device kit Use to test blood sugar 3 times a week. Preferred blood glucose meter OR supplies to accompany: Blood Glucose Monitor Brands: per insurance. 1 kit 0    calcium carbonate (TUMS) 500 MG chewable tablet Take 2 tablets (1,000 mg) by mouth daily as needed for heartburn      Cholecalciferol (VITAMIN D3 PO) Take 1,000 Units by mouth daily      clonazePAM (KLONOPIN)  0.5 MG tablet Take 0.5-1 tablets (0.25-0.5 mg) by mouth nightly as needed for anxiety. 20 tablet 0    cyanocobalamin (VITAMIN B-12) 100 MCG tablet Take 100 mcg by mouth daily      DULoxetine (CYMBALTA) 60 MG capsule Take 1 capsule (60 mg) by mouth daily. 90 capsule 3    levothyroxine (SYNTHROID/LEVOTHROID) 88 MCG tablet Take 1 tablet (88 mcg) by mouth daily. 90 tablet 0    metFORMIN (GLUCOPHAGE XR) 500 MG 24 hr tablet Take 1 tablet (500 mg) by mouth 2 times daily (with meals). 180 tablet 3    metoprolol tartrate (LOPRESSOR) 25 MG tablet Take 1 tablet (25 mg) by mouth 2 times daily 180 tablet 3    nitroGLYcerin (NITROSTAT) 0.4 MG sublingual tablet For chest pain place 1 tablet under the tongue every 5 minutes for 3 doses. If symptoms persist 5 minutes after 1st dose call 911. 30 tablet 0    olmesartan (BENICAR) 20 MG tablet TAKE 1 TABLET(20 MG) BY MOUTH DAILY 90 tablet 0    polyethylene glycol (MIRALAX) 17 GM/Dose powder Take 17 g by mouth daily as needed for constipation      senna-docusate (SENOKOT-S/PERICOLACE) 8.6-50 MG tablet Take 1 tablet by mouth 2 times daily as needed for constipation 21 tablet 0    thin (NO BRAND SPECIFIED) lancets Use with lanceting device. To accompany: Blood Glucose Monitor Brands: per insurance. 100 each 6       ALLERGIES     Allergies   Allergen Reactions    Ciprofloxacin GI Disturbance    Oxycodone Other (See Comments)     She prefers not to have Oxycodone or Oxycontin due to potential addictive properties    Simvastatin Other (See Comments)     Muscle aches        PAST MEDICAL HISTORY:  Past Medical History:   Diagnosis Date    Adrenal nodule (H) 09/2022    CT scan    Anemia     mild gastropathy on EGD 2008; neg pill cam    Arthritis     Lower back    Atypical ductal hyperplasia of both breasts     Has had biopsies and MRI    Diabetes (H) 2009    Prediabetes    Fibroid uterus     High cholesterol     History of hematuria 2008    Cystoscopy for recurrent UTIs; unremarkable renal US.  Also recurrent UTIs as child and pyelonephritis.     History of hormone replacement therapy     after [facility] with BSO    HTN (hypertension)     HTN, goal below 140/90     Hx of bone density study 10/16/2006    Normal    Hypothyroidism     Insomnia     periodic - prn clonazepam helpful a couple times per month    Lipid screening 07/21/2015    Tchol 128, , HDL 53, LDL 53 outside records --> based on our labs off of atorvastatin 10yr ASCVD risk 9.4% in Oct 2015    Major depressive disorder, single episode in full remission (H) 2007    NSTEMI (non-ST elevated myocardial infarction) (H) 09/26/2022    Osteopenia     Mild left hip July 2016    Prediabetes     Metformin in the past    Renal cyst, right     Rotator cuff injury, left, sequela     MRI Jan 2015 and had PT; High-grade complete or near complete tear of the supraspinatus tendon and anterior fibers of the infraspinatus tendon. 1/3 of the infraspinatus tendon appears involved.     TBI (traumatic brain injury) (H)     As a child    Tubular adenoma of colon 2013 & 2016       PAST SURGICAL HISTORY:  Past Surgical History:   Procedure Laterality Date    ANKLE SURGERY  1976    BREAST BIOPSY, RT/LT      Many    BREAST SURGERY      Benign lumpectomy    C/SECTION, LOW TRANSVERSE  1968    CAPSULE/PILL CAM ENDOSCOPY  02/18/2014    EGD prior; capsule was negative     COLONOSCOPY      1999, 2003, 2008, 6/6/2013    COLONOSCOPY N/A 06/20/2019    Procedure: COLONOSCOPY, WITH POLYPECTOMY AND BIOPSY;  Surgeon: Pepito Romero MD;  Location:  GI    COLONOSCOPY N/A 06/16/2022    Procedure: COLONOSCOPY;  Surgeon: Rodrigo Dunbar MD;  Location:  GI    CV CORONARY ANGIOGRAM N/A 09/26/2022    Procedure: Coronary Angiogram;  Surgeon: Horacio Moran MD;  Location:  HEART CARDIAC CATH LAB    CV INSTANTANEOUS WAVE-FREE RATIO N/A 09/26/2022    Procedure: Instantaneous Wave-Free Ratio;  Surgeon: Horacio Moran MD;  Location:  HEART CARDIAC CATH LAB    CV OPTICAL  COHERENCE TOMOGRAPHY N/A 2022    Procedure: Optical Coherence Tomography;  Surgeon: Horacio Moran MD;  Location:  HEART CARDIAC CATH LAB    HYSTERECTOMY, PAP NO LONGER INDICATED      OPEN REDUCTION INTERNAL FIXATION ANKLE Right 2023    Procedure: OPEN REDUCTION INTERNAL FIXATION RIGHT ANKLE FRACTURE;  Surgeon: Declan Casanova MD;  Location:  OR    [facility] with BSO  2000    benign fibroids    TUBAL LIGATION         FAMILY HISTORY:  Family History   Problem Relation Age of Onset    Colon Cancer Mother 70         age 81    Diabetes Mother         After age 75    Macular Degeneration Mother     Glaucoma Mother     Cerebrovascular Disease Mother     Heart Failure Mother     Hypertension Mother     Hyperlipidemia Mother     Other - See Comments Father 87        Frailty, pneumonia, fractures, failure to thrive    Osteoporosis Father         diagnosed in his 80s    Colon Polyps Daughter         Tubular adenoma    Deep Vein Thrombosis Brother     Hyperlipidemia Brother     Hypertension Brother     Other Cancer Brother         myeloma    Hypertension Brother         both brothers    Hyperlipidemia Brother         both brothers    Depression Brother     Anxiety Disorder Brother     Mental Illness Brother         on Haldol before death from lung cancer    Lung Cancer Brother         long time heavy smoker    Other Cancer Brother         Lung cancer    Breast Cancer Other         radical mastecomy in her 40s    Colon Cancer Other     Other Cancer Other         several aunts various kinds    Other Cancer Other         throat cancer    Other Cancer Paternal Grandmother         throat cancer       SOCIAL HISTORY:  Social History     Socioeconomic History    Marital status:    Tobacco Use    Smoking status: Former     Current packs/day: 0.00     Average packs/day: 0.5 packs/day for 9.6 years (4.8 ttl pk-yrs)     Types: Cigarettes     Start date: 1965     Quit date: 4/15/1975     Years  "since quittin.5    Smokeless tobacco: Never    Tobacco comments:     social smoker - only smoked when with another smoker   Vaping Use    Vaping status: Never Used   Substance and Sexual Activity    Alcohol use: Yes     Comment: Occasionally 1 beer every few weeks    Drug use: No    Sexual activity: Not Currently     Partners: Male     Birth control/protection: None     Comment: post menopausal   Other Topics Concern    Parent/sibling w/ CABG, MI or angioplasty before 65F 55M? No     Social Drivers of Health     Financial Resource Strain: Low Risk  (2024)    Financial Resource Strain     Within the past 12 months, have you or your family members you live with been unable to get utilities (heat, electricity) when it was really needed?: No   Food Insecurity: Low Risk  (2024)    Food Insecurity     Within the past 12 months, did you worry that your food would run out before you got money to buy more?: No     Within the past 12 months, did the food you bought just not last and you didn t have money to get more?: No   Transportation Needs: Low Risk  (2024)    Transportation Needs     Within the past 12 months, has lack of transportation kept you from medical appointments, getting your medicines, non-medical meetings or appointments, work, or from getting things that you need?: No   Interpersonal Safety: Low Risk  (2024)    Interpersonal Safety     Do you feel physically and emotionally safe where you currently live?: Yes     Within the past 12 months, have you been hit, slapped, kicked or otherwise physically hurt by someone?: No     Within the past 12 months, have you been humiliated or emotionally abused in other ways by your partner or ex-partner?: No   Housing Stability: Low Risk  (2024)    Housing Stability     Do you have housing? : Yes     Are you worried about losing your housing?: No       Physical Exam:  Vitals: /70   Pulse 64   Ht 1.753 m (5' 9\")   Wt 96.4 kg (212 lb 8 oz)   " LMP  (LMP Unknown)   SpO2 94%   BMI 31.38 kg/m          CC  Margo Barbosa, APRN CNP  [address]  [place] [address]

## 2024-10-29 NOTE — LETTER
10/29/2024    Sirena Carter MD  [address] [place] [address]    RE: Janie De Leon       Dear Colleague,     I had the pleasure of seeing Janie De Leon in the [facility].  HPI and Plan:   Janie De Leon is a pleasant 72 year old female with a past medical history notable for hypertension, prediabetes, hyperlipidemia, non-ST elevation myocardial infarction in September of 2022 when she underwent  coronary angiography, which revealed mild proximal LAD disease with mild plaque rupture without flow limitation as evidenced by iFR 0.98.  This was managed medically but dual antiplatelet therapy was initiated.  This was then switched to baby aspirin after taking a year of Plavix.    More recently, she was seen by her primary care provider because she complained of exertional shortness of breath which is ongoing for last 1 and half to 2 years.  She gets short of breath walking couple blocks.  She can walk a flight of stairs.  There is no associated chest discomfort.  She was referred for a stress echocardiogram and I reviewed the results with her.  Revealed no evidence of ischemia or infarction at exercise capacity of 5 METS.  She states that this shortness of breath is somewhat improved and is not disabling.    She is also on atorvastatin 40 mg/day.  Her LDL has been at goal of 69.  HDL 38.  She has slightly elevated triglycerides although lower than previously when it was 403.  Now it is 280.    On exam, regular rate and rhythm without murmurs.  Chest was clear auscultation.  No carotid bruits.  No focal deficits.     Impression     1.  History of coronary disease and non-ST elevation myocardial infarction in September 2022, secondary to plaque rupture and erosion in the proximal LAD without obstructive disease, managed medically.  No recurrent chest pain.  2.  Exertional shortness of breath, stable, recent stress echocardiogram negative for ischemia  2.  Mixed hyperlipidemia, elevated  triglycerides and low HDL, LDL at goal on atorvastatin  3.  Hypertension on Benicar and metoprolol, well controlled  4.  Hypothyroidism  5.  Chronic renal insufficiency, creatinine 1.07     Plan  At this time, overall cardiac status is stable.  Will continue atorvastatin.  Have prescribed sublingual nitroglycerin for emergency use in case she needs it.  Suggested regular exercise to improve her conditioning.        She will return to see my midlevel provider in a year or earlier as needed.  I reviewed the stress echo images today as well as the labs.     Sincerely,     Kristian Johnston MD       Today's clinic visit entailed:    33 minutes spent by me on the date of the encounter doing chart review, review of test results, interpretation of tests, patient visit, and documentation   Provider  Link to MDM Help Grid           No orders of the defined types were placed in this encounter.      No orders of the defined types were placed in this encounter.      There are no discontinued medications.      Encounter Diagnosis   Name Primary?     NSTEMI (non-ST elevated myocardial infarction) (H)        CURRENT MEDICATIONS:  Current Outpatient Medications   Medication Sig Dispense Refill     acetaminophen (TYLENOL) 500 MG tablet Take 2 tablets (1,000 mg) by mouth every 6 hours as needed for mild pain       amLODIPine (NORVASC) 5 MG tablet Take 1 tablet (5 mg) by mouth every evening. 90 tablet 3     aspirin (ASA) 81 MG EC tablet Take 1 tablet (81 mg) by mouth daily  0     atorvastatin (LIPITOR) 40 MG tablet Take 1 tablet (40 mg) by mouth every evening. 90 tablet 0     blood glucose (NO BRAND SPECIFIED) test strip Use to test blood sugar 3 times a week. To accompany: Blood Glucose Monitor Brands: per insurance. 100 strip 6     blood glucose monitoring (NO BRAND SPECIFIED) meter device kit Use to test blood sugar 3 times a week. Preferred blood glucose meter OR supplies to accompany: Blood Glucose Monitor Brands: per insurance. 1  kit 0     calcium carbonate (TUMS) 500 MG chewable tablet Take 2 tablets (1,000 mg) by mouth daily as needed for heartburn       Cholecalciferol (VITAMIN D3 PO) Take 1,000 Units by mouth daily       clonazePAM (KLONOPIN) 0.5 MG tablet Take 0.5-1 tablets (0.25-0.5 mg) by mouth nightly as needed for anxiety. 20 tablet 0     cyanocobalamin (VITAMIN B-12) 100 MCG tablet Take 100 mcg by mouth daily       DULoxetine (CYMBALTA) 60 MG capsule Take 1 capsule (60 mg) by mouth daily. 90 capsule 3     levothyroxine (SYNTHROID/LEVOTHROID) 88 MCG tablet Take 1 tablet (88 mcg) by mouth daily. 90 tablet 0     metFORMIN (GLUCOPHAGE XR) 500 MG 24 hr tablet Take 1 tablet (500 mg) by mouth 2 times daily (with meals). 180 tablet 3     metoprolol tartrate (LOPRESSOR) 25 MG tablet Take 1 tablet (25 mg) by mouth 2 times daily 180 tablet 3     nitroGLYcerin (NITROSTAT) 0.4 MG sublingual tablet For chest pain place 1 tablet under the tongue every 5 minutes for 3 doses. If symptoms persist 5 minutes after 1st dose call 911. 30 tablet 0     olmesartan (BENICAR) 20 MG tablet TAKE 1 TABLET(20 MG) BY MOUTH DAILY 90 tablet 0     polyethylene glycol (MIRALAX) 17 GM/Dose powder Take 17 g by mouth daily as needed for constipation       senna-docusate (SENOKOT-S/PERICOLACE) 8.6-50 MG tablet Take 1 tablet by mouth 2 times daily as needed for constipation 21 tablet 0     thin (NO BRAND SPECIFIED) lancets Use with lanceting device. To accompany: Blood Glucose Monitor Brands: per insurance. 100 each 6       ALLERGIES     Allergies   Allergen Reactions     Ciprofloxacin GI Disturbance     Oxycodone Other (See Comments)     She prefers not to have Oxycodone or Oxycontin due to potential addictive properties     Simvastatin Other (See Comments)     Muscle aches        PAST MEDICAL HISTORY:  Past Medical History:   Diagnosis Date     Adrenal nodule (H) 09/2022    CT scan     Anemia     mild gastropathy on EGD 2008; neg pill cam     Arthritis     Lower back      Atypical ductal hyperplasia of both breasts     Has had biopsies and MRI     Diabetes (H) 2009    Prediabetes     Fibroid uterus      High cholesterol      History of hematuria 2008    Cystoscopy for recurrent UTIs; unremarkable renal US. Also recurrent UTIs as child and pyelonephritis.      History of hormone replacement therapy     after [facility]AS[facility] with BSO     HTN (hypertension)      HTN, goal below 140/90      Hx of bone density study 10/16/2006    Normal     Hypothyroidism      Insomnia     periodic - prn clonazepam helpful a couple times per month     Lipid screening 07/21/2015    Tchol 128, , HDL 53, LDL 53 outside records --> based on our labs off of atorvastatin 10yr ASCVD risk 9.4% in Oct 2015     Major depressive disorder, single episode in full remission (H) 2007     NSTEMI (non-ST elevated myocardial infarction) (H) 09/26/2022     Osteopenia     Mild left hip July 2016     Prediabetes     Metformin in the past     Renal cyst, right      Rotator cuff injury, left, sequela     MRI Jan 2015 and had PT; High-grade complete or near complete tear of the supraspinatus tendon and anterior fibers of the infraspinatus tendon. 1/3 of the infraspinatus tendon appears involved.      TBI (traumatic brain injury) (H)     As a child     Tubular adenoma of colon 2013 & 2016       PAST SURGICAL HISTORY:  Past Surgical History:   Procedure Laterality Date     ANKLE SURGERY  1976     BREAST BIOPSY, RT/LT      Many     BREAST SURGERY      Benign lumpectomy     C/SECTION, LOW TRANSVERSE  1968     CAPSULE/PILL CAM ENDOSCOPY  02/18/2014    EGD prior; capsule was negative      COLONOSCOPY      1999, 2003, 2008, 6/6/2013     COLONOSCOPY N/A 06/20/2019    Procedure: COLONOSCOPY, WITH POLYPECTOMY AND BIOPSY;  Surgeon: Pepito Romero MD;  Location:  GI     COLONOSCOPY N/A 06/16/2022    Procedure: COLONOSCOPY;  Surgeon: Rodrigo Dunbar MD;  Location:  GI     CV CORONARY ANGIOGRAM N/A 09/26/2022    Procedure: Coronary  Angiogram;  Surgeon: Horacio Moran MD;  Location:  HEART CARDIAC CATH LAB     CV INSTANTANEOUS WAVE-FREE RATIO N/A 2022    Procedure: Instantaneous Wave-Free Ratio;  Surgeon: Horacio Moran MD;  Location:  HEART CARDIAC CATH LAB     CV OPTICAL COHERENCE TOMOGRAPHY N/A 2022    Procedure: Optical Coherence Tomography;  Surgeon: Horacio Moran MD;  Location:  HEART CARDIAC CATH LAB     HYSTERECTOMY, PAP NO LONGER INDICATED       OPEN REDUCTION INTERNAL FIXATION ANKLE Right 2023    Procedure: OPEN REDUCTION INTERNAL FIXATION RIGHT ANKLE FRACTURE;  Surgeon: Declan Casanova MD;  Location:  OR     [facility] with BSO  2000    benign fibroids     TUBAL LIGATION         FAMILY HISTORY:  Family History   Problem Relation Age of Onset     Colon Cancer Mother 70         age 81     Diabetes Mother         After age 75     Macular Degeneration Mother      Glaucoma Mother      Cerebrovascular Disease Mother      Heart Failure Mother      Hypertension Mother      Hyperlipidemia Mother      Other - See Comments Father 87        Frailty, pneumonia, fractures, failure to thrive     Osteoporosis Father         diagnosed in his 80s     Colon Polyps Daughter         Tubular adenoma     Deep Vein Thrombosis Brother      Hyperlipidemia Brother      Hypertension Brother      Other Cancer Brother         myeloma     Hypertension Brother         both brothers     Hyperlipidemia Brother         both brothers     Depression Brother      Anxiety Disorder Brother      Mental Illness Brother         on Haldol before death from lung cancer     Lung Cancer Brother         long time heavy smoker     Other Cancer Brother         Lung cancer     Breast Cancer Other         radical mastecomy in her 40s     Colon Cancer Other      Other Cancer Other         several aunts various kinds     Other Cancer Other         throat cancer     Other Cancer Paternal Grandmother         throat cancer       SOCIAL  HISTORY:  Social History     Socioeconomic History     Marital status:    Tobacco Use     Smoking status: Former     Current packs/day: 0.00     Average packs/day: 0.5 packs/day for 9.6 years (4.8 ttl pk-yrs)     Types: Cigarettes     Start date: 1965     Quit date: 4/15/1975     Years since quittin.5     Smokeless tobacco: Never     Tobacco comments:     social smoker - only smoked when with another smoker   Vaping Use     Vaping status: Never Used   Substance and Sexual Activity     Alcohol use: Yes     Comment: Occasionally 1 beer every few weeks     Drug use: No     Sexual activity: Not Currently     Partners: Male     Birth control/protection: None     Comment: post menopausal   Other Topics Concern     Parent/sibling w/ CABG, MI or angioplasty before 65F 55M? No     Social Drivers of Health     Financial Resource Strain: Low Risk  (2024)    Financial Resource Strain      Within the past 12 months, have you or your family members you live with been unable to get utilities (heat, electricity) when it was really needed?: No   Food Insecurity: Low Risk  (2024)    Food Insecurity      Within the past 12 months, did you worry that your food would run out before you got money to buy more?: No      Within the past 12 months, did the food you bought just not last and you didn t have money to get more?: No   Transportation Needs: Low Risk  (2024)    Transportation Needs      Within the past 12 months, has lack of transportation kept you from medical appointments, getting your medicines, non-medical meetings or appointments, work, or from getting things that you need?: No   Interpersonal Safety: Low Risk  (2024)    Interpersonal Safety      Do you feel physically and emotionally safe where you currently live?: Yes      Within the past 12 months, have you been hit, slapped, kicked or otherwise physically hurt by someone?: No      Within the past 12 months, have you been humiliated or  "emotionally abused in other ways by your partner or ex-partner?: No   Housing Stability: Low Risk  (1/8/2024)    Housing Stability      Do you have housing? : Yes      Are you worried about losing your housing?: No       Physical Exam:  Vitals: /70   Pulse 64   Ht 1.753 m (5' 9\")   Wt 96.4 kg (212 lb 8 oz)   LMP  (LMP Unknown)   SpO2 94%   BMI 31.38 kg/m          CC  ALEXYS Yoon CNP  [address]  [place] [address]                  Thank you for allowing me to participate in the care of your patient.      Sincerely,     Kristian Johnston MD     [facility]  cc:   ALEXYS Yoon CNP  [address]  [place] [address]      "

## 2024-10-30 ENCOUNTER — OFFICE VISIT (OUTPATIENT)
Dept: EDUCATION SERVICES | Facility: CLINIC | Age: 74
End: 2024-10-30
Attending: INTERNAL MEDICINE
Payer: MEDICARE

## 2024-10-30 DIAGNOSIS — E11.9 TYPE 2 DIABETES MELLITUS WITHOUT COMPLICATION, WITHOUT LONG-TERM CURRENT USE OF INSULIN (H): ICD-10-CM

## 2024-10-30 PROCEDURE — G0108 DIAB MANAGE TRN  PER INDIV: HCPCS | Performed by: DIETITIAN, REGISTERED

## 2024-10-30 NOTE — PATIENT INSTRUCTIONS
MY DIABETES TODAY:    1)  Goal A1C is under <7.0  Mine is:      Lab Results   Component Value Date    A1C 6.8 09/25/2024    A1C 5.8 08/27/2020       2)  Goal LDL (bad cholesterol) under 100  (measured at least yearly)- I am currently at:   Lab Results   Component Value Date    LDL 69 01/19/2024     08/27/2020       3)  Goal blood pressure under 130/80- mine was Data Unavailable today    Care Plan:  Recommend carb controlled diet with 2-3 carb choices per meal, 0-1 per snack.  Encouraged increased vegetable consumption at meals.  Consider decreasing frequency of eating out.  Be active, as able.  Great idea to join the [employer]!  Continue testing glucose 3x/week.  Fasting goal is <130.  Continue Metformin as prescribed.  Follow-up with any questions/concerns.    Follow up:  Call [phone]), e-mail ([email or web address]), or send MyChart message with questions, concerns or if follow-up is needed.  Follow-up for annual diabetes education review in 1 year or sooner, if needed.     Bring blood glucose meter and logbook with you to all doctor and follow-up appointments.     [place] [facility] for the [facility] Area:  For Your Diabetes or Nutrition Education Appointments Call:  [phone]   For Diabetes or Nutrition Related Questions:   [phone]  Send MyChart Message   If you need a medication refill please contact your pharmacy. Please allow 3 business days for your refills to be completed.    2.02

## 2024-10-30 NOTE — LETTER
10/30/2024         RE: Janie Murphy [place]  [address] [place] [address]        Dear Colleague,    Thank you for referring your patient, Janie De Leon, to the [facility]. Please see a copy of my visit note below.    Diabetes Self-Management Education & Support    Presents for: Initial Assessment for new diagnosis    Type of Service: In Person Visit      ASSESSMENT:  Patient presents for diabetes education following a new dx of Type 2 this month.  Long hx of pre-diabetes noted.  Patient notes feeling mad at herself for not making changes sooner.  Notes stress at home with caretaking for her  who has dementia.  Patient is self monitoring glucose levels 3x/week fasting and reports numbers in the 110-130 range.  Has started taking Metformin 500mg BID without side effects.  Will occasionally forget to take it.    Patient's most recent   Lab Results   Component Value Date    A1C 6.8 09/25/2024    A1C 5.8 08/27/2020     is meeting goal of <7.0    Diabetes knowledge and skills assessment:   Patient is knowledgeable in diabetes management concepts related to: Monitoring and Taking Medication    Continue education with the following diabetes management concepts: Healthy Eating, Problem Solving, Reducing Risks, and Healthy Coping    Based on learning assessment above, most appropriate setting for further diabetes education would be: Individual setting.      PLAN    Recommend carb controlled diet with 2-3 carb choices per meal, 0-1 per snack.  Encouraged increased vegetable consumption at meals.  Consider decreasing frequency of eating out.  Be active, as able.  Great idea to join the [employer]!  Continue testing glucose 3x/week.  Fasting goal is <130.  Continue Metformin as prescribed.  Follow-up with any questions/concerns.    Topics to cover at upcoming visits: Problem Solving, Reducing Risks, and Healthy Coping    Follow-up: per patient    See Care Plan for co-developed, patient-state  "behavior change goals.  AVS provided for patient today.    Education Materials Provided:  [facility] Understanding Diabetes Booklet and My Plate Planner      SUBJECTIVE/OBJECTIVE:  Presents for: Initial Assessment for new diagnosis  Accompanied by: Self  Diabetes education in the past 24mo: (Patient-Rptd) No  Focus of Visit: Patient Unsure  Diabetes type: (Patient-Rptd) Type 2  Disease course: (Patient-Rptd) Stable  How confident are you filling out medical forms by yourself:: (Patient-Rptd) Extremely  Diabetes management related comments/concerns: \"I'd mad at myself\"  Other concerns:: None  Cultural Influences/Ethnic Background:  Not  or       Diabetes Symptoms & Complications:  Diabetes Related Symptoms: (Patient-Rptd) None  Weight trend: (Patient-Rptd) Fluctuating  Symptom course: (Patient-Rptd) Stable  Disease course: (Patient-Rptd) Stable  Complications assessed today?: No    Patient Problem List and Family Medical History reviewed for relevant medical history, current medical status, and diabetes risk factors.    Vitals:  LMP  (LMP Unknown)   Estimated body mass index is 31.38 kg/m  as calculated from the following:    Height as of 10/29/24: 1.753 m (5' 9\").    Weight as of 10/29/24: 96.4 kg (212 lb 8 oz).   Last 3 BP:   BP Readings from Last 3 Encounters:   10/29/24 107/70   10/01/24 94/62   09/25/24 124/76       History   Smoking Status     Former     Types: Cigarettes   Smokeless Tobacco     Never       Labs:  Lab Results   Component Value Date    A1C 6.8 09/25/2024    A1C 5.8 08/27/2020     Lab Results   Component Value Date     09/25/2024     05/28/2023     09/27/2022     08/27/2020     Lab Results   Component Value Date    LDL 69 01/19/2024     08/27/2020     HDL Cholesterol   Date Value Ref Range Status   08/27/2020 46 (L) >49 mg/dL Final     Direct Measure HDL   Date Value Ref Range Status   01/19/2024 38 (L) >=50 mg/dL Final   ]  GFR Estimate " "  Date Value Ref Range Status   09/25/2024 55 (L) >60 mL/min/1.73m2 Final     Comment:     eGFR calculated using 2021 CKD-EPI equation.   08/27/2020 64 >60 mL/min/[1.73_m2] Final     Comment:     Non  GFR Calc  Starting 12/18/2018, serum creatinine based estimated GFR (eGFR) will be   calculated using the Chronic Kidney Disease Epidemiology Collaboration   (CKD-EPI) equation.       GFR, ESTIMATED POCT   Date Value Ref Range Status   09/16/2022 59 (L) >60 mL/min/1.73m2 Final     GFR Estimate If Black   Date Value Ref Range Status   08/27/2020 74 >60 mL/min/[1.73_m2] Final     Comment:      GFR Calc  Starting 12/18/2018, serum creatinine based estimated GFR (eGFR) will be   calculated using the Chronic Kidney Disease Epidemiology Collaboration   (CKD-EPI) equation.       Lab Results   Component Value Date    CR 1.06 09/25/2024    CR 0.90 08/27/2020     No results found for: \"MICROALBUMIN\"    Healthy Eating:  Healthy Eating Assessed Today: Yes  Cultural/Worship diet restrictions?: (Patient-Rptd) No  Do you have any food allergies or intolerances?: No  Meal planning/habits: None  Who cooks/prepares meals for you?: Self  Who purchases food in  your home?: Self  How many times a week on average do you eat food made away from home (restaurant/take-out)?: (Patient-Rptd) 4  Meals include: (Patient-Rptd) Breakfast, Lunch, Dinner, Evening Snack  Breakfast: 1 slice toast - 1 tsp honey butter or peanut butter  Lunch: ham/cheese sandwich  Dinner: \"light\" OR out to eat - sandwich or salad or pizza  Other: not eating fruits, vegetables regularly  Beverages: Water, Coffee, Milk, Other (see Comments)  Please elaborate:: flavored water  Has patient met with a dietitian in the past?: No    Being Active:  Being Active Assessed Today: Yes  Exercise:: Currently not exercising  Barrier to exercise: Other (ankle surger 1.5 yrs ago)    Monitoring:  Monitoring Assessed Today: Yes  Did patient bring glucose " meter to appointment? : No  Blood Glucose Meter: (Patient-Rptd) Accu-chek  Times checking blood sugar at home (number): (Patient-Rptd) 3  Times checking blood sugar at home (per): (Patient-Rptd) Week  Blood glucose trend:  (fastin-130)        Taking Medications:  Diabetes Medication(s)       Biguanides       metFORMIN (GLUCOPHAGE XR) 500 MG 24 hr tablet Take 1 tablet (500 mg) by mouth 2 times daily (with meals).            Taking Medication Assessed Today: Yes  Current Treatments: (Patient-Rptd) Diet, Oral Medication (taken by mouth)  Problems taking diabetes medications regularly?: No  Diabetes medication side effects?: No    Problem Solving:  Problem Solving Assessed Today: Yes  Is the patient at risk for hypoglycemia?: No  Is the patient at risk for DKA?: No              Reducing Risks:  Reducing Risks Assessed Today: Yes  Diabetes Risks: Age over 45 years, Sedentary Lifestyle, Family History, Hyperlipidemia  CAD Risks: Diabetes Mellitus, Dyslipidemia, Hypertension, Obesity, Post-menopausal, Sedentary lifestyle, Stress  Has dilated eye exam at least once a year?: (Patient-Rptd) Yes  Sees dentist every 6 months?: (Patient-Rptd) Yes  Feet checked by healthcare provider in the last year?: (Patient-Rptd) No    Healthy Coping:  Healthy Coping Assessed Today: Yes  Emotional response to diabetes: Ready to learn, Concern for health and well-being  Informal Support system:: (Patient-Rptd) Children, Friends, Spouse  Stage of change: PREPARATION (Decided to change - considering how)  Support resources: None  Patient Activation Measure Survey Score:       No data to display                  Care Plan and Education Provided:  Healthy Eating: Balanced meals, Carbohydrate Counting, Label reading, and Plate planning method, Being Active: Finding a physical activity routine that works for you, Monitoring: Frequency of monitoring, Individual glucose targets, and Log and interpret results, Taking Medication: Action of  prescribed medication(s), Side effects of prescribed medication(s), and When to take medication(s), and Healthy Coping: Benefits of making appropriate lifestyle changes    DARLINE Nesbitt [facility]      Time Spent: 60 minutes  Encounter Type: Individual    Any diabetes medication dose changes were made via the CDE Protocol per the patient's referring provider. A copy of this encounter was shared with the provider.   Answers submitted by the patient for this visit:  Questionnaire about: Diabetes problem (Submitted on 10/25/2024)  Chief Complaint: Diabetes problem

## 2024-10-30 NOTE — PROGRESS NOTES
Diabetes Self-Management Education & Support    Presents for: Initial Assessment for new diagnosis    Type of Service: In Person Visit      ASSESSMENT:  Patient presents for diabetes education following a new dx of Type 2 this month.  Long hx of pre-diabetes noted.  Patient notes feeling mad at herself for not making changes sooner.  Notes stress at home with caretaking for her  who has dementia.  Patient is self monitoring glucose levels 3x/week fasting and reports numbers in the 110-130 range.  Has started taking Metformin 500mg BID without side effects.  Will occasionally forget to take it.    Patient's most recent   Lab Results   Component Value Date    A1C 6.8 09/25/2024    A1C 5.8 08/27/2020     is meeting goal of <7.0    Diabetes knowledge and skills assessment:   Patient is knowledgeable in diabetes management concepts related to: Monitoring and Taking Medication    Continue education with the following diabetes management concepts: Healthy Eating, Problem Solving, Reducing Risks, and Healthy Coping    Based on learning assessment above, most appropriate setting for further diabetes education would be: Individual setting.      PLAN    Recommend carb controlled diet with 2-3 carb choices per meal, 0-1 per snack.  Encouraged increased vegetable consumption at meals.  Consider decreasing frequency of eating out.  Be active, as able.  Great idea to join the [employer]!  Continue testing glucose 3x/week.  Fasting goal is <130.  Continue Metformin as prescribed.  Follow-up with any questions/concerns.    Topics to cover at upcoming visits: Problem Solving, Reducing Risks, and Healthy Coping    Follow-up: per patient    See Care Plan for co-developed, patient-state behavior change goals.  AVS provided for patient today.    Education Materials Provided:   [employer] Understanding Diabetes Booklet and My Plate Planner      SUBJECTIVE/OBJECTIVE:  Presents for: Initial Assessment for new diagnosis  Accompanied  "by: Self  Diabetes education in the past 24mo: (Patient-Rptd) No  Focus of Visit: Patient Unsure  Diabetes type: (Patient-Rptd) Type 2  Disease course: (Patient-Rptd) Stable  How confident are you filling out medical forms by yourself:: (Patient-Rptd) Extremely  Diabetes management related comments/concerns: \"I'd mad at myself\"  Other concerns:: None  Cultural Influences/Ethnic Background:  Not  or       Diabetes Symptoms & Complications:  Diabetes Related Symptoms: (Patient-Rptd) None  Weight trend: (Patient-Rptd) Fluctuating  Symptom course: (Patient-Rptd) Stable  Disease course: (Patient-Rptd) Stable  Complications assessed today?: No    Patient Problem List and Family Medical History reviewed for relevant medical history, current medical status, and diabetes risk factors.    Vitals:  LMP  (LMP Unknown)   Estimated body mass index is 31.38 kg/m  as calculated from the following:    Height as of 10/29/24: 1.753 m (5' 9\").    Weight as of 10/29/24: 96.4 kg (212 lb 8 oz).   Last 3 BP:   BP Readings from Last 3 Encounters:   10/29/24 107/70   10/01/24 94/62   09/25/24 124/76       History   Smoking Status    Former    Types: Cigarettes   Smokeless Tobacco    Never       Labs:  Lab Results   Component Value Date    A1C 6.8 09/25/2024    A1C 5.8 08/27/2020     Lab Results   Component Value Date     09/25/2024     05/28/2023     09/27/2022     08/27/2020     Lab Results   Component Value Date    LDL 69 01/19/2024     08/27/2020     HDL Cholesterol   Date Value Ref Range Status   08/27/2020 46 (L) >49 mg/dL Final     Direct Measure HDL   Date Value Ref Range Status   01/19/2024 38 (L) >=50 mg/dL Final   ]  GFR Estimate   Date Value Ref Range Status   09/25/2024 55 (L) >60 mL/min/1.73m2 Final     Comment:     eGFR calculated using 2021 CKD-EPI equation.   08/27/2020 64 >60 mL/min/[1.73_m2] Final     Comment:     Non  GFR Calc  Starting 12/18/2018, serum " "creatinine based estimated GFR (eGFR) will be   calculated using the Chronic Kidney Disease Epidemiology Collaboration   (CKD-EPI) equation.       GFR, ESTIMATED POCT   Date Value Ref Range Status   2022 59 (L) >60 mL/min/1.73m2 Final     GFR Estimate If Black   Date Value Ref Range Status   2020 74 >60 mL/min/[1.73_m2] Final     Comment:      GFR Calc  Starting 2018, serum creatinine based estimated GFR (eGFR) will be   calculated using the Chronic Kidney Disease Epidemiology Collaboration   (CKD-EPI) equation.       Lab Results   Component Value Date    CR 1.06 2024    CR 0.90 2020     No results found for: \"MICROALBUMIN\"    Healthy Eating:  Healthy Eating Assessed Today: Yes  Cultural/Samaritan diet restrictions?: (Patient-Rptd) No  Do you have any food allergies or intolerances?: No  Meal planning/habits: None  Who cooks/prepares meals for you?: Self  Who purchases food in  your home?: Self  How many times a week on average do you eat food made away from home (restaurant/take-out)?: (Patient-Rptd) 4  Meals include: (Patient-Rptd) Breakfast, Lunch, Dinner, Evening Snack  Breakfast: 1 slice toast - 1 tsp honey butter or peanut butter  Lunch: ham/cheese sandwich  Dinner: \"light\" OR out to eat - sandwich or salad or pizza  Other: not eating fruits, vegetables regularly  Beverages: Water, Coffee, Milk, Other (see Comments)  Please elaborate:: flavored water  Has patient met with a dietitian in the past?: No    Being Active:  Being Active Assessed Today: Yes  Exercise:: Currently not exercising  Barrier to exercise: Other (ankle surger 1.5 yrs ago)    Monitoring:  Monitoring Assessed Today: Yes  Did patient bring glucose meter to appointment? : No  Blood Glucose Meter: (Patient-Rptd) Accu-chek  Times checking blood sugar at home (number): (Patient-Rptd) 3  Times checking blood sugar at home (per): (Patient-Rptd) Week  Blood glucose trend:  (fastin-130)        Taking " Medications:  Diabetes Medication(s)       Biguanides       metFORMIN (GLUCOPHAGE XR) 500 MG 24 hr tablet Take 1 tablet (500 mg) by mouth 2 times daily (with meals).            Taking Medication Assessed Today: Yes  Current Treatments: (Patient-Rptd) Diet, Oral Medication (taken by mouth)  Problems taking diabetes medications regularly?: No  Diabetes medication side effects?: No    Problem Solving:  Problem Solving Assessed Today: Yes  Is the patient at risk for hypoglycemia?: No  Is the patient at risk for DKA?: No              Reducing Risks:  Reducing Risks Assessed Today: Yes  Diabetes Risks: Age over 45 years, Sedentary Lifestyle, Family History, Hyperlipidemia  CAD Risks: Diabetes Mellitus, Dyslipidemia, Hypertension, Obesity, Post-menopausal, Sedentary lifestyle, Stress  Has dilated eye exam at least once a year?: (Patient-Rptd) Yes  Sees dentist every 6 months?: (Patient-Rptd) Yes  Feet checked by healthcare provider in the last year?: (Patient-Rptd) No    Healthy Coping:  Healthy Coping Assessed Today: Yes  Emotional response to diabetes: Ready to learn, Concern for health and well-being  Informal Support system:: (Patient-Rptd) Children, Friends, Spouse  Stage of change: PREPARATION (Decided to change - considering how)  Support resources: None  Patient Activation Measure Survey Score:       No data to display                  Care Plan and Education Provided:  Healthy Eating: Balanced meals, Carbohydrate Counting, Label reading, and Plate planning method, Being Active: Finding a physical activity routine that works for you, Monitoring: Frequency of monitoring, Individual glucose targets, and Log and interpret results, Taking Medication: Action of prescribed medication(s), Side effects of prescribed medication(s), and When to take medication(s), and Healthy Coping: Benefits of making appropriate lifestyle changes    [facility]      Time Spent: 60 minutes  Encounter Type: Individual    Any  diabetes medication dose changes were made via the CDE Protocol per the patient's referring provider. A copy of this encounter was shared with the provider.   Answers submitted by the patient for this visit:  Questionnaire about: Diabetes problem (Submitted on 10/25/2024)  Chief Complaint: Diabetes problem

## 2024-11-01 ENCOUNTER — HOSPITAL ENCOUNTER (OUTPATIENT)
Dept: BONE DENSITY | Facility: CLINIC | Age: 74
Discharge: HOME | End: 2024-11-01
Attending: INTERNAL MEDICINE | Admitting: INTERNAL MEDICINE
Payer: MEDICARE

## 2024-11-01 DIAGNOSIS — Z78.0 ASYMPTOMATIC POSTMENOPAUSAL STATUS: ICD-10-CM

## 2024-11-01 PROCEDURE — 77080 DXA BONE DENSITY AXIAL: CPT

## 2024-11-08 DIAGNOSIS — I21.4 NSTEMI (NON-ST ELEVATED MYOCARDIAL INFARCTION) (H): Chronic | ICD-10-CM

## 2024-11-10 ENCOUNTER — MYC REFILL (OUTPATIENT)
Dept: FAMILY MEDICINE | Facility: CLINIC | Age: 74
End: 2024-11-10
Payer: MEDICARE

## 2024-11-10 DIAGNOSIS — G47.00 INSOMNIA, UNSPECIFIED TYPE: Chronic | ICD-10-CM

## 2024-11-14 RX ORDER — CLONAZEPAM 0.5 MG/1
.25-.5 TABLET ORAL
Qty: 20 TABLET | Refills: 0 | Status: SHIPPED | OUTPATIENT
Start: 2024-11-14

## 2024-11-14 RX ORDER — METOPROLOL TARTRATE 25 MG/1
25 TABLET, FILM COATED ORAL 2 TIMES DAILY
Qty: 180 TABLET | Refills: 3 | Status: SHIPPED | OUTPATIENT
Start: 2024-11-14

## 2024-11-24 ENCOUNTER — HEALTH MAINTENANCE LETTER (OUTPATIENT)
Age: 74
End: 2024-11-24

## 2024-11-26 DIAGNOSIS — I21.4 NSTEMI (NON-ST ELEVATED MYOCARDIAL INFARCTION) (H): Chronic | ICD-10-CM

## 2024-11-26 RX ORDER — NITROGLYCERIN 0.4 MG/1
TABLET SUBLINGUAL
Qty: 25 TABLET | Refills: 3 | Status: SHIPPED | OUTPATIENT
Start: 2024-11-26

## 2024-12-01 ENCOUNTER — E-VISIT (OUTPATIENT)
Dept: FAMILY MEDICINE | Facility: CLINIC | Age: 74
End: 2024-12-01
Payer: MEDICARE

## 2024-12-01 DIAGNOSIS — R69 DIAGNOSIS UNKNOWN: Primary | ICD-10-CM

## 2024-12-01 PROCEDURE — 99207 PR NON-BILLABLE SERV PER CHARTING: CPT | Performed by: INTERNAL MEDICINE

## 2024-12-02 ASSESSMENT — PATIENT HEALTH QUESTIONNAIRE - PHQ9
SUM OF ALL RESPONSES TO PHQ QUESTIONS 1-9: 7
10. IF YOU CHECKED OFF ANY PROBLEMS, HOW DIFFICULT HAVE THESE PROBLEMS MADE IT FOR YOU TO DO YOUR WORK, TAKE CARE OF THINGS AT HOME, OR GET ALONG WITH OTHER PEOPLE: NOT DIFFICULT AT ALL
SUM OF ALL RESPONSES TO PHQ QUESTIONS 1-9: 7

## 2024-12-02 NOTE — TELEPHONE ENCOUNTER
Pt rescheduled for tomorrow.     Appointments in Next Year      Dec 03, 2024 10:30 AM  (Arrive by 10:10 AM)  Provider Visit with Yenny Parker PA-C  [facility] ([facility] [place] ) [phone]     Sudheer Villanueva RN  [facility]

## 2024-12-02 NOTE — PATIENT INSTRUCTIONS
Dear Janie,    We are sorry you are not feeling well. Based on the responses you provided, it is recommended that you be seen in-person in clinic so we can better evaluate your symptoms. Please schedule this visit in Stayzilla. You will have a Schedule Now button in Stayzilla to help with scheduling this appointment. Otherwise, you can call [phone] to schedule an appointment.     You will not be charged for this eVisit. Thank you for trusting us with your care.     Sirena Carter MD

## 2024-12-03 ENCOUNTER — OFFICE VISIT (OUTPATIENT)
Dept: FAMILY MEDICINE | Facility: CLINIC | Age: 74
End: 2024-12-03
Attending: INTERNAL MEDICINE
Payer: MEDICARE

## 2024-12-03 VITALS
HEART RATE: 64 BPM | BODY MASS INDEX: 31.15 KG/M2 | WEIGHT: 210.3 LBS | RESPIRATION RATE: 14 BRPM | HEIGHT: 69 IN | OXYGEN SATURATION: 96 % | DIASTOLIC BLOOD PRESSURE: 63 MMHG | SYSTOLIC BLOOD PRESSURE: 113 MMHG | TEMPERATURE: 97.6 F

## 2024-12-03 DIAGNOSIS — I10 ESSENTIAL HYPERTENSION, BENIGN: Chronic | ICD-10-CM

## 2024-12-03 DIAGNOSIS — E11.9 TYPE 2 DIABETES MELLITUS WITHOUT COMPLICATION, WITHOUT LONG-TERM CURRENT USE OF INSULIN (H): ICD-10-CM

## 2024-12-03 DIAGNOSIS — L97.501 SKIN ULCER OF TOE, LIMITED TO BREAKDOWN OF SKIN, UNSPECIFIED LATERALITY (H): Primary | ICD-10-CM

## 2024-12-03 LAB
EST. AVERAGE GLUCOSE BLD GHB EST-MCNC: 140 MG/DL
HBA1C MFR BLD: 6.5 % (ref 0–5.6)

## 2024-12-03 PROCEDURE — 99213 OFFICE O/P EST LOW 20 MIN: CPT | Performed by: PHYSICIAN ASSISTANT

## 2024-12-03 PROCEDURE — 83036 HEMOGLOBIN GLYCOSYLATED A1C: CPT | Performed by: PHYSICIAN ASSISTANT

## 2024-12-03 PROCEDURE — 36415 COLL VENOUS BLD VENIPUNCTURE: CPT | Performed by: PHYSICIAN ASSISTANT

## 2024-12-03 RX ORDER — OLMESARTAN MEDOXOMIL 20 MG/1
20 TABLET ORAL DAILY
Qty: 90 TABLET | Refills: 1 | Status: SHIPPED | OUTPATIENT
Start: 2024-12-03

## 2024-12-03 ASSESSMENT — PAIN SCALES - GENERAL: PAINLEVEL_OUTOF10: NO PAIN (0)

## 2024-12-03 NOTE — PROGRESS NOTES
HPI: Janie is a pleasant 73 yo female with recently diagnosed type 2 DM here for an ulcer on her pinky toe that she noticed about a week ago.  Denies any pain or drainage  Denies fever or chills or toe swelling  Has been using bandaid to cover this  Has DM so concerned about foot problems as her brother lost a toe to diabetes  States she only wears shoes when she leaves the house, otherwise doesn't wear shoes in the house    Past Medical History:   Diagnosis Date    Adrenal nodule (H) 09/2022    CT scan    Anemia     mild gastropathy on EGD 2008; neg pill cam    Arthritis     Lower back    Atypical ductal hyperplasia of both breasts     Has had biopsies and MRI    Diabetes (H) 2009    Prediabetes    Fibroid uterus     High cholesterol     History of hematuria 2008    Cystoscopy for recurrent UTIs; unremarkable renal US. Also recurrent UTIs as child and pyelonephritis.     History of hormone replacement therapy     after [facility] with BSO    HTN (hypertension)     HTN, goal below 140/90     Hx of bone density study 10/16/2006    Normal    Hypothyroidism     Insomnia     periodic - prn clonazepam helpful a couple times per month    Lipid screening 07/21/2015    Tchol 128, , HDL 53, LDL 53 outside records --> based on our labs off of atorvastatin 10yr ASCVD risk 9.4% in Oct 2015    Major depressive disorder, single episode in full remission (H) 2007    NSTEMI (non-ST elevated myocardial infarction) (H) 09/26/2022    Osteopenia     Mild left hip July 2016    Prediabetes     Metformin in the past    Renal cyst, right     Rotator cuff injury, left, sequela     MRI Jan 2015 and had PT; High-grade complete or near complete tear of the supraspinatus tendon and anterior fibers of the infraspinatus tendon. 1/3 of the infraspinatus tendon appears involved.     TBI (traumatic brain injury) (H)     As a child    Tubular adenoma of colon 2013 & 2016     Past Surgical History:   Procedure Laterality Date    ANKLE SURGERY  1976     BREAST BIOPSY, RT/LT      Many    BREAST SURGERY      Benign lumpectomy    C/SECTION, LOW TRANSVERSE  1968    CAPSULE/PILL CAM ENDOSCOPY  2014    EGD prior; capsule was negative     COLONOSCOPY      , , , 2013    COLONOSCOPY N/A 2019    Procedure: COLONOSCOPY, WITH POLYPECTOMY AND BIOPSY;  Surgeon: Pepito Romero MD;  Location:  GI    COLONOSCOPY N/A 2022    Procedure: COLONOSCOPY;  Surgeon: Rodrigo Dunbar MD;  Location:  GI    CV CORONARY ANGIOGRAM N/A 2022    Procedure: Coronary Angiogram;  Surgeon: Horacio Moran MD;  Location:  HEART CARDIAC CATH LAB    CV INSTANTANEOUS WAVE-FREE RATIO N/A 2022    Procedure: Instantaneous Wave-Free Ratio;  Surgeon: Horacio Moran MD;  Location:  HEART CARDIAC CATH LAB    CV OPTICAL COHERENCE TOMOGRAPHY N/A 2022    Procedure: Optical Coherence Tomography;  Surgeon: Horacio Moran MD;  Location:  HEART CARDIAC CATH LAB    HYSTERECTOMY, PAP NO LONGER INDICATED      OPEN REDUCTION INTERNAL FIXATION ANKLE Right 2023    Procedure: OPEN REDUCTION INTERNAL FIXATION RIGHT ANKLE FRACTURE;  Surgeon: Declan Casanova MD;  Location:  OR    [facility] with BSO  2000    benign fibroids    TUBAL LIGATION       Social History     Tobacco Use    Smoking status: Former     Current packs/day: 0.00     Average packs/day: 0.5 packs/day for 9.6 years (4.8 ttl pk-yrs)     Types: Cigarettes     Start date: 1965     Quit date: 4/15/1975     Years since quittin.6    Smokeless tobacco: Never    Tobacco comments:     social smoker - only smoked when with another smoker   Substance Use Topics    Alcohol use: Yes     Comment: Occasionally 1 beer every few weeks     Current Outpatient Medications   Medication Sig Dispense Refill    acetaminophen (TYLENOL) 500 MG tablet Take 2 tablets (1,000 mg) by mouth every 6 hours as needed for mild pain      amLODIPine (NORVASC) 5 MG tablet Take 1 tablet (5 mg) by mouth  every evening. 90 tablet 3    aspirin (ASA) 81 MG EC tablet Take 1 tablet (81 mg) by mouth daily  0    atorvastatin (LIPITOR) 40 MG tablet Take 1 tablet (40 mg) by mouth every evening. 90 tablet 0    blood glucose (NO BRAND SPECIFIED) test strip Use to test blood sugar 3 times a week. To accompany: Blood Glucose Monitor Brands: per insurance. 100 strip 6    blood glucose monitoring (NO BRAND SPECIFIED) meter device kit Use to test blood sugar 3 times a week. Preferred blood glucose meter OR supplies to accompany: Blood Glucose Monitor Brands: per insurance. 1 kit 0    calcium carbonate (TUMS) 500 MG chewable tablet Take 2 tablets (1,000 mg) by mouth daily as needed for heartburn      Cholecalciferol (VITAMIN D3 PO) Take 1,000 Units by mouth daily      clonazePAM (KLONOPIN) 0.5 MG tablet Take 0.5-1 tablets (0.25-0.5 mg) by mouth nightly as needed for anxiety. 20 tablet 0    cyanocobalamin (VITAMIN B-12) 100 MCG tablet Take 100 mcg by mouth daily      DULoxetine (CYMBALTA) 60 MG capsule Take 1 capsule (60 mg) by mouth daily. 90 capsule 3    levothyroxine (SYNTHROID/LEVOTHROID) 88 MCG tablet Take 1 tablet (88 mcg) by mouth daily. 90 tablet 0    metFORMIN (GLUCOPHAGE XR) 500 MG 24 hr tablet Take 1 tablet (500 mg) by mouth 2 times daily (with meals). 180 tablet 3    metoprolol tartrate (LOPRESSOR) 25 MG tablet Take 1 tablet (25 mg) by mouth 2 times daily. 180 tablet 3    nitroGLYcerin (NITROSTAT) 0.4 MG sublingual tablet For chest pain place 1 tablet under the tongue every 5 minutes for 3 doses. If symptoms persist 5 minutes after 1st dose call 911. 25 tablet 3    olmesartan (BENICAR) 20 MG tablet Take 1 tablet (20 mg) by mouth daily. 90 tablet 1    polyethylene glycol (MIRALAX) 17 GM/Dose powder Take 17 g by mouth daily as needed for constipation      senna-docusate (SENOKOT-S/PERICOLACE) 8.6-50 MG tablet Take 1 tablet by mouth 2 times daily as needed for constipation 21 tablet 0    thin (NO BRAND SPECIFIED) lancets Use  "with lanceting device. To accompany: Blood Glucose Monitor Brands: per insurance. 100 each 6     Allergies   Allergen Reactions    Ciprofloxacin GI Disturbance    Oxycodone Other (See Comments)     She prefers not to have Oxycodone or Oxycontin due to potential addictive properties    Simvastatin Other (See Comments)     Muscle aches      PHYSICAL EXAM:    /63 (BP Location: Left arm, Patient Position: Sitting, Cuff Size: Adult Large)   Pulse 64   Temp 97.6  F (36.4  C) (Oral)   Resp 14   Ht 1.753 m (5' 9\")   Wt 95.4 kg (210 lb 4.8 oz)   LMP  (LMP Unknown)   SpO2 96%   BMI 31.06 kg/m      Patient appears non toxic  On the top of the 5th toe there is a round small superficial ulcer overlying a bony prominence  No erythema, drainage or swelling of the toe.    Results for orders placed or performed in visit on 12/03/24   Hemoglobin A1c     Status: Abnormal   Result Value Ref Range    Estimated Average Glucose 140 (H) <117 mg/dL    Hemoglobin A1C 6.5 (H) 0.0 - 5.6 %         Assessment and Plan:     (L97.501) Skin ulcer of toe, limited to breakdown of skin, unspecified laterality (H)  (primary encounter diagnosis)  Comment:   Plan: suspect this due to friction of the bony prominence from shoe wear. Recd wearing pad over that toe when in shoes as it appears there is rubbing.    (E11.9) Type 2 diabetes mellitus without complication, without long-term current use of insulin (H)  Comment: home blood sugars have been <120  Plan: Hemoglobin A1c has improved.            (I10) Essential hypertension, benign - goal < 140/90  Comment:   Plan: olmesartan (BENICAR) 20 MG tablet        Refilled,. Well controlled.      Yenny Parker PA-C                Answers submitted by the patient for this visit:  Patient Health Questionnaire (Submitted on 12/2/2024)  If you checked off any problems, how difficult have these problems made it for you to do your work, take care of things at home, or get along with other people?: Not " difficult at all  PHQ9 TOTAL SCORE: 7  General Questionnaire (Submitted on 12/2/2024)  Chief Complaint: Chronic problems general questions HPI Form  How many days per week do you miss taking your medication?: 1  What makes it hard for you to take your medication every day?: remembering to take  General Concern (Submitted on 12/2/2024)  Chief Complaint: Chronic problems general questions HPI Form  What is the reason for your visit today?: Sore on toe that hasnt healed  When did your symptoms begin?: 3-7 days ago  What are your symptoms?: Round red lesion on toe  How would you describe these symptoms?: Mild  Are your symptoms:: Staying the same  Have you had these symptoms before?: No  Is there anything that makes you feel worse?: No  Is there anything that makes you feel better?: No  Questionnaire about: Chronic problems general questions HPI Form (Submitted on 12/2/2024)  Chief Complaint: Chronic problems general questions HPI Form

## 2024-12-16 ENCOUNTER — LAB (OUTPATIENT)
Dept: LAB | Facility: CLINIC | Age: 74
End: 2024-12-16
Payer: MEDICARE

## 2024-12-16 DIAGNOSIS — E06.3 HYPOTHYROIDISM DUE TO HASHIMOTO THYROIDITIS: ICD-10-CM

## 2024-12-16 LAB
T4 FREE SERPL-MCNC: 1.26 NG/DL (ref 0.9–1.7)
TSH SERPL DL<=0.005 MIU/L-ACNC: 4.13 UIU/ML (ref 0.3–4.2)

## 2024-12-16 PROCEDURE — 36415 COLL VENOUS BLD VENIPUNCTURE: CPT

## 2024-12-16 PROCEDURE — 84439 ASSAY OF FREE THYROXINE: CPT

## 2024-12-16 PROCEDURE — 84443 ASSAY THYROID STIM HORMONE: CPT

## 2024-12-17 DIAGNOSIS — E06.3 HYPOTHYROIDISM DUE TO HASHIMOTO THYROIDITIS: ICD-10-CM

## 2024-12-17 RX ORDER — LEVOTHYROXINE SODIUM 88 UG/1
88 TABLET ORAL DAILY
Qty: 90 TABLET | Refills: 3 | Status: SHIPPED | OUTPATIENT
Start: 2024-12-17

## 2025-01-20 ENCOUNTER — OFFICE VISIT (OUTPATIENT)
Dept: FAMILY MEDICINE | Facility: CLINIC | Age: 75
End: 2025-01-20
Attending: INTERNAL MEDICINE
Payer: MEDICARE

## 2025-01-20 VITALS
WEIGHT: 208.4 LBS | BODY MASS INDEX: 30.87 KG/M2 | HEIGHT: 69 IN | RESPIRATION RATE: 16 BRPM | DIASTOLIC BLOOD PRESSURE: 67 MMHG | SYSTOLIC BLOOD PRESSURE: 129 MMHG | HEART RATE: 65 BPM | TEMPERATURE: 97 F | OXYGEN SATURATION: 99 %

## 2025-01-20 DIAGNOSIS — F32.5 MAJOR DEPRESSIVE DISORDER, SINGLE EPISODE IN FULL REMISSION: Chronic | ICD-10-CM

## 2025-01-20 DIAGNOSIS — N18.31 STAGE 3A CHRONIC KIDNEY DISEASE (H): ICD-10-CM

## 2025-01-20 DIAGNOSIS — D32.9 MENINGIOMA (H): ICD-10-CM

## 2025-01-20 DIAGNOSIS — I25.10 CORONARY ARTERY DISEASE INVOLVING NATIVE CORONARY ARTERY OF NATIVE HEART WITHOUT ANGINA PECTORIS: Primary | ICD-10-CM

## 2025-01-20 DIAGNOSIS — E11.9 TYPE 2 DIABETES MELLITUS WITHOUT COMPLICATION, WITHOUT LONG-TERM CURRENT USE OF INSULIN (H): ICD-10-CM

## 2025-01-20 DIAGNOSIS — M81.0 AGE-RELATED OSTEOPOROSIS WITHOUT CURRENT PATHOLOGICAL FRACTURE: ICD-10-CM

## 2025-01-20 LAB
CHOLEST SERPL-MCNC: 156 MG/DL
CREAT UR-MCNC: 139 MG/DL
FASTING STATUS PATIENT QL REPORTED: NO
HDLC SERPL-MCNC: 37 MG/DL
LDLC SERPL CALC-MCNC: 60 MG/DL
MICROALBUMIN UR-MCNC: 15 MG/L
MICROALBUMIN/CREAT UR: 10.79 MG/G CR (ref 0–25)
NONHDLC SERPL-MCNC: 119 MG/DL
TRIGL SERPL-MCNC: 295 MG/DL

## 2025-01-20 PROCEDURE — 80061 LIPID PANEL: CPT | Performed by: INTERNAL MEDICINE

## 2025-01-20 PROCEDURE — 82570 ASSAY OF URINE CREATININE: CPT | Performed by: INTERNAL MEDICINE

## 2025-01-20 PROCEDURE — 82043 UR ALBUMIN QUANTITATIVE: CPT | Performed by: INTERNAL MEDICINE

## 2025-01-20 PROCEDURE — 36415 COLL VENOUS BLD VENIPUNCTURE: CPT | Performed by: INTERNAL MEDICINE

## 2025-01-20 RX ORDER — DULOXETIN HYDROCHLORIDE 30 MG/1
30 CAPSULE, DELAYED RELEASE ORAL DAILY
Qty: 90 CAPSULE | Refills: 3 | Status: SHIPPED | OUTPATIENT
Start: 2025-01-20

## 2025-01-20 RX ORDER — ALENDRONATE SODIUM 70 MG/1
70 TABLET ORAL
Qty: 12 TABLET | Refills: 3 | Status: SHIPPED | OUTPATIENT
Start: 2025-01-20

## 2025-01-20 NOTE — PROGRESS NOTES
"  {PROVIDER CHARTING PREFERENCE:192941}    Marixa Lima is a 74 year old, presenting for the following health issues:  RECHECK    History of Present Illness       Diabetes:   She presents for follow up of diabetes.  She is checking home blood glucose a few times a week.   She checks blood glucose before meals.  Blood glucose is never over 200 and never under 70.  When her blood glucose is low, the patient is asymptomatic for confusion, blurred vision, lethargy and reports not feeling dizzy, shaky, or weak.   She has no concerns regarding her diabetes at this time.   She is not experiencing numbness or burning in feet, excessive thirst, blurry vision, weight changes or redness, sores or blisters on feet. The patient has not had a diabetic eye exam in the last 12 months.              Recent A1c at goal.  Glucose Control:  good  Medications: metformin 500 mg bid  Lipids:  LDL at 69 in 2024  Blood Pressure:  well controlled  Diabetic Nephropathy screening:   Diabetic Retinopathy screening: yearly diabetic eye exams at [facility]   Diabetic Neuropathy screening:  urine albumin ordered    Follow up on dexa scan    Depression: mental health for therapy, didn't like her previous one, increased cymbalta 90 mg daily.         Objective    /67 (BP Location: Left arm, Patient Position: Sitting, Cuff Size: Adult Regular)   Pulse 65   Temp 97  F (36.1  C)   Resp 16   Ht 1.753 m (5' 9\")   Wt 94.5 kg (208 lb 6.4 oz)   LMP  (LMP Unknown)   SpO2 99%   BMI 30.78 kg/m    Body mass index is 30.78 kg/m .  Physical Exam  Cardiovascular:      Pulses:           Dorsalis pedis pulses are 2+ on the right side and 2+ on the left side.   Feet:      Right foot:      Protective Sensation: 10 sites tested.  10 sites sensed.      Skin integrity: Skin integrity normal.      Toenail Condition: Right toenails are normal.      Left foot:      Protective Sensation: 10 sites tested.  10 sites sensed.      Skin integrity: Skin " integrity normal.      Toenail Condition: Left toenails are normal.              Signed Electronically by: Sirena Carter MD  {Email feedback regarding this note to [email or web address]   :[id number]}

## 2025-01-21 DIAGNOSIS — I21.4 NSTEMI (NON-ST ELEVATED MYOCARDIAL INFARCTION) (H): Chronic | ICD-10-CM

## 2025-01-21 RX ORDER — ATORVASTATIN CALCIUM 40 MG/1
40 TABLET, FILM COATED ORAL EVERY EVENING
Qty: 90 TABLET | Refills: 3 | Status: SHIPPED | OUTPATIENT
Start: 2025-01-21

## 2025-01-24 PROBLEM — E78.1 HYPERTRIGLYCERIDEMIA: Status: ACTIVE | Noted: 2025-01-24

## 2025-02-04 ASSESSMENT — ANXIETY QUESTIONNAIRES
8. IF YOU CHECKED OFF ANY PROBLEMS, HOW DIFFICULT HAVE THESE MADE IT FOR YOU TO DO YOUR WORK, TAKE CARE OF THINGS AT HOME, OR GET ALONG WITH OTHER PEOPLE?: VERY DIFFICULT
GAD7 TOTAL SCORE: 13
5. BEING SO RESTLESS THAT IT IS HARD TO SIT STILL: SEVERAL DAYS
4. TROUBLE RELAXING: MORE THAN HALF THE DAYS
IF YOU CHECKED OFF ANY PROBLEMS ON THIS QUESTIONNAIRE, HOW DIFFICULT HAVE THESE PROBLEMS MADE IT FOR YOU TO DO YOUR WORK, TAKE CARE OF THINGS AT HOME, OR GET ALONG WITH OTHER PEOPLE: VERY DIFFICULT
1. FEELING NERVOUS, ANXIOUS, OR ON EDGE: MORE THAN HALF THE DAYS
7. FEELING AFRAID AS IF SOMETHING AWFUL MIGHT HAPPEN: SEVERAL DAYS
GAD7 TOTAL SCORE: 13
2. NOT BEING ABLE TO STOP OR CONTROL WORRYING: NEARLY EVERY DAY
3. WORRYING TOO MUCH ABOUT DIFFERENT THINGS: NEARLY EVERY DAY
6. BECOMING EASILY ANNOYED OR IRRITABLE: SEVERAL DAYS

## 2025-02-07 ENCOUNTER — OFFICE VISIT (OUTPATIENT)
Dept: BEHAVIORAL HEALTH | Facility: CLINIC | Age: 75
End: 2025-02-07
Payer: MEDICARE

## 2025-02-07 DIAGNOSIS — F32.5 MAJOR DEPRESSIVE DISORDER, SINGLE EPISODE IN FULL REMISSION: Primary | Chronic | ICD-10-CM

## 2025-02-07 PROCEDURE — 90832 PSYTX W PT 30 MINUTES: CPT

## 2025-02-07 ASSESSMENT — PATIENT HEALTH QUESTIONNAIRE - PHQ9
SUM OF ALL RESPONSES TO PHQ QUESTIONS 1-9: 14
10. IF YOU CHECKED OFF ANY PROBLEMS, HOW DIFFICULT HAVE THESE PROBLEMS MADE IT FOR YOU TO DO YOUR WORK, TAKE CARE OF THINGS AT HOME, OR GET ALONG WITH OTHER PEOPLE: VERY DIFFICULT
SUM OF ALL RESPONSES TO PHQ QUESTIONS 1-9: 14

## 2025-02-07 NOTE — PROGRESS NOTES
[facility] Primary Care: Integrated Behavioral Health    Integrated Behavioral Health   Mental Health & Addiction Services      Progress Note - Initial [facility] Visit     Patient Name: Janie De Leon    Date: February 7, 2025  Service Type: Individual   Visit Start Time:  3:30 pm  Visit End Time:   4:00 pm    Attendees: Patient   Service Modality: In-person     [facility] Visit Activities (Refresh list every visit): NEW and [facility] Only         DATA:     Interactive Complexity: No   Crisis: No     Assessments completed prior to this visit:     The following assessments were completed by patient for this visit:  PHQ9:       9/8/2023     8:25 AM 9/21/2023    10:46 AM 10/24/2023     8:52 AM 1/14/2024    11:02 AM 9/24/2024     8:54 PM 12/2/2024    11:29 AM 2/7/2025     3:18 PM   PHQ-9 SCORE   PHQ-9 Total Score MyChart 6 (Mild depression) 6 (Mild depression)  6 (Mild depression) 6 (Mild depression) 7 (Mild depression) 14 (Moderate depression)   PHQ-9 Total Score 6 6 8 6 6 7  14        Patient-reported     GAD7:       8/27/2020     9:07 AM 8/30/2021     9:27 AM 8/31/2022    10:30 AM 9/8/2023     8:26 AM 9/22/2023     9:01 AM 9/25/2024     1:42 PM 2/4/2025    10:32 AM   QUEENIE-7 SCORE   Total Score   11 (moderate anxiety) 6 (mild anxiety) 6 (mild anxiety) 4 (minimal anxiety) 13 (moderate anxiety)   Total Score 8 2 11 6 6 4 13        Patient-reported     CAGE-AID:       2/4/2025    10:33 AM   CAGE-AID Total Score   Total Score 0    Total Score MyChart 0 (A total score of 2 or greater is considered clinically significant)       Patient-reported        Referral:   Patient was referred to [facility] by [unfilled].    Reason for referral: clarify behavioral health diagnosis and determine behavioral health treatment options.      [facility] introduced self and role. Discussed informed consent and limits to confidentiality.     Presenting Concerns/ Current Stressors:   Pt reports significant feelings of overwhelm as she supports  her  and his dementia. Pt reports little support system as  no longer has relationships with his adult children. PT reports a long history of depression and is seeking ways to manage stress so that her mental health remains stable. Pt reports that her previous therapy experiences were unsuccessful, just did not feel like aligned fits. Pt and therapist discussed stress reduction and what has been helpful for pt in the past. therapist offered techniques for pt to begin practicing.     Pt reports a history of suicide attempts (2x) the last attempt being over 10 years ago. Developed safety plan. Pt reports no intent or plan to self harm currently.     Therapeutic Interventions:  Motivational Interviewing (MI): Asked open-ended questions to invite patient's self-reflection and self-direction around change and what is important for them in working towards their goals.  Expressed and demonstrated empathy through reflective listening.     Response to treatment interventions:   Patient was receptive to interventions utilized.     Safety Issues and Plan for Safety and Risk Management:     Patient has had a history of suicide attempts: Pt reports a history of sucide attempts (2x) the last attempt being over 10 years ago. Developed safety plan. Pt reports no intent or plan to self harm currently.     Patient denies current fears or concerns for personal safety.   Patient denies current or recent suicidal ideation or behaviors.   Patient denies current or recent homicidal ideation or behaviors.   Patient denies current or recent self injurious behavior or ideation.   Patient denies other safety concerns.   A safety and risk management plan has been developed including: Patient consented to co-developed safety plan.  Safety and risk management plan was completed - see below.  Patient agreed to use safety plan should any safety concerns arise.  A copy was given to the patient.   Patient reports there are no firearms  in the house.       ASSESSMENT:   Mental Status:     Appearance:   Appropriate    Eye Contact:   Good    Psychomotor Behavior: Normal    Attitude:   Cooperative  Interested   Orientation:   All   Speech Rate / Production: Normal/ Responsive   Volume:   Normal    Mood:    Elevated    Affect:    Appropriate    Thought Content:  Clear    Thought Form:  Coherent    Insight:    Fair         Diagnostic Criteria:   Major Depressive Disorder  A) Recurrent episode(s) - symptoms have been present during the same 2-week period and represent a change from previous functioning 5 or more symptoms (required for diagnosis)   - Depressed mood. Note: In children and adolescents, can be irritable mood.     - Diminished interest or pleasure in all, or almost all, activities.    - Fatigue or loss of energy.    - Feelings of worthlessness or inappropriate guilt.    - Recurrent thoughts of death (not just fear of dying), recurrent suicidal ideation without a specific plan, or a suicide attempt or a specific plan for committing suicide.   B) The symptoms cause clinically significant distress or impairment in social, occupational, or other important areas of functioning  C) The episode is not attributable to the physiological effects of a substance or to another medical condition  D) The occurence of major depressive episode is not better explained by other thought / psychotic disorders  E) There has never been a manic episode or hypomanic episode        DSM5 Diagnoses: (Sustained by DSM5 Criteria Listed Above)     Diagnoses: 296.32 (F33.1) Major Depressive Disorder, Recurrent Episode, Moderate With anxious distress     Psychosocial / Contextual Factors: Trauma History, Relationship Concerns, and Caregiver       Collateral Reports Completed:   Routed note to PCP        PLAN: (Homework, other):     1. Patient was provided:  recommendation to schedule follow-up with [facility] [place]     2. Provider recommended the following referrals: continue sessions  with [facility], long term therapy referral placed.        3. Suicide Risk and Safety Concerns were assessed for Janie Jeffrey De Leon    Safety Plan:   Patient agreed to follow safety plan   Julio Safety Plan      Creation Date: 2/7/25       Step 1: Warning signs:    Warning Signs    navigating 's dementia      Step 2: Internal coping strategies - Things I can do to take my mind off my problems without contacting another person:    Strategies    reading      Step 3: People and social settings that provide distraction:    Name Contact Information    friend group, extended family, book club          Step 4: People whom I can ask for help during a crisis:   No contacts identified     Step 5: Professionals or agencies I can contact during a crisis:    Local Emergency Department Emergency Department Address Emergency Department Phone    [address] [facility], [place], [place], [address] [phone]      Suicide Prevention Lifeline Phone: Call or Text 822  Crisis Text Line: Text HOME to 832616     Step 6: Making the environment safer (plan for lethal means safety):   Declined to answer     Optional: What is most important to me and worth living for?:   My daughter, granddaughter and grandson.     Julio Safety Plan. Connie Shah and Brian Lamar. Used with permission of the authors.            Donna Duron, [facility]   February 7, 2025

## 2025-02-07 NOTE — Clinical Note
Hi there,   I met with Janie on Friday, we will continue brief therapy and I have placed a referral for long-term therapy also. Thanks!

## 2025-02-14 ENCOUNTER — TRANSFERRED RECORDS (OUTPATIENT)
Dept: HEALTH INFORMATION MANAGEMENT | Facility: CLINIC | Age: 75
End: 2025-02-14
Payer: MEDICARE

## 2025-02-14 LAB — RETINOPATHY: NEGATIVE

## 2025-02-27 ENCOUNTER — MYC REFILL (OUTPATIENT)
Dept: FAMILY MEDICINE | Facility: CLINIC | Age: 75
End: 2025-02-27
Payer: MEDICARE

## 2025-02-27 DIAGNOSIS — G47.00 INSOMNIA, UNSPECIFIED TYPE: Chronic | ICD-10-CM

## 2025-03-03 RX ORDER — CLONAZEPAM 0.5 MG/1
.25-.5 TABLET ORAL
Qty: 20 TABLET | Refills: 0 | Status: SHIPPED | OUTPATIENT
Start: 2025-03-03

## 2025-03-10 ENCOUNTER — MYC MEDICAL ADVICE (OUTPATIENT)
Dept: FAMILY MEDICINE | Facility: CLINIC | Age: 75
End: 2025-03-10
Payer: MEDICARE

## 2025-03-10 DIAGNOSIS — F32.5 MAJOR DEPRESSIVE DISORDER, SINGLE EPISODE IN FULL REMISSION: Primary | ICD-10-CM

## 2025-03-11 RX ORDER — DULOXETIN HYDROCHLORIDE 60 MG/1
60 CAPSULE, DELAYED RELEASE ORAL DAILY
Qty: 90 CAPSULE | Refills: 3 | Status: SHIPPED | OUTPATIENT
Start: 2025-03-11

## 2025-03-11 NOTE — TELEPHONE ENCOUNTER
Please see pt's MyChart message and advise on the next best steps.    Per PCP OV on 1/20/25 - duloxetine was changed to 30 mg oral daily.     Pharmacy pended for your review.     Sudheer Villanueva RN  [facility]

## 2025-04-17 ENCOUNTER — MYC REFILL (OUTPATIENT)
Dept: FAMILY MEDICINE | Facility: CLINIC | Age: 75
End: 2025-04-17
Payer: MEDICARE

## 2025-04-17 DIAGNOSIS — G47.00 INSOMNIA, UNSPECIFIED TYPE: Chronic | ICD-10-CM

## 2025-04-18 ASSESSMENT — ANXIETY QUESTIONNAIRES
6. BECOMING EASILY ANNOYED OR IRRITABLE: MORE THAN HALF THE DAYS
GAD7 TOTAL SCORE: 16
2. NOT BEING ABLE TO STOP OR CONTROL WORRYING: NEARLY EVERY DAY
5. BEING SO RESTLESS THAT IT IS HARD TO SIT STILL: NOT AT ALL
1. FEELING NERVOUS, ANXIOUS, OR ON EDGE: MORE THAN HALF THE DAYS
8. IF YOU CHECKED OFF ANY PROBLEMS, HOW DIFFICULT HAVE THESE MADE IT FOR YOU TO DO YOUR WORK, TAKE CARE OF THINGS AT HOME, OR GET ALONG WITH OTHER PEOPLE?: VERY DIFFICULT
3. WORRYING TOO MUCH ABOUT DIFFERENT THINGS: NEARLY EVERY DAY
4. TROUBLE RELAXING: NEARLY EVERY DAY
7. FEELING AFRAID AS IF SOMETHING AWFUL MIGHT HAPPEN: NEARLY EVERY DAY
GAD7 TOTAL SCORE: 16
GAD7 TOTAL SCORE: 16
IF YOU CHECKED OFF ANY PROBLEMS ON THIS QUESTIONNAIRE, HOW DIFFICULT HAVE THESE PROBLEMS MADE IT FOR YOU TO DO YOUR WORK, TAKE CARE OF THINGS AT HOME, OR GET ALONG WITH OTHER PEOPLE: VERY DIFFICULT
7. FEELING AFRAID AS IF SOMETHING AWFUL MIGHT HAPPEN: NEARLY EVERY DAY

## 2025-04-19 NOTE — PROGRESS NOTES
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  support professionals: primary care physician.      6.  Referrals:   The following referral(s) will be initiated: Outpatient [facility].       A Release of Information has been obtained for the following:  n/a .     Clinical Substantiation/medical necessity for the above recommendations:  PHQ-9 and QUEENIE-7 scores. 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 She has her own healthcare issues to attend to.    7. ARIEL:    ARIEL:  Discussed the general effects of drugs and alcohol on health and well-being. Provider gave patient printed information about the  effects of chemical use on their health and well being. Recommendations:  none .     8. Records:   These were reviewed at time of assessment.   Information in this assessment was obtained from the medical record and  provided by patient who is a good historian.    Patient will have open access to their mental health medical record.    9.   Interactive Complexity: No    10. Safety Plan: No Safety plan indicated    Provider Name/ Credentials:  Mary Saab [facility]  April 23, 2025

## 2025-04-20 RX ORDER — CLONAZEPAM 0.5 MG/1
.25-.5 TABLET ORAL
Qty: 20 TABLET | Refills: 0 | Status: SHIPPED | OUTPATIENT
Start: 2025-04-20

## 2025-04-23 ENCOUNTER — VIRTUAL VISIT (OUTPATIENT)
Dept: PSYCHOLOGY | Facility: CLINIC | Age: 75
End: 2025-04-23
Payer: MEDICARE

## 2025-04-23 DIAGNOSIS — F41.1 GAD (GENERALIZED ANXIETY DISORDER): ICD-10-CM

## 2025-04-23 DIAGNOSIS — F33.1 MAJOR DEPRESSIVE DISORDER, RECURRENT EPISODE, MODERATE (H): Primary | ICD-10-CM

## 2025-04-23 PROCEDURE — 90791 PSYCH DIAGNOSTIC EVALUATION: CPT | Mod: 95 | Performed by: COUNSELOR

## 2025-04-23 ASSESSMENT — COLUMBIA-SUICIDE SEVERITY RATING SCALE - C-SSRS
TOTAL  NUMBER OF INTERRUPTED ATTEMPTS SINCE LAST CONTACT: NO
TOTAL  NUMBER OF ABORTED OR SELF INTERRUPTED ATTEMPTS SINCE LAST CONTACT: NO
ATTEMPT SINCE LAST CONTACT: NO
1. SINCE LAST CONTACT, HAVE YOU WISHED YOU WERE DEAD OR WISHED YOU COULD GO TO SLEEP AND NOT WAKE UP?: YES
SUICIDE, SINCE LAST CONTACT: NO
6. HAVE YOU EVER DONE ANYTHING, STARTED TO DO ANYTHING, OR PREPARED TO DO ANYTHING TO END YOUR LIFE?: NO
5. HAVE YOU STARTED TO WORK OUT OR WORKED OUT THE DETAILS OF HOW TO KILL YOURSELF? DO YOU INTEND TO CARRY OUT THIS PLAN?: NO
2. HAVE YOU ACTUALLY HAD ANY THOUGHTS OF KILLING YOURSELF?: YES
REASONS FOR IDEATION SINCE LAST CONTACT: COMPLETELY TO END OR STOP THE PAIN (YOU COULDN'T GO ON LIVING WITH THE PAIN OR HOW YOU WERE FEELING)

## 2025-04-23 ASSESSMENT — PATIENT HEALTH QUESTIONNAIRE - PHQ9
SUM OF ALL RESPONSES TO PHQ QUESTIONS 1-9: 15
SUM OF ALL RESPONSES TO PHQ QUESTIONS 1-9: 15
10. IF YOU CHECKED OFF ANY PROBLEMS, HOW DIFFICULT HAVE THESE PROBLEMS MADE IT FOR YOU TO DO YOUR WORK, TAKE CARE OF THINGS AT HOME, OR GET ALONG WITH OTHER PEOPLE: SOMEWHAT DIFFICULT

## 2025-04-23 NOTE — Clinical Note
Good Morning, I completed the DA on Janie and will meet again next week. I encouraged her to arrange for in home healthcare for her  so that she can attend to some of her own needs and get out of the house.  Mary Saab [facility]

## 2025-04-29 ASSESSMENT — PATIENT HEALTH QUESTIONNAIRE - PHQ9
10. IF YOU CHECKED OFF ANY PROBLEMS, HOW DIFFICULT HAVE THESE PROBLEMS MADE IT FOR YOU TO DO YOUR WORK, TAKE CARE OF THINGS AT HOME, OR GET ALONG WITH OTHER PEOPLE: SOMEWHAT DIFFICULT
SUM OF ALL RESPONSES TO PHQ QUESTIONS 1-9: 11
SUM OF ALL RESPONSES TO PHQ QUESTIONS 1-9: 11

## 2025-04-29 NOTE — PROGRESS NOTES
Answers submitted by the patient for this visit:  Patient Health Questionnaire (Submitted on 4/29/2025)  If you checked off any problems, how difficult have these problems made it for you to do your work, take care of things at home, or get along with other people?: Somewhat difficult  PHQ9 TOTAL SCORE: 11      [facility]                                     Progress Note    Patient Name: Janie De Leon  Date: 04/30/2025         Service Type: Individual      Session Start Time: 1400  Session End Time: 1445     Session Length: 45    Session #: 1    Attendees: Client attended alone    Service Modality:  Video Visit:      Provider verified identity through the following two step process.  Patient provided:  Patient is known previously to provider    Telemedicine Visit: The patient's condition can be safely assessed and treated via synchronous audio and visual telemedicine encounter.      Reason for Telemedicine Visit: Patient has requested telehealth visit and Services only offered telehealth    Originating Site (Patient Location): Patient's home    Distant Site (Provider Location): Provider Remote Setting- Home Office    Consent:  The patient/guardian has verbally consented to: the potential risks and benefits of telemedicine (video visit) versus in person care; bill my insurance or make self-payment for services provided; and responsibility for payment of non-covered services.     Patient would like the video invitation sent by:  My Chart    Mode of Communication:  Video Conference via [employer]well    Distant Location (Provider):  Off-site    As the provider I attest to compliance with applicable laws and regulations related to telemedicine.    DATA  Interactive Complexity: No  Crisis: No        Progress Since Last Session (Related to Symptoms / Goals / Homework):   Symptoms: No change      Homework: Completed in session      Episode of Care Goals: Minimal progress - PREPARATION (Decided to change -  "considering how); Intervened by negotiating a change plan and determining options / strategies for behavior change, identifying triggers, exploring social supports, and working towards setting a date to begin behavior change     Current / Ongoing Stressors and Concerns:     Janie has a company coming in next week to stay with [place] one day a week.     The reason for seeking services at this time is: \"Depression\".  The problem(s) began 05/01/06. Feels like she has been depressed all her life but in 2006 was when her boss started bothering her.    Janie had enuresis as a child and wet the bed until she was 12. Her Mom wouldn't let her bathe everyday so Janie would smell going to school. Her Mom started shaming her, said she was going to put a sign around her neck saying she wet the bed. At 12 Janie got her period, her Mom was mad because now she had to clean both urine and blood out of the sheets. Because of this, Janie feels it impacted her self esteem. Her Mom would compare her to her brother.     Janie talked about caring for her  and the toll it takes.      Treatment Objective(s) Addressed in This Session:   Increase interest, engagement, and pleasure in doing things       Intervention:   CBT: Negative self talk  Motivational Interviewing: open ended questions, validated his feelings    Assessments completed prior to visit:  The following assessments were completed by patient for this visit:  PHQ2: No questionnaires on file.  GAD2:       2/4/2025    10:32 AM 4/18/2025    10:39 AM 4/25/2025    10:52 AM   QUEENIE-2   Feeling nervous, anxious, or on edge 2 2 1   Not being able to stop or control worrying 3 3 2   QUEENIE-2 Total Score 5  5  3        Patient-reported     PROMIS 10-Global Health (only subscores and total score):       4/18/2025    10:42 AM 4/25/2025    10:53 AM   PROMIS-10 Scores Only   Global Mental Health Score 7  6    Global Physical Health Score 14  15    PROMIS TOTAL - SUBSCORES 21  21        " Patient-reported     [place] Suicide Severity Rating Scale (Short Version)      3/7/2022     2:17 PM 9/25/2022     3:43 PM 5/23/2023     4:08 PM 5/23/2023    10:18 PM 5/24/2023     1:30 AM 4/23/2025    10:00 AM   [place] Suicide Severity Rating (Short Version)   Over the past 2 weeks have you felt down, depressed, or hopeless? no no no no no    Over the past 2 weeks have you had thoughts of killing yourself? no no no no no    Have you ever attempted to kill yourself? no no no no no    1. Wish to be Dead (Since Last Contact)      Y   Wish to be Dead Description (Since Last Contact)      It would be easier if dead   2. Non-Specific Active Suicidal Thoughts (Since Last Contact)      Y   3. Active Suicidal Ideation with any Methods (Not Plan) Without Intent to Act (Since Last Contact)      N   4. Active Suicidal Ideation with Some Intent to Act, Without Specific Plan (Since Last Contact)      N   5. Active Suicidal Ideation with Specific Plan and Intent (Since Last Contact)      N   Most Severe Ideation Rating (Since Last Contact)      1   Description of Most Severe Ideation (Since Last Contact)      Easier if dead   Frequency (Since Last Contact)      1   Duration (Since Last Contact)      1   Deterrents (Since Last Contact)      1   Reasons for Ideation (Since Last Contact)      5   Actual Attempt (Since Last Contact)      N   Has subject engaged in non-suicidal self-injurious behavior? (Since Last Contact)      N   Interrupted Attempts (Since Last Contact)      N   Aborted or Self-Interrupted Attempt (Since Last Contact)      N   Preparatory Acts or Behavior (Since Last Contact)      N   Suicide (Since Last Contact)      N   Calculated C-SSRS Risk Score (Since Last Contact)      Low Risk         ASSESSMENT: Current Emotional / Mental Status (status of significant symptoms):   Risk status (Self / Other harm or suicidal ideation)   Patient denies current fears or concerns for personal safety.   Patient denies current  or recent suicidal ideation or behaviors.   Patient denies current or recent homicidal ideation or behaviors.   Patient denies current or recent self injurious behavior or ideation.   Patient denies other safety concerns.   Patient reports there has been no change in risk factors since their last session.     Patient reports there has been no change in protective factors since their last session.     Recommended that patient call 911 or go to the local ED should there be a change in any of these risk factors     Appearance:   Appropriate    Eye Contact:   Good    Psychomotor Behavior: Normal    Attitude:   Cooperative    Orientation:   All   Speech    Rate / Production: Normal     Volume:  Normal    Mood:    Normal   Affect:    Appropriate    Thought Content:  Clear    Thought Form:  Coherent  Logical    Insight:    Good      Medication Review:   No changes to current psychiatric medication(s)     Medication Compliance:   Yes     Changes in Health Issues:   None reported     Chemical Use Review:   Substance Use: Chemical use reviewed, no active concerns identified      Tobacco Use: No current tobacco use.      Diagnosis:  1. Major depressive disorder, recurrent episode, moderate (H)        Collateral Reports Completed:   Not Applicable    PLAN: (Patient Tasks / Therapist Tasks / Other)  Use mindfulness to cope with stress and frustration        Mary Saab [facility]                                                         ______________________________________________________________________    Individual Treatment Plan    Patient's Name: Janie De Leon  YOB: 1950    Date of Creation: 04/30/2025  Date Treatment Plan Last Reviewed/Revised:     DSM5 Diagnoses: 296.32 (F33.1) Major Depressive Disorder, Recurrent Episode, Moderate _ and With mixed features  Psychosocial / Contextual Factors: Caring for  with dementia, not able to leave much  PROMIS (reviewed every 90 days):     Referral /  Collaboration:  Referral to another professional/service is not indicated at this time..    Anticipated number of session for this episode of care: 9-12 sessions  Anticipation frequency of session: Every other week  Anticipated Duration of each session: 38-52 minutes  Treatment plan will be reviewed in 90 days or when goals have been changed.       MeasurableTreatment Goal(s) related to diagnosis / functional impairment(s)  Goal - Depression:   Patient will alleviate depressive symptoms and return to previous level of effective functioning.  I will know I've met my goal when I am less depressed.     Objective #A   Patient will describe situations, thoughts, feelings, and actions associated with depression, their impact on functioning and attempts to resolve them.  Intervention(s)  Therapist will provide psychoeducation, behavioral activation, and cognitive restructuring.  Status: New - Date: 4/29/2025    Objective #B  Patient will use cognitive strategies identified in therapy to challenge negative thought patterns 90% of the time.  Intervention(s)  Therapist will provide psychoeducation, behavioral activation, and cognitive restructuring.  Status: New - Date: 4/29/2025    Objective #C  Patient will use behavior activation skills to manage feelings of depression. Pt will use these skills at least three times per week.  Intervention(s)  Therapist will provide psychoeducation, behavioral activation, and cognitive restructuring.  Status: New - Date: 4/29/2025    Patient has reviewed and agreed to the above plan.      JOSE MARIA Sarkar  April 29, 2025

## 2025-04-30 ENCOUNTER — ALLIED HEALTH/NURSE VISIT (OUTPATIENT)
Dept: EDUCATION SERVICES | Facility: CLINIC | Age: 75
End: 2025-04-30
Attending: DIETITIAN, REGISTERED
Payer: MEDICARE

## 2025-04-30 ENCOUNTER — VIRTUAL VISIT (OUTPATIENT)
Dept: PSYCHOLOGY | Facility: CLINIC | Age: 75
End: 2025-04-30
Payer: MEDICARE

## 2025-04-30 DIAGNOSIS — E11.9 TYPE 2 DIABETES MELLITUS WITHOUT COMPLICATION, WITHOUT LONG-TERM CURRENT USE OF INSULIN (H): Primary | ICD-10-CM

## 2025-04-30 DIAGNOSIS — F33.1 MAJOR DEPRESSIVE DISORDER, RECURRENT EPISODE, MODERATE (H): Primary | ICD-10-CM

## 2025-04-30 PROCEDURE — 90834 PSYTX W PT 45 MINUTES: CPT | Mod: 95 | Performed by: COUNSELOR

## 2025-04-30 PROCEDURE — 97802 MEDICAL NUTRITION INDIV IN: CPT | Performed by: DIETITIAN, REGISTERED

## 2025-04-30 NOTE — LETTER
4/30/2025         RE: Janie Cutlerr  [address] [place] [address]        Dear Colleague,    Thank you for referring your patient, Janie De Leon, to the [facility]. Please see a copy of my visit note below.    Medical Nutrition Therapy for Diabetes    Type of Service: In Person Visit    Assessment  Patient presents for diabetes education follow-up visit.  Last seen Oct 2024.  Patient desires to discuss healthy eating today.  Blood sugar checks 3x/week are all within target.  Continues of Metformin XR 1000 mg daily.    Nutrition Diagnosis Impaired Nutrient Utilization related to compromised endocrine function  as evidenced by impaired glycemia    Nutrition Intervention  Nutrition Prescription: Carbohydrate Intake: 30-45 grams/meal and 15 grams/snacks  Protein Intake: 80-90 grams/day  Education given to support: general nutrition guidelines, consistent meals, and portion control    Patient verbalized understanding of diabetes self-management education concepts discussed, opportunities for ongoing education and support, and recommendations provided today.    Plan  Patient's Behavior Change Goals:   See Patient Instructions for patient stated behavior change goals.     Monitor/Evaluate  RD will monitor/evaluate:  Blood Glucose / A1c  Food and nutrition knowledge / skills  Food / Beverage / Nutrient intake   Progress toward meeting stated nutrition-related goals    Follow-Up  Follow up with RD as needed.    Subjective/Objective  Janie De Leon presents today for MNT and education related to prediabetes.   She is accompanied by spouse.     Nutrition History:  Healthy Eating Assessed Today: Yes  Cultural/Caodaism diet restrictions?: No  Do you have any food allergies or intolerances?: No  Meal planning/habits: Avoiding sweets  Who cooks/prepares meals for you?: Self  Who purchases food in  your home?: Self  How many times a week on average do you eat food made away from home  (restaurant/take-out)?: 2  Meals include: Breakfast, Lunch, Dinner, Evening Snack  Breakfast: 1 slice toast with honey butter, coffee  Lunch: sandwich - beef/ham/cheese, sometimes potato chips, 12 oz milk  Dinner:  chicken OR pork chop/steak, vegetables, 12 oz milk  Snacks: skinny pb cups, milk, fruit  Beverages: Water, Coffee, Milk, Other (see Comments)  Please elaborate:: Carbonated water no calories  Has patient met with a dietitian in the past?: Yes    Exercise:   Being Active Assessed Today: Yes  Exercise:: Currently not exercising  Barrier to exercise: Other    Labs:  Lab Results   Component Value Date    A1C 6.5 12/03/2024    A1C 5.8 08/27/2020     Lab Results   Component Value Date     09/25/2024     05/28/2023     09/27/2022     08/27/2020     Lab Results   Component Value Date    LDL 60 01/20/2025     08/27/2020     HDL Cholesterol   Date Value Ref Range Status   08/27/2020 46 (L) >49 mg/dL Final     Direct Measure HDL   Date Value Ref Range Status   01/20/2025 37 (L) >=50 mg/dL Final   ]  GFR Estimate   Date Value Ref Range Status   09/25/2024 55 (L) >60 mL/min/1.73m2 Final     Comment:     eGFR calculated using 2021 CKD-EPI equation.   08/27/2020 64 >60 mL/min/[1.73_m2] Final     Comment:     Non  GFR Calc  Starting 12/18/2018, serum creatinine based estimated GFR (eGFR) will be   calculated using the Chronic Kidney Disease Epidemiology Collaboration   (CKD-EPI) equation.       GFR, ESTIMATED POCT   Date Value Ref Range Status   09/16/2022 59 (L) >60 mL/min/1.73m2 Final     GFR Estimate If Black   Date Value Ref Range Status   08/27/2020 74 >60 mL/min/[1.73_m2] Final     Comment:      GFR Calc  Starting 12/18/2018, serum creatinine based estimated GFR (eGFR) will be   calculated using the Chronic Kidney Disease Epidemiology Collaboration   (CKD-EPI) equation.       Lab Results   Component Value Date    CR 1.06 09/25/2024    CR 0.90  08/27/2020     Lab Results   Component Value Date    MICROL 15.0 01/20/2025    UMALCR 10.79 01/20/2025    UCRR 139.0 01/20/2025       Anthropometrics:  Vitals: LMP  (LMP Unknown)   There is no height or weight on file to calculate BMI.    Wt Readings from Last 5 Encounters:   01/20/25 94.5 kg (208 lb 6.4 oz)   12/03/24 95.4 kg (210 lb 4.8 oz)   10/29/24 96.4 kg (212 lb 8 oz)   10/25/24 95.7 kg (211 lb)   10/01/24 96.1 kg (211 lb 12.8 oz)       Weight Change: stable or down a couple pounds    Estimated KCAL Requirements:  1400 kcal per day    Roxy Coates RD LD [facility]  Time spent in minutes: 20  Encounter: Individual  Answers submitted by the patient for this visit:  Questionnaire about: Diabetes problem (Submitted on 4/25/2025)  Chief Complaint: Diabetes problem

## 2025-04-30 NOTE — PROGRESS NOTES
Medical Nutrition Therapy for Diabetes    Type of Service: In Person Visit    Assessment  Patient presents for diabetes education follow-up visit.  Last seen Oct 2024.  Patient desires to discuss healthy eating today.  Blood sugar checks 3x/week are all within target.  Continues of Metformin XR 1000 mg daily.    Nutrition Diagnosis Impaired Nutrient Utilization related to compromised endocrine function  as evidenced by impaired glycemia    Nutrition Intervention  Nutrition Prescription: Carbohydrate Intake: 30-45 grams/meal and 15 grams/snacks  Protein Intake: 80-90 grams/day  Education given to support: general nutrition guidelines, consistent meals, and portion control    Patient verbalized understanding of diabetes self-management education concepts discussed, opportunities for ongoing education and support, and recommendations provided today.    Plan  Patient's Behavior Change Goals:   See Patient Instructions for patient stated behavior change goals.     Monitor/Evaluate  RD will monitor/evaluate:  Blood Glucose / A1c  Food and nutrition knowledge / skills  Food / Beverage / Nutrient intake   Progress toward meeting stated nutrition-related goals    Follow-Up  Follow up with RD as needed.    Subjective/Objective  Janie Murphy Moises presents today for MNT and education related to prediabetes.   She is accompanied by spouse.     Nutrition History:  Healthy Eating Assessed Today: Yes  Cultural/Jehovah's witness diet restrictions?: No  Do you have any food allergies or intolerances?: No  Meal planning/habits: Avoiding sweets  Who cooks/prepares meals for you?: Self  Who purchases food in  your home?: Self  How many times a week on average do you eat food made away from home (restaurant/take-out)?: 2  Meals include: Breakfast, Lunch, Dinner, Evening Snack  Breakfast: 1 slice toast with honey butter, coffee  Lunch: sandwich - beef/ham/cheese, sometimes potato chips, 12 oz milk  Dinner: [place] chicken OR pork chop/steak,  vegetables, 12 oz milk  Snacks: skinny pb cups, milk, fruit  Beverages: Water, Coffee, Milk, Other (see Comments)  Please elaborate:: Carbonated water no calories  Has patient met with a dietitian in the past?: Yes    Exercise:   Being Active Assessed Today: Yes  Exercise:: Currently not exercising  Barrier to exercise: Other    Labs:  Lab Results   Component Value Date    A1C 6.5 12/03/2024    A1C 5.8 08/27/2020     Lab Results   Component Value Date     09/25/2024     05/28/2023     09/27/2022     08/27/2020     Lab Results   Component Value Date    LDL 60 01/20/2025     08/27/2020     HDL Cholesterol   Date Value Ref Range Status   08/27/2020 46 (L) >49 mg/dL Final     Direct Measure HDL   Date Value Ref Range Status   01/20/2025 37 (L) >=50 mg/dL Final   ]  GFR Estimate   Date Value Ref Range Status   09/25/2024 55 (L) >60 mL/min/1.73m2 Final     Comment:     eGFR calculated using 2021 CKD-EPI equation.   08/27/2020 64 >60 mL/min/[1.73_m2] Final     Comment:     Non  GFR Calc  Starting 12/18/2018, serum creatinine based estimated GFR (eGFR) will be   calculated using the Chronic Kidney Disease Epidemiology Collaboration   (CKD-EPI) equation.       GFR, ESTIMATED POCT   Date Value Ref Range Status   09/16/2022 59 (L) >60 mL/min/1.73m2 Final     GFR Estimate If Black   Date Value Ref Range Status   08/27/2020 74 >60 mL/min/[1.73_m2] Final     Comment:      GFR Calc  Starting 12/18/2018, serum creatinine based estimated GFR (eGFR) will be   calculated using the Chronic Kidney Disease Epidemiology Collaboration   (CKD-EPI) equation.       Lab Results   Component Value Date    CR 1.06 09/25/2024    CR 0.90 08/27/2020     Lab Results   Component Value Date    MICROL 15.0 01/20/2025    UMALCR 10.79 01/20/2025    UCRR 139.0 01/20/2025       Anthropometrics:  Vitals: LMP  (LMP Unknown)   There is no height or weight on file to calculate BMI.    Wt  Readings from Last 5 Encounters:   01/20/25 94.5 kg (208 lb 6.4 oz)   12/03/24 95.4 kg (210 lb 4.8 oz)   10/29/24 96.4 kg (212 lb 8 oz)   10/25/24 95.7 kg (211 lb)   10/01/24 96.1 kg (211 lb 12.8 oz)       Weight Change: stable or down a couple pounds    Estimated KCAL Requirements:  1400 kcal per day    Roxy Coates RD [facility]  Time spent in minutes: 20  Encounter: Individual  Answers submitted by the patient for this visit:  Questionnaire about: Diabetes problem (Submitted on 4/25/2025)  Chief Complaint: Diabetes problem

## 2025-05-15 DIAGNOSIS — I10 ESSENTIAL HYPERTENSION, BENIGN: Chronic | ICD-10-CM

## 2025-05-18 RX ORDER — OLMESARTAN MEDOXOMIL 20 MG/1
20 TABLET ORAL
Qty: 90 TABLET | Refills: 3 | Status: SHIPPED | OUTPATIENT
Start: 2025-05-18

## 2025-05-24 ASSESSMENT — ANXIETY QUESTIONNAIRES
5. BEING SO RESTLESS THAT IT IS HARD TO SIT STILL: NOT AT ALL
6. BECOMING EASILY ANNOYED OR IRRITABLE: SEVERAL DAYS
IF YOU CHECKED OFF ANY PROBLEMS ON THIS QUESTIONNAIRE, HOW DIFFICULT HAVE THESE PROBLEMS MADE IT FOR YOU TO DO YOUR WORK, TAKE CARE OF THINGS AT HOME, OR GET ALONG WITH OTHER PEOPLE: SOMEWHAT DIFFICULT
GAD7 TOTAL SCORE: 8
1. FEELING NERVOUS, ANXIOUS, OR ON EDGE: MORE THAN HALF THE DAYS
7. FEELING AFRAID AS IF SOMETHING AWFUL MIGHT HAPPEN: NOT AT ALL
4. TROUBLE RELAXING: SEVERAL DAYS
2. NOT BEING ABLE TO STOP OR CONTROL WORRYING: MORE THAN HALF THE DAYS
8. IF YOU CHECKED OFF ANY PROBLEMS, HOW DIFFICULT HAVE THESE MADE IT FOR YOU TO DO YOUR WORK, TAKE CARE OF THINGS AT HOME, OR GET ALONG WITH OTHER PEOPLE?: SOMEWHAT DIFFICULT
3. WORRYING TOO MUCH ABOUT DIFFERENT THINGS: MORE THAN HALF THE DAYS
GAD7 TOTAL SCORE: 8

## 2025-05-27 ENCOUNTER — VIRTUAL VISIT (OUTPATIENT)
Dept: PSYCHOLOGY | Facility: CLINIC | Age: 75
End: 2025-05-27
Payer: MEDICARE

## 2025-05-27 DIAGNOSIS — F33.1 MAJOR DEPRESSIVE DISORDER, RECURRENT EPISODE, MODERATE (H): Primary | ICD-10-CM

## 2025-05-27 DIAGNOSIS — F41.1 GAD (GENERALIZED ANXIETY DISORDER): ICD-10-CM

## 2025-05-27 PROCEDURE — 90834 PSYTX W PT 45 MINUTES: CPT | Mod: 95 | Performed by: COUNSELOR

## 2025-05-27 NOTE — PROGRESS NOTES
Answers submitted by the patient for this visit:  Patient Health Questionnaire (G7) (Submitted on 5/24/2025)  QUEENIE 7 TOTAL SCORE: 8        [facility]                                     Progress Note    Patient Name: Janie De Leon  Date: 05/27/2025         Service Type: Individual      Session Start Time: 1500  Session End Time: 1545     Session Length:45     Session #: 2    Attendees: Client attended alone    Service Modality:  Video Visit:      Provider verified identity through the following two step process.  Patient provided:  Patient is known previously to provider    Telemedicine Visit: The patient's condition can be safely assessed and treated via synchronous audio and visual telemedicine encounter.      Reason for Telemedicine Visit: Patient has requested telehealth visit and Services only offered telehealth    Originating Site (Patient Location): Patient's home    Distant Site (Provider Location): Provider Remote Setting- Home Office    Consent:  The patient/guardian has verbally consented to: the potential risks and benefits of telemedicine (video visit) versus in person care; bill my insurance or make self-payment for services provided; and responsibility for payment of non-covered services.     Patient would like the video invitation sent by:  My Chart    Mode of Communication:  Video Conference via Amwell    Distant Location (Provider):  Off-site    As the provider I attest to compliance with applicable laws and regulations related to telemedicine.    DATA  Interactive Complexity: No  Crisis: No        Progress Since Last Session (Related to Symptoms / Goals / Homework):   Symptoms: Improvement since getting weekly help come in to help with her     Homework: Completed in session      Episode of Care Goals: Caregiver fatigue      Stage of Change: PREPARATION (Decided to change - considering how)    MI Intervention: Expressed Empathy/Understanding, Open-ended questions, and  Reframe     Change Talk Expressed by the Patient: Ability to change Committment to change    Provider Response to Change Talk: A - Affirmed patient's thoughts, decisions, or attempts at behavior change       Current / Ongoing Stressors and Concerns:     Janie talked about caring for her  and the toll it takes. It's been up and down. She had arranged for a person to come in once a week and the last Thursday of the month so she can join her book club. She is supposed to host this Thursday and they didn't schedule anyone. Her  has started getting agitated and making a fist and shaking it at her or the dog. Janie found a support group to attend and they told her to call his doctor and report the change in his behavior. They added Seroquel to his medications. Janie is going to check out online groups through [employer].      Treatment Objective(s) Addressed in This Session:   Increase interest, engagement, and pleasure in doing things       Intervention:   CBT: Negative self talk  Motivational Interviewing: open ended questions, validated his feelings    Assessments completed prior to visit:  The following assessments were completed by patient for this visit:  PHQ2: No questionnaires on file.  GAD2:       2/4/2025    10:32 AM 4/18/2025    10:39 AM 4/25/2025    10:52 AM 5/24/2025     3:26 AM   QUEENIE-2   Feeling nervous, anxious, or on edge 2 2 1 2   Not being able to stop or control worrying 3 3 2 2   QUEENIE-2 Total Score 5  5  3  4        Patient-reported     PROMIS 10-Global Health (only subscores and total score):       4/18/2025    10:42 AM 4/25/2025    10:53 AM   PROMIS-10 Scores Only   Global Mental Health Score 7  6    Global Physical Health Score 14  15    PROMIS TOTAL - SUBSCORES 21  21        Patient-reported     [place] Suicide Severity Rating Scale (Short Version)      3/7/2022     2:17 PM 9/25/2022     3:43 PM 5/23/2023     4:08 PM 5/23/2023    10:18 PM 5/24/2023     1:30 AM 4/23/2025    10:00 AM    [place] Suicide Severity Rating (Short Version)   Over the past 2 weeks have you felt down, depressed, or hopeless? no no no no no    Over the past 2 weeks have you had thoughts of killing yourself? no no no no no    Have you ever attempted to kill yourself? no no no no no    1. Wish to be Dead (Since Last Contact)      Y   Wish to be Dead Description (Since Last Contact)      It would be easier if dead   2. Non-Specific Active Suicidal Thoughts (Since Last Contact)      Y   3. Active Suicidal Ideation with any Methods (Not Plan) Without Intent to Act (Since Last Contact)      N   4. Active Suicidal Ideation with Some Intent to Act, Without Specific Plan (Since Last Contact)      N   5. Active Suicidal Ideation with Specific Plan and Intent (Since Last Contact)      N   Most Severe Ideation Rating (Since Last Contact)      1   Description of Most Severe Ideation (Since Last Contact)      Easier if dead   Frequency (Since Last Contact)      1   Duration (Since Last Contact)      1   Deterrents (Since Last Contact)      1   Reasons for Ideation (Since Last Contact)      5   Actual Attempt (Since Last Contact)      N   Has subject engaged in non-suicidal self-injurious behavior? (Since Last Contact)      N   Interrupted Attempts (Since Last Contact)      N   Aborted or Self-Interrupted Attempt (Since Last Contact)      N   Preparatory Acts or Behavior (Since Last Contact)      N   Suicide (Since Last Contact)      N   Calculated C-SSRS Risk Score (Since Last Contact)      Low Risk         ASSESSMENT: Current Emotional / Mental Status (status of significant symptoms):   Risk status (Self / Other harm or suicidal ideation)   Patient denies current fears or concerns for personal safety.   Patient denies current or recent suicidal ideation or behaviors.   Patient denies current or recent homicidal ideation or behaviors.   Patient denies current or recent self injurious behavior or ideation.   Patient denies other safety  concerns.   Patient reports there has been no change in risk factors since their last session.     Patient reports there has been no change in protective factors since their last session.     Recommended that patient call 911 or go to the local ED should there be a change in any of these risk factors     Appearance:   Appropriate    Eye Contact:   Good    Psychomotor Behavior: Normal    Attitude:   Cooperative    Orientation:   All   Speech    Rate / Production: Normal     Volume:  Normal    Mood:    Normal   Affect:    Appropriate    Thought Content:  Clear    Thought Form:  Coherent  Logical    Insight:    Good      Medication Review:   No changes to current psychiatric medication(s)     Medication Compliance:   Yes     Changes in Health Issues:   None reported     Chemical Use Review:   Substance Use: Chemical use reviewed, no active concerns identified      Tobacco Use: No current tobacco use.      Diagnosis:  No diagnosis found.      Collateral Reports Completed:   Not Applicable    PLAN: (Patient Tasks / Therapist Tasks / Other)  Use mindfulness to cope with stress and frustration        JOSE MARIA Sarkar                                                         ______________________________________________________________________    Individual Treatment Plan    Patient's Name: Janie De Leon  YOB: 1950    Date of Creation: 04/30/2025  Date Treatment Plan Last Reviewed/Revised:     DSM5 Diagnoses: 296.32 (F33.1) Major Depressive Disorder, Recurrent Episode, Moderate _ and With mixed features  Psychosocial / Contextual Factors: Caring for  with dementia, not able to leave much  PROMIS (reviewed every 90 days):     Referral / Collaboration:  Referral to another professional/service is not indicated at this time..    Anticipated number of session for this episode of care: 9-12 sessions  Anticipation frequency of session: Every other week  Anticipated Duration of each session: 38-52  minutes  Treatment plan will be reviewed in 90 days or when goals have been changed.       MeasurableTreatment Goal(s) related to diagnosis / functional impairment(s)  Goal - Depression:   Patient will alleviate depressive symptoms and return to previous level of effective functioning.  I will know I've met my goal when I am less depressed.     Objective #A   Patient will describe situations, thoughts, feelings, and actions associated with depression, their impact on functioning and attempts to resolve them.  Intervention(s)  Therapist will provide psychoeducation, behavioral activation, and cognitive restructuring.  Status: New - Date: 4/29/2025    Objective #B  Patient will use cognitive strategies identified in therapy to challenge negative thought patterns 90% of the time.  Intervention(s)  Therapist will provide psychoeducation, behavioral activation, and cognitive restructuring.  Status: New - Date: 4/29/2025    Objective #C  Patient will use behavior activation skills to manage feelings of depression. Pt will use these skills at least three times per week.  Intervention(s)  Therapist will provide psychoeducation, behavioral activation, and cognitive restructuring.  Status: New - Date: 4/29/2025    Patient has reviewed and agreed to the above plan.      JOSE MARIA Sarkar  April 29, 2025

## 2025-06-04 NOTE — PROGRESS NOTES
Answers submitted by the patient for this visit:  Patient Health Questionnaire (Submitted on 6/9/2025)  If you checked off any problems, how difficult have these problems made it for you to do your work, take care of things at home, or get along with other people?: Somewhat difficult  PHQ9 TOTAL SCORE: 6        [facility]                                     Progress Note    Patient Name: Janie De Leon  Date: 06/10/2025         Service Type: Individual      Session Start Time: 1458 Session End Time: 1545     Session Length: 47    Session #: 3    Attendees: Client attended alone    Service Modality:  Video Visit:      Provider verified identity through the following two step process.  Patient provided:  Patient is known previously to provider    Telemedicine Visit: The patient's condition can be safely assessed and treated via synchronous audio and visual telemedicine encounter.      Reason for Telemedicine Visit: Patient has requested telehealth visit and Services only offered telehealth    Originating Site (Patient Location): Patient's home    Distant Site (Provider Location): Provider Remote Setting- Home Office    Consent:  The patient/guardian has verbally consented to: the potential risks and benefits of telemedicine (video visit) versus in person care; bill my insurance or make self-payment for services provided; and responsibility for payment of non-covered services.     Patient would like the video invitation sent by:  My Chart    Mode of Communication:  Video Conference via Amwell    Distant Location (Provider):  Off-site    As the provider I attest to compliance with applicable laws and regulations related to telemedicine.    DATA  Interactive Complexity: No  Crisis: No        Progress Since Last Session (Related to Symptoms / Goals / Homework):   Symptoms: Less anxiety but it's a slow process    Homework: Completed in session      Episode of Care Goals: Caregiver fatigue      Stage of  Change: ACTION (Actively working towards change)    MI Intervention: Expressed Empathy/Understanding, Supported Autonomy, Collaboration, Evocation, and Open-ended questions        Current / Ongoing Stressors and Concerns:     Janie stated her  is refusing to have people come in and care for him, he's being angry about it. Tomorrow a man is coming and having a male might be better. This man is going to help Janie get some hours of caregiver for free. Janie stated she loves her grandchildren and if she hears something from them that seems questionable she will ask her daughter. Her Mom used to believe whatever the grandchildren said. Her Mom and brother used to think she was a ditz. Her Mom would make fun of her in front of her brothers. Janie became the butt of family jokes and her Mom would do that in front of her daughter. Janie stated her Dad was ineffective, he couldn't stand up to her Mom.     Janie wet the bed for quite a while so she never had sleep overs. Her Mom never taught her how to do nails or makeup and never learned it from other girls since she didn't have sleep overs. Janie spent a lot of time in the hospital as a child.     Janie has never had a good self esteem. She can put on a mask and pretend, but was always afraid people would find out.     Treatment Objective(s) Addressed in This Session:   Increase social connections and activities       Intervention:   CBT: Negative self talk  Motivational Interviewing: open ended questions, validated his feelings    Assessments completed prior to visit:  The following assessments were completed by patient for this visit:  PHQ2: No questionnaires on file.  GAD2:       2/4/2025    10:32 AM 4/18/2025    10:39 AM 4/25/2025    10:52 AM 5/24/2025     3:26 AM 6/9/2025     5:36 PM   QUEENIE-2   Feeling nervous, anxious, or on edge 2 2 1 2 0   Not being able to stop or control worrying 3 3 2 2 1   QUEENIE-2 Total Score 5  5  3  4  1        Patient-reported      PROMIS 10-Global Health (only subscores and total score):       4/18/2025    10:42 AM 4/25/2025    10:53 AM   PROMIS-10 Scores Only   Global Mental Health Score 7  6    Global Physical Health Score 14  15    PROMIS TOTAL - SUBSCORES 21  21        Patient-reported     [place] Suicide Severity Rating Scale (Short Version)      3/7/2022     2:17 PM 9/25/2022     3:43 PM 5/23/2023     4:08 PM 5/23/2023    10:18 PM 5/24/2023     1:30 AM 4/23/2025    10:00 AM   [place] Suicide Severity Rating (Short Version)   Over the past 2 weeks have you felt down, depressed, or hopeless? no no no no no    Over the past 2 weeks have you had thoughts of killing yourself? no no no no no    Have you ever attempted to kill yourself? no no no no no    1. Wish to be Dead (Since Last Contact)      Y   Wish to be Dead Description (Since Last Contact)      It would be easier if dead   2. Non-Specific Active Suicidal Thoughts (Since Last Contact)      Y   3. Active Suicidal Ideation with any Methods (Not Plan) Without Intent to Act (Since Last Contact)      N   4. Active Suicidal Ideation with Some Intent to Act, Without Specific Plan (Since Last Contact)      N   5. Active Suicidal Ideation with Specific Plan and Intent (Since Last Contact)      N   Most Severe Ideation Rating (Since Last Contact)      1   Description of Most Severe Ideation (Since Last Contact)      Easier if dead   Frequency (Since Last Contact)      1   Duration (Since Last Contact)      1   Deterrents (Since Last Contact)      1   Reasons for Ideation (Since Last Contact)      5   Actual Attempt (Since Last Contact)      N   Has subject engaged in non-suicidal self-injurious behavior? (Since Last Contact)      N   Interrupted Attempts (Since Last Contact)      N   Aborted or Self-Interrupted Attempt (Since Last Contact)      N   Preparatory Acts or Behavior (Since Last Contact)      N   Suicide (Since Last Contact)      N   Calculated C-SSRS Risk Score (Since Last  Contact)      Low Risk         ASSESSMENT: Current Emotional / Mental Status (status of significant symptoms):   Risk status (Self / Other harm or suicidal ideation)   Patient denies current fears or concerns for personal safety.   Patient denies current or recent suicidal ideation or behaviors.   Patient denies current or recent homicidal ideation or behaviors.   Patient denies current or recent self injurious behavior or ideation.   Patient denies other safety concerns.   Patient reports there has been no change in risk factors since their last session.     Patient reports there has been no change in protective factors since their last session.     Recommended that patient call 911 or go to the local ED should there be a change in any of these risk factors     Appearance:   Appropriate    Eye Contact:   Good    Psychomotor Behavior: Normal    Attitude:   Cooperative    Orientation:   All   Speech    Rate / Production: Normal     Volume:  Normal    Mood:    Normal   Affect:    Appropriate    Thought Content:  Clear    Thought Form:  Coherent  Logical    Insight:    Good      Medication Review:   No changes to current psychiatric medication(s)     Medication Compliance:   Yes     Changes in Health Issues:   None reported     Chemical Use Review:   Substance Use: Chemical use reviewed, no active concerns identified      Tobacco Use: No current tobacco use.      Diagnosis:  1. Major depressive disorder, recurrent episode, moderate (H)    2. QUEENIE (generalized anxiety disorder)          Collateral Reports Completed:   Not Applicable    PLAN: (Patient Tasks / Therapist Tasks / Other)  Use mindfulness to cope with stress and frustration  Keep notes on other childhood incidents and self esteem        JOSE MARIA Sarkar                                                         ______________________________________________________________________    Individual Treatment Plan    Patient's Name: Janie De Leon  Date Of  Birth: 1950    Date of Creation: 04/30/2025  Date Treatment Plan Last Reviewed/Revised:     DSM5 Diagnoses: 296.32 (F33.1) Major Depressive Disorder, Recurrent Episode, Moderate _ and With mixed features  Psychosocial / Contextual Factors: Caring for  with dementia, not able to leave much  PROMIS (reviewed every 90 days):     Referral / Collaboration:  Referral to another professional/service is not indicated at this time..    Anticipated number of session for this episode of care: 9-12 sessions  Anticipation frequency of session: Every other week  Anticipated Duration of each session: 38-52 minutes  Treatment plan will be reviewed in 90 days or when goals have been changed.       MeasurableTreatment Goal(s) related to diagnosis / functional impairment(s)  Goal - Depression:   Patient will alleviate depressive symptoms and return to previous level of effective functioning.  I will know I've met my goal when I am less depressed.     Objective #A   Patient will describe situations, thoughts, feelings, and actions associated with depression, their impact on functioning and attempts to resolve them.  Intervention(s)  Therapist will provide psychoeducation, behavioral activation, and cognitive restructuring.  Status: New - Date: 4/29/2025    Objective #B  Patient will use cognitive strategies identified in therapy to challenge negative thought patterns 90% of the time.  Intervention(s)  Therapist will provide psychoeducation, behavioral activation, and cognitive restructuring.  Status: New - Date: 4/29/2025    Objective #C  Patient will use behavior activation skills to manage feelings of depression. Pt will use these skills at least three times per week.  Intervention(s)  Therapist will provide psychoeducation, behavioral activation, and cognitive restructuring.  Status: New - Date: 4/29/2025    Patient has reviewed and agreed to the above plan.      JOSE MARIA Sarkar  April 29, 2025

## 2025-06-10 ENCOUNTER — VIRTUAL VISIT (OUTPATIENT)
Dept: PSYCHOLOGY | Facility: CLINIC | Age: 75
End: 2025-06-10
Payer: MEDICARE

## 2025-06-10 DIAGNOSIS — F33.1 MAJOR DEPRESSIVE DISORDER, RECURRENT EPISODE, MODERATE (H): Primary | ICD-10-CM

## 2025-06-10 DIAGNOSIS — F41.1 GAD (GENERALIZED ANXIETY DISORDER): ICD-10-CM

## 2025-06-10 PROCEDURE — 90834 PSYTX W PT 45 MINUTES: CPT | Mod: 95 | Performed by: COUNSELOR

## 2025-06-15 ENCOUNTER — MYC REFILL (OUTPATIENT)
Dept: FAMILY MEDICINE | Facility: CLINIC | Age: 75
End: 2025-06-15
Payer: MEDICARE

## 2025-06-15 DIAGNOSIS — G47.00 INSOMNIA, UNSPECIFIED TYPE: Chronic | ICD-10-CM

## 2025-06-19 RX ORDER — CLONAZEPAM 0.5 MG/1
.25-.5 TABLET ORAL
Qty: 20 TABLET | Refills: 0 | Status: SHIPPED | OUTPATIENT
Start: 2025-06-19

## 2025-07-08 ENCOUNTER — VIRTUAL VISIT (OUTPATIENT)
Dept: PSYCHOLOGY | Facility: CLINIC | Age: 75
End: 2025-07-08
Payer: MEDICARE

## 2025-07-08 DIAGNOSIS — F41.1 GENERALIZED ANXIETY DISORDER: ICD-10-CM

## 2025-07-08 DIAGNOSIS — F33.1 MAJOR DEPRESSIVE DISORDER, RECURRENT EPISODE, MODERATE (H): Primary | ICD-10-CM

## 2025-07-08 ASSESSMENT — ANXIETY QUESTIONNAIRES
GAD7 TOTAL SCORE: 7
1. FEELING NERVOUS, ANXIOUS, OR ON EDGE: SEVERAL DAYS
2. NOT BEING ABLE TO STOP OR CONTROL WORRYING: NEARLY EVERY DAY
5. BEING SO RESTLESS THAT IT IS HARD TO SIT STILL: NOT AT ALL
7. FEELING AFRAID AS IF SOMETHING AWFUL MIGHT HAPPEN: NOT AT ALL
IF YOU CHECKED OFF ANY PROBLEMS ON THIS QUESTIONNAIRE, HOW DIFFICULT HAVE THESE PROBLEMS MADE IT FOR YOU TO DO YOUR WORK, TAKE CARE OF THINGS AT HOME, OR GET ALONG WITH OTHER PEOPLE: SOMEWHAT DIFFICULT
GAD7 TOTAL SCORE: 7
6. BECOMING EASILY ANNOYED OR IRRITABLE: MORE THAN HALF THE DAYS
3. WORRYING TOO MUCH ABOUT DIFFERENT THINGS: SEVERAL DAYS

## 2025-07-08 ASSESSMENT — PATIENT HEALTH QUESTIONNAIRE - PHQ9
SUM OF ALL RESPONSES TO PHQ QUESTIONS 1-9: 12
5. POOR APPETITE OR OVEREATING: NOT AT ALL

## 2025-07-08 NOTE — PROGRESS NOTES
[facility]                                     Progress Note    Patient Name: Janie De Leon  Date: 25         Service Type: Individual      Session Start Time: 11 am  Session End Time: 11:45 am     Session Length: 38-52 min    Session #: 1    Attendees: Client attended alone    Service Modality:  Video Visit:      Provider verified identity through the following two step process.  Patient provided:  Patient photo, Patient , and Patient address    Telemedicine Visit: The patient's condition can be safely assessed and treated via synchronous audio and visual telemedicine encounter.      Reason for Telemedicine Visit: Patient has requested telehealth visit    Originating Site (Patient Location): Patient's home    Distant Site (Provider Location): Provider Remote Setting- Home Office    Consent:  The patient/guardian has verbally consented to: the potential risks and benefits of telemedicine (video visit) versus in person care; bill my insurance or make self-payment for services provided; and responsibility for payment of non-covered services.     Patient would like the video invitation sent by:  My Chart    Mode of Communication:  [facility]    Distant Location (Provider):  Off-site    As the provider I attest to compliance with applicable laws and regulations related to telemedicine.    DATA  Interactive Complexity: No  Crisis: No        Progress Since Last Session (Related to Symptoms / Goals / Homework):   Symptoms: first session.    Homework: Partially completed      Episode of Care Goals: Satisfactory progress - PREPARATION (Decided to change - considering how); Intervened by negotiating a change plan and determining options / strategies for behavior change, identifying triggers, exploring social supports, and working towards setting a date to begin behavior change     Current / Ongoing Stressors and Concerns:   Low self esteem.     Treatment Objective(s) Addressed in  This Session:   Safety. Rapport.       Intervention:  Assessed functioning and for safety. Reviewed the phq and aki. Revised hersafety plan; she entered the mobile crisis number into her phone. Developing rapport. Wrote goals.         Assessments completed prior to visit:  The following assessments were completed by patient for this visit:  PHQ9:       9/24/2024     8:54 PM 12/2/2024    11:29 AM 2/7/2025     3:18 PM 4/23/2025     9:35 AM 4/29/2025     2:20 PM 6/9/2025     5:35 PM 7/8/2025    11:06 AM   PHQ-9 SCORE   PHQ-9 Total Score MyChart 6 (Mild depression) 7 (Mild depression) 14 (Moderate depression) 15 (Moderately severe depression) 11 (Moderate depression) 6 (Mild depression)    PHQ-9 Total Score 6 7  14  15  11  6  12       Patient-reported     GAD7:       9/8/2023     8:26 AM 9/22/2023     9:01 AM 9/25/2024     1:42 PM 2/4/2025    10:32 AM 4/18/2025    10:39 AM 5/24/2025     3:26 AM 7/8/2025    11:06 AM   AKI-7 SCORE   Total Score 6 (mild anxiety) 6 (mild anxiety) 4 (minimal anxiety) 13 (moderate anxiety) 16 (severe anxiety) 8 (mild anxiety)    Total Score 6 6 4 13  16  8  7       Patient-reported         ASSESSMENT: Current Emotional / Mental Status (status of significant symptoms):   Risk status (Self / Other harm or suicidal ideation)   Patient denies current fears or concerns for personal safety.   Patient denies current or recent suicidal ideation or behaviors.   Patient denies current or recent homicidal ideation or behaviors.   Patient denies current or recent self injurious behavior or ideation.   Patient denies other safety concerns.   Patient reports there has been no change in risk factors since their last session.     Patient reports there has been no change in protective factors since their last session.     A safety and risk management plan has been developed including: Patient consented to co-developed safety plan on 7/8/25.  Safety and risk management plan was reviewed.   Patient agreed to  "use safety plan should any safety concerns arise.  A copy was made available to the patient.    Julio Safety Plan      Creation Date: 2/7/25 Last Update Date: 7/8/25      Step 1: Warning signs:    Warning Signs    navigating 's dementia      Step 2: Internal coping strategies - Things I can do to take my mind off my problems without contacting another person:    Strategies    reading, cross word puzzles      Step 3: People and social settings that provide distraction:    Name Contact Information    Nannette yu programmed in her phone    Pat \"  \"    Kim \"  \"    Yenny \"  \"    Lorna \"  \"    Tamar \"  \"       Places    [email or web address]      Step 4: People whom I can ask for help during a crisis:    Name Contact Information    Nannette Yu \"  \"    Lorna \"  \"    Pat \"  \"    Yenny \"  \"      Step 5: Professionals or agencies I can contact during a crisis:    Clinician/Agency Name Phone Emergency Contact    Michael Coffey  [phone]      [facility] Emergency Department Emergency Department Address Emergency Department Phone    [facility] [address] [place], [address] [phone]    [facility] crisis  [phone]      Suicide Prevention Lifeline Phone: Call or Text 407  Crisis Text Line: Text HOME to 651522     Step 6: Making the environment safer (plan for lethal means safety):   Did not identify any lethal methods     Optional: What is most important to me and worth living for?:   My daughter, granddaughter and grandson. Nannette uY (best friend).     Julio Safety Plan. Connie Shah and Brian Lamar. Used with permission of the authors.           Appearance:   Appropriate    Eye Contact:   Good    Psychomotor Behavior: Normal    Attitude:   Cooperative    Orientation:   All   Speech    Rate / Production: Normal     Volume:  Normal    Mood:    Anxious  Normal   Affect:    Appropriate    Thought Content:  Clear    Thought Form:  Coherent  Logical    Insight:    Good " "     Medication Review:   No changes to current psychiatric medication(s)     Medication Compliance:   Yes     Changes in Health Issues:   None reported     Chemical Use Review:   Substance Use: Chemical use reviewed, no active concerns identified      Tobacco Use: No current tobacco use.      Diagnosis:  MDD and QUEENIE.    Collateral Reports Completed:   Routed note to PCP when indicated.    PLAN: (Patient Tasks / Therapist Tasks / Other)  Twice per month. Mondays/Tuesdays work best.        Elmer Coffey, [facility]                                                         ______________________________________________________________________    Individual Treatment Plan    Patient's Name: Janie De Leon  YOB: 1950    Date of Creation: 7/8/25  Date Treatment Plan Last Reviewed/Revised:      DSM5 Diagnoses: 296.32 (F33.1) Major Depressive Disorder, Recurrent Episode, Moderate With anxious distress or 300.02 (F41.1) Generalized Anxiety Disorder  Psychosocial / Contextual Factors:  has alzheimers.  PROMIS (reviewed every 90 days):     Referral / Collaboration:  Referral to another professional/service is not indicated at this time..    Anticipated number of session for this episode of care: 9-12 sessions  Anticipation frequency of session: Every other week  Anticipated Duration of each session: 38-52 minutes  Treatment plan will be reviewed in 90 days or when goals have been changed.       MeasurableTreatment Goal(s) related to diagnosis / functional impairment(s)  Goal 1: Patient will report feeling better about herself    I will know I've met my goal when \"I like myself emotionally.      Objective #A (Patient Action)    Patient will follow her safety plan.  Status: New - Date: 7/8/25     Intervention(s)  Therapist will monitor for safety each visit .    Objective #B  Patient will use a positive affirmation at least daily 100% of trials for 1 week.  Status: New - Date: 7/8/25 "     Intervention(s)  Therapist will come up with a list of positive affirmations with patient and encourage acceptance.                  Patient has reviewed and agreed to the above plan.      Elmer Coffey, [facility]  July 8, 2025

## 2025-07-14 ASSESSMENT — PATIENT HEALTH QUESTIONNAIRE - PHQ9
SUM OF ALL RESPONSES TO PHQ QUESTIONS 1-9: 8
SUM OF ALL RESPONSES TO PHQ QUESTIONS 1-9: 8
10. IF YOU CHECKED OFF ANY PROBLEMS, HOW DIFFICULT HAVE THESE PROBLEMS MADE IT FOR YOU TO DO YOUR WORK, TAKE CARE OF THINGS AT HOME, OR GET ALONG WITH OTHER PEOPLE: SOMEWHAT DIFFICULT

## 2025-07-15 ENCOUNTER — VIRTUAL VISIT (OUTPATIENT)
Dept: PSYCHOLOGY | Facility: CLINIC | Age: 75
End: 2025-07-15
Payer: MEDICARE

## 2025-07-15 DIAGNOSIS — F33.0 MAJOR DEPRESSIVE DISORDER, RECURRENT EPISODE, MILD: Primary | ICD-10-CM

## 2025-07-15 DIAGNOSIS — F41.1 GENERALIZED ANXIETY DISORDER: ICD-10-CM

## 2025-07-15 NOTE — PROGRESS NOTES
[facility]                                     Progress Note    Patient Name: Janie De Leon  Date: 7/15/25         Service Type: Individual      Session Start Time: 12 pm  Session End Time: 12:45 pm     Session Length: 38-52 min.    Session #: 2    Attendees: Client attended alone    Service Modality:  Video Visit:      Provider verified identity through the following two step process.  Patient provided:  Patient photo, Patient , and Patient address    Telemedicine Visit: The patient's condition can be safely assessed and treated via synchronous audio and visual telemedicine encounter.      Reason for Telemedicine Visit: Patient has requested telehealth visit    Originating Site (Patient Location): Patient's home    Distant Site (Provider Location): Provider Remote Setting- Home Office    Consent:  The patient/guardian has verbally consented to: the potential risks and benefits of telemedicine (video visit) versus in person care; bill my insurance or make self-payment for services provided; and responsibility for payment of non-covered services.     Patient would like the video invitation sent by:  My Chart    Mode of Communication:  Video Conference via [employer]    Distant Location (Provider):  Off-site    As the provider I attest to compliance with applicable laws and regulations related to telemedicine.    DATA  Interactive Complexity: No  Crisis: No        Progress Since Last Session (Related to Symptoms / Goals / Homework):   Symptoms: improvement on phq; aki not completed.    Homework: Partially completed.     Episode of Care Goals: Satisfactory progress - PREPARATION (Decided to change - considering how); Intervened by negotiating a change plan and determining options / strategies for behavior change, identifying triggers, exploring social supports, and working towards setting a date to begin behavior change.    Current / Ongoing Stressors and Concerns:  Low self esteem.  Taking care  of her  who is struggling medically. She has an appt next Tuesday to have her  placed in assisted living. He has trouble walking and cannot make a decision. It is expected he has alzheimer's impacting the prefrontal cortex she believes.     Treatment Objective(s) Addressed in This Session:   Safety. Rapport.     Intervention:  Assessed functioning and for safety. Reviewed the phq. She denied safety concerns. Processed feelings about  wandering off when he was supposed to be supervised by a man she had hired from an agency. Explored her feelings about her  needing assisted living in a facility. Explored her childhood and medical issues she dealt with and how she was parented poorly but typical back then (beatings). Homework: list of positive affirmations.       Assessments completed prior to visit:  The following assessments were completed by patient for this visit:  PHQ9:       12/2/2024    11:29 AM 2/7/2025     3:18 PM 4/23/2025     9:35 AM 4/29/2025     2:20 PM 6/9/2025     5:35 PM 7/8/2025    11:06 AM 7/14/2025     1:09 PM   PHQ-9 SCORE   PHQ-9 Total Score MyChart 7 (Mild depression) 14 (Moderate depression) 15 (Moderately severe depression) 11 (Moderate depression) 6 (Mild depression)  8 (Mild depression)   PHQ-9 Total Score 7  14  15  11  6  12 8        Patient-reported     GAD7:       9/8/2023     8:26 AM 9/22/2023     9:01 AM 9/25/2024     1:42 PM 2/4/2025    10:32 AM 4/18/2025    10:39 AM 5/24/2025     3:26 AM 7/8/2025    11:06 AM   QUEENIE-7 SCORE   Total Score 6 (mild anxiety) 6 (mild anxiety) 4 (minimal anxiety) 13 (moderate anxiety) 16 (severe anxiety) 8 (mild anxiety)    Total Score 6 6 4 13  16  8  7       Patient-reported         ASSESSMENT: Current Emotional / Mental Status (status of significant symptoms):   Risk status (Self / Other harm or suicidal ideation)   Patient denies current fears or concerns for personal safety.   Patient denies current or recent suicidal ideation  "or behaviors.   Patient denies current or recent homicidal ideation or behaviors.   Patient denies current or recent self injurious behavior or ideation.   Patient denies other safety concerns.   Patient reports there has been no change in risk factors since their last session.     Patient reports there has been no change in protective factors since their last session.     A safety and risk management plan has been developed including: Patient consented to co-developed safety plan on 7/8/25.  Safety and risk management plan was reviewed.   Patient agreed to use safety plan should any safety concerns arise.  A copy was made available to the patient.    Julio Safety Plan      Creation Date: 2/7/25 Last Update Date: 7/8/25      Step 1: Warning signs:    Warning Signs    navigating 's dementia      Step 2: Internal coping strategies - Things I can do to take my mind off my problems without contacting another person:    Strategies    reading, cross word puzzles      Step 3: People and social settings that provide distraction:    Name Contact Information    Nannette ovalle programmed in her phone    Pat \"  \"    Kim \"  \"    Yenny \"  \"    Lorna \"  \"    Tamar \"  \"       [employer]      Step 4: People whom I can ask for help during a crisis:    Name Contact Information    Nannette Ovalle \"  \"    Lorna \"  \"    Pat \"  \"    Yenny \"  \"      Step 5: Professionals or agencies I can contact during a crisis:    Clinician/Agency Name Phone Emergency Contact    Michael Coffey  [phone]      [facility] Emergency Department Emergency Department Address Emergency Department Phone    [address] [place], [place], [id number] [phone]    [facility] crisis  [phone]      Suicide Prevention Lifeline Phone: Call or Text 443  Crisis Text Line: Text HOME to 864037     Step 6: Making the environment safer (plan for lethal means safety):   Did not identify any lethal methods     Optional: " What is most important to me and worth living for?:   My daughter, granddaughter and grandson. Nannette Ovalle (best friend).     Julio Safety Plan. Connie Shah and Brian Lamar. Used with permission of the authors.           Appearance:   Appropriate    Eye Contact:   Good    Psychomotor Behavior: Normal    Attitude:   Cooperative    Orientation:   All   Speech    Rate / Production: Normal     Volume:  Normal    Mood:    Normal, depressed    Affect:    Appropriate    Thought Content:  Clear    Thought Form:  Coherent  Logical    Insight:    Good      Medication Review:   No changes to current psychiatric medication(s).     Medication Compliance:   Yes     Changes in Health Issues:   None reported     Chemical Use Review:   Substance Use: Chemical use reviewed, no active concerns identified      Tobacco Use: No current tobacco use.      Diagnosis:  MDD and QUEENIE.    Collateral Reports Completed:   Routed note to PCP when indicated.    PLAN: (Patient Tasks / Therapist Tasks / Other)  Twice per month. Mondays/Tuesdays and Fridays work best.        Elmer Coffey, [facility]                                                         ______________________________________________________________________    Individual Treatment Plan    Patient's Name: Janie De Leon  YOB: 1950    Date of Creation: 7/8/25  Date Treatment Plan Last Reviewed/Revised:      DSM5 Diagnoses: 296.32 (F33.1) Major Depressive Disorder, Recurrent Episode, Moderate With anxious distress or 300.02 (F41.1) Generalized Anxiety Disorder  Psychosocial / Contextual Factors:  has alzheimers.  PROMIS (reviewed every 90 days):     Referral / Collaboration:  Referral to another professional/service is not indicated at this time.    Anticipated number of session for this episode of care: 9-12 sessions  Anticipation frequency of session: Every other week  Anticipated Duration of each session: 38-52 minutes  Treatment plan will be  "reviewed in 90 days or when goals have been changed.       MeasurableTreatment Goal(s) related to diagnosis / functional impairment(s)  Goal 1: Patient will report feeling better about herself    I will know I've met my goal when \"I like myself emotionally.      Objective #A (Patient Action)    Patient will follow her safety plan.  Status: New - Date: 7/8/25     Intervention(s)  Therapist will monitor for safety each visit .    Objective #B  Patient will use a positive affirmation at least daily 100% of trials for 1 week.  Status: New - Date: 7/8/25     Intervention(s)  Therapist will come up with a list of positive affirmations with patient and encourage acceptance.           Patient has reviewed and agreed to the above plan.      Elmer Coffey, [facility]  July 8, 2025   "

## 2025-07-16 SDOH — HEALTH STABILITY: PHYSICAL HEALTH: ON AVERAGE, HOW MANY MINUTES DO YOU ENGAGE IN EXERCISE AT THIS LEVEL?: 0 MIN

## 2025-07-16 SDOH — HEALTH STABILITY: PHYSICAL HEALTH: ON AVERAGE, HOW MANY DAYS PER WEEK DO YOU ENGAGE IN MODERATE TO STRENUOUS EXERCISE (LIKE A BRISK WALK)?: 0 DAYS

## 2025-07-16 ASSESSMENT — SOCIAL DETERMINANTS OF HEALTH (SDOH): HOW OFTEN DO YOU GET TOGETHER WITH FRIENDS OR RELATIVES?: ONCE A WEEK

## 2025-07-20 ASSESSMENT — PATIENT HEALTH QUESTIONNAIRE - PHQ9
10. IF YOU CHECKED OFF ANY PROBLEMS, HOW DIFFICULT HAVE THESE PROBLEMS MADE IT FOR YOU TO DO YOUR WORK, TAKE CARE OF THINGS AT HOME, OR GET ALONG WITH OTHER PEOPLE: SOMEWHAT DIFFICULT
SUM OF ALL RESPONSES TO PHQ QUESTIONS 1-9: 8
SUM OF ALL RESPONSES TO PHQ QUESTIONS 1-9: 8

## 2025-07-21 ENCOUNTER — OFFICE VISIT (OUTPATIENT)
Dept: FAMILY MEDICINE | Facility: CLINIC | Age: 75
End: 2025-07-21
Payer: MEDICARE

## 2025-07-21 VITALS
SYSTOLIC BLOOD PRESSURE: 100 MMHG | BODY MASS INDEX: 28.49 KG/M2 | DIASTOLIC BLOOD PRESSURE: 67 MMHG | TEMPERATURE: 97.2 F | HEART RATE: 63 BPM | OXYGEN SATURATION: 96 % | HEIGHT: 70 IN | WEIGHT: 199 LBS | RESPIRATION RATE: 18 BRPM

## 2025-07-21 DIAGNOSIS — F33.1 MAJOR DEPRESSIVE DISORDER, RECURRENT EPISODE, MODERATE (H): ICD-10-CM

## 2025-07-21 DIAGNOSIS — N64.4 BREAST PAIN, RIGHT: ICD-10-CM

## 2025-07-21 DIAGNOSIS — E78.1 HYPERTRIGLYCERIDEMIA: ICD-10-CM

## 2025-07-21 DIAGNOSIS — N18.30 STAGE 3 CHRONIC KIDNEY DISEASE, UNSPECIFIED WHETHER STAGE 3A OR 3B CKD (H): ICD-10-CM

## 2025-07-21 DIAGNOSIS — D22.9 NUMEROUS SKIN MOLES: ICD-10-CM

## 2025-07-21 DIAGNOSIS — E11.9 TYPE 2 DIABETES MELLITUS WITHOUT COMPLICATION, WITHOUT LONG-TERM CURRENT USE OF INSULIN (H): ICD-10-CM

## 2025-07-21 DIAGNOSIS — Z00.00 ANNUAL PHYSICAL EXAM: Primary | ICD-10-CM

## 2025-07-21 LAB
ERYTHROCYTE [DISTWIDTH] IN BLOOD BY AUTOMATED COUNT: 12.2 % (ref 10–15)
EST. AVERAGE GLUCOSE BLD GHB EST-MCNC: 134 MG/DL
HBA1C MFR BLD: 6.3 % (ref 0–5.6)
HCT VFR BLD AUTO: 47.9 % (ref 35–47)
HGB BLD-MCNC: 15.4 G/DL (ref 11.7–15.7)
MCH RBC QN AUTO: 28.9 PG (ref 26.5–33)
MCHC RBC AUTO-ENTMCNC: 32.2 G/DL (ref 31.5–36.5)
MCV RBC AUTO: 90 FL (ref 78–100)
PLATELET # BLD AUTO: 356 10E3/UL (ref 150–450)
RBC # BLD AUTO: 5.32 10E6/UL (ref 3.8–5.2)
WBC # BLD AUTO: 8.6 10E3/UL (ref 4–11)

## 2025-07-21 PROCEDURE — 80061 LIPID PANEL: CPT | Performed by: INTERNAL MEDICINE

## 2025-07-21 PROCEDURE — 83036 HEMOGLOBIN GLYCOSYLATED A1C: CPT | Performed by: INTERNAL MEDICINE

## 2025-07-21 PROCEDURE — 80053 COMPREHEN METABOLIC PANEL: CPT | Performed by: INTERNAL MEDICINE

## 2025-07-21 PROCEDURE — 85027 COMPLETE CBC AUTOMATED: CPT | Performed by: INTERNAL MEDICINE

## 2025-07-21 PROCEDURE — 3074F SYST BP LT 130 MM HG: CPT | Performed by: INTERNAL MEDICINE

## 2025-07-21 PROCEDURE — 3078F DIAST BP <80 MM HG: CPT | Performed by: INTERNAL MEDICINE

## 2025-07-21 PROCEDURE — 99214 OFFICE O/P EST MOD 30 MIN: CPT | Mod: 25 | Performed by: INTERNAL MEDICINE

## 2025-07-21 PROCEDURE — G0439 PPPS, SUBSEQ VISIT: HCPCS | Performed by: INTERNAL MEDICINE

## 2025-07-21 PROCEDURE — 1126F AMNT PAIN NOTED NONE PRSNT: CPT | Performed by: INTERNAL MEDICINE

## 2025-07-21 PROCEDURE — 3044F HG A1C LEVEL LT 7.0%: CPT | Performed by: INTERNAL MEDICINE

## 2025-07-21 PROCEDURE — 36415 COLL VENOUS BLD VENIPUNCTURE: CPT | Performed by: INTERNAL MEDICINE

## 2025-07-21 ASSESSMENT — PAIN SCALES - GENERAL: PAINLEVEL_OUTOF10: NO PAIN (0)

## 2025-07-21 NOTE — PATIENT INSTRUCTIONS
You are due for mammogram.  Please call the following number to make appointment :  [phone]  It is located in [address]     Show Applicator Variable?: Yes Render Note In Bullet Format When Appropriate: No Detail Level: Detailed Consent: The patient's consent was obtained including but not limited to risks of crusting, scabbing, blistering, scarring, darker or lighter pigmentary change, recurrence, incomplete removal and infection. Spray Paint Text: The liquid nitrogen was applied to the skin utilizing a spray paint frosting technique. Medical Necessity Clause: This procedure was medically necessary because the lesions that were treated were: Post-Care Instructions: I reviewed with the patient in detail post-care instructions. Patient is to wear sunprotection, and avoid picking at any of the treated lesions. Pt may apply Vaseline to crusted or scabbing areas. Medical Necessity Information: It is in your best interest to select a reason for this procedure from the list below. All of these items fulfill various CMS LCD requirements except the new and changing color options.

## 2025-07-21 NOTE — PROGRESS NOTES
"Preventive Care Visit  [facility] NELL Carter MD, Internal Medicine  Jul 21, 2025      Assessment & Plan     Annual physical exam  Up-to-date with colon cancer screening and vaccination.  Due for mammogram.  - CBC with Platelets (Today)  - COMPREHENSIVE METABOLIC PANEL    Stage 3 chronic kidney disease, unspecified whether stage 3a or 3b CKD (H)  Stable.    Breast pain, right  Patient reports right breast pain and itching in the nipple.  Will get a diagnostic mammogram.  - MA Diagnostic Bilateral w/ Eddie; Future    Major depressive disorder, recurrent episode, moderate (H)  Depression: Cymbalta 90 mg daily. Symptoms are well controlled. No SI    Type 2 diabetes mellitus without complication, without long-term current use of insulin (H)  DM: BG well controlled on metformin 500 mg bid.   - Hemoglobin A1c; Future  - Hemoglobin A1c    Numerous skin moles  - Adult Dermatology  Referral; Future    Hypertriglyceridemia  - Lipid Profile  Patient has been advised of split billing requirements and indicates understanding: Yes    BMI  Estimated body mass index is 28.97 kg/m  as calculated from the following:    Height as of this encounter: 1.765 m (5' 9.5\").    Weight as of this encounter: 90.3 kg (199 lb).   Weight management plan: Discussed healthy diet and exercise guidelines    Counseling  Appropriate preventive services were addressed with this patient via screening, questionnaire, or discussion as appropriate for fall prevention, nutrition, physical activity, Tobacco-use cessation, social engagement, weight loss and cognition.  Checklist reviewing preventive services available has been given to the patient.  Reviewed patient's diet, addressing concerns and/or questions.   She is at risk for psychosocial distress and has been provided with information to reduce risk.   Discussed possible causes of fatigue. The patient was provided with written information regarding signs of hearing loss. "   Information on urinary incontinence and treatment options given to patient.       Follow-up  Return in about 6 months (around 1/21/2026).    Marixa Lima is a 75 year old, presenting for the following:  Physical (Medicare Part B 03/01/2024)        7/21/2025    10:50 AM   Additional Questions   Roomed by Jeny HUBER   Accompanied by N/A         7/21/2025   Forms   Any forms needing to be completed Yes          HPI  Janie Jeffrey De Leon is here for KRYSTINA Watson: rash, allergic reaction. Discontinued medication.    Advance Care Planning    Advance care planning document is on file but is outdated.  Patient encouraged to update or provider to update POLST.        7/16/2025   General Health   How would you rate your overall physical health? (!) FAIR   Feel stress (tense, anxious, or unable to sleep) Rather much   (!) STRESS CONCERN      7/16/2025   Nutrition   Diet: I don't know         7/16/2025   Exercise   Days per week of moderate/strenous exercise 0 days   Average minutes spent exercising at this level 0 min   (!) EXERCISE CONCERN      7/16/2025   Social Factors   Frequency of gathering with friends or relatives Once a week   Worry food won't last until get money to buy more No   Food not last or not have enough money for food? No   Do you have housing? (Housing is defined as stable permanent housing and does not include staying outside in a car, in a tent, in an abandoned building, in an overnight shelter, or couch-surfing.) Yes   Are you worried about losing your housing? No   Lack of transportation? No   Unable to get utilities (heat,electricity)? No         7/16/2025   Fall Risk   Fallen 2 or more times in the past year? No   Trouble with walking or balance? No          7/16/2025   Activities of Daily Living- Home Safety   Needs help with the following daily activites None of the above   Safety concerns in the home None of the above         7/16/2025   Dental   Dentist two times every year? Yes         7/16/2025    Hearing Screening   Hearing concerns? (!) IT'S HARD TO FOLLOW A CONVERSATION IN A NOISY RESTAURANT OR CROWDED ROOM.   Would you like a referral for hearing testing? No         2025   Driving Risk Screening   Patient/family members have concerns about driving No         2025   General Alertness/Fatigue Screening   Have you been more tired than usual lately? (!) YES         2025   Urinary Incontinence Screening   Bothered by leaking urine in past 6 months Yes       Today's PHQ-9 Score:       2025    12:00 PM   PHQ-9 SCORE   PHQ-9 Total Score MyChart 8 (Mild depression)   PHQ-9 Total Score 8        Patient-reported         2025   Substance Use   Alcohol more than 3/day or more than 7/wk Not Applicable   Do you have a current opioid prescription? No   How severe/bad is pain from 1 to 10? 4/10   Do you use any other substances recreationally? No     Social History     Tobacco Use    Smoking status: Former     Current packs/day: 0.00     Average packs/day: 0.5 packs/day for 9.6 years (4.8 ttl pk-yrs)     Types: Cigarettes     Start date: 1965     Quit date: 4/15/1975     Years since quittin.3    Smokeless tobacco: Never    Tobacco comments:     social smoker - only smoked when with another smoker   Vaping Use    Vaping status: Never Used   Substance Use Topics    Alcohol use: Yes     Comment: Occasionally 1 beer every few weeks    Drug use: No         9/10/2024   LAST FHS-7 RESULTS   1st degree relative breast or ovarian cancer No   Any relative bilateral breast cancer No   Any male have breast cancer No   Any ONE woman have BOTH breast AND ovarian cancer No   Any woman with breast cancer before 50yrs Yes   2 or more relatives with breast AND/OR ovarian cancer No   2 or more relatives with breast AND/OR bowel cancer Yes       Mammogram Screening - Mammogram every 1-2 years updated in Health Maintenance based on mutual decision making    ASCVD Risk   The ASCVD Risk score (Nataly  DK, et al., 2019) failed to calculate for the following reasons:    Risk score cannot be calculated because patient has a medical history suggesting prior/existing ASCVD    Reviewed and updated as needed this visit by Provider                  Current providers sharing in care for this patient include:  Patient Care Team:  Sirena Carter MD as PCP - General (Internal Medicine)  Horace Cutler MD as Assigned Endocrinology Provider  Jean Hernandez DO as Physician (Neurology)  Jean Hernandez DO as Assigned Neuroscience Provider  Sirena Carter MD as Assigned PCP  Kristian Johnston MD as MD (Cardiovascular Disease)  Roxy Coates RD as Diabetes Educator (Dietitian, Registered)  Kristian Johnston MD as Assigned Heart and Vascular Provider  Mary Saab LPCC (COUNSELOR - PROFESSIONAL)  Mary Saab LPCC as Assigned Behavioral Health Provider    The following health maintenance items are reviewed in Epic and correct as of today:  Health Maintenance   Topic Date Due    ANNUAL REVIEW OF HM ORDERS  08/31/2023    MEDICARE ANNUAL WELLNESS VISIT  09/08/2024    COVID-19 VACCINE (8 - 2024-25 season) 02/28/2025    HEMOGLOBIN  09/25/2025    DEPRESSION 6 MO INDEX REPEAT PHQ-9  08/04/2025    INFLUENZA VACCINE (1) 09/01/2025    BMP  09/25/2025    A1C  12/03/2025    TSH W/FREE T4 REFLEX  12/16/2025    LIPID  01/20/2026    MICROALBUMIN  01/20/2026    DIABETIC FOOT EXAM  01/20/2026    PHQ-9  01/21/2026    EYE EXAM  02/14/2026    QUEENIE ASSESSMENT  07/08/2026    FALL RISK ASSESSMENT  07/21/2026    COLORECTAL CANCER SCREENING  06/16/2027    ADVANCE CARE PLANNING  06/17/2029    DEXA  11/01/2029    DTAP/TDAP/TD VACCINE (4 - Td or Tdap) 11/07/2029    HEPATITIS C SCREENING  Completed    DEPRESSION ACTION PLAN  Completed    PNEUMOCOCCAL VACCINE 50+ YEARS  Completed    URINALYSIS  Completed    ZOSTER VACCINE  Completed    RSV VACCINE  Completed    HPV VACCINE  Aged Out    MENINGITIS VACCINE  Aged  "Out    MAMMO SCREENING  Discontinued        Objective    Exam  /67 (BP Location: Left arm, Patient Position: Sitting, Cuff Size: Adult Regular)   Pulse 63   Temp 97.2  F (36.2  C) (Oral)   Resp 18   Ht 1.765 m (5' 9.5\")   Wt 90.3 kg (199 lb)   LMP  (LMP Unknown)   SpO2 96%   BMI 28.97 kg/m     Estimated body mass index is 28.97 kg/m  as calculated from the following:    Height as of this encounter: 1.765 m (5' 9.5\").    Weight as of this encounter: 90.3 kg (199 lb).    Physical Exam  Vitals reviewed.   Constitutional:       Appearance: Normal appearance.   HENT:      Right Ear: Tympanic membrane normal. There is no impacted cerumen.      Left Ear: Tympanic membrane normal. There is no impacted cerumen.      Mouth/Throat:      Mouth: Mucous membranes are moist.      Pharynx: Oropharynx is clear. No oropharyngeal exudate or posterior oropharyngeal erythema.   Cardiovascular:      Rate and Rhythm: Normal rate and regular rhythm.      Heart sounds: Normal heart sounds. No murmur heard.     No gallop.   Pulmonary:      Effort: Pulmonary effort is normal. No respiratory distress.      Breath sounds: Normal breath sounds. No stridor. No wheezing, rhonchi or rales.   Chest:   Breasts:     Right: No swelling, bleeding, inverted nipple, mass, nipple discharge, skin change or tenderness.      Left: No swelling, bleeding, inverted nipple, mass, nipple discharge, skin change or tenderness.   Abdominal:      General: Abdomen is flat. There is no distension.      Palpations: Abdomen is soft. There is no mass.      Tenderness: There is no abdominal tenderness. There is no guarding.      Hernia: No hernia is present.   Musculoskeletal:         General: Normal range of motion.      Right lower leg: No edema.      Left lower leg: No edema.   Skin:     General: Skin is warm and dry.   Neurological:      General: No focal deficit present.      Mental Status: She is alert.   Psychiatric:         Mood and Affect: Mood " normal.               7/21/2025   Mini Cog   Clock Draw Score 2 Normal    2 Normal   3 Item Recall 3 objects recalled    3 objects recalled   Mini Cog Total Score 5    5       Multiple values from one day are sorted in reverse-chronological order              Signed Electronically by: Sierna Carter MD

## 2025-07-22 ENCOUNTER — PATIENT OUTREACH (OUTPATIENT)
Dept: CARE COORDINATION | Facility: CLINIC | Age: 75
End: 2025-07-22
Payer: MEDICARE

## 2025-07-22 LAB
ALBUMIN SERPL BCG-MCNC: 4.6 G/DL (ref 3.5–5.2)
ALP SERPL-CCNC: 103 U/L (ref 40–150)
ALT SERPL W P-5'-P-CCNC: 10 U/L (ref 0–50)
ANION GAP SERPL CALCULATED.3IONS-SCNC: 12 MMOL/L (ref 7–15)
AST SERPL W P-5'-P-CCNC: 20 U/L (ref 0–45)
BILIRUB SERPL-MCNC: 0.4 MG/DL
BUN SERPL-MCNC: 28.1 MG/DL (ref 8–23)
CALCIUM SERPL-MCNC: 9.8 MG/DL (ref 8.8–10.4)
CHLORIDE SERPL-SCNC: 104 MMOL/L (ref 98–107)
CHOLEST SERPL-MCNC: 158 MG/DL
CREAT SERPL-MCNC: 1.28 MG/DL (ref 0.51–0.95)
EGFRCR SERPLBLD CKD-EPI 2021: 43 ML/MIN/1.73M2
FASTING STATUS PATIENT QL REPORTED: NO
FASTING STATUS PATIENT QL REPORTED: NO
GLUCOSE SERPL-MCNC: 111 MG/DL (ref 70–99)
HCO3 SERPL-SCNC: 25 MMOL/L (ref 22–29)
HDLC SERPL-MCNC: 42 MG/DL
LDLC SERPL CALC-MCNC: 56 MG/DL
NONHDLC SERPL-MCNC: 116 MG/DL
POTASSIUM SERPL-SCNC: 5 MMOL/L (ref 3.4–5.3)
PROT SERPL-MCNC: 7 G/DL (ref 6.4–8.3)
SODIUM SERPL-SCNC: 141 MMOL/L (ref 135–145)
TRIGL SERPL-MCNC: 300 MG/DL

## 2025-07-28 ENCOUNTER — HOSPITAL ENCOUNTER (OUTPATIENT)
Dept: ULTRASOUND IMAGING | Facility: CLINIC | Age: 75
Discharge: HOME | End: 2025-07-28
Attending: INTERNAL MEDICINE
Payer: MEDICARE

## 2025-07-28 ENCOUNTER — HOSPITAL ENCOUNTER (OUTPATIENT)
Dept: MAMMOGRAPHY | Facility: CLINIC | Age: 75
Discharge: HOME | End: 2025-07-28
Attending: INTERNAL MEDICINE
Payer: MEDICARE

## 2025-07-28 DIAGNOSIS — N64.4 BREAST PAIN, RIGHT: ICD-10-CM

## 2025-07-28 PROCEDURE — 76642 ULTRASOUND BREAST LIMITED: CPT | Mod: 50

## 2025-07-28 PROCEDURE — 77066 DX MAMMO INCL CAD BI: CPT

## 2025-08-05 ENCOUNTER — VIRTUAL VISIT (OUTPATIENT)
Dept: PSYCHOLOGY | Facility: CLINIC | Age: 75
End: 2025-08-05
Payer: MEDICARE

## 2025-08-05 DIAGNOSIS — F41.1 GENERALIZED ANXIETY DISORDER: ICD-10-CM

## 2025-08-05 DIAGNOSIS — F33.1 MAJOR DEPRESSIVE DISORDER, RECURRENT EPISODE, MODERATE (H): Primary | ICD-10-CM

## 2025-08-05 DIAGNOSIS — I10 ESSENTIAL HYPERTENSION, BENIGN: Chronic | ICD-10-CM

## 2025-08-05 RX ORDER — AMLODIPINE BESYLATE 5 MG/1
5 TABLET ORAL EVERY EVENING
Qty: 90 TABLET | Refills: 2 | Status: SHIPPED | OUTPATIENT
Start: 2025-08-05

## 2025-08-05 ASSESSMENT — ANXIETY QUESTIONNAIRES
GAD7 TOTAL SCORE: 7
GAD7 TOTAL SCORE: 7
5. BEING SO RESTLESS THAT IT IS HARD TO SIT STILL: NOT AT ALL
8. IF YOU CHECKED OFF ANY PROBLEMS, HOW DIFFICULT HAVE THESE MADE IT FOR YOU TO DO YOUR WORK, TAKE CARE OF THINGS AT HOME, OR GET ALONG WITH OTHER PEOPLE?: SOMEWHAT DIFFICULT
7. FEELING AFRAID AS IF SOMETHING AWFUL MIGHT HAPPEN: NOT AT ALL
7. FEELING AFRAID AS IF SOMETHING AWFUL MIGHT HAPPEN: NOT AT ALL
GAD7 TOTAL SCORE: 7
6. BECOMING EASILY ANNOYED OR IRRITABLE: SEVERAL DAYS
3. WORRYING TOO MUCH ABOUT DIFFERENT THINGS: MORE THAN HALF THE DAYS
2. NOT BEING ABLE TO STOP OR CONTROL WORRYING: MORE THAN HALF THE DAYS
1. FEELING NERVOUS, ANXIOUS, OR ON EDGE: SEVERAL DAYS
4. TROUBLE RELAXING: SEVERAL DAYS
IF YOU CHECKED OFF ANY PROBLEMS ON THIS QUESTIONNAIRE, HOW DIFFICULT HAVE THESE PROBLEMS MADE IT FOR YOU TO DO YOUR WORK, TAKE CARE OF THINGS AT HOME, OR GET ALONG WITH OTHER PEOPLE: SOMEWHAT DIFFICULT

## 2025-08-05 ASSESSMENT — PATIENT HEALTH QUESTIONNAIRE - PHQ9
SUM OF ALL RESPONSES TO PHQ QUESTIONS 1-9: 10
10. IF YOU CHECKED OFF ANY PROBLEMS, HOW DIFFICULT HAVE THESE PROBLEMS MADE IT FOR YOU TO DO YOUR WORK, TAKE CARE OF THINGS AT HOME, OR GET ALONG WITH OTHER PEOPLE: SOMEWHAT DIFFICULT
SUM OF ALL RESPONSES TO PHQ QUESTIONS 1-9: 10

## 2025-08-14 ENCOUNTER — TRANSFERRED RECORDS (OUTPATIENT)
Dept: HEALTH INFORMATION MANAGEMENT | Facility: CLINIC | Age: 75
End: 2025-08-14
Payer: MEDICARE

## 2025-08-19 ENCOUNTER — VIRTUAL VISIT (OUTPATIENT)
Dept: PSYCHOLOGY | Facility: CLINIC | Age: 75
End: 2025-08-19
Payer: MEDICARE

## 2025-08-19 DIAGNOSIS — F33.0 MAJOR DEPRESSIVE DISORDER, RECURRENT EPISODE, MILD: Primary | ICD-10-CM

## 2025-08-19 DIAGNOSIS — F41.1 GENERALIZED ANXIETY DISORDER: ICD-10-CM

## 2025-08-28 ENCOUNTER — TRANSFERRED RECORDS (OUTPATIENT)
Dept: HEALTH INFORMATION MANAGEMENT | Facility: CLINIC | Age: 75
End: 2025-08-28
Payer: MEDICARE

## (undated) DEVICE — LINEN TOWEL PACK X5 5464

## (undated) DEVICE — MANIFOLD NEPTUNE 4 PORT 700-20

## (undated) DEVICE — CAST PADDING 4" UNSTERILE 9044

## (undated) DEVICE — DRAPE C-ARMOR 5 SIDED 5523

## (undated) DEVICE — SU MONOCRYL 3-0 PS-2 27" Y427H

## (undated) DEVICE — PACK EXTREMITY SOP15EXFSD

## (undated) DEVICE — SLEEVE TR BAND RADIAL COMPRESSION DEVICE 24CM TRB24-REG

## (undated) DEVICE — CAST PLASTER SPLINT 5X30" 7395

## (undated) DEVICE — IMM LIMB ELEVATOR DC40-0203

## (undated) DEVICE — GUIDEWIRE VASC 0.014INX180CM RUNTHROUGH 25-1011

## (undated) DEVICE — DRILL BIT ARTHREX 2.5MM AR-8943-42

## (undated) DEVICE — SU MONOCRYL 2-0 CT-1 36" UND Y945H

## (undated) DEVICE — CAST PADDING 4" WEBRIL UNSTERILE

## (undated) DEVICE — GUIDEWIRE VASC 0.035INX150CM INQWIRE J TIP IQ35F150J3F/A

## (undated) DEVICE — DRAPE SHEET REV FOLD 3/4 9349

## (undated) DEVICE — TOTE ANGIO CORP PC15AT SAN32CC83O

## (undated) DEVICE — INTRO GLIDESHEATH SLENDER 6FR 10X45CM 60-1060

## (undated) DEVICE — MANIFOLD KIT ANGIO AUTOMATED 014613

## (undated) DEVICE — CATH DIAGNOSTIC RADIAL 5FR TIG 4.0

## (undated) DEVICE — PREP CHLORAPREP 26ML TINTED HI-LITE ORANGE 930815

## (undated) DEVICE — DRILL BIT ARTHREX CAN 2.6MM AR-8943-02

## (undated) DEVICE — DEFIB PRO-PADZ LVP LQD GEL ADULT 8900-2105-01

## (undated) DEVICE — GLOVE BIOGEL PI MICRO INDICATOR UNDERGLOVE SZ 7.5 48975

## (undated) DEVICE — GW VASC OMNIWIRE J L185CM PRESSURE 89185J

## (undated) DEVICE — DRSG GAUZE 4X4" 3033

## (undated) DEVICE — DRSG ABDOMINAL 07 1/2X8" 7197D

## (undated) DEVICE — SU VICRYL 0 CT-1 36" J346H

## (undated) DEVICE — DRAPE C-ARM 60X42" 1013

## (undated) DEVICE — CATH ANGIO INFINITI PIGTAIL 145 6 SH 6FRX110CM  534-652S

## (undated) DEVICE — ESU GROUND PAD UNIVERSAL W/O CORD

## (undated) DEVICE — KIT HAND CONTROL ANGIOTOUCH ACIST 65CM AT-P65

## (undated) DEVICE — KT CATH DRAGONFLY INTVASC IMG

## (undated) DEVICE — SOL WATER IRRIG 1000ML BOTTLE 2F7114

## (undated) DEVICE — GLOVE BIOGEL PI ULTRATOUCH SZ 7.5 41175

## (undated) DEVICE — DRILL BIT ARTHREX 2.7MM AR-8827D-01

## (undated) DEVICE — KIT LG BORE TOUHY ACCESS PLUS MAP152

## (undated) DEVICE — CATH GUIDING BLUE YELLOW PTFE XB3.5 6FRX100CM 67005400

## (undated) DEVICE — TAPE DURAPORE 3" SILK 1538-3

## (undated) DEVICE — DRSG XEROFORM 1X8"

## (undated) DEVICE — GUIDE WIRE TROCAR TIP 1.35MM AR-8943-01

## (undated) DEVICE — DRILL BIT ARTHREX 2.0MM AR-8943-16

## (undated) DEVICE — BNDG ELASTIC 6" DBL LENGTH UNSTERILE 6611-16

## (undated) DEVICE — Device

## (undated) DEVICE — SU ETHILON 3-0 FS-1 18" 669H

## (undated) DEVICE — SOL NACL 0.9% IRRIG 1000ML BOTTLE 2F7124

## (undated) RX ORDER — DEXAMETHASONE SODIUM PHOSPHATE 4 MG/ML
INJECTION, SOLUTION INTRA-ARTICULAR; INTRALESIONAL; INTRAMUSCULAR; INTRAVENOUS; SOFT TISSUE
Status: DISPENSED
Start: 2023-05-25

## (undated) RX ORDER — HEPARIN SODIUM 1000 [USP'U]/ML
INJECTION, SOLUTION INTRAVENOUS; SUBCUTANEOUS
Status: DISPENSED
Start: 2022-09-26

## (undated) RX ORDER — GLYCOPYRROLATE 0.2 MG/ML
INJECTION, SOLUTION INTRAMUSCULAR; INTRAVENOUS
Status: DISPENSED
Start: 2023-05-25

## (undated) RX ORDER — EPHEDRINE SULFATE 50 MG/ML
INJECTION, SOLUTION INTRAMUSCULAR; INTRAVENOUS; SUBCUTANEOUS
Status: DISPENSED
Start: 2023-05-25

## (undated) RX ORDER — CLOPIDOGREL 300 MG/1
TABLET, FILM COATED ORAL
Status: DISPENSED
Start: 2022-09-26

## (undated) RX ORDER — ONDANSETRON 2 MG/ML
INJECTION INTRAMUSCULAR; INTRAVENOUS
Status: DISPENSED
Start: 2023-05-25

## (undated) RX ORDER — LIDOCAINE HYDROCHLORIDE 10 MG/ML
INJECTION, SOLUTION EPIDURAL; INFILTRATION; INTRACAUDAL; PERINEURAL
Status: DISPENSED
Start: 2022-09-26

## (undated) RX ORDER — NEOSTIGMINE METHYLSULFATE 1 MG/ML
VIAL (ML) INJECTION
Status: DISPENSED
Start: 2023-05-25

## (undated) RX ORDER — ASPIRIN 81 MG/1
TABLET, CHEWABLE ORAL
Status: DISPENSED
Start: 2022-09-26

## (undated) RX ORDER — PROPOFOL 10 MG/ML
INJECTION, EMULSION INTRAVENOUS
Status: DISPENSED
Start: 2023-05-25

## (undated) RX ORDER — CEFAZOLIN SODIUM/WATER 2 G/20 ML
SYRINGE (ML) INTRAVENOUS
Status: DISPENSED
Start: 2023-05-25

## (undated) RX ORDER — HEPARIN SODIUM 200 [USP'U]/100ML
INJECTION, SOLUTION INTRAVENOUS
Status: DISPENSED
Start: 2022-09-26

## (undated) RX ORDER — FENTANYL CITRATE 50 UG/ML
INJECTION, SOLUTION INTRAMUSCULAR; INTRAVENOUS
Status: DISPENSED
Start: 2019-06-20

## (undated) RX ORDER — VERAPAMIL HYDROCHLORIDE 2.5 MG/ML
INJECTION, SOLUTION INTRAVENOUS
Status: DISPENSED
Start: 2022-09-26

## (undated) RX ORDER — FENTANYL CITRATE 50 UG/ML
INJECTION, SOLUTION INTRAMUSCULAR; INTRAVENOUS
Status: DISPENSED
Start: 2022-09-26

## (undated) RX ORDER — FENTANYL CITRATE 50 UG/ML
INJECTION, SOLUTION INTRAMUSCULAR; INTRAVENOUS
Status: DISPENSED
Start: 2023-05-25

## (undated) RX ORDER — NITROGLYCERIN 5 MG/ML
VIAL (ML) INTRAVENOUS
Status: DISPENSED
Start: 2022-09-26

## (undated) RX ORDER — FENTANYL CITRATE 50 UG/ML
INJECTION, SOLUTION INTRAMUSCULAR; INTRAVENOUS
Status: DISPENSED
Start: 2022-06-16